# Patient Record
Sex: FEMALE | Race: WHITE | Employment: FULL TIME | URBAN - NONMETROPOLITAN AREA
[De-identification: names, ages, dates, MRNs, and addresses within clinical notes are randomized per-mention and may not be internally consistent; named-entity substitution may affect disease eponyms.]

---

## 2017-02-20 ENCOUNTER — OFFICE VISIT (OUTPATIENT)
Dept: PRIMARY CARE CLINIC | Age: 67
End: 2017-02-20
Payer: MEDICARE

## 2017-02-20 VITALS
BODY MASS INDEX: 25.04 KG/M2 | SYSTOLIC BLOOD PRESSURE: 124 MMHG | TEMPERATURE: 98.4 F | HEIGHT: 69 IN | DIASTOLIC BLOOD PRESSURE: 78 MMHG | RESPIRATION RATE: 16 BRPM | WEIGHT: 169.1 LBS | HEART RATE: 53 BPM | OXYGEN SATURATION: 98 %

## 2017-02-20 DIAGNOSIS — E53.8 B12 DEFICIENCY: ICD-10-CM

## 2017-02-20 DIAGNOSIS — Z23 NEED FOR VACCINATION WITH 13-POLYVALENT PNEUMOCOCCAL CONJUGATE VACCINE: ICD-10-CM

## 2017-02-20 DIAGNOSIS — E03.9 HYPOTHYROIDISM, UNSPECIFIED TYPE: Primary | ICD-10-CM

## 2017-02-20 DIAGNOSIS — E78.5 HYPERLIPIDEMIA, UNSPECIFIED HYPERLIPIDEMIA TYPE: ICD-10-CM

## 2017-02-20 DIAGNOSIS — Z12.31 SCREENING MAMMOGRAM, ENCOUNTER FOR: ICD-10-CM

## 2017-02-20 LAB
ANION GAP SERPL CALCULATED.3IONS-SCNC: 16 MMOL/L (ref 7–19)
BUN BLDV-MCNC: 19 MG/DL (ref 8–23)
CALCIUM SERPL-MCNC: 9.4 MG/DL (ref 8.8–10.2)
CHLORIDE BLD-SCNC: 103 MMOL/L (ref 98–111)
CHOLESTEROL, TOTAL: 246 MG/DL (ref 160–199)
CO2: 24 MMOL/L (ref 22–29)
CREAT SERPL-MCNC: 0.6 MG/DL (ref 0.5–0.9)
GFR NON-AFRICAN AMERICAN: >60
GLUCOSE BLD-MCNC: 93 MG/DL (ref 74–109)
HDLC SERPL-MCNC: 53 MG/DL (ref 65–121)
LDL CHOLESTEROL CALCULATED: 171 MG/DL
POTASSIUM SERPL-SCNC: 4.5 MMOL/L (ref 3.5–5)
SODIUM BLD-SCNC: 143 MMOL/L (ref 136–145)
T4 FREE: 0.9 NG/ML (ref 0.9–1.7)
TRIGL SERPL-MCNC: 112 MG/DL (ref 150–199)
TSH SERPL DL<=0.05 MIU/L-ACNC: 7.8 UIU/ML (ref 0.27–4.2)
VITAMIN B-12: 122 PG/ML (ref 211–946)

## 2017-02-20 PROCEDURE — 90670 PCV13 VACCINE IM: CPT | Performed by: FAMILY MEDICINE

## 2017-02-20 PROCEDURE — 99214 OFFICE O/P EST MOD 30 MIN: CPT | Performed by: FAMILY MEDICINE

## 2017-02-20 PROCEDURE — 36415 COLL VENOUS BLD VENIPUNCTURE: CPT | Performed by: FAMILY MEDICINE

## 2017-02-20 PROCEDURE — G0009 ADMIN PNEUMOCOCCAL VACCINE: HCPCS | Performed by: FAMILY MEDICINE

## 2017-02-21 RX ORDER — LEVOTHYROXINE SODIUM 0.07 MG/1
75 TABLET ORAL DAILY
Qty: 30 TABLET | Refills: 2 | Status: SHIPPED | OUTPATIENT
Start: 2017-02-21 | End: 2017-03-24 | Stop reason: SDUPTHER

## 2017-02-22 ASSESSMENT — ENCOUNTER SYMPTOMS
DIARRHEA: 0
SINUS PRESSURE: 0
COLOR CHANGE: 0
BLOOD IN STOOL: 0
ABDOMINAL PAIN: 0
COUGH: 0
EYE DISCHARGE: 0
CHEST TIGHTNESS: 0
WHEEZING: 0
EYE ITCHING: 0
RHINORRHEA: 0
NAUSEA: 0
EYE REDNESS: 0
VOMITING: 0

## 2017-02-28 DIAGNOSIS — Z12.11 SCREEN FOR COLON CANCER: Primary | ICD-10-CM

## 2017-02-28 LAB
CONTROL: NORMAL
HEMOCCULT STL QL: NORMAL

## 2017-02-28 PROCEDURE — 82274 ASSAY TEST FOR BLOOD FECAL: CPT | Performed by: FAMILY MEDICINE

## 2017-03-07 ENCOUNTER — HOSPITAL ENCOUNTER (OUTPATIENT)
Dept: WOMENS IMAGING | Age: 67
Discharge: HOME | End: 2017-03-07
Payer: MEDICARE

## 2017-03-07 DIAGNOSIS — Z12.31 SCREENING MAMMOGRAM, ENCOUNTER FOR: ICD-10-CM

## 2017-03-07 PROCEDURE — G0202 SCR MAMMO BI INCL CAD: HCPCS

## 2017-03-10 RX ORDER — ATORVASTATIN CALCIUM 40 MG/1
40 TABLET, FILM COATED ORAL NIGHTLY
Qty: 90 TABLET | Refills: 3 | Status: SHIPPED | OUTPATIENT
Start: 2017-03-10 | End: 2017-11-20 | Stop reason: SDUPTHER

## 2017-03-24 RX ORDER — LEVOTHYROXINE SODIUM 0.07 MG/1
75 TABLET ORAL DAILY
Qty: 90 TABLET | Refills: 3 | Status: SHIPPED | OUTPATIENT
Start: 2017-03-24 | End: 2017-05-31 | Stop reason: SDUPTHER

## 2017-04-03 ENCOUNTER — HOSPITAL ENCOUNTER (EMERGENCY)
Age: 67
Discharge: LEFT BEFORE DISCHARGE | End: 2017-04-03
Payer: MEDICARE

## 2017-04-03 VITALS
HEART RATE: 72 BPM | HEIGHT: 70 IN | WEIGHT: 170 LBS | TEMPERATURE: 100.4 F | OXYGEN SATURATION: 94 % | RESPIRATION RATE: 22 BRPM | BODY MASS INDEX: 24.34 KG/M2

## 2017-04-03 PROCEDURE — 4500000002 HC ER NO CHARGE

## 2017-04-03 ASSESSMENT — PAIN SCALES - GENERAL: PAINLEVEL_OUTOF10: 3

## 2017-04-03 ASSESSMENT — PAIN DESCRIPTION - FREQUENCY: FREQUENCY: INTERMITTENT

## 2017-04-04 ENCOUNTER — OFFICE VISIT (OUTPATIENT)
Dept: PRIMARY CARE CLINIC | Age: 67
End: 2017-04-04
Payer: MEDICARE

## 2017-04-04 ENCOUNTER — HOSPITAL ENCOUNTER (OUTPATIENT)
Dept: GENERAL RADIOLOGY | Age: 67
Discharge: HOME | End: 2017-04-04
Payer: MEDICARE

## 2017-04-04 VITALS
DIASTOLIC BLOOD PRESSURE: 64 MMHG | SYSTOLIC BLOOD PRESSURE: 112 MMHG | HEIGHT: 69 IN | BODY MASS INDEX: 25.48 KG/M2 | TEMPERATURE: 96.3 F | OXYGEN SATURATION: 99 % | RESPIRATION RATE: 18 BRPM | WEIGHT: 172 LBS | HEART RATE: 62 BPM

## 2017-04-04 DIAGNOSIS — R06.02 SHORTNESS OF BREATH: ICD-10-CM

## 2017-04-04 DIAGNOSIS — R07.9 CHEST PAIN, UNSPECIFIED TYPE: ICD-10-CM

## 2017-04-04 DIAGNOSIS — J40 BRONCHITIS: Primary | ICD-10-CM

## 2017-04-04 DIAGNOSIS — R22.2 MASS IN CHEST: Primary | ICD-10-CM

## 2017-04-04 PROCEDURE — 96372 THER/PROPH/DIAG INJ SC/IM: CPT | Performed by: FAMILY MEDICINE

## 2017-04-04 PROCEDURE — 99213 OFFICE O/P EST LOW 20 MIN: CPT | Performed by: FAMILY MEDICINE

## 2017-04-04 PROCEDURE — 93000 ELECTROCARDIOGRAM COMPLETE: CPT | Performed by: FAMILY MEDICINE

## 2017-04-04 PROCEDURE — 71020 XR CHEST STANDARD TWO VW: CPT

## 2017-04-04 RX ORDER — BETAMETHASONE SODIUM PHOSPHATE AND BETAMETHASONE ACETATE 3; 3 MG/ML; MG/ML
12 INJECTION, SUSPENSION INTRA-ARTICULAR; INTRALESIONAL; INTRAMUSCULAR; SOFT TISSUE ONCE
Status: COMPLETED | OUTPATIENT
Start: 2017-04-04 | End: 2017-04-04

## 2017-04-04 RX ORDER — ALBUTEROL SULFATE 90 UG/1
2 AEROSOL, METERED RESPIRATORY (INHALATION) EVERY 6 HOURS PRN
Qty: 1 INHALER | Refills: 3 | Status: SHIPPED | OUTPATIENT
Start: 2017-04-04 | End: 2018-05-24 | Stop reason: ALTCHOICE

## 2017-04-04 RX ADMIN — BETAMETHASONE SODIUM PHOSPHATE AND BETAMETHASONE ACETATE 12 MG: 3; 3 INJECTION, SUSPENSION INTRA-ARTICULAR; INTRALESIONAL; INTRAMUSCULAR; SOFT TISSUE at 11:29

## 2017-04-04 ASSESSMENT — ENCOUNTER SYMPTOMS
RHINORRHEA: 1
ABDOMINAL PAIN: 0
DIARRHEA: 0
NAUSEA: 0
COUGH: 1
WHEEZING: 0
BACK PAIN: 0
COLOR CHANGE: 0
EYE DISCHARGE: 0
VOMITING: 0
SINUS PRESSURE: 1

## 2017-04-07 ENCOUNTER — HOSPITAL ENCOUNTER (OUTPATIENT)
Dept: CT IMAGING | Age: 67
Discharge: HOME | End: 2017-04-07
Payer: MEDICARE

## 2017-04-07 DIAGNOSIS — R22.2 MASS IN CHEST: ICD-10-CM

## 2017-04-07 PROCEDURE — 71250 CT THORAX DX C-: CPT

## 2017-04-10 ENCOUNTER — TELEPHONE (OUTPATIENT)
Dept: PRIMARY CARE CLINIC | Age: 67
End: 2017-04-10

## 2017-04-11 ENCOUNTER — TELEPHONE (OUTPATIENT)
Dept: PRIMARY CARE CLINIC | Age: 67
End: 2017-04-11

## 2017-04-11 DIAGNOSIS — R22.2 CHEST MASS: Primary | ICD-10-CM

## 2017-04-24 ENCOUNTER — OFFICE VISIT (OUTPATIENT)
Dept: PRIMARY CARE CLINIC | Age: 67
End: 2017-04-24
Payer: MEDICARE

## 2017-04-24 VITALS
RESPIRATION RATE: 18 BRPM | TEMPERATURE: 98.3 F | SYSTOLIC BLOOD PRESSURE: 110 MMHG | WEIGHT: 169 LBS | BODY MASS INDEX: 25.03 KG/M2 | HEART RATE: 61 BPM | DIASTOLIC BLOOD PRESSURE: 68 MMHG | HEIGHT: 69 IN

## 2017-04-24 DIAGNOSIS — Z13.820 SCREENING FOR OSTEOPOROSIS: ICD-10-CM

## 2017-04-24 DIAGNOSIS — Z78.0 POSTMENOPAUSAL: ICD-10-CM

## 2017-04-24 DIAGNOSIS — E03.9 HYPOTHYROIDISM, UNSPECIFIED TYPE: Primary | ICD-10-CM

## 2017-04-24 PROCEDURE — 99213 OFFICE O/P EST LOW 20 MIN: CPT | Performed by: FAMILY MEDICINE

## 2017-04-25 ENCOUNTER — OFFICE VISIT (OUTPATIENT)
Dept: CARDIOTHORACIC SURGERY | Age: 67
End: 2017-04-25
Payer: MEDICARE

## 2017-04-25 VITALS
HEIGHT: 69 IN | WEIGHT: 169 LBS | SYSTOLIC BLOOD PRESSURE: 124 MMHG | OXYGEN SATURATION: 100 % | DIASTOLIC BLOOD PRESSURE: 60 MMHG | BODY MASS INDEX: 25.03 KG/M2

## 2017-04-25 DIAGNOSIS — R91.1 PULMONARY NODULE, RIGHT: Primary | ICD-10-CM

## 2017-04-25 PROCEDURE — 99204 OFFICE O/P NEW MOD 45 MIN: CPT | Performed by: THORACIC SURGERY (CARDIOTHORACIC VASCULAR SURGERY)

## 2017-04-25 ASSESSMENT — ENCOUNTER SYMPTOMS
DIARRHEA: 0
WHEEZING: 0
COLOR CHANGE: 0
ABDOMINAL PAIN: 0
VOMITING: 0
COUGH: 0
NAUSEA: 0
EYE DISCHARGE: 0
BACK PAIN: 0

## 2017-05-03 ENCOUNTER — HOSPITAL ENCOUNTER (OUTPATIENT)
Dept: WOMENS IMAGING | Age: 67
Discharge: HOME | End: 2017-05-03
Payer: MEDICARE

## 2017-05-03 DIAGNOSIS — Z78.0 POSTMENOPAUSAL: ICD-10-CM

## 2017-05-03 DIAGNOSIS — Z13.820 SCREENING FOR OSTEOPOROSIS: ICD-10-CM

## 2017-05-03 PROCEDURE — 77080 DXA BONE DENSITY AXIAL: CPT

## 2017-05-10 DIAGNOSIS — R91.1 PULMONARY NODULE: Primary | ICD-10-CM

## 2017-05-16 ENCOUNTER — HOSPITAL ENCOUNTER (OUTPATIENT)
Dept: NUCLEAR MEDICINE | Age: 67
Discharge: HOME | End: 2017-05-16
Payer: MEDICARE

## 2017-05-16 DIAGNOSIS — R91.1 PULMONARY NODULE: ICD-10-CM

## 2017-05-16 LAB
GLUCOSE BLD-MCNC: 94 MG/DL (ref 70–99)
PERFORMED ON: NORMAL

## 2017-05-16 PROCEDURE — 78815 PET IMAGE W/CT SKULL-THIGH: CPT

## 2017-05-16 PROCEDURE — 3430000000 HC RX DIAGNOSTIC RADIOPHARMACEUTICAL: Performed by: THORACIC SURGERY (CARDIOTHORACIC VASCULAR SURGERY)

## 2017-05-16 PROCEDURE — A9552 F18 FDG: HCPCS | Performed by: THORACIC SURGERY (CARDIOTHORACIC VASCULAR SURGERY)

## 2017-05-16 PROCEDURE — 82948 REAGENT STRIP/BLOOD GLUCOSE: CPT

## 2017-05-16 RX ORDER — FLUDEOXYGLUCOSE F 18 200 MCI/ML
10 INJECTION, SOLUTION INTRAVENOUS
Status: COMPLETED | OUTPATIENT
Start: 2017-05-16 | End: 2017-05-16

## 2017-05-16 RX ADMIN — FLUDEOXYGLUCOSE F 18 10 MILLICURIE: 200 INJECTION, SOLUTION INTRAVENOUS at 12:59

## 2017-05-17 ENCOUNTER — TELEPHONE (OUTPATIENT)
Dept: PRIMARY CARE CLINIC | Age: 67
End: 2017-05-17

## 2017-05-17 ENCOUNTER — TELEPHONE (OUTPATIENT)
Dept: CARDIOTHORACIC SURGERY | Age: 67
End: 2017-05-17

## 2017-05-17 DIAGNOSIS — R94.8 ABNORMAL PET SCAN OF COLON: Primary | ICD-10-CM

## 2017-05-30 ENCOUNTER — OFFICE VISIT (OUTPATIENT)
Dept: RETAIL CLINIC | Facility: CLINIC | Age: 67
End: 2017-05-30

## 2017-05-30 VITALS
RESPIRATION RATE: 18 BRPM | WEIGHT: 171.6 LBS | HEART RATE: 71 BPM | DIASTOLIC BLOOD PRESSURE: 79 MMHG | BODY MASS INDEX: 25.42 KG/M2 | SYSTOLIC BLOOD PRESSURE: 106 MMHG | HEIGHT: 69 IN | OXYGEN SATURATION: 98 % | TEMPERATURE: 98.2 F
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
## 2017-05-30 PROCEDURE — 99203 OFFICE O/P NEW LOW 30 MIN: CPT | Performed by: NURSE PRACTITIONER

## 2017-05-30 RX ORDER — ATORVASTATIN CALCIUM 80 MG/1
80 TABLET, FILM COATED ORAL DAILY
COMMUNITY
End: 2020-06-26

## 2017-05-30 RX ORDER — LEVOTHYROXINE SODIUM 0.07 MG/1
75 TABLET ORAL DAILY
COMMUNITY
End: 2020-06-26

## 2017-05-30 RX ORDER — LEVOTHYROXINE SODIUM 0.03 MG/1
25 TABLET ORAL DAILY
COMMUNITY
End: 2017-05-30

## 2017-05-31 RX ORDER — LEVOTHYROXINE SODIUM 0.07 MG/1
75 TABLET ORAL DAILY
Qty: 90 TABLET | Refills: 0 | Status: SHIPPED | OUTPATIENT
Start: 2017-05-31 | End: 2017-11-20 | Stop reason: SDUPTHER

## 2017-06-04 ENCOUNTER — OFFICE VISIT (OUTPATIENT)
Dept: RETAIL CLINIC | Facility: CLINIC | Age: 67
End: 2017-06-04

## 2017-06-04 VITALS
OXYGEN SATURATION: 98 % | TEMPERATURE: 97.8 F | RESPIRATION RATE: 20 BRPM | SYSTOLIC BLOOD PRESSURE: 106 MMHG | DIASTOLIC BLOOD PRESSURE: 62 MMHG | HEART RATE: 80 BPM

## 2017-06-04 DIAGNOSIS — J06.9 VIRAL UPPER RESPIRATORY TRACT INFECTION: Primary | ICD-10-CM

## 2017-06-04 DIAGNOSIS — J40 BRONCHITIS: ICD-10-CM

## 2017-06-04 PROCEDURE — 99213 OFFICE O/P EST LOW 20 MIN: CPT | Performed by: NURSE PRACTITIONER

## 2017-06-04 RX ORDER — PREDNISONE 20 MG/1
60 TABLET ORAL DAILY
Qty: 19 TABLET | Refills: 0 | Status: SHIPPED | OUTPATIENT
Start: 2017-06-04 | End: 2020-06-26

## 2017-06-04 RX ORDER — AZITHROMYCIN 250 MG/1
TABLET, FILM COATED ORAL
Qty: 6 TABLET | Refills: 0 | Status: SHIPPED | OUTPATIENT
Start: 2017-06-04 | End: 2020-06-26

## 2017-06-04 NOTE — PROGRESS NOTES
Chief Complaint   Patient presents with   • Cough   • Sore Throat     Subjective   Petty Cortés is a 66 y.o. female who presents to the clinic today with complaints cough and sore throat. She was treated for bronchitis April. She had abnormal chest x-ray and Dr. Mccartney worked her up with resolution of mass likely pulmonary infiltrate possibly pneumonia. This resolved, but she woke up yesterday with cough and sore throat that has gotten worse. Her son reports she is coughed all night. She reports she has chest burning during cough. Associated symptoms are sore throat and chills with no fever. She has mild frontal HA. She also reports nasal congestion and sinus pressure. She does smoke. She taking nothing for the cough or sore throat. She reports it is worse at night.   She reports flea bites almost resolved. She has not had her dog treated. Encouraged her to do this.       Current Outpatient Prescriptions:   •  atorvastatin (LIPITOR) 80 MG tablet, Take 80 mg by mouth Daily., Disp: , Rfl:   •  levothyroxine (SYNTHROID, LEVOTHROID) 75 MCG tablet, Take 75 mcg by mouth Daily., Disp: , Rfl:   •  triamcinolone (KENALOG) 0.1 % ointment, Apply  topically 3 (Three) Times a Day., Disp: 30 g, Rfl: 0    Allergies:  Review of patient's allergies indicates no known allergies.    Past Medical History:   Diagnosis Date   • Collapsed lung      Past Surgical History:   Procedure Laterality Date   • CHEST TUBE INSERTION     • TUBAL ABDOMINAL LIGATION       Family History   Problem Relation Age of Onset   • Stroke Mother    • Diabetes Mother    • Stroke Father    • Diabetes Brother    • Diabetes Child      Social History   Substance Use Topics   • Smoking status: Current Every Day Smoker     Packs/day: 1.00     Years: 47.00     Types: Cigarettes   • Smokeless tobacco: Never Used      Comment: Thinking about it due to pulmonary issues.    • Alcohol use No       Review of Systems  Review of Systems   Constitutional: Positive for  chills and fatigue.   HENT: Positive for sore throat.    Eyes: Negative.    Respiratory: Positive for cough.    Cardiovascular: Negative.    Gastrointestinal: Negative.    Endocrine: Negative.    Genitourinary: Negative.    Musculoskeletal: Negative.    Skin: Negative.    Allergic/Immunologic: Negative.    Neurological: Positive for headaches.   Hematological: Negative.    Psychiatric/Behavioral: Negative.        Objective   /62 (BP Location: Left arm, Patient Position: Sitting, Cuff Size: Adult)  Pulse 80  Temp 97.8 °F (36.6 °C) (Temporal Artery )   Resp 20  SpO2 98%      Physical Exam   Constitutional: She is oriented to person, place, and time. She appears well-developed and well-nourished.   HENT:   Head: Normocephalic and atraumatic.   Right Ear: Hearing, external ear and ear canal normal. Tympanic membrane is bulging.   Left Ear: Hearing, external ear and ear canal normal. Tympanic membrane is bulging.   Nose: Nose normal. Right sinus exhibits no maxillary sinus tenderness and no frontal sinus tenderness. Left sinus exhibits no maxillary sinus tenderness and no frontal sinus tenderness.   Mouth/Throat: Uvula is midline and mucous membranes are normal. Abnormal dentition (missing teeth). Posterior oropharyngeal erythema present. Tonsils are 1+ on the right. Tonsils are 1+ on the left. No tonsillar exudate.   Eyes: Pupils are equal, round, and reactive to light.   Neck: Normal range of motion.   Cardiovascular: Normal rate, regular rhythm and normal heart sounds.    Pulmonary/Chest: Effort normal and breath sounds normal. No stridor. No respiratory distress. She has no wheezes. She has no rales. She exhibits no tenderness.   Nonproductive cough noted during exam several times.    Lymphadenopathy:     She has no cervical adenopathy.   Neurological: She is alert and oriented to person, place, and time.   Skin: Skin is warm and dry.   Psychiatric: She has a normal mood and affect. Her behavior is normal.    Vitals reviewed.      Assessment/Plan     Petty was seen today for cough and sore throat.    Diagnoses and all orders for this visit:    Viral upper respiratory tract infection    Bronchitis    Other orders  -     azithromycin (ZITHROMAX Z-ROMEO) 250 MG tablet; Take 2 tablets the first day, then 1 tablet daily for 4 days.  -     predniSONE (DELTASONE) 20 MG tablet; Take 3 tablets by mouth Daily. 3 tabs po daily x 3 days  then 2 tabs daily x 3 days  then 1 tab daily x 3 days then 1/2 tab daily x 2 days    Reviewed chest x-ray, chest CT and PET looks like she had consolidation that resolved during workup of chest mass in April when she presented with similar symptoms. Her PE shows no evidence of pneumonia and lungs CTA, but will treat for underlying pneumonia and have her follow up with her pcp.     Follow up with pcp if does not improve over next few days. She has follow up on 6/13.   3 tabs po daily x 3 days  then 2 tabs daily x 3 days  then 1 tab daily x 3 days then 1/2 tab daily x 2 days  Go to emergency room if fever of over 101 develops, symptoms worsen or with any respiratory distress.     Recommend smoking cessation she is going to try to cut down.

## 2017-06-04 NOTE — PATIENT INSTRUCTIONS
Acute Bronchitis  Bronchitis is inflammation of the airways that extend from the windpipe into the lungs (bronchi). The inflammation often causes mucus to develop. This leads to a cough, which is the most common symptom of bronchitis.   In acute bronchitis, the condition usually develops suddenly and goes away over time, usually in a couple weeks. Smoking, allergies, and asthma can make bronchitis worse. Repeated episodes of bronchitis may cause further lung problems.   CAUSES  Acute bronchitis is most often caused by the same virus that causes a cold. The virus can spread from person to person (contagious) through coughing, sneezing, and touching contaminated objects.  SIGNS AND SYMPTOMS   · Cough.    · Fever.    · Coughing up mucus.    · Body aches.    · Chest congestion.    · Chills.    · Shortness of breath.    · Sore throat.    DIAGNOSIS   Acute bronchitis is usually diagnosed through a physical exam. Your health care provider will also ask you questions about your medical history. Tests, such as chest X-rays, are sometimes done to rule out other conditions.   TREATMENT   Acute bronchitis usually goes away in a couple weeks. Oftentimes, no medical treatment is necessary. Medicines are sometimes given for relief of fever or cough. Antibiotic medicines are usually not needed but may be prescribed in certain situations. In some cases, an inhaler may be recommended to help reduce shortness of breath and control the cough. A cool mist vaporizer may also be used to help thin bronchial secretions and make it easier to clear the chest.   HOME CARE INSTRUCTIONS  · Get plenty of rest.    · Drink enough fluids to keep your urine clear or pale yellow (unless you have a medical condition that requires fluid restriction). Increasing fluids may help thin your respiratory secretions (sputum) and reduce chest congestion, and it will prevent dehydration.    · Take medicines only as directed by your health care provider.  · If  you were prescribed an antibiotic medicine, finish it all even if you start to feel better.  · Avoid smoking and secondhand smoke. Exposure to cigarette smoke or irritating chemicals will make bronchitis worse. If you are a smoker, consider using nicotine gum or skin patches to help control withdrawal symptoms. Quitting smoking will help your lungs heal faster.    · Reduce the chances of another bout of acute bronchitis by washing your hands frequently, avoiding people with cold symptoms, and trying not to touch your hands to your mouth, nose, or eyes.    · Keep all follow-up visits as directed by your health care provider.    SEEK MEDICAL CARE IF:  Your symptoms do not improve after 1 week of treatment.   SEEK IMMEDIATE MEDICAL CARE IF:  · You develop an increased fever or chills.    · You have chest pain.    · You have severe shortness of breath.  · You have bloody sputum.    · You develop dehydration.  · You faint or repeatedly feel like you are going to pass out.  · You develop repeated vomiting.  · You develop a severe headache.  MAKE SURE YOU:   · Understand these instructions.  · Will watch your condition.  · Will get help right away if you are not doing well or get worse.     This information is not intended to replace advice given to you by your health care provider. Make sure you discuss any questions you have with your health care provider.     Document Released: 01/25/2006 Document Revised: 01/08/2016 Document Reviewed: 06/10/2014  [employer] Interactive Patient Education ©2017 [employer]    Follow up with pcp if does not improve over next few days. She has follow up on 6/13.   3 tabs po daily x 3 days  then 2 tabs daily x 3 days  then 1 tab daily x 3 days then 1/2 tab daily x 2 days  Go to emergency room if fever of over 101 develops, symptoms worsen or with any respiratory distress.   Upper Respiratory Infection, Adult  Most upper respiratory infections (URIs) are a viral infection of the air passages  "leading to the lungs. A URI affects the nose, throat, and upper air passages. The most common type of URI is nasopharyngitis and is typically referred to as \"the common cold.\"  URIs run their course and usually go away on their own. Most of the time, a URI does not require medical attention, but sometimes a bacterial infection in the upper airways can follow a viral infection. This is called a secondary infection. Sinus and middle ear infections are common types of secondary upper respiratory infections.  Bacterial pneumonia can also complicate a URI. A URI can worsen asthma and chronic obstructive pulmonary disease (COPD). Sometimes, these complications can require emergency medical care and may be life threatening.   CAUSES  Almost all URIs are caused by viruses. A virus is a type of germ and can spread from one person to another.   RISKS FACTORS  You may be at risk for a URI if:   · You smoke.    · You have chronic heart or lung disease.  · You have a weakened defense (immune) system.    · You are very young or very old.    · You have nasal allergies or asthma.  · You work in crowded or poorly ventilated areas.  · You work in health care facilities or schools.  SIGNS AND SYMPTOMS   Symptoms typically develop 2-3 days after you come in contact with a cold virus. Most viral URIs last 7-10 days. However, viral URIs from the influenza virus (flu virus) can last 14-18 days and are typically more severe. Symptoms may include:   · Runny or stuffy (congested) nose.    · Sneezing.    · Cough.    · Sore throat.    · Headache.    · Fatigue.    · Fever.    · Loss of appetite.    · Pain in your forehead, behind your eyes, and over your cheekbones (sinus pain).  · Muscle aches.    DIAGNOSIS   Your health care provider may diagnose a URI by:  · Physical exam.  · Tests to check that your symptoms are not due to another condition such as:  ¨ Strep throat.  ¨ Sinusitis.  ¨ Pneumonia.  ¨ Asthma.  TREATMENT   A URI goes away on its " own with time. It cannot be cured with medicines, but medicines may be prescribed or recommended to relieve symptoms. Medicines may help:  · Reduce your fever.  · Reduce your cough.  · Relieve nasal congestion.  HOME CARE INSTRUCTIONS   · Take medicines only as directed by your health care provider.    · Gargle warm saltwater or take cough drops to comfort your throat as directed by your health care provider.  · Use a warm mist humidifier or inhale steam from a shower to increase air moisture. This may make it easier to breathe.  · Drink enough fluid to keep your urine clear or pale yellow.    · Eat soups and other clear broths and maintain good nutrition.    · Rest as needed.    · Return to work when your temperature has returned to normal or as your health care provider advises. You may need to stay home longer to avoid infecting others. You can also use a face mask and careful hand washing to prevent spread of the virus.  · Increase the usage of your inhaler if you have asthma.    · Do not use any tobacco products, including cigarettes, chewing tobacco, or electronic cigarettes. If you need help quitting, ask your health care provider.  PREVENTION   The best way to protect yourself from getting a cold is to practice good hygiene.   · Avoid oral or hand contact with people with cold symptoms.    · Wash your hands often if contact occurs.    There is no clear evidence that vitamin C, vitamin E, echinacea, or exercise reduces the chance of developing a cold. However, it is always recommended to get plenty of rest, exercise, and practice good nutrition.   SEEK MEDICAL CARE IF:   · You are getting worse rather than better.    · Your symptoms are not controlled by medicine.    · You have chills.  · You have worsening shortness of breath.  · You have brown or red mucus.  · You have yellow or brown nasal discharge.  · You have pain in your face, especially when you bend forward.  · You have a fever.  · You have swollen  neck glands.  · You have pain while swallowing.  · You have white areas in the back of your throat.  SEEK IMMEDIATE MEDICAL CARE IF:   · You have severe or persistent:    Headache.    Ear pain.    Sinus pain.    Chest pain.  · You have chronic lung disease and any of the following:    Wheezing.    Prolonged cough.    Coughing up blood.    A change in your usual mucus.  · You have a stiff neck.  · You have changes in your:    Vision.    Hearing.    Thinking.    Mood.  MAKE SURE YOU:   · Understand these instructions.  · Will watch your condition.  · Will get help right away if you are not doing well or get worse.     This information is not intended to replace advice given to you by your health care provider. Make sure you discuss any questions you have with your health care provider.     Document Released: 06/13/2002 Document Revised: 05/03/2016 Document Reviewed: 03/25/2015  [employer] Interactive Patient Education ©2017 [employer]    Follow up with pcp if does not improve over next few days. She has follow up on 6/13.   3 tabs po daily x 3 days  then 2 tabs daily x 3 days  then 1 tab daily x 3 days then 1/2 tab daily x 2 days  Go to emergency room if fever of over 101 develops, symptoms worsen or with any respiratory distress.

## 2017-06-07 ENCOUNTER — OFFICE VISIT (OUTPATIENT)
Dept: GASTROENTEROLOGY | Age: 67
End: 2017-06-07
Payer: MEDICARE

## 2017-06-07 VITALS
BODY MASS INDEX: 25.03 KG/M2 | HEART RATE: 66 BPM | WEIGHT: 169 LBS | DIASTOLIC BLOOD PRESSURE: 60 MMHG | OXYGEN SATURATION: 97 % | SYSTOLIC BLOOD PRESSURE: 110 MMHG | HEIGHT: 69 IN

## 2017-06-07 DIAGNOSIS — Z83.71 FAMILY HISTORY OF POLYPS IN THE COLON: ICD-10-CM

## 2017-06-07 DIAGNOSIS — R94.8 ABNORMAL PET SCAN OF COLON: Primary | ICD-10-CM

## 2017-06-07 PROBLEM — Z83.719 FAMILY HISTORY OF POLYPS IN THE COLON: Status: ACTIVE | Noted: 2017-06-07

## 2017-06-07 PROCEDURE — 99214 OFFICE O/P EST MOD 30 MIN: CPT | Performed by: NURSE PRACTITIONER

## 2017-06-07 ASSESSMENT — ENCOUNTER SYMPTOMS
CONSTIPATION: 0
ANAL BLEEDING: 0
BLOOD IN STOOL: 0
ABDOMINAL DISTENTION: 0
PHOTOPHOBIA: 0
NAUSEA: 0
DIARRHEA: 1
SORE THROAT: 0
BACK PAIN: 0
CHOKING: 0
WHEEZING: 0
VOMITING: 0
COUGH: 1
RECTAL PAIN: 0
ABDOMINAL PAIN: 0

## 2017-06-12 ENCOUNTER — OFFICE VISIT (OUTPATIENT)
Dept: PRIMARY CARE CLINIC | Age: 67
End: 2017-06-12
Payer: MEDICARE

## 2017-06-12 VITALS
BODY MASS INDEX: 25.62 KG/M2 | TEMPERATURE: 97.6 F | WEIGHT: 173 LBS | DIASTOLIC BLOOD PRESSURE: 64 MMHG | HEART RATE: 50 BPM | HEIGHT: 69 IN | SYSTOLIC BLOOD PRESSURE: 125 MMHG | RESPIRATION RATE: 18 BRPM

## 2017-06-12 DIAGNOSIS — J40 BRONCHITIS: ICD-10-CM

## 2017-06-12 DIAGNOSIS — E03.9 HYPOTHYROIDISM, UNSPECIFIED TYPE: Primary | ICD-10-CM

## 2017-06-12 LAB
T4 FREE: 1.6 NG/ML (ref 0.9–1.7)
TSH SERPL DL<=0.05 MIU/L-ACNC: 0.7 UIU/ML (ref 0.27–4.2)

## 2017-06-12 PROCEDURE — 36415 COLL VENOUS BLD VENIPUNCTURE: CPT | Performed by: FAMILY MEDICINE

## 2017-06-12 PROCEDURE — 99213 OFFICE O/P EST LOW 20 MIN: CPT | Performed by: FAMILY MEDICINE

## 2017-06-12 RX ORDER — BENZONATATE 100 MG/1
100 CAPSULE ORAL 3 TIMES DAILY PRN
Qty: 30 CAPSULE | Refills: 0 | Status: SHIPPED | OUTPATIENT
Start: 2017-06-12 | End: 2017-06-19

## 2017-06-12 ASSESSMENT — ENCOUNTER SYMPTOMS
ABDOMINAL PAIN: 0
COUGH: 1
COLOR CHANGE: 0
NAUSEA: 0
VOMITING: 0
WHEEZING: 1
DIARRHEA: 0
BACK PAIN: 0
EYE DISCHARGE: 0

## 2017-06-27 ENCOUNTER — ANESTHESIA (OUTPATIENT)
Dept: OPERATING ROOM | Age: 67
End: 2017-06-27
Payer: MEDICARE

## 2017-06-27 ENCOUNTER — ANESTHESIA EVENT (OUTPATIENT)
Dept: OPERATING ROOM | Age: 67
End: 2017-06-27

## 2017-06-27 ENCOUNTER — HOSPITAL ENCOUNTER (OUTPATIENT)
Age: 67
Setting detail: SPECIMEN
Discharge: HOME | End: 2017-06-27
Payer: MEDICARE

## 2017-06-27 ENCOUNTER — HOSPITAL ENCOUNTER (OUTPATIENT)
Age: 67
Setting detail: OUTPATIENT SURGERY
Discharge: HOME | End: 2017-06-27
Attending: INTERNAL MEDICINE | Admitting: INTERNAL MEDICINE
Payer: MEDICARE

## 2017-06-27 VITALS
DIASTOLIC BLOOD PRESSURE: 66 MMHG | OXYGEN SATURATION: 98 % | RESPIRATION RATE: 18 BRPM | SYSTOLIC BLOOD PRESSURE: 126 MMHG | WEIGHT: 173 LBS | BODY MASS INDEX: 25.62 KG/M2 | HEART RATE: 46 BPM | HEIGHT: 69 IN | TEMPERATURE: 98.7 F

## 2017-06-27 VITALS — OXYGEN SATURATION: 99 % | DIASTOLIC BLOOD PRESSURE: 67 MMHG | SYSTOLIC BLOOD PRESSURE: 115 MMHG

## 2017-06-27 PROCEDURE — G8918 PT W/O PREOP ORDER IV AB PRO: HCPCS

## 2017-06-27 PROCEDURE — 88305 TISSUE EXAM BY PATHOLOGIST: CPT

## 2017-06-27 PROCEDURE — 45380 COLONOSCOPY AND BIOPSY: CPT | Performed by: INTERNAL MEDICINE

## 2017-06-27 PROCEDURE — 45380 COLONOSCOPY AND BIOPSY: CPT

## 2017-06-27 PROCEDURE — 00810 PR ANESTH,INTESTINE,SCOPE,LOW: CPT | Performed by: NURSE ANESTHETIST, CERTIFIED REGISTERED

## 2017-06-27 PROCEDURE — G8907 PT DOC NO EVENTS ON DISCHARG: HCPCS

## 2017-06-27 RX ORDER — PROPOFOL 10 MG/ML
INJECTION, EMULSION INTRAVENOUS PRN
Status: DISCONTINUED | OUTPATIENT
Start: 2017-06-27 | End: 2017-06-27 | Stop reason: SDUPTHER

## 2017-06-27 RX ORDER — SODIUM CHLORIDE, SODIUM LACTATE, POTASSIUM CHLORIDE, CALCIUM CHLORIDE 600; 310; 30; 20 MG/100ML; MG/100ML; MG/100ML; MG/100ML
INJECTION, SOLUTION INTRAVENOUS CONTINUOUS PRN
Status: DISCONTINUED | OUTPATIENT
Start: 2017-06-27 | End: 2017-06-27 | Stop reason: SDUPTHER

## 2017-06-27 RX ADMIN — PROPOFOL 200 MG: 10 INJECTION, EMULSION INTRAVENOUS at 10:46

## 2017-06-27 RX ADMIN — SODIUM CHLORIDE, SODIUM LACTATE, POTASSIUM CHLORIDE, CALCIUM CHLORIDE: 600; 310; 30; 20 INJECTION, SOLUTION INTRAVENOUS at 10:42

## 2017-11-20 ENCOUNTER — OFFICE VISIT (OUTPATIENT)
Dept: PRIMARY CARE CLINIC | Age: 67
End: 2017-11-20
Payer: MEDICARE

## 2017-11-20 VITALS
RESPIRATION RATE: 20 BRPM | TEMPERATURE: 96.6 F | HEART RATE: 77 BPM | WEIGHT: 170 LBS | HEIGHT: 69 IN | BODY MASS INDEX: 25.18 KG/M2 | OXYGEN SATURATION: 97 % | DIASTOLIC BLOOD PRESSURE: 66 MMHG | SYSTOLIC BLOOD PRESSURE: 104 MMHG

## 2017-11-20 DIAGNOSIS — R53.83 FATIGUE, UNSPECIFIED TYPE: ICD-10-CM

## 2017-11-20 DIAGNOSIS — E03.9 HYPOTHYROIDISM, UNSPECIFIED TYPE: Primary | ICD-10-CM

## 2017-11-20 DIAGNOSIS — Z23 NEED FOR INFLUENZA VACCINATION: ICD-10-CM

## 2017-11-20 LAB
T4 FREE: 0.7 NG/DL (ref 0.9–1.7)
TSH SERPL DL<=0.05 MIU/L-ACNC: 19.47 UIU/ML (ref 0.27–4.2)

## 2017-11-20 PROCEDURE — G8427 DOCREV CUR MEDS BY ELIG CLIN: HCPCS | Performed by: FAMILY MEDICINE

## 2017-11-20 PROCEDURE — 3014F SCREEN MAMMO DOC REV: CPT | Performed by: FAMILY MEDICINE

## 2017-11-20 PROCEDURE — G8484 FLU IMMUNIZE NO ADMIN: HCPCS | Performed by: FAMILY MEDICINE

## 2017-11-20 PROCEDURE — 1123F ACP DISCUSS/DSCN MKR DOCD: CPT | Performed by: FAMILY MEDICINE

## 2017-11-20 PROCEDURE — 4040F PNEUMOC VAC/ADMIN/RCVD: CPT | Performed by: FAMILY MEDICINE

## 2017-11-20 PROCEDURE — G8419 CALC BMI OUT NRM PARAM NOF/U: HCPCS | Performed by: FAMILY MEDICINE

## 2017-11-20 PROCEDURE — 90688 IIV4 VACCINE SPLT 0.5 ML IM: CPT | Performed by: FAMILY MEDICINE

## 2017-11-20 PROCEDURE — 99214 OFFICE O/P EST MOD 30 MIN: CPT | Performed by: FAMILY MEDICINE

## 2017-11-20 PROCEDURE — 3017F COLORECTAL CA SCREEN DOC REV: CPT | Performed by: FAMILY MEDICINE

## 2017-11-20 PROCEDURE — G0008 ADMIN INFLUENZA VIRUS VAC: HCPCS | Performed by: FAMILY MEDICINE

## 2017-11-20 PROCEDURE — 1090F PRES/ABSN URINE INCON ASSESS: CPT | Performed by: FAMILY MEDICINE

## 2017-11-20 PROCEDURE — 4004F PT TOBACCO SCREEN RCVD TLK: CPT | Performed by: FAMILY MEDICINE

## 2017-11-20 PROCEDURE — 36415 COLL VENOUS BLD VENIPUNCTURE: CPT | Performed by: FAMILY MEDICINE

## 2017-11-20 PROCEDURE — G8399 PT W/DXA RESULTS DOCUMENT: HCPCS | Performed by: FAMILY MEDICINE

## 2017-11-20 RX ORDER — ATORVASTATIN CALCIUM 40 MG/1
40 TABLET, FILM COATED ORAL NIGHTLY
Qty: 90 TABLET | Refills: 3 | Status: SHIPPED | OUTPATIENT
Start: 2017-11-20 | End: 2019-04-24 | Stop reason: SDUPTHER

## 2017-11-20 RX ORDER — LEVOTHYROXINE SODIUM 0.07 MG/1
75 TABLET ORAL DAILY
Qty: 90 TABLET | Refills: 3 | Status: SHIPPED | OUTPATIENT
Start: 2017-11-20 | End: 2018-02-20 | Stop reason: DRUGHIGH

## 2017-11-20 ASSESSMENT — ENCOUNTER SYMPTOMS
WHEEZING: 0
COLOR CHANGE: 0
EYE DISCHARGE: 0
BACK PAIN: 0
VOMITING: 0
COUGH: 0
DIARRHEA: 0
ABDOMINAL PAIN: 0
NAUSEA: 0

## 2017-11-20 NOTE — PROGRESS NOTES
Subjective:      Patient ID: Jake Galicia is a 73 y.o. female. HPI  Hypothyroidism: Recent symptoms: fatigue. She denies dry skin. Patient is  taking her medication consistently on an empty stomach. Fatigue: Patient complains of fatigue. Symptoms began several days ago. Sentinal symptom the patient feels fatigue began with: none. Symptoms of her fatigue have been fatigue with paradoxical insomnia. Patient describes the following psychologic symptoms: none. Patient denies fever, significant change in weight and GI blood loss. Symptoms have stabilized. Severity has been mild. Previous visits for this problem: none. No results found for: [facility]  Lab Results   Component Value Date    TSH 0.70 06/12/2017    TSH 7.80 (H) 02/20/2017    TSH 11.97 (H) 10/17/2016     Review of Systems   Constitutional: Positive for fatigue. Negative for activity change, appetite change and fever. HENT: Negative for congestion and nosebleeds. Eyes: Negative for discharge. Respiratory: Negative for cough and wheezing. Cardiovascular: Negative for chest pain and leg swelling. Gastrointestinal: Negative for abdominal pain, diarrhea, nausea and vomiting. Genitourinary: Negative for difficulty urinating, frequency and urgency. Musculoskeletal: Negative for back pain and gait problem. Skin: Negative for color change and rash. Neurological: Negative for dizziness and headaches. Hematological: Does not bruise/bleed easily. Psychiatric/Behavioral: Negative for sleep disturbance (tossing and turning at night) and suicidal ideas. Objective:   Physical Exam   Constitutional: She is oriented to person, place, and time. She appears well-developed and well-nourished. HENT:   Head: Normocephalic and atraumatic. Neck: Normal range of motion. Neck supple. Carotid bruit is not present. No thyroid mass and no thyromegaly present. Cardiovascular: Normal rate, regular rhythm and intact distal pulses.   Exam

## 2018-01-05 ENCOUNTER — NURSE ONLY (OUTPATIENT)
Dept: PRIMARY CARE CLINIC | Age: 68
End: 2018-01-05
Payer: MEDICARE

## 2018-01-05 DIAGNOSIS — E03.9 HYPOTHYROIDISM, UNSPECIFIED TYPE: Primary | ICD-10-CM

## 2018-01-05 LAB
T4 FREE: 1.3 NG/DL (ref 0.9–1.7)
TSH SERPL DL<=0.05 MIU/L-ACNC: 4.78 UIU/ML (ref 0.27–4.2)

## 2018-01-05 PROCEDURE — 36415 COLL VENOUS BLD VENIPUNCTURE: CPT | Performed by: FAMILY MEDICINE

## 2018-01-09 RX ORDER — LEVOTHYROXINE SODIUM 88 UG/1
88 TABLET ORAL DAILY
Qty: 30 TABLET | Refills: 3 | Status: SHIPPED | OUTPATIENT
Start: 2018-01-09 | End: 2018-02-21 | Stop reason: SDUPTHER

## 2018-02-20 ENCOUNTER — OFFICE VISIT (OUTPATIENT)
Dept: PRIMARY CARE CLINIC | Age: 68
End: 2018-02-20
Payer: MEDICARE

## 2018-02-20 VITALS
WEIGHT: 167 LBS | OXYGEN SATURATION: 98 % | RESPIRATION RATE: 20 BRPM | DIASTOLIC BLOOD PRESSURE: 76 MMHG | HEART RATE: 60 BPM | HEIGHT: 69 IN | SYSTOLIC BLOOD PRESSURE: 134 MMHG | BODY MASS INDEX: 24.73 KG/M2 | TEMPERATURE: 97.3 F

## 2018-02-20 DIAGNOSIS — E03.9 ACQUIRED HYPOTHYROIDISM: Primary | ICD-10-CM

## 2018-02-20 DIAGNOSIS — Z23 NEED FOR PNEUMOCOCCAL VACCINATION: ICD-10-CM

## 2018-02-20 PROCEDURE — G8484 FLU IMMUNIZE NO ADMIN: HCPCS | Performed by: FAMILY MEDICINE

## 2018-02-20 PROCEDURE — G8399 PT W/DXA RESULTS DOCUMENT: HCPCS | Performed by: FAMILY MEDICINE

## 2018-02-20 PROCEDURE — 99213 OFFICE O/P EST LOW 20 MIN: CPT | Performed by: FAMILY MEDICINE

## 2018-02-20 PROCEDURE — 1123F ACP DISCUSS/DSCN MKR DOCD: CPT | Performed by: FAMILY MEDICINE

## 2018-02-20 PROCEDURE — 3017F COLORECTAL CA SCREEN DOC REV: CPT | Performed by: FAMILY MEDICINE

## 2018-02-20 PROCEDURE — 4004F PT TOBACCO SCREEN RCVD TLK: CPT | Performed by: FAMILY MEDICINE

## 2018-02-20 PROCEDURE — G8427 DOCREV CUR MEDS BY ELIG CLIN: HCPCS | Performed by: FAMILY MEDICINE

## 2018-02-20 PROCEDURE — 3014F SCREEN MAMMO DOC REV: CPT | Performed by: FAMILY MEDICINE

## 2018-02-20 PROCEDURE — 1090F PRES/ABSN URINE INCON ASSESS: CPT | Performed by: FAMILY MEDICINE

## 2018-02-20 PROCEDURE — 4040F PNEUMOC VAC/ADMIN/RCVD: CPT | Performed by: FAMILY MEDICINE

## 2018-02-20 PROCEDURE — G0009 ADMIN PNEUMOCOCCAL VACCINE: HCPCS | Performed by: FAMILY MEDICINE

## 2018-02-20 PROCEDURE — 90732 PPSV23 VACC 2 YRS+ SUBQ/IM: CPT | Performed by: FAMILY MEDICINE

## 2018-02-20 PROCEDURE — G8420 CALC BMI NORM PARAMETERS: HCPCS | Performed by: FAMILY MEDICINE

## 2018-02-20 ASSESSMENT — PATIENT HEALTH QUESTIONNAIRE - PHQ9
SUM OF ALL RESPONSES TO PHQ QUESTIONS 1-9: 0
2. FEELING DOWN, DEPRESSED OR HOPELESS: 0
SUM OF ALL RESPONSES TO PHQ9 QUESTIONS 1 & 2: 0
1. LITTLE INTEREST OR PLEASURE IN DOING THINGS: 0

## 2018-02-21 RX ORDER — LEVOTHYROXINE SODIUM 88 UG/1
88 TABLET ORAL DAILY
Qty: 30 TABLET | Refills: 3 | Status: SHIPPED | OUTPATIENT
Start: 2018-02-21 | End: 2018-02-23 | Stop reason: SDUPTHER

## 2018-02-23 RX ORDER — LEVOTHYROXINE SODIUM 88 UG/1
88 TABLET ORAL DAILY
Qty: 90 TABLET | Refills: 3 | Status: SHIPPED | OUTPATIENT
Start: 2018-02-23 | End: 2019-02-27 | Stop reason: SDUPTHER

## 2018-02-24 ASSESSMENT — ENCOUNTER SYMPTOMS
COLOR CHANGE: 0
NAUSEA: 0
EYE DISCHARGE: 0
WHEEZING: 0
DIARRHEA: 0
BACK PAIN: 0
VOMITING: 0
ABDOMINAL PAIN: 0
COUGH: 0

## 2018-03-19 ENCOUNTER — OFFICE VISIT (OUTPATIENT)
Dept: PRIMARY CARE CLINIC | Age: 68
End: 2018-03-19
Payer: MEDICARE

## 2018-03-19 VITALS
WEIGHT: 166.4 LBS | HEART RATE: 71 BPM | TEMPERATURE: 97.3 F | DIASTOLIC BLOOD PRESSURE: 72 MMHG | OXYGEN SATURATION: 96 % | SYSTOLIC BLOOD PRESSURE: 121 MMHG | HEIGHT: 65 IN | BODY MASS INDEX: 27.72 KG/M2

## 2018-03-19 DIAGNOSIS — J01.00 ACUTE NON-RECURRENT MAXILLARY SINUSITIS: Primary | ICD-10-CM

## 2018-03-19 DIAGNOSIS — J02.9 SORE THROAT: ICD-10-CM

## 2018-03-19 LAB — S PYO AG THROAT QL: NORMAL

## 2018-03-19 PROCEDURE — 87880 STREP A ASSAY W/OPTIC: CPT | Performed by: FAMILY MEDICINE

## 2018-03-19 PROCEDURE — 1123F ACP DISCUSS/DSCN MKR DOCD: CPT | Performed by: FAMILY MEDICINE

## 2018-03-19 PROCEDURE — 1090F PRES/ABSN URINE INCON ASSESS: CPT | Performed by: FAMILY MEDICINE

## 2018-03-19 PROCEDURE — 4040F PNEUMOC VAC/ADMIN/RCVD: CPT | Performed by: FAMILY MEDICINE

## 2018-03-19 PROCEDURE — 99213 OFFICE O/P EST LOW 20 MIN: CPT | Performed by: FAMILY MEDICINE

## 2018-03-19 PROCEDURE — 3014F SCREEN MAMMO DOC REV: CPT | Performed by: FAMILY MEDICINE

## 2018-03-19 PROCEDURE — 4004F PT TOBACCO SCREEN RCVD TLK: CPT | Performed by: FAMILY MEDICINE

## 2018-03-19 PROCEDURE — 3017F COLORECTAL CA SCREEN DOC REV: CPT | Performed by: FAMILY MEDICINE

## 2018-03-19 PROCEDURE — G8482 FLU IMMUNIZE ORDER/ADMIN: HCPCS | Performed by: FAMILY MEDICINE

## 2018-03-19 PROCEDURE — G8419 CALC BMI OUT NRM PARAM NOF/U: HCPCS | Performed by: FAMILY MEDICINE

## 2018-03-19 PROCEDURE — G8427 DOCREV CUR MEDS BY ELIG CLIN: HCPCS | Performed by: FAMILY MEDICINE

## 2018-03-19 PROCEDURE — G8399 PT W/DXA RESULTS DOCUMENT: HCPCS | Performed by: FAMILY MEDICINE

## 2018-03-20 RX ORDER — METHYLPREDNISOLONE 4 MG/1
TABLET ORAL
Qty: 1 KIT | Refills: 0 | Status: SHIPPED | OUTPATIENT
Start: 2018-03-20 | End: 2018-03-26

## 2018-03-20 RX ORDER — BENZONATATE 100 MG/1
100 CAPSULE ORAL 3 TIMES DAILY PRN
Qty: 30 CAPSULE | Refills: 0 | Status: SHIPPED | OUTPATIENT
Start: 2018-03-20 | End: 2018-03-27

## 2018-03-20 RX ORDER — AZITHROMYCIN 250 MG/1
TABLET, FILM COATED ORAL
Qty: 1 PACKET | Refills: 0 | Status: SHIPPED | OUTPATIENT
Start: 2018-03-20 | End: 2018-03-30

## 2018-03-21 ASSESSMENT — ENCOUNTER SYMPTOMS
EYE DISCHARGE: 0
ABDOMINAL PAIN: 0
SINUS PRESSURE: 1
EYE ITCHING: 0
SINUS PAIN: 1
NAUSEA: 0
COLOR CHANGE: 0
WHEEZING: 0
VOMITING: 0
DIARRHEA: 0
CHEST TIGHTNESS: 0
RHINORRHEA: 1
COUGH: 1
SORE THROAT: 1
EYE REDNESS: 0

## 2018-05-24 ENCOUNTER — OFFICE VISIT (OUTPATIENT)
Dept: PRIMARY CARE CLINIC | Age: 68
End: 2018-05-24
Payer: MEDICARE

## 2018-05-24 VITALS
SYSTOLIC BLOOD PRESSURE: 112 MMHG | HEIGHT: 65 IN | DIASTOLIC BLOOD PRESSURE: 66 MMHG | BODY MASS INDEX: 27.99 KG/M2 | OXYGEN SATURATION: 98 % | RESPIRATION RATE: 20 BRPM | WEIGHT: 168 LBS | TEMPERATURE: 98.3 F | HEART RATE: 65 BPM

## 2018-05-24 DIAGNOSIS — R06.02 SHORTNESS OF BREATH: ICD-10-CM

## 2018-05-24 DIAGNOSIS — E03.9 ACQUIRED HYPOTHYROIDISM: ICD-10-CM

## 2018-05-24 DIAGNOSIS — R53.83 FATIGUE, UNSPECIFIED TYPE: Primary | ICD-10-CM

## 2018-05-24 LAB
BASOPHILS ABSOLUTE: 0 K/UL (ref 0–0.2)
BASOPHILS RELATIVE PERCENT: 0.2 % (ref 0–1)
EOSINOPHILS ABSOLUTE: 0.1 K/UL (ref 0–0.6)
EOSINOPHILS RELATIVE PERCENT: 1.9 % (ref 0–5)
HCT VFR BLD CALC: 42 % (ref 37–47)
HEMOGLOBIN: 13.2 G/DL (ref 12–16)
LYMPHOCYTES ABSOLUTE: 1.6 K/UL (ref 1.1–4.5)
LYMPHOCYTES RELATIVE PERCENT: 26 % (ref 20–40)
MCH RBC QN AUTO: 28.9 PG (ref 27–31)
MCHC RBC AUTO-ENTMCNC: 31.4 G/DL (ref 33–37)
MCV RBC AUTO: 91.9 FL (ref 81–99)
MONOCYTES ABSOLUTE: 0.5 K/UL (ref 0–0.9)
MONOCYTES RELATIVE PERCENT: 8 % (ref 0–10)
NEUTROPHILS ABSOLUTE: 4 K/UL (ref 1.5–7.5)
NEUTROPHILS RELATIVE PERCENT: 63.6 % (ref 50–65)
PDW BLD-RTO: 13.8 % (ref 11.5–14.5)
PLATELET # BLD: 144 K/UL (ref 130–400)
PMV BLD AUTO: 11.9 FL (ref 9.4–12.3)
RBC # BLD: 4.57 M/UL (ref 4.2–5.4)
T4 FREE: 1.5 NG/DL (ref 0.9–1.7)
TSH SERPL DL<=0.05 MIU/L-ACNC: 1.25 UIU/ML (ref 0.27–4.2)
VITAMIN B-12: 150 PG/ML (ref 211–946)
WBC # BLD: 6.2 K/UL (ref 4.8–10.8)

## 2018-05-24 PROCEDURE — 1090F PRES/ABSN URINE INCON ASSESS: CPT | Performed by: FAMILY MEDICINE

## 2018-05-24 PROCEDURE — 99214 OFFICE O/P EST MOD 30 MIN: CPT | Performed by: FAMILY MEDICINE

## 2018-05-24 PROCEDURE — G8427 DOCREV CUR MEDS BY ELIG CLIN: HCPCS | Performed by: FAMILY MEDICINE

## 2018-05-24 PROCEDURE — G8399 PT W/DXA RESULTS DOCUMENT: HCPCS | Performed by: FAMILY MEDICINE

## 2018-05-24 PROCEDURE — 1123F ACP DISCUSS/DSCN MKR DOCD: CPT | Performed by: FAMILY MEDICINE

## 2018-05-24 PROCEDURE — 3017F COLORECTAL CA SCREEN DOC REV: CPT | Performed by: FAMILY MEDICINE

## 2018-05-24 PROCEDURE — 36415 COLL VENOUS BLD VENIPUNCTURE: CPT | Performed by: FAMILY MEDICINE

## 2018-05-24 PROCEDURE — 4004F PT TOBACCO SCREEN RCVD TLK: CPT | Performed by: FAMILY MEDICINE

## 2018-05-24 PROCEDURE — G8419 CALC BMI OUT NRM PARAM NOF/U: HCPCS | Performed by: FAMILY MEDICINE

## 2018-05-24 PROCEDURE — 4040F PNEUMOC VAC/ADMIN/RCVD: CPT | Performed by: FAMILY MEDICINE

## 2018-05-31 RX ORDER — SYRINGE W-NEEDLE,DISPOSAB,3 ML 25GX5/8"
SYRINGE, EMPTY DISPOSABLE MISCELLANEOUS
Qty: 10 EACH | Refills: 0 | Status: SHIPPED | OUTPATIENT
Start: 2018-05-31 | End: 2018-08-16 | Stop reason: SDUPTHER

## 2018-05-31 RX ORDER — CYANOCOBALAMIN 1000 UG/ML
INJECTION INTRAMUSCULAR; SUBCUTANEOUS
Qty: 10 ML | Refills: 2 | Status: SHIPPED | OUTPATIENT
Start: 2018-05-31 | End: 2018-06-11 | Stop reason: CLARIF

## 2018-06-01 ASSESSMENT — ENCOUNTER SYMPTOMS
COLOR CHANGE: 0
BACK PAIN: 0
VOMITING: 0
EYE DISCHARGE: 0
SHORTNESS OF BREATH: 1
COUGH: 0
NAUSEA: 0
WHEEZING: 0
ABDOMINAL PAIN: 0
DIARRHEA: 0

## 2018-06-06 ENCOUNTER — TELEPHONE (OUTPATIENT)
Dept: PRIMARY CARE CLINIC | Age: 68
End: 2018-06-06

## 2018-06-06 RX ORDER — CYANOCOBALAMIN 1000 UG/ML
1000 INJECTION INTRAMUSCULAR; SUBCUTANEOUS
Qty: 1 ML | Refills: 5 | Status: SHIPPED | OUTPATIENT
Start: 2018-06-06 | End: 2018-06-11 | Stop reason: SDUPTHER

## 2018-06-11 RX ORDER — CYANOCOBALAMIN 1000 UG/ML
1000 INJECTION INTRAMUSCULAR; SUBCUTANEOUS
Qty: 3 ML | Refills: 3 | Status: SHIPPED | OUTPATIENT
Start: 2018-06-11 | End: 2018-07-16 | Stop reason: SDUPTHER

## 2018-07-16 RX ORDER — CYANOCOBALAMIN 1000 UG/ML
1000 INJECTION INTRAMUSCULAR; SUBCUTANEOUS
Qty: 3 ML | Refills: 3 | Status: SHIPPED | OUTPATIENT
Start: 2018-07-16 | End: 2018-09-25

## 2018-08-16 RX ORDER — SYRINGE WITH NEEDLE, 1 ML 25GX5/8"
SYRINGE, EMPTY DISPOSABLE MISCELLANEOUS
Qty: 6 EACH | Refills: 3 | Status: SHIPPED | OUTPATIENT
Start: 2018-08-16 | End: 2018-09-25

## 2018-09-06 ENCOUNTER — OFFICE VISIT (OUTPATIENT)
Dept: PRIMARY CARE CLINIC | Age: 68
End: 2018-09-06
Payer: MEDICARE

## 2018-09-06 VITALS
RESPIRATION RATE: 18 BRPM | HEART RATE: 53 BPM | TEMPERATURE: 97.7 F | SYSTOLIC BLOOD PRESSURE: 110 MMHG | WEIGHT: 166.8 LBS | HEIGHT: 69 IN | BODY MASS INDEX: 24.71 KG/M2 | OXYGEN SATURATION: 98 % | DIASTOLIC BLOOD PRESSURE: 70 MMHG

## 2018-09-06 DIAGNOSIS — E03.9 ACQUIRED HYPOTHYROIDISM: Primary | ICD-10-CM

## 2018-09-06 DIAGNOSIS — E78.5 HYPERLIPIDEMIA, UNSPECIFIED HYPERLIPIDEMIA TYPE: ICD-10-CM

## 2018-09-06 DIAGNOSIS — E53.8 B12 DEFICIENCY: ICD-10-CM

## 2018-09-06 DIAGNOSIS — R21 RASH: ICD-10-CM

## 2018-09-06 LAB
ALBUMIN SERPL-MCNC: 4.1 G/DL (ref 3.5–5.2)
ALP BLD-CCNC: 78 U/L (ref 35–104)
ALT SERPL-CCNC: 14 U/L (ref 5–33)
ANION GAP SERPL CALCULATED.3IONS-SCNC: 15 MMOL/L (ref 7–19)
AST SERPL-CCNC: 17 U/L (ref 5–32)
BASOPHILS ABSOLUTE: 0 K/UL (ref 0–0.2)
BASOPHILS RELATIVE PERCENT: 0.4 % (ref 0–1)
BILIRUB SERPL-MCNC: 0.6 MG/DL (ref 0.2–1.2)
BUN BLDV-MCNC: 12 MG/DL (ref 8–23)
CALCIUM SERPL-MCNC: 9.6 MG/DL (ref 8.8–10.2)
CHLORIDE BLD-SCNC: 103 MMOL/L (ref 98–111)
CHOLESTEROL, TOTAL: 147 MG/DL (ref 160–199)
CO2: 25 MMOL/L (ref 22–29)
CREAT SERPL-MCNC: 0.5 MG/DL (ref 0.5–0.9)
EOSINOPHILS ABSOLUTE: 0.1 K/UL (ref 0–0.6)
EOSINOPHILS RELATIVE PERCENT: 1.1 % (ref 0–5)
GFR NON-AFRICAN AMERICAN: >60
GLUCOSE BLD-MCNC: 89 MG/DL (ref 74–109)
HCT VFR BLD CALC: 44 % (ref 37–47)
HDLC SERPL-MCNC: 52 MG/DL (ref 65–121)
HEMOGLOBIN: 13.7 G/DL (ref 12–16)
LDL CHOLESTEROL CALCULATED: 75 MG/DL
LYMPHOCYTES ABSOLUTE: 2.1 K/UL (ref 1.1–4.5)
LYMPHOCYTES RELATIVE PERCENT: 27.6 % (ref 20–40)
MCH RBC QN AUTO: 27.7 PG (ref 27–31)
MCHC RBC AUTO-ENTMCNC: 31.1 G/DL (ref 33–37)
MCV RBC AUTO: 88.9 FL (ref 81–99)
MONOCYTES ABSOLUTE: 0.5 K/UL (ref 0–0.9)
MONOCYTES RELATIVE PERCENT: 6.2 % (ref 0–10)
NEUTROPHILS ABSOLUTE: 4.8 K/UL (ref 1.5–7.5)
NEUTROPHILS RELATIVE PERCENT: 64.4 % (ref 50–65)
PDW BLD-RTO: 13 % (ref 11.5–14.5)
PLATELET # BLD: 147 K/UL (ref 130–400)
PMV BLD AUTO: 12.4 FL (ref 9.4–12.3)
POTASSIUM SERPL-SCNC: 3.9 MMOL/L (ref 3.5–5)
RBC # BLD: 4.95 M/UL (ref 4.2–5.4)
SODIUM BLD-SCNC: 143 MMOL/L (ref 136–145)
T4 FREE: 1.2 NG/DL (ref 0.9–1.7)
TOTAL PROTEIN: 6.9 G/DL (ref 6.6–8.7)
TRIGL SERPL-MCNC: 98 MG/DL (ref 0–149)
TSH SERPL DL<=0.05 MIU/L-ACNC: 2.35 UIU/ML (ref 0.27–4.2)
VITAMIN B-12: 504 PG/ML (ref 211–946)
WBC # BLD: 7.5 K/UL (ref 4.8–10.8)

## 2018-09-06 PROCEDURE — 1123F ACP DISCUSS/DSCN MKR DOCD: CPT | Performed by: FAMILY MEDICINE

## 2018-09-06 PROCEDURE — G8427 DOCREV CUR MEDS BY ELIG CLIN: HCPCS | Performed by: FAMILY MEDICINE

## 2018-09-06 PROCEDURE — 4004F PT TOBACCO SCREEN RCVD TLK: CPT | Performed by: FAMILY MEDICINE

## 2018-09-06 PROCEDURE — 99214 OFFICE O/P EST MOD 30 MIN: CPT | Performed by: FAMILY MEDICINE

## 2018-09-06 PROCEDURE — 36415 COLL VENOUS BLD VENIPUNCTURE: CPT | Performed by: FAMILY MEDICINE

## 2018-09-06 PROCEDURE — G8399 PT W/DXA RESULTS DOCUMENT: HCPCS | Performed by: FAMILY MEDICINE

## 2018-09-06 PROCEDURE — 1101F PT FALLS ASSESS-DOCD LE1/YR: CPT | Performed by: FAMILY MEDICINE

## 2018-09-06 PROCEDURE — 96372 THER/PROPH/DIAG INJ SC/IM: CPT | Performed by: FAMILY MEDICINE

## 2018-09-06 PROCEDURE — 3017F COLORECTAL CA SCREEN DOC REV: CPT | Performed by: FAMILY MEDICINE

## 2018-09-06 PROCEDURE — 4040F PNEUMOC VAC/ADMIN/RCVD: CPT | Performed by: FAMILY MEDICINE

## 2018-09-06 PROCEDURE — G8420 CALC BMI NORM PARAMETERS: HCPCS | Performed by: FAMILY MEDICINE

## 2018-09-06 PROCEDURE — 1090F PRES/ABSN URINE INCON ASSESS: CPT | Performed by: FAMILY MEDICINE

## 2018-09-06 RX ORDER — TRIAMCINOLONE ACETONIDE 40 MG/ML
40 INJECTION, SUSPENSION INTRA-ARTICULAR; INTRAMUSCULAR ONCE
Status: COMPLETED | OUTPATIENT
Start: 2018-09-06 | End: 2018-09-06

## 2018-09-06 RX ADMIN — TRIAMCINOLONE ACETONIDE 40 MG: 40 INJECTION, SUSPENSION INTRA-ARTICULAR; INTRAMUSCULAR at 16:33

## 2018-09-06 NOTE — PROGRESS NOTES
After obtaining consent, and per orders of Dr. Aaron Rogers, injection of kenalog 40 mg IM given in Left upper quad. gluteus by Zoila Grove LPN.

## 2018-09-07 ASSESSMENT — ENCOUNTER SYMPTOMS
ABDOMINAL PAIN: 0
COLOR CHANGE: 0
VOMITING: 0
EYE DISCHARGE: 0
WHEEZING: 0
COUGH: 0
NAUSEA: 0
DIARRHEA: 0
BACK PAIN: 0

## 2018-09-25 ENCOUNTER — TELEPHONE (OUTPATIENT)
Dept: PRIMARY CARE CLINIC | Age: 68
End: 2018-09-25

## 2018-09-25 ENCOUNTER — OFFICE VISIT (OUTPATIENT)
Dept: PRIMARY CARE CLINIC | Age: 68
End: 2018-09-25
Payer: MEDICARE

## 2018-09-25 VITALS
HEART RATE: 72 BPM | DIASTOLIC BLOOD PRESSURE: 72 MMHG | OXYGEN SATURATION: 95 % | HEIGHT: 69 IN | WEIGHT: 162 LBS | RESPIRATION RATE: 22 BRPM | BODY MASS INDEX: 23.99 KG/M2 | TEMPERATURE: 98.2 F | SYSTOLIC BLOOD PRESSURE: 108 MMHG

## 2018-09-25 DIAGNOSIS — J40 BRONCHITIS: ICD-10-CM

## 2018-09-25 DIAGNOSIS — R05.9 COUGH: Primary | ICD-10-CM

## 2018-09-25 PROCEDURE — 4040F PNEUMOC VAC/ADMIN/RCVD: CPT | Performed by: FAMILY MEDICINE

## 2018-09-25 PROCEDURE — 96372 THER/PROPH/DIAG INJ SC/IM: CPT | Performed by: FAMILY MEDICINE

## 2018-09-25 PROCEDURE — 4004F PT TOBACCO SCREEN RCVD TLK: CPT | Performed by: FAMILY MEDICINE

## 2018-09-25 PROCEDURE — 1101F PT FALLS ASSESS-DOCD LE1/YR: CPT | Performed by: FAMILY MEDICINE

## 2018-09-25 PROCEDURE — G8427 DOCREV CUR MEDS BY ELIG CLIN: HCPCS | Performed by: FAMILY MEDICINE

## 2018-09-25 PROCEDURE — G8399 PT W/DXA RESULTS DOCUMENT: HCPCS | Performed by: FAMILY MEDICINE

## 2018-09-25 PROCEDURE — 1090F PRES/ABSN URINE INCON ASSESS: CPT | Performed by: FAMILY MEDICINE

## 2018-09-25 PROCEDURE — 3017F COLORECTAL CA SCREEN DOC REV: CPT | Performed by: FAMILY MEDICINE

## 2018-09-25 PROCEDURE — G8420 CALC BMI NORM PARAMETERS: HCPCS | Performed by: FAMILY MEDICINE

## 2018-09-25 PROCEDURE — 99213 OFFICE O/P EST LOW 20 MIN: CPT | Performed by: FAMILY MEDICINE

## 2018-09-25 PROCEDURE — 1123F ACP DISCUSS/DSCN MKR DOCD: CPT | Performed by: FAMILY MEDICINE

## 2018-09-25 RX ORDER — LANOLIN ALCOHOL/MO/W.PET/CERES
1000 CREAM (GRAM) TOPICAL DAILY
COMMUNITY
End: 2019-09-19

## 2018-09-25 RX ORDER — BETAMETHASONE SODIUM PHOSPHATE AND BETAMETHASONE ACETATE 3; 3 MG/ML; MG/ML
12 INJECTION, SUSPENSION INTRA-ARTICULAR; INTRALESIONAL; INTRAMUSCULAR; SOFT TISSUE ONCE
Status: COMPLETED | OUTPATIENT
Start: 2018-09-25 | End: 2018-09-25

## 2018-09-25 RX ADMIN — BETAMETHASONE SODIUM PHOSPHATE AND BETAMETHASONE ACETATE 12 MG: 3; 3 INJECTION, SUSPENSION INTRA-ARTICULAR; INTRALESIONAL; INTRAMUSCULAR; SOFT TISSUE at 09:47

## 2018-09-26 RX ORDER — PREDNISONE 10 MG/1
TABLET ORAL
Qty: 21 EACH | Refills: 0 | Status: SHIPPED | OUTPATIENT
Start: 2018-09-26 | End: 2018-09-26 | Stop reason: SDUPTHER

## 2018-09-26 RX ORDER — ALBUTEROL SULFATE 90 UG/1
2 AEROSOL, METERED RESPIRATORY (INHALATION) EVERY 6 HOURS PRN
Qty: 1 INHALER | Refills: 3 | Status: SHIPPED | OUTPATIENT
Start: 2018-09-26 | End: 2018-09-26 | Stop reason: SDUPTHER

## 2018-09-26 RX ORDER — ALBUTEROL SULFATE 90 UG/1
2 AEROSOL, METERED RESPIRATORY (INHALATION) EVERY 6 HOURS PRN
Qty: 1 INHALER | Refills: 3 | Status: SHIPPED | OUTPATIENT
Start: 2018-09-26 | End: 2019-07-01 | Stop reason: SDUPTHER

## 2018-09-26 RX ORDER — AZITHROMYCIN 250 MG/1
TABLET, FILM COATED ORAL
Qty: 1 PACKET | Refills: 0 | Status: SHIPPED | OUTPATIENT
Start: 2018-09-26 | End: 2018-09-26 | Stop reason: SDUPTHER

## 2018-09-26 RX ORDER — AZITHROMYCIN 250 MG/1
TABLET, FILM COATED ORAL
Qty: 1 PACKET | Refills: 0 | Status: SHIPPED | OUTPATIENT
Start: 2018-09-26 | End: 2018-09-30

## 2018-09-26 RX ORDER — PREDNISONE 10 MG/1
TABLET ORAL
Qty: 21 EACH | Refills: 0 | Status: ON HOLD | OUTPATIENT
Start: 2018-09-26 | End: 2018-10-05

## 2018-09-26 ASSESSMENT — ENCOUNTER SYMPTOMS
COUGH: 1
DIARRHEA: 0
CHEST TIGHTNESS: 0
EYE DISCHARGE: 0
EYE ITCHING: 0
COLOR CHANGE: 0
VOMITING: 0
NAUSEA: 0
EYE REDNESS: 0
ABDOMINAL PAIN: 0
SINUS PRESSURE: 0
SHORTNESS OF BREATH: 1
RHINORRHEA: 0
WHEEZING: 1

## 2018-09-26 NOTE — PROGRESS NOTES
She is oriented to person, place, and time. She appears well-developed and well-nourished. HENT:   Head: Normocephalic and atraumatic. Right Ear: Tympanic membrane normal.   Left Ear: Tympanic membrane normal.   Nose: Mucosal edema and rhinorrhea present. Right sinus exhibits maxillary sinus tenderness and frontal sinus tenderness. Left sinus exhibits maxillary sinus tenderness and frontal sinus tenderness. Mouth/Throat: Uvula is midline, oropharynx is clear and moist and mucous membranes are normal. No oropharyngeal exudate. Eyes: Pupils are equal, round, and reactive to light. Conjunctivae are normal.   Neck: Normal range of motion. Neck supple. Cardiovascular: Normal rate and regular rhythm. Pulmonary/Chest: Effort normal. She has wheezes. Neurological: She is alert and oriented to person, place, and time. Skin: Skin is warm and dry. No rash noted. Psychiatric: She has a normal mood and affect. Her behavior is normal. Judgment and thought content normal.      /72 (Site: Right Upper Arm, Position: Sitting, Cuff Size: Medium Adult)   Pulse 72   Temp 98.2 °F (36.8 °C) (Temporal)   Resp 22   Ht 5' 9\" (1.753 m)   Wt 162 lb (73.5 kg)   SpO2 95%   BMI 23.92 kg/m²      ASSESSMENT:    Verito Mcelroy was seen today for cough. Diagnoses and all orders for this visit:    Cough  -     betamethasone acetate-betamethasone sodium phosphate (CELESTONE) injection 12 mg; Inject 2 mLs into the muscle once    Bronchitis        PLAN:    See prescription orders. Encourage fluids, Tylenol, ibuprofen and rest.  Follow-up if not improving. EMR Dragon/transcription disclaimer:  Much of this encounter note is electronic transcription/translation of spoken language to printed texts. The electronic translation of spoken language may be erroneous, or at times, nonsensical words or phrases may be inadvertently transcribed.   Although I have reviewed the note for such errors, some may still exist.

## 2018-10-05 ENCOUNTER — HOSPITAL ENCOUNTER (INPATIENT)
Age: 68
LOS: 1 days | Discharge: HOME | DRG: 069 | End: 2018-10-06
Attending: EMERGENCY MEDICINE | Admitting: INTERNAL MEDICINE
Payer: MEDICARE

## 2018-10-05 ENCOUNTER — APPOINTMENT (OUTPATIENT)
Dept: MRI IMAGING | Age: 68
DRG: 069 | End: 2018-10-05
Payer: MEDICARE

## 2018-10-05 ENCOUNTER — APPOINTMENT (OUTPATIENT)
Dept: CT IMAGING | Age: 68
DRG: 069 | End: 2018-10-05
Payer: MEDICARE

## 2018-10-05 ENCOUNTER — APPOINTMENT (OUTPATIENT)
Dept: GENERAL RADIOLOGY | Age: 68
DRG: 069 | End: 2018-10-05
Payer: MEDICARE

## 2018-10-05 DIAGNOSIS — R51.9 NONINTRACTABLE HEADACHE, UNSPECIFIED CHRONICITY PATTERN, UNSPECIFIED HEADACHE TYPE: Primary | ICD-10-CM

## 2018-10-05 DIAGNOSIS — R53.1 LEFT-SIDED WEAKNESS: ICD-10-CM

## 2018-10-05 LAB
ALBUMIN SERPL-MCNC: 3.7 G/DL (ref 3.5–5.2)
ALP BLD-CCNC: 82 U/L (ref 35–104)
ALT SERPL-CCNC: 32 U/L (ref 5–33)
ANION GAP SERPL CALCULATED.3IONS-SCNC: 10 MMOL/L (ref 7–19)
AST SERPL-CCNC: 19 U/L (ref 5–32)
BILIRUB SERPL-MCNC: 0.5 MG/DL (ref 0.2–1.2)
BILIRUBIN URINE: NEGATIVE
BLOOD, URINE: NEGATIVE
BUN BLDV-MCNC: 17 MG/DL (ref 8–23)
C-REACTIVE PROTEIN: 0.48 MG/DL (ref 0–0.5)
CALCIUM SERPL-MCNC: 9.4 MG/DL (ref 8.8–10.2)
CHLORIDE BLD-SCNC: 100 MMOL/L (ref 98–111)
CLARITY: CLEAR
CO2: 29 MMOL/L (ref 22–29)
COLOR: YELLOW
CREAT SERPL-MCNC: 0.7 MG/DL (ref 0.5–0.9)
GFR NON-AFRICAN AMERICAN: >60
GLUCOSE BLD-MCNC: 104 MG/DL (ref 74–109)
GLUCOSE URINE: >=1000 MG/DL
HCT VFR BLD CALC: 42.6 % (ref 37–47)
HEMOGLOBIN: 13.2 G/DL (ref 12–16)
INR BLD: 1.01 (ref 0.88–1.18)
KETONES, URINE: NEGATIVE MG/DL
LEUKOCYTE ESTERASE, URINE: NEGATIVE
LV EF: 58 %
LVEF MODALITY: NORMAL
MAGNESIUM: 2.2 MG/DL (ref 1.6–2.4)
MCH RBC QN AUTO: 27.6 PG (ref 27–31)
MCHC RBC AUTO-ENTMCNC: 31 G/DL (ref 33–37)
MCV RBC AUTO: 89.1 FL (ref 81–99)
NITRITE, URINE: NEGATIVE
PDW BLD-RTO: 13.6 % (ref 11.5–14.5)
PH UA: 7
PLATELET # BLD: 245 K/UL (ref 130–400)
PMV BLD AUTO: 10.6 FL (ref 9.4–12.3)
POTASSIUM SERPL-SCNC: 4.2 MMOL/L (ref 3.5–5)
PROTEIN UA: NEGATIVE MG/DL
PROTHROMBIN TIME: 13.2 SEC (ref 12–14.6)
RBC # BLD: 4.78 M/UL (ref 4.2–5.4)
SODIUM BLD-SCNC: 139 MMOL/L (ref 136–145)
SPECIFIC GRAVITY UA: 1.03
T4 FREE: 1.7 NG/DL (ref 0.9–1.7)
TOTAL PROTEIN: 7.1 G/DL (ref 6.6–8.7)
TROPONIN: <0.01 NG/ML (ref 0–0.03)
TSH SERPL DL<=0.05 MIU/L-ACNC: 4.49 UIU/ML (ref 0.27–4.2)
UROBILINOGEN, URINE: 0.2 E.U./DL
WBC # BLD: 11.3 K/UL (ref 4.8–10.8)

## 2018-10-05 PROCEDURE — 2580000003 HC RX 258: Performed by: HOSPITALIST

## 2018-10-05 PROCEDURE — 84439 ASSAY OF FREE THYROXINE: CPT

## 2018-10-05 PROCEDURE — 84443 ASSAY THYROID STIM HORMONE: CPT

## 2018-10-05 PROCEDURE — 80053 COMPREHEN METABOLIC PANEL: CPT

## 2018-10-05 PROCEDURE — 99222 1ST HOSP IP/OBS MODERATE 55: CPT | Performed by: HOSPITALIST

## 2018-10-05 PROCEDURE — 70544 MR ANGIOGRAPHY HEAD W/O DYE: CPT

## 2018-10-05 PROCEDURE — 2500000003 HC RX 250 WO HCPCS: Performed by: HOSPITALIST

## 2018-10-05 PROCEDURE — 84484 ASSAY OF TROPONIN QUANT: CPT

## 2018-10-05 PROCEDURE — 6360000002 HC RX W HCPCS: Performed by: HOSPITALIST

## 2018-10-05 PROCEDURE — 85027 COMPLETE CBC AUTOMATED: CPT

## 2018-10-05 PROCEDURE — 95819 EEG AWAKE AND ASLEEP: CPT | Performed by: PSYCHIATRY & NEUROLOGY

## 2018-10-05 PROCEDURE — 95819 EEG AWAKE AND ASLEEP: CPT

## 2018-10-05 PROCEDURE — 6370000000 HC RX 637 (ALT 250 FOR IP): Performed by: HOSPITALIST

## 2018-10-05 PROCEDURE — 93306 TTE W/DOPPLER COMPLETE: CPT

## 2018-10-05 PROCEDURE — 1210000000 HC MED SURG R&B

## 2018-10-05 PROCEDURE — 93880 EXTRACRANIAL BILAT STUDY: CPT

## 2018-10-05 PROCEDURE — 36415 COLL VENOUS BLD VENIPUNCTURE: CPT

## 2018-10-05 PROCEDURE — A9577 INJ MULTIHANCE: HCPCS | Performed by: PSYCHIATRY & NEUROLOGY

## 2018-10-05 PROCEDURE — 71046 X-RAY EXAM CHEST 2 VIEWS: CPT

## 2018-10-05 PROCEDURE — 86140 C-REACTIVE PROTEIN: CPT

## 2018-10-05 PROCEDURE — 83735 ASSAY OF MAGNESIUM: CPT

## 2018-10-05 PROCEDURE — 85610 PROTHROMBIN TIME: CPT

## 2018-10-05 PROCEDURE — 93005 ELECTROCARDIOGRAM TRACING: CPT

## 2018-10-05 PROCEDURE — 99285 EMERGENCY DEPT VISIT HI MDM: CPT

## 2018-10-05 PROCEDURE — 99285 EMERGENCY DEPT VISIT HI MDM: CPT | Performed by: EMERGENCY MEDICINE

## 2018-10-05 PROCEDURE — 6360000004 HC RX CONTRAST MEDICATION: Performed by: PSYCHIATRY & NEUROLOGY

## 2018-10-05 PROCEDURE — 99223 1ST HOSP IP/OBS HIGH 75: CPT | Performed by: PSYCHIATRY & NEUROLOGY

## 2018-10-05 PROCEDURE — 96372 THER/PROPH/DIAG INJ SC/IM: CPT

## 2018-10-05 PROCEDURE — 81003 URINALYSIS AUTO W/O SCOPE: CPT

## 2018-10-05 PROCEDURE — 70450 CT HEAD/BRAIN W/O DYE: CPT

## 2018-10-05 PROCEDURE — 70553 MRI BRAIN STEM W/O & W/DYE: CPT

## 2018-10-05 RX ORDER — SIMVASTATIN 10 MG
10 TABLET ORAL NIGHTLY
Status: DISCONTINUED | OUTPATIENT
Start: 2018-10-05 | End: 2018-10-06 | Stop reason: HOSPADM

## 2018-10-05 RX ORDER — SODIUM CHLORIDE 9 MG/ML
INJECTION, SOLUTION INTRAVENOUS CONTINUOUS
Status: DISCONTINUED | OUTPATIENT
Start: 2018-10-05 | End: 2018-10-06 | Stop reason: HOSPADM

## 2018-10-05 RX ADMIN — ASPIRIN 325 MG: 325 TABLET, COATED ORAL at 21:15

## 2018-10-05 RX ADMIN — GADOBENATE DIMEGLUMINE 15 ML: 529 INJECTION, SOLUTION INTRAVENOUS at 12:55

## 2018-10-05 RX ADMIN — ENOXAPARIN SODIUM 40 MG: 100 INJECTION SUBCUTANEOUS at 21:16

## 2018-10-05 RX ADMIN — FOLIC ACID: 5 INJECTION, SOLUTION INTRAMUSCULAR; INTRAVENOUS; SUBCUTANEOUS at 21:14

## 2018-10-05 RX ADMIN — SIMVASTATIN 10 MG: 10 TABLET, FILM COATED ORAL at 21:16

## 2018-10-05 ASSESSMENT — ENCOUNTER SYMPTOMS
VOMITING: 0
EYE PAIN: 0
ABDOMINAL PAIN: 0
DIARRHEA: 0
SHORTNESS OF BREATH: 0

## 2018-10-05 ASSESSMENT — PAIN SCALES - GENERAL: PAINLEVEL_OUTOF10: 0

## 2018-10-05 NOTE — PROGRESS NOTES
Speech Language Pathology  Facility/Department: [facility] SURG SERVICES    NAME: Crow Mattson  : 1950  MRN: [id number]     Received order to assess. Attempted X3. Patient out of room for assessments during all attempts. Will try again at later time.     Electronically signed by ROSHAN King on 10/5/2018 at 3:34 PM

## 2018-10-05 NOTE — ED PROVIDER NOTES
nose bilaterally   Skin: Skin is warm and dry. Psychiatric: She has a normal mood and affect. Her behavior is normal.   Vitals reviewed. DIAGNOSTIC RESULTS     EKG: All EKG's are interpreted by the Emergency Department Physician who either signs or Co-signs this chart in the absence of a cardiologist.    Sinus bradycardia. Normal QT interval.  T wave changes in several leads. RADIOLOGY:   Non-plain film images such as CT, Ultrasound and MRI are read by the radiologist. Plain radiographic images are visualized and preliminarily interpreted by the emergency physician with the below findings:    Interpretation per the Radiologist below, if available at the time of this note:    CT Head WO Contrast   Final Result   Impression:   No acute intracranial findings appreciated. Signed by Dr Lurdes Delatorre on 10/5/2018 9:27 AM            LABS:  Labs Reviewed   CBC - Abnormal; Notable for the following:        Result Value    WBC 11.3 (*)     MCHC 31.0 (*)     All other components within normal limits   COMPREHENSIVE METABOLIC PANEL   PROTIME-INR   TROPONIN       All other labs were within normal range or not returned as of this dictation. EMERGENCY DEPARTMENT COURSE and DIFFERENTIAL DIAGNOSIS/MDM:   Vitals:    Vitals:    10/05/18 0825 10/05/18 0901   BP: (!) 120/59 116/60   Pulse: (!) 47 (!) 49   Resp: 18    Temp: 98.4 °F (36.9 °C)    TempSrc: Oral    SpO2: 95%    Weight: 162 lb (73.5 kg)    Height: 5' 9\" (1.753 m)        MDM  0935 case discussed with Dr. Missy Calhoun. He'll be coming to the ER soon to see the patient in consultation. [id number] Dr. Missy Calhoun in ER seeing patient    Dr. Missy Calhoun has evaluated the patient and will continue to follow in consult. He questions whether or not this is a stroke and does not recommend TPA at this time. He will continue to follow. I spoke with hospitalist who is agreeable with plan of care and admission. Patient resting comfortably at this time.     CONSULTS:  IP CONSULT TO

## 2018-10-06 VITALS
SYSTOLIC BLOOD PRESSURE: 97 MMHG | WEIGHT: 162 LBS | BODY MASS INDEX: 23.99 KG/M2 | DIASTOLIC BLOOD PRESSURE: 41 MMHG | TEMPERATURE: 98 F | OXYGEN SATURATION: 94 % | RESPIRATION RATE: 16 BRPM | HEIGHT: 69 IN | HEART RATE: 63 BPM

## 2018-10-06 LAB
FOLATE: >20 NG/ML (ref 4.8–37.3)
SEDIMENTATION RATE, ERYTHROCYTE: 14 MM/HR (ref 0–25)
VITAMIN B-12: 741 PG/ML (ref 211–946)

## 2018-10-06 PROCEDURE — 86308 HETEROPHILE ANTIBODY SCREEN: CPT

## 2018-10-06 PROCEDURE — 36415 COLL VENOUS BLD VENIPUNCTURE: CPT

## 2018-10-06 PROCEDURE — 99232 SBSQ HOSP IP/OBS MODERATE 35: CPT | Performed by: PSYCHIATRY & NEUROLOGY

## 2018-10-06 PROCEDURE — G8998 SWALLOW D/C STATUS: HCPCS

## 2018-10-06 PROCEDURE — G8996 SWALLOW CURRENT STATUS: HCPCS

## 2018-10-06 PROCEDURE — 99238 HOSP IP/OBS DSCHRG MGMT 30/<: CPT | Performed by: INTERNAL MEDICINE

## 2018-10-06 PROCEDURE — 99221 1ST HOSP IP/OBS SF/LOW 40: CPT | Performed by: INTERNAL MEDICINE

## 2018-10-06 PROCEDURE — 92610 EVALUATE SWALLOWING FUNCTION: CPT

## 2018-10-06 PROCEDURE — 82607 VITAMIN B-12: CPT

## 2018-10-06 PROCEDURE — 82746 ASSAY OF FOLIC ACID SERUM: CPT

## 2018-10-06 PROCEDURE — 85652 RBC SED RATE AUTOMATED: CPT

## 2018-10-06 PROCEDURE — 6370000000 HC RX 637 (ALT 250 FOR IP): Performed by: HOSPITALIST

## 2018-10-06 RX ADMIN — ASPIRIN 325 MG: 325 TABLET, COATED ORAL at 08:49

## 2018-10-06 ASSESSMENT — PAIN SCALES - GENERAL: PAINLEVEL_OUTOF10: 0

## 2018-10-06 NOTE — PROGRESS NOTES
Speech Language Pathology  Facility/Department: [facility] SURG SERVICES   BEDSIDE SWALLOW EVALUATION      NAME: Adrien Irvin  : 1950  MRN: [id number]  ADMISSION DATE: 10/5/2018   Date of Eval: 10/6/2018  Evaluating Therapist: Sol Simmons MS CCC-SLP      ADMITTING DIAGNOSIS: has Hypothyroidism; Syncope and collapse; TIA (transient ischemic attack); Bradycardia; Tobacco abuse; Hyperlipidemia; Abnormal PET scan of colon; Family history of polyps in the colon; Left-sided weakness; and Symptomatic bradycardia on her problem list.      HISTORY OF PRESENT ILLNESS:   Per Neurology Note: This is a 78 y.o. female who was evaluated in the emergency department with weakness. The patient states her symptoms started around 745 this morning she was in the dining room and noted a right-sided headache followed by bilateral upper extremity tingling. She states that she noted chills followed by blurred vision. She states that she felt lightheaded and dizzy. She noted generalized weakness. She denies focal weakness or facial drooping. She denies significant speech difficulty. She did state that there was  possibly some confusion at times with the event. Also had a brief confusional episode yesterday. Symptoms are currently improving is not resolving though she does note some weakness over the left side. She has had a questionable TIA in the past. No history of carotid disease no atrial fibrillation history noted. Her headache is now resolved as well and only lasted approximately 15-20 minutes. Reason for Referral  Adrien Irvin was referred for a bedside swallow evaluation to assess the efficiency of her swallow function, identify signs and symptoms of aspiration and make recommendations regarding safe dietary consistencies, effective compensatory strategies, and safe eating environment.       Current Diet level:  Current Diet : Regular  Current Liquid Diet : Thin        Pain:  Pain Assessment  Patient Currently in Verbalizes understanding      G-Code  SLP G-Codes  Functional Limitations: Swallowing  Swallow Current Status (): 0 percent impaired, limited or restricted  Swallow Discharge Status (): 0 percent impaired, limited or restricted             Melissa Nino CCC-SLP  10/6/2018 10:55 AM      Electronically Signed By:  Giovana Cope M.S., CCC-SLP  10/6/2018,10:59 AM.

## 2018-10-06 NOTE — CONSULTS
[facility]  Cardiology Consult    Requesting MD:  Rupa Turner, * Admit Status:  Inpatient       History obtained from:   [x] Patient  [] Other (specify):     Cc:  TIA    HPI: Ms. Dontae Aguilar is a 78 y.o. female with a history of hypertension, hyperlipidemia, and tobacco abuse evaluated for TIA and we were asked to see. She has no prior hx of CAD or AF and denies chest pain or palpitations.  No hx of CHF    ROS    Past Medical History:   Diagnosis Date    Hyperlipidemia     Hypothyroidism     Tobacco abuse       No p  Past Surgical History:   Procedure Laterality Date    LUNG SURGERY      collapsed lung    DC COLONOSCOPY W/BIOPSY SINGLE/MULTIPLE N/A 6/27/2017    Dr Anjel West-extensive and large mouthed diverticular disease throughout the right colon-Tubular AP (villiform) (-) dysplasia x 1, tubular AP (-) dysplasia x 1--5 yr recall    TUBAL LIGATION         Family History   Problem Relation Age of Onset    Other Mother         COPD, dementia    Heart Disease Mother     Stroke Mother     Heart Disease Father     Stroke Father     Diabetes Brother     Diabetes Brother     High Blood Pressure Brother     High Cholesterol Brother     Colon Polyps Brother     Colon Cancer Neg Hx     Liver Cancer Neg Hx     Liver Disease Neg Hx     Esophageal Cancer Neg Hx     Rectal Cancer Neg Hx     Stomach Cancer Neg Hx        Social History     Social History    Marital status:      Spouse name: N/A    Number of children: N/A    Years of education: N/A     Occupational History    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

## 2018-10-06 NOTE — H&P
History and Physical    Patient Name:  Mika Jackson    :  1950    Chief Complaint:   Headache with left sided weakness    History of Present Illness:   Mika Jackson presents to [facility] with severe right sided headache and weakness of left lower extremity. She had associated tingling in her hands and legs. She had blurry vision on the left side. She felt dizzy. Currently complains only of weakness of left LE. She has had similar episode 2 years ago and was diagnosed with TIA. She denies chest pain, dyspnea, or cough. Has history of bradycardia with frequent episodes of dizziness and pre syncope. Past Medical History:   has a past medical history of Hyperlipidemia; Hypothyroidism; and Tobacco abuse. bradycardia and TIA    Surgical History:   has a past surgical history that includes Tubal ligation; Lung surgery; and pr colonoscopy w/biopsy single/multiple (N/A, 2017). Social History:   reports that she has been smoking Cigarettes. She has a 15.00 pack-year smoking history. She has never used smokeless tobacco. She reports that she does not drink alcohol or use drugs. Family History:  family history includes Colon Polyps in her brother; Diabetes in her brother and brother; Heart Disease in her father and mother; High Blood Pressure in her brother; High Cholesterol in her brother; Other in her mother; Stroke in her father and mother. Medications:  Prior to Admission medications    Medication Sig Start Date End Date Taking?  Authorizing Provider   vitamin B-12 (CYANOCOBALAMIN) 1000 MCG tablet Take 1,000 mcg by mouth daily   Yes Historical Provider, MD   levothyroxine (SYNTHROID) 88 MCG tablet Take 1 tablet by mouth Daily 18  Yes JORDYN Aceves - CNP   atorvastatin (LIPITOR) 40 MG tablet Take 1 tablet by mouth nightly 17  Yes Kobe Dewitt MD   albuterol sulfate HFA (PROAIR HFA) 108 (90 Base) MCG/ACT inhaler Inhale 2 puffs into the lungs every 6 hours as needed for Wheezing 9/26/18   Nathan Woods MD       Allergies:  Patient has no known allergies. Review of Systems:   · Constitutional: there has been no unanticipated weight loss. There's been no change in energy level, sleep pattern, or activity level. · Eyes: blurry vision No scleral icterus. · ENT: Headaches, no hearing loss or vertigo. No mouth sores or sore throat. · Cardiovascular: No chest pain, dyspnea on exertion, palpitations or loss of consciousness. No cough, hemoptysis, pleuritic pain, or phlebitis. · Respiratory: No cough or wheezing, no sputum production. No hemoptysis. · Gastrointestinal: No abdominal pain, appetite loss, blood in stools. No change in bowel or bladder habits. · Genitourinary: No dysuria, trouble voiding, or hematuria. · Musculoskeletal:  No gait disturbance or joint complaints. · Integumentary: No rash or pruritis. · Neurological: headache, no diplopia, has new change in muscle strength, and tingling. No change in gait, balance, coordination, mood, affect, memory, mentation, behavior. · Psychiatric: No anxiety, or depression. · Endocrine: No temperature intolerance. No excessive thirst, fluid intake, or urination. No tremor. · Hematologic/Lymphatic: No abnormal bruising or bleeding, blood clots or swollen lymph nodes. · Allergic/Immunologic: No nasal congestion or hives. Physical Examination:    Vital Signs: BP (!) 91/55   Pulse 52   Temp 99.6 °F (37.6 °C) (Temporal)   Resp 16   Ht 5' 9\" (1.753 m)   Wt 162 lb (73.5 kg)   SpO2 92%   BMI 23.92 kg/m²   General appearance: Well preserved, mesomorphic body habitus, alert, no distress. Skin: Skin color, texture, turgor normal. No rashes or lesions. No induration or tightening palpated. Head: Normocephalic. No masses, lesions, tenderness or abnormalities  Eyes: conjunctivae/corneas clear. EOM's intact. Sclera non icteric.   Ears: External ears normal.  Hearing normal to finger

## 2018-10-06 NOTE — PROGRESS NOTES
Patient resting well going down for radiology now for x ray, alert and oriented x 4,  noted bilaterally and pupils equal and reactive Electronically signed by Carolann Short RN on 10/5/2018 at 7:56 PM

## 2018-10-06 NOTE — DISCHARGE SUMMARY
rashes or lesions  Lymphatic: No palpable lymph node enlargment  Neurologic: Alert and oriented X 3, normal strength and tone. Normal symmetric reflexes. Mental status: Alert, oriented, thought content appropriate  Cranial nerves:II-XII Grossly intact   Psychiatric:  Alert and oriented, mood appropriate      Consults:   IP CONSULT TO NEUROLOGY  IP CONSULT TO CARDIOLOGY  IP CONSULT TO CASE MANAGEMENT  IP CONSULT TO SPIRITUAL SERVICES    Significant Diagnostic Studies:     Xr Chest Standard (2 Vw)    Result Date: 10/5/2018  XR CHEST (2 VW) 10/5/2018 6:30 PM HISTORY: Difficulty breathing COMPARISON: April 4, 2017. FINDINGS: The lungs are clear. Apical pleural thickening is noted bilaterally stable and unchanged. The cardiomediastinal silhouette and pulmonary vascularity are within normal limits. The osseous structures and surrounding soft tissues demonstrate no acute abnormality. 1. No radiographic evidence of acute cardiopulmonary process. Signed by Dr Ousmane Powell on 10/5/2018 8:09 PM    Ct Head Wo Contrast    Result Date: 10/5/2018  EXAMINATION: CT HEAD WO CONTRAST 10/5/2018 9:23 AM HISTORY: Acute onset neuro deficits Comparison: CT head without contrast 10/16/2016 Technique: Sequential imaging was performed from the vertex through the base of the skull without the use of IV contrast. Sagittal and coronal reformations were made from the original source data and reviewed. Automated exposure control was utilized for radiation dose reduction. Radiation dose: DLP 5930 mGy-cm (exam repeated due to patient motion Findings: There is no evidence of acute intracranial hemorrhage, mass, or midline shift. There is no evidence of acute territorial infarct. The ventricles appear normal in configuration. The basilar cisterns are patent. Posterior fossa appears grossly normal. The calvarium is intact. The visualized paranasal sinuses and mastoid air cells appear clear. Impression: No acute intracranial findings appreciated. Signed by Dr Khushbu Moore on 10/5/2018 9:27 AM    Mra Head Wo Contrast    Result Date: 10/5/2018  EXAMINATION: MRA HEAD WO CONTRAST 10/5/2018 1:07 PM HISTORY: Syncope, headache, altered mental status TECHNIQUE: 3-D time-of-flight imaging was performed through the [place] [facility] [place]. 3-D and maximum intensity projection (MIP) images were created from the axial source data. COMPARISON: MRI brain with and without contrast 10/5/2018, CT head without contrast 10/5/2018 FINDINGS: There is normal flow related enhancement of the visualized internal carotid arteries bilaterally. There is no evidence of flow-limiting stenosis or vessel cut off. Posteriorly, the visualized vertebral arteries demonstrate normal flow-related enhancement. The left vertebral artery is dominant. The basilar artery demonstrates normal flow-related enhancement. The superior cerebellar arteries demonstrate normal flow-related enhancement. The basilar artery terminates normally into the posterior cerebral arteries bilaterally. Posterior cerebral arteries demonstrate normal flow-related enhancement. Anteriorly, there is normal flow related enhancement involving the middle cerebral arteries bilaterally. There is no evidence of flow-limiting stenosis or vessel cut off. No aneurysmal dilatation is identified. The anterior cerebral arteries demonstrate normal flow-related enhancement. There is no evidence of vessel cut off, aneurysm, or dissection. The anterior communicating artery demonstrates normal flow-related enhancement. There is minimal flow related enhancement within the feeding vessel of the developmental venous anomaly in the right cerebellar hemisphere. .     Normal MRA brain.  Signed by Dr Khushbu Moore on 10/5/2018 1:14 PM    Vl Dup Carotid Bilateral    Result Date: 10/5/2018  Vascular Carotid Procedure  Demographics   Patient Name  Cristobal Rudolph Age                78                G   Patient       [id number]        Gender             Female  Number

## 2018-10-08 ENCOUNTER — TELEPHONE (OUTPATIENT)
Dept: PRIMARY CARE CLINIC | Age: 68
End: 2018-10-08

## 2018-10-08 ENCOUNTER — TELEPHONE (OUTPATIENT)
Dept: CARDIOLOGY | Age: 68
End: 2018-10-08

## 2018-10-08 LAB
ANA IGG, ELISA: NORMAL
EKG P AXIS: 19 DEGREES
EKG P-R INTERVAL: 168 MS
EKG Q-T INTERVAL: 476 MS
EKG QRS DURATION: 94 MS
EKG QTC CALCULATION (BAZETT): 452 MS
EKG T AXIS: 78 DEGREES

## 2018-10-08 RX ORDER — INFLUENZA A VIRUS A/MICHIGAN/45/2015 X-275 (H1N1) ANTIGEN (FORMALDEHYDE INACTIVATED), INFLUENZA A VIRUS A/SINGAPORE/INFIMH-16-0019/2016 IVR-186 (H3N2) ANTIGEN (FORMALDEHYDE INACTIVATED), AND INFLUENZA B VIRUS B/MARYLAND/15/2016 BX-69A (A B/COLORADO/6/2017-LIKE VIRUS) ANTIGEN (FORMALDEHYDE INACTIVATED) 60; 60; 60 UG/.5ML; UG/.5ML; UG/.5ML
INJECTION, SUSPENSION INTRAMUSCULAR
COMMUNITY
Start: 2018-10-05 | End: 2018-10-10 | Stop reason: ALTCHOICE

## 2018-10-10 ENCOUNTER — OFFICE VISIT (OUTPATIENT)
Dept: CARDIOLOGY | Age: 68
End: 2018-10-10
Payer: MEDICARE

## 2018-10-10 VITALS
HEART RATE: 66 BPM | HEIGHT: 69 IN | DIASTOLIC BLOOD PRESSURE: 64 MMHG | SYSTOLIC BLOOD PRESSURE: 118 MMHG | WEIGHT: 167 LBS | BODY MASS INDEX: 24.73 KG/M2

## 2018-10-10 DIAGNOSIS — G45.9 TIA (TRANSIENT ISCHEMIC ATTACK): Primary | ICD-10-CM

## 2018-10-10 DIAGNOSIS — R00.1 BRADYCARDIA: ICD-10-CM

## 2018-10-10 DIAGNOSIS — R55 SYNCOPE AND COLLAPSE: ICD-10-CM

## 2018-10-10 DIAGNOSIS — Z72.0 TOBACCO ABUSE: ICD-10-CM

## 2018-10-10 PROCEDURE — 1111F DSCHRG MED/CURRENT MED MERGE: CPT | Performed by: CLINICAL NURSE SPECIALIST

## 2018-10-10 PROCEDURE — 1101F PT FALLS ASSESS-DOCD LE1/YR: CPT | Performed by: CLINICAL NURSE SPECIALIST

## 2018-10-10 PROCEDURE — 4040F PNEUMOC VAC/ADMIN/RCVD: CPT | Performed by: CLINICAL NURSE SPECIALIST

## 2018-10-10 PROCEDURE — G8484 FLU IMMUNIZE NO ADMIN: HCPCS | Performed by: CLINICAL NURSE SPECIALIST

## 2018-10-10 PROCEDURE — 1123F ACP DISCUSS/DSCN MKR DOCD: CPT | Performed by: CLINICAL NURSE SPECIALIST

## 2018-10-10 PROCEDURE — 3017F COLORECTAL CA SCREEN DOC REV: CPT | Performed by: CLINICAL NURSE SPECIALIST

## 2018-10-10 PROCEDURE — 99203 OFFICE O/P NEW LOW 30 MIN: CPT | Performed by: CLINICAL NURSE SPECIALIST

## 2018-10-10 PROCEDURE — G8420 CALC BMI NORM PARAMETERS: HCPCS | Performed by: CLINICAL NURSE SPECIALIST

## 2018-10-10 PROCEDURE — G8399 PT W/DXA RESULTS DOCUMENT: HCPCS | Performed by: CLINICAL NURSE SPECIALIST

## 2018-10-10 PROCEDURE — 0296T PR EXT ECG > 48HR TO 21 DAY RCRD W/CONECT INTL RCRD: CPT | Performed by: CLINICAL NURSE SPECIALIST

## 2018-10-10 PROCEDURE — 4004F PT TOBACCO SCREEN RCVD TLK: CPT | Performed by: CLINICAL NURSE SPECIALIST

## 2018-10-10 PROCEDURE — G8427 DOCREV CUR MEDS BY ELIG CLIN: HCPCS | Performed by: CLINICAL NURSE SPECIALIST

## 2018-10-10 PROCEDURE — 1090F PRES/ABSN URINE INCON ASSESS: CPT | Performed by: CLINICAL NURSE SPECIALIST

## 2018-10-10 NOTE — PROGRESS NOTES
(1.753 m)   Wt 167 lb (75.8 kg)   BMI 24.66 kg/m²   General - [facility] is alert, cooperative, and pleasant. Well groomed. No acute distress. Body habitus is normal.  HEENT - The head is normocephalic. No circumoral cyanosis. Dentition is poor, missing several teeth  EYES -  No Xanthelasma, no arcus senilis, no conjunctival hemorrhages or discharge. Neck - Supple, without increased jugular venous pressures. No carotid bruits. No mass. Respiratory - Lungs are clear bilaterally. No wheezes or rales. Normal effort without use of accessory muscles. Cardiovascular - Heart has regular rhythm and rate. No murmurs, rubs or gallops. + pedal pulses and no varicosities. Abdominal -  Soft, nontender, nondistended. Bowel sounds are intact. Extremities - No clubbing, cyanosis, or  edema. Musculoskeletal - No musculoskeletal symptoms. No clubbing . No Osler's nodes. Gait normal .  No kyphosis or scoliosis. Skin - no statis ulcers or dermatitis. Neurological - No focal signs are identified. Oriented to person, place and time. Psychiatric -  Appropriate affect and mood. Assessment:          Diagnosis Orders   1. TIA (transient ischemic attack)  AL EXT ECG > 48HR TO 21 DAY RCRD W/CONECT INTL RCRD   2. Syncope and collapse  AL EXT ECG > 48HR TO 21 DAY RCRD W/CONECT INTL RCRD   3. Bradycardia  AL EXT ECG > 48HR TO 21 DAY RCRD W/CONECT INTL RCRD   4. Tobacco abuse       Current medical management includes aspirin and statin. Also on thyroid replacement and albuterol  No arrhythmia noted while in hospital. Discussed where monitor to evaluate for any sustained bradycardia or arrhythmia that could have contributed to her TIA and presyncopal episode.       Plan  Two-week monitor placed in office  Follow up in 4 weeks after waering monitor   Use button for any symptoms  Instructed to go to emergency room should she have recurrent TIA-like symptoms  Smoking cessation recommended  Call with any questions or concerns  Follow up with PCP for non cardiac problems  Report any new problems  Cardiovascular Fitness-Exercise as tolerated. Strive for 15 minutes of exercise most days of the week. Cardiac / Healthy Diet  Continue current medications as directed  Continue plan of treatment        I appreciate the opportunity of participating in the care and treatment of this patient.      Ana María Friedman, APRN      Cc:  Trent Wahl MD

## 2018-10-16 ENCOUNTER — OFFICE VISIT (OUTPATIENT)
Dept: PRIMARY CARE CLINIC | Age: 68
End: 2018-10-16
Payer: MEDICARE

## 2018-10-16 VITALS
HEART RATE: 75 BPM | TEMPERATURE: 98 F | OXYGEN SATURATION: 97 % | BODY MASS INDEX: 24.73 KG/M2 | WEIGHT: 167 LBS | HEIGHT: 69 IN | DIASTOLIC BLOOD PRESSURE: 66 MMHG | SYSTOLIC BLOOD PRESSURE: 108 MMHG | RESPIRATION RATE: 22 BRPM

## 2018-10-16 DIAGNOSIS — R00.1 BRADYCARDIA: ICD-10-CM

## 2018-10-16 DIAGNOSIS — G45.9 TIA (TRANSIENT ISCHEMIC ATTACK): Primary | ICD-10-CM

## 2018-10-16 DIAGNOSIS — Z72.0 TOBACCO ABUSE: ICD-10-CM

## 2018-10-16 PROCEDURE — 99495 TRANSJ CARE MGMT MOD F2F 14D: CPT | Performed by: FAMILY MEDICINE

## 2018-10-16 PROCEDURE — 1111F DSCHRG MED/CURRENT MED MERGE: CPT | Performed by: FAMILY MEDICINE

## 2018-10-28 NOTE — PROGRESS NOTES
from Last 3 Encounters:   10/16/18 108/66   10/10/18 118/64   10/06/18 (!) 97/41        Physical Exam:  General Appearance: alert and oriented to person, place and time, well-developed and well-nourished, in no acute distress  Skin: warm and dry, no rash or erythema  Head: normocephalic and atraumatic  Neck: neck supple and non tender without mass, no thyromegaly or thyroid nodules, no cervical lymphadenopathy   Pulmonary/Chest: clear to auscultation bilaterally- no wheezes, rales or rhonchi, normal air movement, no respiratory distress  Cardiovascular: normal rate, normal S1 and S2, no gallops, intact distal pulses and no carotid bruits  Extremities: no cyanosis and no clubbing  Musculoskeletal: normal range of motion, no joint swelling, deformity or tenderness  Neurologic: gait and coordination normal and speech normal    Assessment/Plan:  1. TIA (transient ischemic attack)  Encouraged her to go back to the ER if symptoms return. Encourage smoking cessation. We will continue to monitor blood pressures and cholesterol. 2. Bradycardia  Encouraged her to follow-up with cardiology for her results of her patch and to determine any further management for her bradycardia. 3. Tobacco abuse  Encourage smoking cessation.         Medical Decision Making: moderate complexity

## 2018-11-12 ENCOUNTER — TELEPHONE (OUTPATIENT)
Dept: PRIMARY CARE CLINIC | Age: 68
End: 2018-11-12

## 2018-11-12 ENCOUNTER — NURSE ONLY (OUTPATIENT)
Dept: PRIMARY CARE CLINIC | Age: 68
End: 2018-11-12
Payer: MEDICARE

## 2018-11-12 DIAGNOSIS — D72.829 LEUKOCYTOSIS, UNSPECIFIED TYPE: ICD-10-CM

## 2018-11-12 DIAGNOSIS — N95.1 MENOPAUSAL SYMPTOMS: Primary | ICD-10-CM

## 2018-11-12 LAB
ESTRADIOL LEVEL: <5 PG/ML
HCT VFR BLD CALC: 46.5 % (ref 37–47)
HEMOGLOBIN: 14.1 G/DL (ref 12–16)
MCH RBC QN AUTO: 27.6 PG (ref 27–31)
MCHC RBC AUTO-ENTMCNC: 30.3 G/DL (ref 33–37)
MCV RBC AUTO: 91.2 FL (ref 81–99)
PDW BLD-RTO: 14.5 % (ref 11.5–14.5)
PLATELET # BLD: 156 K/UL (ref 130–400)
PMV BLD AUTO: 12.5 FL (ref 9.4–12.3)
RBC # BLD: 5.1 M/UL (ref 4.2–5.4)
WBC # BLD: 7.2 K/UL (ref 4.8–10.8)

## 2018-11-12 PROCEDURE — 36415 COLL VENOUS BLD VENIPUNCTURE: CPT | Performed by: FAMILY MEDICINE

## 2018-11-16 LAB
ESTRADIOL LEVEL: 3.9 PG/ML
ESTROGEN TOTAL: 19.6 PG/ML
ESTRONE: 15.7 PG/ML

## 2018-11-20 ENCOUNTER — TELEPHONE (OUTPATIENT)
Dept: PRIMARY CARE CLINIC | Age: 68
End: 2018-11-20

## 2019-01-11 ENCOUNTER — OFFICE VISIT (OUTPATIENT)
Dept: CARDIOLOGY | Age: 69
End: 2019-01-11
Payer: MEDICARE

## 2019-01-11 VITALS
HEIGHT: 69 IN | WEIGHT: 166 LBS | DIASTOLIC BLOOD PRESSURE: 64 MMHG | SYSTOLIC BLOOD PRESSURE: 122 MMHG | BODY MASS INDEX: 24.59 KG/M2 | HEART RATE: 76 BPM

## 2019-01-11 DIAGNOSIS — G45.9 TIA (TRANSIENT ISCHEMIC ATTACK): Primary | ICD-10-CM

## 2019-01-11 DIAGNOSIS — R55 SYNCOPE AND COLLAPSE: ICD-10-CM

## 2019-01-11 PROCEDURE — 99213 OFFICE O/P EST LOW 20 MIN: CPT | Performed by: CLINICAL NURSE SPECIALIST

## 2019-01-11 PROCEDURE — 1101F PT FALLS ASSESS-DOCD LE1/YR: CPT | Performed by: CLINICAL NURSE SPECIALIST

## 2019-01-11 PROCEDURE — 4004F PT TOBACCO SCREEN RCVD TLK: CPT | Performed by: CLINICAL NURSE SPECIALIST

## 2019-01-11 PROCEDURE — G8399 PT W/DXA RESULTS DOCUMENT: HCPCS | Performed by: CLINICAL NURSE SPECIALIST

## 2019-01-11 PROCEDURE — G8484 FLU IMMUNIZE NO ADMIN: HCPCS | Performed by: CLINICAL NURSE SPECIALIST

## 2019-01-11 PROCEDURE — 1123F ACP DISCUSS/DSCN MKR DOCD: CPT | Performed by: CLINICAL NURSE SPECIALIST

## 2019-01-11 PROCEDURE — 3017F COLORECTAL CA SCREEN DOC REV: CPT | Performed by: CLINICAL NURSE SPECIALIST

## 2019-01-11 PROCEDURE — G8427 DOCREV CUR MEDS BY ELIG CLIN: HCPCS | Performed by: CLINICAL NURSE SPECIALIST

## 2019-01-11 PROCEDURE — G8420 CALC BMI NORM PARAMETERS: HCPCS | Performed by: CLINICAL NURSE SPECIALIST

## 2019-01-11 PROCEDURE — 1090F PRES/ABSN URINE INCON ASSESS: CPT | Performed by: CLINICAL NURSE SPECIALIST

## 2019-01-11 PROCEDURE — 4040F PNEUMOC VAC/ADMIN/RCVD: CPT | Performed by: CLINICAL NURSE SPECIALIST

## 2019-02-23 ENCOUNTER — APPOINTMENT (OUTPATIENT)
Dept: GENERAL RADIOLOGY | Age: 69
End: 2019-02-23
Payer: MEDICARE

## 2019-02-23 ENCOUNTER — HOSPITAL ENCOUNTER (EMERGENCY)
Age: 69
Discharge: HOME | End: 2019-02-23
Payer: MEDICARE

## 2019-02-23 ENCOUNTER — APPOINTMENT (OUTPATIENT)
Dept: CT IMAGING | Age: 69
End: 2019-02-23
Payer: MEDICARE

## 2019-02-23 VITALS
DIASTOLIC BLOOD PRESSURE: 71 MMHG | OXYGEN SATURATION: 95 % | BODY MASS INDEX: 23.99 KG/M2 | RESPIRATION RATE: 16 BRPM | HEIGHT: 69 IN | SYSTOLIC BLOOD PRESSURE: 113 MMHG | WEIGHT: 162 LBS | TEMPERATURE: 98.5 F | HEART RATE: 57 BPM

## 2019-02-23 DIAGNOSIS — R55 SYNCOPE AND COLLAPSE: Primary | ICD-10-CM

## 2019-02-23 LAB
ALBUMIN SERPL-MCNC: 3.7 G/DL (ref 3.5–5.2)
ALP BLD-CCNC: 78 U/L (ref 35–104)
ALT SERPL-CCNC: 11 U/L (ref 5–33)
ANION GAP SERPL CALCULATED.3IONS-SCNC: 8 MMOL/L (ref 7–19)
AST SERPL-CCNC: 25 U/L (ref 5–32)
BASOPHILS ABSOLUTE: 0 K/UL (ref 0–0.2)
BASOPHILS RELATIVE PERCENT: 0.6 % (ref 0–1)
BILIRUB SERPL-MCNC: <0.2 MG/DL (ref 0.2–1.2)
BILIRUBIN URINE: NEGATIVE
BLOOD, URINE: NEGATIVE
BUN BLDV-MCNC: 17 MG/DL (ref 8–23)
CALCIUM SERPL-MCNC: 9.2 MG/DL (ref 8.8–10.2)
CHLORIDE BLD-SCNC: 106 MMOL/L (ref 98–111)
CLARITY: CLEAR
CO2: 27 MMOL/L (ref 22–29)
COLOR: YELLOW
CREAT SERPL-MCNC: 0.7 MG/DL (ref 0.5–0.9)
EOSINOPHILS ABSOLUTE: 0.1 K/UL (ref 0–0.6)
EOSINOPHILS RELATIVE PERCENT: 1.3 % (ref 0–5)
GFR NON-AFRICAN AMERICAN: >60
GLUCOSE BLD-MCNC: 96 MG/DL (ref 70–99)
GLUCOSE BLD-MCNC: 98 MG/DL (ref 74–109)
GLUCOSE URINE: NEGATIVE MG/DL
HCT VFR BLD CALC: 42.3 % (ref 37–47)
HEMOGLOBIN: 12.9 G/DL (ref 12–16)
KETONES, URINE: NEGATIVE MG/DL
LEUKOCYTE ESTERASE, URINE: NEGATIVE
LYMPHOCYTES ABSOLUTE: 2.2 K/UL (ref 1.1–4.5)
LYMPHOCYTES RELATIVE PERCENT: 30.8 % (ref 20–40)
MCH RBC QN AUTO: 27 PG (ref 27–31)
MCHC RBC AUTO-ENTMCNC: 30.5 G/DL (ref 33–37)
MCV RBC AUTO: 88.7 FL (ref 81–99)
MONOCYTES ABSOLUTE: 0.5 K/UL (ref 0–0.9)
MONOCYTES RELATIVE PERCENT: 7.2 % (ref 0–10)
NEUTROPHILS ABSOLUTE: 4.3 K/UL (ref 1.5–7.5)
NEUTROPHILS RELATIVE PERCENT: 59.8 % (ref 50–65)
NITRITE, URINE: NEGATIVE
PDW BLD-RTO: 13.4 % (ref 11.5–14.5)
PERFORMED ON: NORMAL
PH UA: 6.5
PLATELET # BLD: 130 K/UL (ref 130–400)
PMV BLD AUTO: 11.2 FL (ref 9.4–12.3)
POTASSIUM SERPL-SCNC: 4.4 MMOL/L (ref 3.5–5)
PROTEIN UA: NEGATIVE MG/DL
RBC # BLD: 4.77 M/UL (ref 4.2–5.4)
SODIUM BLD-SCNC: 141 MMOL/L (ref 136–145)
SPECIFIC GRAVITY UA: 1.01
TOTAL PROTEIN: 6.4 G/DL (ref 6.6–8.7)
TROPONIN: <0.01 NG/ML (ref 0–0.03)
URINE REFLEX TO CULTURE: NORMAL
UROBILINOGEN, URINE: 0.2 E.U./DL
WBC # BLD: 7.1 K/UL (ref 4.8–10.8)

## 2019-02-23 PROCEDURE — 71046 X-RAY EXAM CHEST 2 VIEWS: CPT

## 2019-02-23 PROCEDURE — 81003 URINALYSIS AUTO W/O SCOPE: CPT

## 2019-02-23 PROCEDURE — 84484 ASSAY OF TROPONIN QUANT: CPT

## 2019-02-23 PROCEDURE — 99284 EMERGENCY DEPT VISIT MOD MDM: CPT | Performed by: PHYSICIAN ASSISTANT

## 2019-02-23 PROCEDURE — 85025 COMPLETE CBC W/AUTO DIFF WBC: CPT

## 2019-02-23 PROCEDURE — 82948 REAGENT STRIP/BLOOD GLUCOSE: CPT

## 2019-02-23 PROCEDURE — 93005 ELECTROCARDIOGRAM TRACING: CPT

## 2019-02-23 PROCEDURE — 70450 CT HEAD/BRAIN W/O DYE: CPT

## 2019-02-23 PROCEDURE — 80053 COMPREHEN METABOLIC PANEL: CPT

## 2019-02-23 PROCEDURE — 36415 COLL VENOUS BLD VENIPUNCTURE: CPT

## 2019-02-23 PROCEDURE — 99284 EMERGENCY DEPT VISIT MOD MDM: CPT

## 2019-02-26 ENCOUNTER — OFFICE VISIT (OUTPATIENT)
Dept: PRIMARY CARE CLINIC | Age: 69
End: 2019-02-26
Payer: MEDICARE

## 2019-02-26 VITALS
WEIGHT: 165.58 LBS | RESPIRATION RATE: 16 BRPM | HEART RATE: 62 BPM | SYSTOLIC BLOOD PRESSURE: 113 MMHG | DIASTOLIC BLOOD PRESSURE: 65 MMHG | HEIGHT: 69 IN | TEMPERATURE: 99.4 F | BODY MASS INDEX: 24.53 KG/M2

## 2019-02-26 DIAGNOSIS — R94.31 ABNORMAL EKG: ICD-10-CM

## 2019-02-26 DIAGNOSIS — E78.5 HYPERLIPIDEMIA, UNSPECIFIED HYPERLIPIDEMIA TYPE: ICD-10-CM

## 2019-02-26 DIAGNOSIS — R55 SYNCOPE AND COLLAPSE: Primary | ICD-10-CM

## 2019-02-26 LAB
EKG P AXIS: 125 DEGREES
EKG P-R INTERVAL: 146 MS
EKG Q-T INTERVAL: 438 MS
EKG QRS DURATION: 94 MS
EKG QTC CALCULATION (BAZETT): 438 MS
EKG T AXIS: 56 DEGREES

## 2019-02-26 PROCEDURE — 4040F PNEUMOC VAC/ADMIN/RCVD: CPT | Performed by: FAMILY MEDICINE

## 2019-02-26 PROCEDURE — G8399 PT W/DXA RESULTS DOCUMENT: HCPCS | Performed by: FAMILY MEDICINE

## 2019-02-26 PROCEDURE — 1123F ACP DISCUSS/DSCN MKR DOCD: CPT | Performed by: FAMILY MEDICINE

## 2019-02-26 PROCEDURE — G8427 DOCREV CUR MEDS BY ELIG CLIN: HCPCS | Performed by: FAMILY MEDICINE

## 2019-02-26 PROCEDURE — G8420 CALC BMI NORM PARAMETERS: HCPCS | Performed by: FAMILY MEDICINE

## 2019-02-26 PROCEDURE — 4004F PT TOBACCO SCREEN RCVD TLK: CPT | Performed by: FAMILY MEDICINE

## 2019-02-26 PROCEDURE — 3017F COLORECTAL CA SCREEN DOC REV: CPT | Performed by: FAMILY MEDICINE

## 2019-02-26 PROCEDURE — G8484 FLU IMMUNIZE NO ADMIN: HCPCS | Performed by: FAMILY MEDICINE

## 2019-02-26 PROCEDURE — 99214 OFFICE O/P EST MOD 30 MIN: CPT | Performed by: FAMILY MEDICINE

## 2019-02-26 PROCEDURE — 1090F PRES/ABSN URINE INCON ASSESS: CPT | Performed by: FAMILY MEDICINE

## 2019-02-26 PROCEDURE — 1101F PT FALLS ASSESS-DOCD LE1/YR: CPT | Performed by: FAMILY MEDICINE

## 2019-02-26 ASSESSMENT — ENCOUNTER SYMPTOMS
EYE DISCHARGE: 0
NAUSEA: 0
ABDOMINAL DISTENTION: 0
PHOTOPHOBIA: 0
CHOKING: 0
EYE PAIN: 0
SHORTNESS OF BREATH: 0
WHEEZING: 0
CHEST TIGHTNESS: 0
SORE THROAT: 0
ABDOMINAL PAIN: 0
COUGH: 0
RHINORRHEA: 0
COLOR CHANGE: 0
BACK PAIN: 0
STRIDOR: 0

## 2019-02-26 ASSESSMENT — PATIENT HEALTH QUESTIONNAIRE - PHQ9
2. FEELING DOWN, DEPRESSED OR HOPELESS: 0
SUM OF ALL RESPONSES TO PHQ9 QUESTIONS 1 & 2: 0
SUM OF ALL RESPONSES TO PHQ QUESTIONS 1-9: 0
1. LITTLE INTEREST OR PLEASURE IN DOING THINGS: 0
SUM OF ALL RESPONSES TO PHQ QUESTIONS 1-9: 0

## 2019-02-27 RX ORDER — LEVOTHYROXINE SODIUM 88 UG/1
TABLET ORAL
Qty: 90 TABLET | Refills: 3 | Status: SHIPPED | OUTPATIENT
Start: 2019-02-27 | End: 2019-11-11 | Stop reason: SDUPTHER

## 2019-02-27 ASSESSMENT — ENCOUNTER SYMPTOMS
COLOR CHANGE: 0
DIARRHEA: 0
VOMITING: 0
BACK PAIN: 0
NAUSEA: 0
ABDOMINAL PAIN: 0
COUGH: 0
EYE DISCHARGE: 0
WHEEZING: 0

## 2019-03-07 ENCOUNTER — HOSPITAL ENCOUNTER (OUTPATIENT)
Dept: NON INVASIVE DIAGNOSTICS | Age: 69
Discharge: HOME | End: 2019-03-07
Payer: MEDICARE

## 2019-03-07 VITALS
BODY MASS INDEX: 24.74 KG/M2 | HEART RATE: 80 BPM | SYSTOLIC BLOOD PRESSURE: 106 MMHG | WEIGHT: 167.55 LBS | DIASTOLIC BLOOD PRESSURE: 48 MMHG

## 2019-03-07 DIAGNOSIS — E78.5 HYPERLIPIDEMIA, UNSPECIFIED HYPERLIPIDEMIA TYPE: ICD-10-CM

## 2019-03-07 DIAGNOSIS — R94.31 ABNORMAL EKG: ICD-10-CM

## 2019-03-07 DIAGNOSIS — R55 SYNCOPE AND COLLAPSE: ICD-10-CM

## 2019-03-07 PROCEDURE — 2500000003 HC RX 250 WO HCPCS: Performed by: INTERNAL MEDICINE

## 2019-03-07 PROCEDURE — 2580000003 HC RX 258: Performed by: INTERNAL MEDICINE

## 2019-03-07 PROCEDURE — 6360000002 HC RX W HCPCS: Performed by: INTERNAL MEDICINE

## 2019-03-07 PROCEDURE — C8928 TTE W OR W/O FOL W/CON,STRES: HCPCS

## 2019-03-07 RX ORDER — SODIUM CHLORIDE 9 MG/ML
INJECTION, SOLUTION INTRAVENOUS
Status: COMPLETED | OUTPATIENT
Start: 2019-03-07 | End: 2019-03-07

## 2019-03-07 RX ORDER — DOBUTAMINE HYDROCHLORIDE 200 MG/100ML
10 INJECTION INTRAVENOUS CONTINUOUS PRN
Status: DISCONTINUED | OUTPATIENT
Start: 2019-03-07 | End: 2019-03-08 | Stop reason: HOSPADM

## 2019-03-07 RX ORDER — METOPROLOL TARTRATE 5 MG/5ML
5 INJECTION INTRAVENOUS PRN
Status: DISCONTINUED | OUTPATIENT
Start: 2019-03-07 | End: 2019-03-08 | Stop reason: HOSPADM

## 2019-03-07 RX ORDER — ATROPINE SULFATE 0.1 MG/ML
1 INJECTION INTRAVENOUS PRN
Status: DISCONTINUED | OUTPATIENT
Start: 2019-03-07 | End: 2019-03-08 | Stop reason: HOSPADM

## 2019-03-07 RX ADMIN — METOPROLOL TARTRATE 2 MG: 1 INJECTION, SOLUTION INTRAVENOUS at 15:40

## 2019-03-07 RX ADMIN — ATROPINE SULFATE 0.5 MG: 0.1 INJECTION PARENTERAL at 15:33

## 2019-03-07 RX ADMIN — DOBUTAMINE HYDROCHLORIDE 10 MCG/KG/MIN: 200 INJECTION INTRAVENOUS at 15:20

## 2019-03-07 RX ADMIN — SODIUM CHLORIDE: 9 INJECTION, SOLUTION INTRAVENOUS at 15:20

## 2019-04-25 RX ORDER — ATORVASTATIN CALCIUM 40 MG/1
TABLET, FILM COATED ORAL
Qty: 90 TABLET | Refills: 3 | Status: SHIPPED | OUTPATIENT
Start: 2019-04-25 | End: 2019-11-11 | Stop reason: SDUPTHER

## 2019-06-03 ENCOUNTER — OFFICE VISIT (OUTPATIENT)
Dept: PRIMARY CARE CLINIC | Age: 69
End: 2019-06-03
Payer: MEDICARE

## 2019-06-03 VITALS
SYSTOLIC BLOOD PRESSURE: 110 MMHG | DIASTOLIC BLOOD PRESSURE: 70 MMHG | WEIGHT: 164.8 LBS | BODY MASS INDEX: 24.34 KG/M2 | HEART RATE: 48 BPM | RESPIRATION RATE: 16 BRPM | TEMPERATURE: 97.8 F | OXYGEN SATURATION: 97 %

## 2019-06-03 DIAGNOSIS — F32.1 CURRENT MODERATE EPISODE OF MAJOR DEPRESSIVE DISORDER WITHOUT PRIOR EPISODE (HCC): Primary | ICD-10-CM

## 2019-06-03 DIAGNOSIS — Z12.31 ENCOUNTER FOR SCREENING MAMMOGRAM FOR BREAST CANCER: ICD-10-CM

## 2019-06-03 DIAGNOSIS — E78.5 HYPERLIPIDEMIA, UNSPECIFIED HYPERLIPIDEMIA TYPE: ICD-10-CM

## 2019-06-03 DIAGNOSIS — E03.9 ACQUIRED HYPOTHYROIDISM: ICD-10-CM

## 2019-06-03 LAB
ALBUMIN SERPL-MCNC: 4.3 G/DL (ref 3.5–5.2)
ALP BLD-CCNC: 84 U/L (ref 35–104)
ALT SERPL-CCNC: 11 U/L (ref 5–33)
ANION GAP SERPL CALCULATED.3IONS-SCNC: 13 MMOL/L (ref 7–19)
AST SERPL-CCNC: 15 U/L (ref 5–32)
BASOPHILS ABSOLUTE: 0.1 K/UL (ref 0–0.2)
BASOPHILS RELATIVE PERCENT: 0.7 % (ref 0–1)
BILIRUB SERPL-MCNC: 0.5 MG/DL (ref 0.2–1.2)
BUN BLDV-MCNC: 14 MG/DL (ref 8–23)
CALCIUM SERPL-MCNC: 9.6 MG/DL (ref 8.8–10.2)
CHLORIDE BLD-SCNC: 106 MMOL/L (ref 98–111)
CHOLESTEROL, TOTAL: 147 MG/DL (ref 160–199)
CO2: 27 MMOL/L (ref 22–29)
CREAT SERPL-MCNC: 0.5 MG/DL (ref 0.5–0.9)
EOSINOPHILS ABSOLUTE: 0.1 K/UL (ref 0–0.6)
EOSINOPHILS RELATIVE PERCENT: 1.4 % (ref 0–5)
GFR NON-AFRICAN AMERICAN: >60
GLUCOSE BLD-MCNC: 98 MG/DL (ref 74–109)
HCT VFR BLD CALC: 45.3 % (ref 37–47)
HDLC SERPL-MCNC: 53 MG/DL (ref 65–121)
HEMOGLOBIN: 13.9 G/DL (ref 12–16)
LDL CHOLESTEROL CALCULATED: 81 MG/DL
LYMPHOCYTES ABSOLUTE: 2.1 K/UL (ref 1.1–4.5)
LYMPHOCYTES RELATIVE PERCENT: 29.9 % (ref 20–40)
MCH RBC QN AUTO: 27.1 PG (ref 27–31)
MCHC RBC AUTO-ENTMCNC: 30.7 G/DL (ref 33–37)
MCV RBC AUTO: 88.5 FL (ref 81–99)
MONOCYTES ABSOLUTE: 0.6 K/UL (ref 0–0.9)
MONOCYTES RELATIVE PERCENT: 8 % (ref 0–10)
NEUTROPHILS ABSOLUTE: 4.2 K/UL (ref 1.5–7.5)
NEUTROPHILS RELATIVE PERCENT: 59.6 % (ref 50–65)
PDW BLD-RTO: 13.5 % (ref 11.5–14.5)
PLATELET # BLD: 141 K/UL (ref 130–400)
PMV BLD AUTO: 12.9 FL (ref 9.4–12.3)
POTASSIUM SERPL-SCNC: 4.3 MMOL/L (ref 3.5–5)
RBC # BLD: 5.12 M/UL (ref 4.2–5.4)
SODIUM BLD-SCNC: 146 MMOL/L (ref 136–145)
T4 FREE: 1.4 NG/DL (ref 0.9–1.7)
TOTAL PROTEIN: 6.9 G/DL (ref 6.6–8.7)
TRIGL SERPL-MCNC: 64 MG/DL (ref 0–149)
TSH SERPL DL<=0.05 MIU/L-ACNC: 2.25 UIU/ML (ref 0.27–4.2)
WBC # BLD: 7.1 K/UL (ref 4.8–10.8)

## 2019-06-03 PROCEDURE — G8427 DOCREV CUR MEDS BY ELIG CLIN: HCPCS | Performed by: FAMILY MEDICINE

## 2019-06-03 PROCEDURE — 1123F ACP DISCUSS/DSCN MKR DOCD: CPT | Performed by: FAMILY MEDICINE

## 2019-06-03 PROCEDURE — G8399 PT W/DXA RESULTS DOCUMENT: HCPCS | Performed by: FAMILY MEDICINE

## 2019-06-03 PROCEDURE — 36415 COLL VENOUS BLD VENIPUNCTURE: CPT | Performed by: FAMILY MEDICINE

## 2019-06-03 PROCEDURE — G8420 CALC BMI NORM PARAMETERS: HCPCS | Performed by: FAMILY MEDICINE

## 2019-06-03 PROCEDURE — 4004F PT TOBACCO SCREEN RCVD TLK: CPT | Performed by: FAMILY MEDICINE

## 2019-06-03 PROCEDURE — 3017F COLORECTAL CA SCREEN DOC REV: CPT | Performed by: FAMILY MEDICINE

## 2019-06-03 PROCEDURE — 4040F PNEUMOC VAC/ADMIN/RCVD: CPT | Performed by: FAMILY MEDICINE

## 2019-06-03 PROCEDURE — 1090F PRES/ABSN URINE INCON ASSESS: CPT | Performed by: FAMILY MEDICINE

## 2019-06-03 PROCEDURE — 99214 OFFICE O/P EST MOD 30 MIN: CPT | Performed by: FAMILY MEDICINE

## 2019-06-03 RX ORDER — RISPERIDONE 0.5 MG/1
0.5 TABLET, FILM COATED ORAL EVERY EVENING
Qty: 30 TABLET | Refills: 0 | Status: SHIPPED | OUTPATIENT
Start: 2019-06-03 | End: 2019-07-01 | Stop reason: SDUPTHER

## 2019-06-03 RX ORDER — ESCITALOPRAM OXALATE 10 MG/1
10 TABLET ORAL DAILY
Qty: 30 TABLET | Refills: 5 | Status: SHIPPED
Start: 2019-06-03 | End: 2020-02-24

## 2019-06-04 ASSESSMENT — ENCOUNTER SYMPTOMS
COUGH: 0
EYE DISCHARGE: 0
VOMITING: 0
ABDOMINAL PAIN: 0
NAUSEA: 0
WHEEZING: 0
COLOR CHANGE: 0
DIARRHEA: 0
BACK PAIN: 0

## 2019-06-04 NOTE — PROGRESS NOTES
SUBJECTIVE:    Patient ID: Juan Pablo Gaytan is a 80 y.o. female. HPI:   Patient presents today for a checkup. She states that she is having some problems with depression. She states that she just does not feel well. She states that she is down and stays down most of the time. She states she has difficulty finding motivation to get up and do things. She states that she is staying irritable. She states that she has had some suicidal thoughts but does not have a plan. She states that she is not actively having suicidal thoughts today. She states that she just feels hopeless and does not feel like there is any reason to go on. She states that she has never been on anything for depression but there is a strong family history of depression. Hypothyroidism: Recent symptoms: fatigue. She denies hair loss. Patient is  taking her medication consistently on an empty stomach. No results found for: [facility]  Lab Results   Component Value Date    TSH 2.250 06/03/2019    TSH 4.490 (H) 10/05/2018    TSH 2.350 09/06/2018     Hyperlipidemia:  No new myalgias or GI upset on atorvastatin (Lipitor). Medication compliance: compliant most of the time. Patient is  following a low fat, low cholesterol diet.       Lab Results   Component Value Date    CHOL 147 (L) 06/03/2019    TRIG 64 06/03/2019    HDL 53 (L) 06/03/2019    LDLCALC 81 06/03/2019    LDLDIRECT 129 (H) 02/20/2015     Lab Results   Component Value Date    ALT 11 06/03/2019    AST 15 06/03/2019          Past Medical History:   Diagnosis Date    Cerebral artery occlusion with cerebral infarction ([facility] [address]     Hyperlipidemia     Hypothyroidism     Tobacco abuse     Vitamin B12 deficiency       Current Outpatient Medications   Medication Sig Dispense Refill    escitalopram (LEXAPRO) 10 MG tablet Take 1 tablet by mouth daily 30 tablet 5    risperiDONE (RISPERDAL) 0.5 MG tablet Take 1 tablet by mouth every evening 30 tablet 0    atorvastatin (LIPITOR) 40 MG tablet TAKE 1 TABLET EVERY NIGHT 90 tablet 3    levothyroxine (SYNTHROID) 88 MCG tablet TAKE 1 TABLET EVERY DAY 90 tablet 3    aspirin 325 MG EC tablet Take 1 tablet by mouth daily 30 tablet 3    albuterol sulfate HFA (PROAIR HFA) 108 (90 Base) MCG/ACT inhaler Inhale 2 puffs into the lungs every 6 hours as needed for Wheezing 1 Inhaler 3    vitamin B-12 (CYANOCOBALAMIN) 1000 MCG tablet Take 1,000 mcg by mouth daily       No current facility-administered medications for this visit. No Known Allergies    Review of Systems   Constitutional: Negative for activity change, appetite change and fever. HENT: Negative for congestion and nosebleeds. Eyes: Negative for discharge. Respiratory: Negative for cough and wheezing. Cardiovascular: Negative for chest pain and leg swelling. Gastrointestinal: Negative for abdominal pain, diarrhea, nausea and vomiting. Genitourinary: Negative for difficulty urinating, frequency and urgency. Musculoskeletal: Negative for back pain and gait problem. Skin: Negative for color change and rash. Neurological: Negative for dizziness and headaches. Hematological: Does not bruise/bleed easily. Psychiatric/Behavioral: Positive for dysphoric mood and suicidal ideas. Negative for sleep disturbance. OBJECTIVE:     Physical Exam   Constitutional: She is oriented to person, place, and time. She appears well-developed. No distress. HENT:   Head: Normocephalic and atraumatic. Neck: Normal range of motion. Neck supple. Cardiovascular: Normal rate, regular rhythm and normal heart sounds. No murmur heard. Pulmonary/Chest: Effort normal and breath sounds normal. No respiratory distress. Neurological: She is alert and oriented to person, place, and time. Skin: Skin is warm and dry. She is not diaphoretic. Psychiatric: She has a normal mood and affect.  Her behavior is normal. Judgment and thought content normal.      /70 (Site: Right Upper Arm, Position: Sitting, Cuff Size: Medium Adult)   Pulse (!) 48   Temp 97.8 °F (36.6 °C) (Temporal)   Resp 16   Wt 164 lb 12.8 oz (74.8 kg)   SpO2 97%   Breastfeeding? No   BMI 24.34 kg/m²      ASSESSMENT:    Edgar Broussard was seen today for 3 month follow-up. Diagnoses and all orders for this visit:    Current moderate episode of major depressive disorder without prior episode ([facility] [address]    Encounter for screening mammogram for breast cancer  -     BOBY DIGITAL SCREEN W CAD BILATERAL    Hyperlipidemia, unspecified hyperlipidemia type  -     CBC Auto Differential  -     Comprehensive Metabolic Panel  -     TSH without Reflex  -     Lipid Panel  -     T4, Free    Acquired hypothyroidism  -     CBC Auto Differential  -     Comprehensive Metabolic Panel  -     TSH without Reflex  -     Lipid Panel  -     T4, Free    Other orders  -     escitalopram (LEXAPRO) 10 MG tablet; Take 1 tablet by mouth daily  -     risperiDONE (RISPERDAL) 0.5 MG tablet; Take 1 tablet by mouth every evening        PLAN:    See lab orders. We'll notify of results. Lexapro respiratory been sent to the pharmacy. We did discuss the safety plan with her today for her suicidal thoughts although she is not actively suicidal today. She states that she would never hurt herself. She states that she does have a cousin that she can call if she got to that point. We did also discuss that she could call 911. I would like to see her back here in 4 weeks. She is to follow-up with us sooner if needed. Continue current medications. EMR Dragon/transcription disclaimer:  Much of this encounter note is electronic transcription/translation of spoken language toprinted texts. The electronic translation of spoken language may be erroneous, or at times, nonsensical words or phrases may be inadvertently transcribed.   Although I have reviewed the note for such errors, some may stillexist.

## 2019-06-07 ENCOUNTER — HOSPITAL ENCOUNTER (OUTPATIENT)
Dept: WOMENS IMAGING | Age: 69
Discharge: HOME | End: 2019-06-07
Payer: MEDICARE

## 2019-06-07 DIAGNOSIS — Z12.31 ENCOUNTER FOR SCREENING MAMMOGRAM FOR BREAST CANCER: ICD-10-CM

## 2019-06-07 PROCEDURE — 77063 BREAST TOMOSYNTHESIS BI: CPT

## 2019-07-01 ENCOUNTER — OFFICE VISIT (OUTPATIENT)
Dept: PRIMARY CARE CLINIC | Age: 69
End: 2019-07-01
Payer: MEDICARE

## 2019-07-01 VITALS
TEMPERATURE: 98.3 F | DIASTOLIC BLOOD PRESSURE: 64 MMHG | OXYGEN SATURATION: 96 % | HEIGHT: 69 IN | BODY MASS INDEX: 24.88 KG/M2 | RESPIRATION RATE: 24 BRPM | SYSTOLIC BLOOD PRESSURE: 118 MMHG | WEIGHT: 168 LBS | HEART RATE: 78 BPM

## 2019-07-01 DIAGNOSIS — F32.1 CURRENT MODERATE EPISODE OF MAJOR DEPRESSIVE DISORDER WITHOUT PRIOR EPISODE (HCC): Primary | ICD-10-CM

## 2019-07-01 PROCEDURE — 1123F ACP DISCUSS/DSCN MKR DOCD: CPT | Performed by: FAMILY MEDICINE

## 2019-07-01 PROCEDURE — G8420 CALC BMI NORM PARAMETERS: HCPCS | Performed by: FAMILY MEDICINE

## 2019-07-01 PROCEDURE — 3017F COLORECTAL CA SCREEN DOC REV: CPT | Performed by: FAMILY MEDICINE

## 2019-07-01 PROCEDURE — 1090F PRES/ABSN URINE INCON ASSESS: CPT | Performed by: FAMILY MEDICINE

## 2019-07-01 PROCEDURE — G8399 PT W/DXA RESULTS DOCUMENT: HCPCS | Performed by: FAMILY MEDICINE

## 2019-07-01 PROCEDURE — 4040F PNEUMOC VAC/ADMIN/RCVD: CPT | Performed by: FAMILY MEDICINE

## 2019-07-01 PROCEDURE — G8427 DOCREV CUR MEDS BY ELIG CLIN: HCPCS | Performed by: FAMILY MEDICINE

## 2019-07-01 PROCEDURE — 99213 OFFICE O/P EST LOW 20 MIN: CPT | Performed by: FAMILY MEDICINE

## 2019-07-01 PROCEDURE — 4004F PT TOBACCO SCREEN RCVD TLK: CPT | Performed by: FAMILY MEDICINE

## 2019-07-01 RX ORDER — ALBUTEROL SULFATE 90 UG/1
2 AEROSOL, METERED RESPIRATORY (INHALATION) EVERY 6 HOURS PRN
Qty: 1 INHALER | Refills: 3 | Status: SHIPPED | OUTPATIENT
Start: 2019-07-01 | End: 2020-11-24 | Stop reason: SDUPTHER

## 2019-07-01 RX ORDER — RISPERIDONE 0.5 MG/1
0.5 TABLET, FILM COATED ORAL EVERY EVENING
Qty: 30 TABLET | Refills: 0 | Status: SHIPPED | OUTPATIENT
Start: 2019-07-01 | End: 2019-09-30 | Stop reason: SDUPTHER

## 2019-07-03 PROBLEM — F32.1 CURRENT MODERATE EPISODE OF MAJOR DEPRESSIVE DISORDER WITHOUT PRIOR EPISODE (HCC): Status: ACTIVE | Noted: 2019-07-03

## 2019-07-03 ASSESSMENT — ENCOUNTER SYMPTOMS
BACK PAIN: 0
COUGH: 0
DIARRHEA: 0
COLOR CHANGE: 0
NAUSEA: 0
EYE DISCHARGE: 0
VOMITING: 0
ABDOMINAL PAIN: 0
WHEEZING: 0

## 2019-07-04 NOTE — PROGRESS NOTES
SUBJECTIVE:    Patient ID: Feng Jiménez is a 80 y.o. female. HPI:   Patient presents today for a follow-up. She states that she is doing well. She states that she feels like her depression has significantly improved since she was here 4 weeks ago. She is tolerating her Lexapro well. She states that it has helped. She is also currently seeing her therapist.  She states that she feels like she is in a much better place. She is handling things much better as well. Past Medical History:   Diagnosis Date    Cerebral artery occlusion with cerebral infarction ([facility] [address]     Hyperlipidemia     Hypothyroidism     Tobacco abuse     Vitamin B12 deficiency       Current Outpatient Medications   Medication Sig Dispense Refill    albuterol sulfate HFA (PROAIR HFA) 108 (90 Base) MCG/ACT inhaler Inhale 2 puffs into the lungs every 6 hours as needed for Wheezing 1 Inhaler 3    risperiDONE (RISPERDAL) 0.5 MG tablet Take 1 tablet by mouth every evening 30 tablet 0    escitalopram (LEXAPRO) 10 MG tablet Take 1 tablet by mouth daily 30 tablet 5    atorvastatin (LIPITOR) 40 MG tablet TAKE 1 TABLET EVERY NIGHT 90 tablet 3    levothyroxine (SYNTHROID) 88 MCG tablet TAKE 1 TABLET EVERY DAY 90 tablet 3    aspirin 325 MG EC tablet Take 1 tablet by mouth daily 30 tablet 3    vitamin B-12 (CYANOCOBALAMIN) 1000 MCG tablet Take 1,000 mcg by mouth daily       No current facility-administered medications for this visit. No Known Allergies    Review of Systems   Constitutional: Negative for activity change, appetite change and fever. HENT: Negative for congestion and nosebleeds. Eyes: Negative for discharge. Respiratory: Negative for cough and wheezing. Cardiovascular: Negative for chest pain and leg swelling. Gastrointestinal: Negative for abdominal pain, diarrhea, nausea and vomiting. Genitourinary: Negative for difficulty urinating, frequency and urgency.    Musculoskeletal: Negative for back pain and gait

## 2019-07-26 ENCOUNTER — HOSPITAL ENCOUNTER (EMERGENCY)
Age: 69
Discharge: HOME | End: 2019-07-26
Attending: FAMILY MEDICINE
Payer: MEDICARE

## 2019-07-26 ENCOUNTER — APPOINTMENT (OUTPATIENT)
Dept: CT IMAGING | Age: 69
End: 2019-07-26
Payer: MEDICARE

## 2019-07-26 VITALS
WEIGHT: 164 LBS | HEART RATE: 54 BPM | DIASTOLIC BLOOD PRESSURE: 63 MMHG | BODY MASS INDEX: 24.29 KG/M2 | SYSTOLIC BLOOD PRESSURE: 128 MMHG | TEMPERATURE: 98.4 F | HEIGHT: 69 IN | OXYGEN SATURATION: 99 % | RESPIRATION RATE: 18 BRPM

## 2019-07-26 DIAGNOSIS — R55 SYNCOPE AND COLLAPSE: Primary | ICD-10-CM

## 2019-07-26 LAB
ALBUMIN SERPL-MCNC: 4.2 G/DL (ref 3.5–5.2)
ALP BLD-CCNC: 84 U/L (ref 35–104)
ALT SERPL-CCNC: 10 U/L (ref 5–33)
AMPHETAMINE SCREEN, URINE: NEGATIVE
ANION GAP SERPL CALCULATED.3IONS-SCNC: 11 MMOL/L (ref 7–19)
AST SERPL-CCNC: 14 U/L (ref 5–32)
BACTERIA: NEGATIVE /HPF
BARBITURATE SCREEN URINE: NEGATIVE
BASOPHILS ABSOLUTE: 0 K/UL (ref 0–0.2)
BASOPHILS RELATIVE PERCENT: 0.4 % (ref 0–1)
BENZODIAZEPINE SCREEN, URINE: NEGATIVE
BILIRUB SERPL-MCNC: 0.3 MG/DL (ref 0.2–1.2)
BILIRUBIN URINE: NEGATIVE
BLOOD, URINE: NEGATIVE
BUN BLDV-MCNC: 15 MG/DL (ref 8–23)
C-REACTIVE PROTEIN: 0.11 MG/DL (ref 0–0.5)
CALCIUM SERPL-MCNC: 9.5 MG/DL (ref 8.8–10.2)
CANNABINOID SCREEN URINE: NEGATIVE
CHLORIDE BLD-SCNC: 104 MMOL/L (ref 98–111)
CLARITY: CLEAR
CO2: 26 MMOL/L (ref 22–29)
COCAINE METABOLITE SCREEN URINE: NEGATIVE
COLOR: YELLOW
CREAT SERPL-MCNC: 0.6 MG/DL (ref 0.5–0.9)
EOSINOPHILS ABSOLUTE: 0.1 K/UL (ref 0–0.6)
EOSINOPHILS RELATIVE PERCENT: 1.6 % (ref 0–5)
EPITHELIAL CELLS, UA: 1 /HPF (ref 0–5)
ETHANOL: <10 MG/DL (ref 0–0.08)
GFR NON-AFRICAN AMERICAN: >60
GLUCOSE BLD-MCNC: 91 MG/DL (ref 70–99)
GLUCOSE BLD-MCNC: 96 MG/DL (ref 74–109)
GLUCOSE URINE: NEGATIVE MG/DL
HCT VFR BLD CALC: 40.4 % (ref 37–47)
HEMOGLOBIN: 12.7 G/DL (ref 12–16)
HYALINE CASTS: 0 /HPF (ref 0–8)
KETONES, URINE: NEGATIVE MG/DL
LEUKOCYTE ESTERASE, URINE: ABNORMAL
LYMPHOCYTES ABSOLUTE: 2 K/UL (ref 1.1–4.5)
LYMPHOCYTES RELATIVE PERCENT: 29.1 % (ref 20–40)
Lab: NORMAL
MCH RBC QN AUTO: 27.9 PG (ref 27–31)
MCHC RBC AUTO-ENTMCNC: 31.4 G/DL (ref 33–37)
MCV RBC AUTO: 88.6 FL (ref 81–99)
MONOCYTES ABSOLUTE: 0.5 K/UL (ref 0–0.9)
MONOCYTES RELATIVE PERCENT: 7.4 % (ref 0–10)
NEUTROPHILS ABSOLUTE: 4.2 K/UL (ref 1.5–7.5)
NEUTROPHILS RELATIVE PERCENT: 61.2 % (ref 50–65)
NITRITE, URINE: NEGATIVE
OPIATE SCREEN URINE: NEGATIVE
PDW BLD-RTO: 13.5 % (ref 11.5–14.5)
PERFORMED ON: NORMAL
PH UA: 7 (ref 5–8)
PLATELET # BLD: 130 K/UL (ref 130–400)
PMV BLD AUTO: 12.2 FL (ref 9.4–12.3)
POTASSIUM SERPL-SCNC: 4.6 MMOL/L (ref 3.5–5)
PROTEIN UA: NEGATIVE MG/DL
RBC # BLD: 4.56 M/UL (ref 4.2–5.4)
RBC UA: 1 /HPF (ref 0–4)
SODIUM BLD-SCNC: 141 MMOL/L (ref 136–145)
SPECIFIC GRAVITY UA: 1.01 (ref 1–1.03)
TOTAL PROTEIN: 6.2 G/DL (ref 6.6–8.7)
TROPONIN: <0.01 NG/ML (ref 0–0.03)
URINE REFLEX TO CULTURE: YES
UROBILINOGEN, URINE: 0.2 E.U./DL
WBC # BLD: 6.9 K/UL (ref 4.8–10.8)
WBC UA: 2 /HPF (ref 0–5)

## 2019-07-26 PROCEDURE — 80307 DRUG TEST PRSMV CHEM ANLYZR: CPT

## 2019-07-26 PROCEDURE — 80053 COMPREHEN METABOLIC PANEL: CPT

## 2019-07-26 PROCEDURE — G0480 DRUG TEST DEF 1-7 CLASSES: HCPCS

## 2019-07-26 PROCEDURE — 36415 COLL VENOUS BLD VENIPUNCTURE: CPT

## 2019-07-26 PROCEDURE — 70450 CT HEAD/BRAIN W/O DYE: CPT

## 2019-07-26 PROCEDURE — 84484 ASSAY OF TROPONIN QUANT: CPT

## 2019-07-26 PROCEDURE — 81001 URINALYSIS AUTO W/SCOPE: CPT

## 2019-07-26 PROCEDURE — 82948 REAGENT STRIP/BLOOD GLUCOSE: CPT

## 2019-07-26 PROCEDURE — 93005 ELECTROCARDIOGRAM TRACING: CPT

## 2019-07-26 PROCEDURE — 85025 COMPLETE CBC W/AUTO DIFF WBC: CPT

## 2019-07-26 PROCEDURE — 99284 EMERGENCY DEPT VISIT MOD MDM: CPT

## 2019-07-26 PROCEDURE — 86140 C-REACTIVE PROTEIN: CPT

## 2019-07-26 PROCEDURE — 87086 URINE CULTURE/COLONY COUNT: CPT

## 2019-07-26 PROCEDURE — 99284 EMERGENCY DEPT VISIT MOD MDM: CPT | Performed by: FAMILY MEDICINE

## 2019-07-26 RX ORDER — SODIUM CHLORIDE 9 MG/ML
INJECTION, SOLUTION INTRAVENOUS CONTINUOUS
Status: DISCONTINUED | OUTPATIENT
Start: 2019-07-26 | End: 2019-07-26 | Stop reason: HOSPADM

## 2019-07-26 RX ORDER — 0.9 % SODIUM CHLORIDE 0.9 %
1000 INTRAVENOUS SOLUTION INTRAVENOUS ONCE
Status: DISCONTINUED | OUTPATIENT
Start: 2019-07-26 | End: 2019-07-26 | Stop reason: HOSPADM

## 2019-07-26 ASSESSMENT — PAIN SCALES - GENERAL: PAINLEVEL_OUTOF10: 2

## 2019-07-26 ASSESSMENT — ENCOUNTER SYMPTOMS
CONSTIPATION: 0
BACK PAIN: 0
RHINORRHEA: 0
CHEST TIGHTNESS: 0
ABDOMINAL PAIN: 0
COUGH: 0
DIARRHEA: 0
WHEEZING: 0
VOMITING: 0
SINUS PAIN: 0
NAUSEA: 0
COLOR CHANGE: 0
SHORTNESS OF BREATH: 0

## 2019-07-26 NOTE — ED PROVIDER NOTES
[address] [facility] EMERGENCY DEPT  eMERGENCY dEPARTMENT eNCOUnter      Pt Name: Feng Jiménez  MRN: [id number]  [place] 1950  Date of evaluation: 7/26/2019  Provider: Kenya Schroeder MD    [address] [place] [address]       Chief Complaint   Patient presents with    Loss of Consciousness         HISTORY OF PRESENT ILLNESS   (Location/Symptom, Timing/Onset,Context/Setting, Quality, Duration, Modifying Factors, Severity)  Note limiting factors. Feng Jiménez is a 80 y.o. female who presents to the emergency department      Patient presents today with a complaint of a syncopal episode while at work. Patient and family state that every several months the patient has 1 of these events at work patient began to feel lightheaded went to sit down and evidently she states \"passed out\". Patient has returned to her baseline there is no postictal symptoms recorded patient is wanting to go home but agrees to work-up. NursingNotes were reviewed. REVIEW OF SYSTEMS    (2-9 systems for level 4, 10 or more for level 5)     Review of Systems   Constitutional: Negative for activity change, appetite change, chills, fatigue and fever. HENT: Negative for congestion, rhinorrhea and sinus pain. Respiratory: Negative for cough, chest tightness, shortness of breath and wheezing. Cardiovascular: Negative for chest pain, palpitations and leg swelling. Gastrointestinal: Negative for abdominal pain, constipation, diarrhea, nausea and vomiting. Genitourinary: Negative for difficulty urinating and dysuria. Musculoskeletal: Negative for back pain. Skin: Negative for color change, pallor and rash. Neurological: Positive for syncope. Negative for weakness and numbness. Hematological: Does not bruise/bleed easily.             PAST MEDICALHISTORY     Past Medical History:   Diagnosis Date    Cerebral artery occlusion with cerebral infarction ([facility] [address]     Hyperlipidemia     Hypothyroidism     Tobacco abuse     Vitamin B12 deficiency SURGICAL HISTORY       Past Surgical History:   Procedure Laterality Date    LUNG SURGERY      collapsed lung    [place] COLONOSCOPY W/BIOPSY SINGLE/MULTIPLE N/A 6/27/2017    Dr Robby West-extensive and large mouthed diverticular disease throughout the right colon-Tubular AP (villiform) (-) dysplasia x 1, tubular AP (-) dysplasia x 1--5 yr recall    TUBAL LIGATION           CURRENT MEDICATIONS     Previous Medications    ALBUTEROL SULFATE HFA (PROAIR HFA) 108 (90 BASE) MCG/ACT INHALER    Inhale 2 puffs into the lungs every 6 hours as needed for Wheezing    ATORVASTATIN (LIPITOR) 40 MG TABLET    TAKE 1 TABLET EVERY NIGHT    ESCITALOPRAM (LEXAPRO) 10 MG TABLET    Take 1 tablet by mouth daily    LEVOTHYROXINE (SYNTHROID) 88 MCG TABLET    TAKE 1 TABLET EVERY DAY    RISPERIDONE (RISPERDAL) 0.5 MG TABLET    Take 1 tablet by mouth every evening    VITAMIN B-12 (CYANOCOBALAMIN) 1000 MCG TABLET    Take 1,000 mcg by mouth daily       ALLERGIES     Patient has no known allergies.     FAMILY HISTORY       Family History   Problem Relation Age of Onset    Other Mother         COPD, dementia    Heart Disease Mother     Stroke Mother     Heart Disease Father     Stroke Father     Diabetes Brother     Diabetes Brother     High Blood Pressure Brother     High Cholesterol Brother     Colon Polyps Brother     Colon Cancer Neg Hx     Liver Cancer Neg Hx     Liver Disease Neg Hx     Esophageal Cancer Neg Hx     Rectal Cancer Neg Hx     Stomach Cancer Neg Hx           SOCIAL HISTORY       Social History     Socioeconomic History    Marital status:      Spouse name: None    Number of children: None    Years of education: None    Highest education level: None   Occupational History    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

## 2019-07-28 LAB
EKG P AXIS: NORMAL DEGREES
EKG P AXIS: NORMAL DEGREES
EKG P-R INTERVAL: NORMAL MS
EKG P-R INTERVAL: NORMAL MS
EKG Q-T INTERVAL: 462 MS
EKG Q-T INTERVAL: 466 MS
EKG QRS DURATION: 104 MS
EKG QRS DURATION: 96 MS
EKG QTC CALCULATION (BAZETT): 443 MS
EKG QTC CALCULATION (BAZETT): 452 MS
EKG T AXIS: 58 DEGREES
EKG T AXIS: 68 DEGREES
URINE CULTURE, ROUTINE: NORMAL

## 2019-07-30 ENCOUNTER — OFFICE VISIT (OUTPATIENT)
Dept: PRIMARY CARE CLINIC | Age: 69
End: 2019-07-30
Payer: MEDICARE

## 2019-07-30 VITALS
RESPIRATION RATE: 22 BRPM | DIASTOLIC BLOOD PRESSURE: 66 MMHG | WEIGHT: 166 LBS | HEIGHT: 69 IN | SYSTOLIC BLOOD PRESSURE: 106 MMHG | HEART RATE: 56 BPM | TEMPERATURE: 96.5 F | OXYGEN SATURATION: 97 % | BODY MASS INDEX: 24.59 KG/M2

## 2019-07-30 DIAGNOSIS — R55 SYNCOPE AND COLLAPSE: Primary | ICD-10-CM

## 2019-07-30 PROCEDURE — 4004F PT TOBACCO SCREEN RCVD TLK: CPT | Performed by: FAMILY MEDICINE

## 2019-07-30 PROCEDURE — G8420 CALC BMI NORM PARAMETERS: HCPCS | Performed by: FAMILY MEDICINE

## 2019-07-30 PROCEDURE — 4040F PNEUMOC VAC/ADMIN/RCVD: CPT | Performed by: FAMILY MEDICINE

## 2019-07-30 PROCEDURE — G8427 DOCREV CUR MEDS BY ELIG CLIN: HCPCS | Performed by: FAMILY MEDICINE

## 2019-07-30 PROCEDURE — 1123F ACP DISCUSS/DSCN MKR DOCD: CPT | Performed by: FAMILY MEDICINE

## 2019-07-30 PROCEDURE — G8399 PT W/DXA RESULTS DOCUMENT: HCPCS | Performed by: FAMILY MEDICINE

## 2019-07-30 PROCEDURE — 3017F COLORECTAL CA SCREEN DOC REV: CPT | Performed by: FAMILY MEDICINE

## 2019-07-30 PROCEDURE — 99214 OFFICE O/P EST MOD 30 MIN: CPT | Performed by: FAMILY MEDICINE

## 2019-07-30 PROCEDURE — 1090F PRES/ABSN URINE INCON ASSESS: CPT | Performed by: FAMILY MEDICINE

## 2019-07-31 ASSESSMENT — ENCOUNTER SYMPTOMS
EYE DISCHARGE: 0
BACK PAIN: 0
ABDOMINAL PAIN: 0
WHEEZING: 0
NAUSEA: 0
DIARRHEA: 0
COUGH: 0
COLOR CHANGE: 0
VOMITING: 0

## 2019-07-31 NOTE — PROGRESS NOTES
SUBJECTIVE:    Patient ID: Tess Alicea is a 80 y.o. female. HPI:   Patient was seen in the ER about a week ago for complaints of syncope. She states that she was serving lunch at work and got very lightheaded and felt like she was going to pass out. This is happened on 3 or 4 different occasions. She was taken to the ER via ambulance. She states that when she got to the ER that her symptoms have nearly resolved. She states that they did a thorough work-up including imaging and labs. All of her studies came back normal.  She states that she was told that it was likely a vasovagal syncopal episode. She has had 3 or 4 of these in the last several months. She has not seen cardiology. She has had a Holter monitor done in the past which came back normal.  She denies any history of seizures. She does have a history of a previous TIA. Past Medical History:   Diagnosis Date    Cerebral artery occlusion with cerebral infarction ([address]     Hyperlipidemia     Hypothyroidism     Tobacco abuse     Vitamin B12 deficiency       Current Outpatient Medications   Medication Sig Dispense Refill    albuterol sulfate HFA (PROAIR HFA) 108 (90 Base) MCG/ACT inhaler Inhale 2 puffs into the lungs every 6 hours as needed for Wheezing 1 Inhaler 3    risperiDONE (RISPERDAL) 0.5 MG tablet Take 1 tablet by mouth every evening 30 tablet 0    escitalopram (LEXAPRO) 10 MG tablet Take 1 tablet by mouth daily 30 tablet 5    atorvastatin (LIPITOR) 40 MG tablet TAKE 1 TABLET EVERY NIGHT 90 tablet 3    levothyroxine (SYNTHROID) 88 MCG tablet TAKE 1 TABLET EVERY DAY 90 tablet 3    vitamin B-12 (CYANOCOBALAMIN) 1000 MCG tablet Take 1,000 mcg by mouth daily       No current facility-administered medications for this visit. No Known Allergies    Review of Systems   Constitutional: Negative for activity change, appetite change and fever. HENT: Negative for congestion and nosebleeds. Eyes: Negative for discharge. Dragon/transcription disclaimer:  Much of this encounter note is electronic transcription/translation of spoken language toprinted texts. The electronic translation of spoken language may be erroneous, or at times, nonsensical words or phrases may be inadvertently transcribed.   Although I have reviewed the note for such errors, some may stillexist.

## 2019-09-09 ENCOUNTER — OFFICE VISIT (OUTPATIENT)
Dept: NEUROSURGERY | Age: 69
End: 2019-09-09
Payer: COMMERCIAL

## 2019-09-09 VITALS
SYSTOLIC BLOOD PRESSURE: 106 MMHG | WEIGHT: 166 LBS | HEIGHT: 69 IN | OXYGEN SATURATION: 94 % | DIASTOLIC BLOOD PRESSURE: 62 MMHG | HEART RATE: 64 BPM | BODY MASS INDEX: 24.59 KG/M2

## 2019-09-09 DIAGNOSIS — R56.9 CONVULSIONS, UNSPECIFIED CONVULSION TYPE (HCC): Primary | ICD-10-CM

## 2019-09-09 DIAGNOSIS — R55 SYNCOPE AND COLLAPSE: ICD-10-CM

## 2019-09-09 PROCEDURE — 99214 OFFICE O/P EST MOD 30 MIN: CPT | Performed by: NURSE PRACTITIONER

## 2019-09-09 NOTE — PROGRESS NOTES
 Colon Polyps Brother     Colon Cancer Neg Hx     Liver Cancer Neg Hx     Liver Disease Neg Hx     Esophageal Cancer Neg Hx     Rectal Cancer Neg Hx     Stomach Cancer Neg Hx        Social History     Socioeconomic History    Marital status:      Spouse name: Not on file    Number of children: Not on file    Years of education: Not on file    Highest education level: Not on file   Occupational History    Occupation: Resident Aide at [address] resource strain: Not on file    Food insecurity:     Worry: Not on file     Inability: Not on file    Transportation needs:     Medical: Not on file     Non-medical: Not on file   Tobacco Use    Smoking status: Current Every Day Smoker     Packs/day: 0.50     Years: 30.00     Pack years: 15.00     Types: Cigarettes    Smokeless tobacco: Never Used    Tobacco comment: pt states she would like to try to quit   Substance and Sexual Activity    Alcohol use: No    Drug use: No    Sexual activity: Not on file   Lifestyle    Physical activity:     Days per week: Not on file     Minutes per session: Not on file    Stress: Not on file   Relationships    Social connections:     Talks on phone: Not on file     Gets together: Not on file     Attends Christianity service: Not on file     Active member of club or organization: Not on file     Attends meetings of clubs or organizations: Not on file     Relationship status: Not on file    Intimate partner violence:     Fear of current or ex partner: Not on file     Emotionally abused: Not on file     Physically abused: Not on file     Forced sexual activity: Not on file   Other Topics Concern    Not on file   Social History Narrative    Not on file       Current Outpatient Medications   Medication Sig Dispense Refill    albuterol sulfate HFA (PROAIR HFA) 108 (90 Base) MCG/ACT inhaler Inhale 2 puffs into the lungs every 6 hours as needed for Wheezing 1 Inhaler 3    risperiDONE

## 2019-09-19 ENCOUNTER — OFFICE VISIT (OUTPATIENT)
Dept: CARDIOLOGY | Age: 69
End: 2019-09-19
Payer: COMMERCIAL

## 2019-09-19 ENCOUNTER — TELEPHONE (OUTPATIENT)
Dept: CARDIOLOGY | Age: 69
End: 2019-09-19

## 2019-09-19 VITALS
BODY MASS INDEX: 24.73 KG/M2 | DIASTOLIC BLOOD PRESSURE: 80 MMHG | WEIGHT: 167 LBS | HEIGHT: 69 IN | HEART RATE: 66 BPM | SYSTOLIC BLOOD PRESSURE: 114 MMHG

## 2019-09-19 DIAGNOSIS — G45.9 TIA (TRANSIENT ISCHEMIC ATTACK): ICD-10-CM

## 2019-09-19 DIAGNOSIS — R55 SYNCOPE AND COLLAPSE: Primary | ICD-10-CM

## 2019-09-19 PROCEDURE — 99213 OFFICE O/P EST LOW 20 MIN: CPT | Performed by: CLINICAL NURSE SPECIALIST

## 2019-09-19 ASSESSMENT — ENCOUNTER SYMPTOMS
FACIAL SWELLING: 0
COUGH: 0
ABDOMINAL PAIN: 0
CHEST TIGHTNESS: 0
VOMITING: 0
EYE REDNESS: 0
SHORTNESS OF BREATH: 0
WHEEZING: 0
NAUSEA: 0

## 2019-09-19 NOTE — PROGRESS NOTES
(RISPERDAL) 0.5 MG tablet Take 1 tablet by mouth every evening 30 tablet 0    escitalopram (LEXAPRO) 10 MG tablet Take 1 tablet by mouth daily 30 tablet 5    atorvastatin (LIPITOR) 40 MG tablet TAKE 1 TABLET EVERY NIGHT 90 tablet 3    levothyroxine (SYNTHROID) 88 MCG tablet TAKE 1 TABLET EVERY DAY 90 tablet 3     No current facility-administered medications for this visit. Allergies: Patient has no known allergies. Review of Systems  Review of Systems   Constitutional: Positive for fatigue. Negative for activity change, diaphoresis, fever and unexpected weight change. HENT: Negative for facial swelling and nosebleeds. Eyes: Negative for redness and visual disturbance. Respiratory: Negative for cough, chest tightness, shortness of breath and wheezing. Cardiovascular: Negative for chest pain, palpitations and leg swelling. Gastrointestinal: Negative for abdominal pain, nausea and vomiting. Endocrine: Negative for cold intolerance and heat intolerance. Genitourinary: Negative for dysuria and hematuria. Musculoskeletal: Negative for arthralgias and myalgias. Skin: Negative for pallor and rash. Neurological: Positive for syncope. Negative for dizziness, seizures, weakness and light-headedness. Hematological: Does not bruise/bleed easily. Psychiatric/Behavioral: Negative for agitation. The patient is not nervous/anxious. Objective  Vital Signs - /80   Pulse 66   Ht 5' 9\" (1.753 m)   Wt 167 lb (75.8 kg)   BMI 24.66 kg/m²   Physical Exam    Data:  DSE 3/19  Summary   Dobutamine stress echocardiogram without clinical or echocardiographic   evidence of myocardial ischemia.  EKG with mild nondiagnostic changes   noted.   Test was supervised by Dr. Brea Giang.     Echo 10/18  Summary   Mitral valve leaflets are mildly thickened with preserved leaflet   mobility.   Trace mitral regurgitation.   UQCVYT thickened aortic valve leaflets with preserved leaflet mobility.   Aortic valve

## 2019-09-30 RX ORDER — RISPERIDONE 0.5 MG/1
TABLET, FILM COATED ORAL
Qty: 30 TABLET | Refills: 3 | Status: SHIPPED
Start: 2019-09-30 | End: 2020-02-24

## 2019-10-01 ENCOUNTER — HOSPITAL ENCOUNTER (OUTPATIENT)
Dept: CARDIAC CATH/INVASIVE PROCEDURES | Age: 69
Discharge: HOME | End: 2019-10-01
Attending: INTERNAL MEDICINE | Admitting: INTERNAL MEDICINE
Payer: MEDICARE

## 2019-10-01 VITALS
WEIGHT: 165 LBS | OXYGEN SATURATION: 96 % | BODY MASS INDEX: 24.44 KG/M2 | RESPIRATION RATE: 15 BRPM | SYSTOLIC BLOOD PRESSURE: 117 MMHG | HEIGHT: 69 IN | TEMPERATURE: 98.2 F | DIASTOLIC BLOOD PRESSURE: 70 MMHG | HEART RATE: 46 BPM

## 2019-10-01 PROCEDURE — 6360000002 HC RX W HCPCS

## 2019-10-01 PROCEDURE — 2500000003 HC RX 250 WO HCPCS

## 2019-10-01 PROCEDURE — 33285 INSJ SUBQ CAR RHYTHM MNTR: CPT | Performed by: INTERNAL MEDICINE

## 2019-10-01 PROCEDURE — 33285 INSJ SUBQ CAR RHYTHM MNTR: CPT

## 2019-10-01 PROCEDURE — C1764 EVENT RECORDER, CARDIAC: HCPCS

## 2019-10-01 RX ORDER — CHLORHEXIDINE GLUCONATE 4 G/100ML
SOLUTION TOPICAL ONCE
Status: DISCONTINUED | OUTPATIENT
Start: 2019-10-01 | End: 2019-10-01 | Stop reason: HOSPADM

## 2019-10-01 RX ORDER — SODIUM CHLORIDE 9 MG/ML
INJECTION, SOLUTION INTRAVENOUS CONTINUOUS
Status: DISCONTINUED | OUTPATIENT
Start: 2019-10-01 | End: 2019-10-01 | Stop reason: HOSPADM

## 2019-10-02 ENCOUNTER — OFFICE VISIT (OUTPATIENT)
Dept: PRIMARY CARE CLINIC | Age: 69
End: 2019-10-02
Payer: MEDICARE

## 2019-10-02 VITALS
SYSTOLIC BLOOD PRESSURE: 120 MMHG | TEMPERATURE: 96.2 F | WEIGHT: 165 LBS | DIASTOLIC BLOOD PRESSURE: 80 MMHG | HEIGHT: 69 IN | HEART RATE: 65 BPM | BODY MASS INDEX: 24.44 KG/M2 | OXYGEN SATURATION: 98 %

## 2019-10-02 DIAGNOSIS — R55 NEAR SYNCOPE: ICD-10-CM

## 2019-10-02 DIAGNOSIS — G43.009 MIGRAINE WITHOUT AURA AND WITHOUT STATUS MIGRAINOSUS, NOT INTRACTABLE: ICD-10-CM

## 2019-10-02 DIAGNOSIS — R51.9 SEVERE HEADACHE: ICD-10-CM

## 2019-10-02 DIAGNOSIS — Z00.00 ROUTINE GENERAL MEDICAL EXAMINATION AT A HEALTH CARE FACILITY: Primary | ICD-10-CM

## 2019-10-02 PROCEDURE — G0439 PPPS, SUBSEQ VISIT: HCPCS | Performed by: FAMILY MEDICINE

## 2019-10-02 PROCEDURE — 99213 OFFICE O/P EST LOW 20 MIN: CPT | Performed by: FAMILY MEDICINE

## 2019-10-02 RX ORDER — ATORVASTATIN CALCIUM 40 MG/1
TABLET, FILM COATED ORAL
Qty: 90 TABLET | Refills: 3 | Status: CANCELLED | OUTPATIENT
Start: 2019-10-02

## 2019-10-02 RX ORDER — RISPERIDONE 0.5 MG/1
TABLET, FILM COATED ORAL
Qty: 30 TABLET | Refills: 3 | Status: CANCELLED | OUTPATIENT
Start: 2019-10-02

## 2019-10-02 RX ORDER — LEVOTHYROXINE SODIUM 88 UG/1
TABLET ORAL
Qty: 90 TABLET | Refills: 3 | Status: CANCELLED | OUTPATIENT
Start: 2019-10-02

## 2019-10-02 ASSESSMENT — ENCOUNTER SYMPTOMS
ABDOMINAL PAIN: 0
COUGH: 0
VOMITING: 0
COLOR CHANGE: 0
EYE DISCHARGE: 0
BACK PAIN: 0
WHEEZING: 0
NAUSEA: 0
DIARRHEA: 0

## 2019-10-02 ASSESSMENT — LIFESTYLE VARIABLES: HOW OFTEN DO YOU HAVE A DRINK CONTAINING ALCOHOL: 0

## 2019-10-02 ASSESSMENT — PATIENT HEALTH QUESTIONNAIRE - PHQ9
SUM OF ALL RESPONSES TO PHQ QUESTIONS 1-9: 2
SUM OF ALL RESPONSES TO PHQ QUESTIONS 1-9: 2

## 2019-10-15 ENCOUNTER — OFFICE VISIT (OUTPATIENT)
Dept: NEUROSURGERY | Age: 69
End: 2019-10-15
Payer: MEDICARE

## 2019-10-15 ENCOUNTER — NURSE ONLY (OUTPATIENT)
Dept: CARDIOLOGY | Age: 69
End: 2019-10-15

## 2019-10-15 VITALS
BODY MASS INDEX: 25.18 KG/M2 | SYSTOLIC BLOOD PRESSURE: 109 MMHG | DIASTOLIC BLOOD PRESSURE: 60 MMHG | OXYGEN SATURATION: 97 % | HEART RATE: 53 BPM | HEIGHT: 69 IN | WEIGHT: 170 LBS

## 2019-10-15 DIAGNOSIS — R55 SYNCOPE AND COLLAPSE: ICD-10-CM

## 2019-10-15 DIAGNOSIS — Z95.818 STATUS POST PLACEMENT OF IMPLANTABLE LOOP RECORDER: Primary | ICD-10-CM

## 2019-10-15 DIAGNOSIS — Z51.89 VISIT FOR WOUND CHECK: ICD-10-CM

## 2019-10-15 DIAGNOSIS — R56.9 CONVULSIONS, UNSPECIFIED CONVULSION TYPE (HCC): Primary | ICD-10-CM

## 2019-10-15 PROCEDURE — 99213 OFFICE O/P EST LOW 20 MIN: CPT | Performed by: NURSE PRACTITIONER

## 2019-10-15 PROCEDURE — 99024 POSTOP FOLLOW-UP VISIT: CPT | Performed by: NURSE PRACTITIONER

## 2019-10-18 ENCOUNTER — TELEPHONE (OUTPATIENT)
Dept: PRIMARY CARE CLINIC | Age: 69
End: 2019-10-18

## 2019-10-22 DIAGNOSIS — R00.1 BRADYCARDIA: ICD-10-CM

## 2019-10-22 DIAGNOSIS — Z95.818 STATUS POST PLACEMENT OF IMPLANTABLE LOOP RECORDER: Primary | ICD-10-CM

## 2019-10-22 DIAGNOSIS — R55 SYNCOPE AND COLLAPSE: ICD-10-CM

## 2019-11-04 ENCOUNTER — HOSPITAL ENCOUNTER (INPATIENT)
Dept: NEUROLOGY | Age: 69
LOS: 1 days | Discharge: HOME | DRG: 244 | End: 2019-11-07
Attending: PSYCHIATRY & NEUROLOGY | Admitting: PSYCHIATRY & NEUROLOGY
Payer: MEDICARE

## 2019-11-04 DIAGNOSIS — R55 SYNCOPE AND COLLAPSE: ICD-10-CM

## 2019-11-04 DIAGNOSIS — R00.1 BRADYCARDIA: Primary | ICD-10-CM

## 2019-11-04 DIAGNOSIS — Z95.818 STATUS POST PLACEMENT OF IMPLANTABLE LOOP RECORDER: ICD-10-CM

## 2019-11-04 PROBLEM — R56.9 CONVULSION (HCC): Status: ACTIVE | Noted: 2019-11-04

## 2019-11-04 PROCEDURE — 6360000002 HC RX W HCPCS: Performed by: PSYCHIATRY & NEUROLOGY

## 2019-11-04 PROCEDURE — 96372 THER/PROPH/DIAG INJ SC/IM: CPT

## 2019-11-04 PROCEDURE — 95951 HC EEG MONITORING VIDEO RECORDING: CPT

## 2019-11-04 PROCEDURE — 99222 1ST HOSP IP/OBS MODERATE 55: CPT | Performed by: PSYCHIATRY & NEUROLOGY

## 2019-11-04 PROCEDURE — 95951 PR EEG MONITORING/VIDEORECORD: CPT | Performed by: PSYCHIATRY & NEUROLOGY

## 2019-11-04 PROCEDURE — G0378 HOSPITAL OBSERVATION PER HR: HCPCS

## 2019-11-04 PROCEDURE — 2580000003 HC RX 258: Performed by: PSYCHIATRY & NEUROLOGY

## 2019-11-04 RX ORDER — ESCITALOPRAM OXALATE 10 MG/1
10 TABLET ORAL DAILY
Status: DISCONTINUED | OUTPATIENT
Start: 2019-11-05 | End: 2019-11-07 | Stop reason: HOSPADM

## 2019-11-04 RX ORDER — ACETAMINOPHEN 325 MG/1
650 TABLET ORAL EVERY 6 HOURS PRN
Status: DISCONTINUED | OUTPATIENT
Start: 2019-11-04 | End: 2019-11-06

## 2019-11-04 RX ORDER — SODIUM CHLORIDE 0.9 % (FLUSH) 0.9 %
10 SYRINGE (ML) INJECTION EVERY 12 HOURS SCHEDULED
Status: DISCONTINUED | OUTPATIENT
Start: 2019-11-04 | End: 2019-11-06

## 2019-11-04 RX ORDER — LORAZEPAM 2 MG/ML
1 INJECTION INTRAMUSCULAR PRN
Status: DISCONTINUED | OUTPATIENT
Start: 2019-11-04 | End: 2019-11-07 | Stop reason: HOSPADM

## 2019-11-04 RX ORDER — ATORVASTATIN CALCIUM 40 MG/1
40 TABLET, FILM COATED ORAL DAILY
Status: DISCONTINUED | OUTPATIENT
Start: 2019-11-05 | End: 2019-11-07 | Stop reason: HOSPADM

## 2019-11-04 RX ORDER — SODIUM CHLORIDE 0.9 % (FLUSH) 0.9 %
10 SYRINGE (ML) INJECTION PRN
Status: DISCONTINUED | OUTPATIENT
Start: 2019-11-04 | End: 2019-11-06

## 2019-11-04 RX ORDER — ALBUTEROL SULFATE 90 UG/1
2 AEROSOL, METERED RESPIRATORY (INHALATION) EVERY 6 HOURS PRN
Status: DISCONTINUED | OUTPATIENT
Start: 2019-11-04 | End: 2019-11-07 | Stop reason: HOSPADM

## 2019-11-04 RX ORDER — RISPERIDONE 0.5 MG/1
0.5 TABLET, FILM COATED ORAL EVERY EVENING
Status: DISCONTINUED | OUTPATIENT
Start: 2019-11-04 | End: 2019-11-07 | Stop reason: HOSPADM

## 2019-11-04 RX ORDER — LEVOTHYROXINE SODIUM 88 UG/1
88 TABLET ORAL DAILY
Status: DISCONTINUED | OUTPATIENT
Start: 2019-11-05 | End: 2019-11-07 | Stop reason: HOSPADM

## 2019-11-04 RX ADMIN — ENOXAPARIN SODIUM 40 MG: 40 INJECTION SUBCUTANEOUS at 13:44

## 2019-11-04 RX ADMIN — RISPERIDONE 0.5 MG: 0.5 TABLET, FILM COATED ORAL at 22:04

## 2019-11-04 RX ADMIN — Medication 10 ML: at 22:05

## 2019-11-04 NOTE — H&P
HISTORY AND PHYSICAL      Patient:   Kaylee Dukes  MR#:    [id number]  Account Number:                   [id number]      Room:    0334/334-01   YOB: 1950  Date of Progress Note: 11/4/2019  Time of Note                           12:43 PM  Attending Physician:  Noble Barker DO      CHIEF COMPLAINT:  Convulsions    HISTORY OF PRESENT ILLNESS:   This 80 y.o. female who presents with recurrent episodes of loss of awareness, possible seizure. Patient has been having intermittent episodes where she loses awareness. She states that she will note tingling in her arms and legs followed by sudden loss of awareness. No overt generalized tonic-clonic activity is noted. There is some fatigue afterwards. She denies any tongue biting or urinary incontinence. No prior history of traumatic brain injury, meningitis or encephalitis. She is being admitted to epilepsy monitoring unit for event clarification and to exclude an underlying seizure focus. MRI of her brain from a year ago did not reveal anything acute. Nonspecific white matter abnormalities were noted. 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 Routine EEG was normal.    REVIEW OF SYSTEMS    Constitutional:  Denies fever or chills   Eyes:  Denies change in visual acuity   HENT:  Denies nasal congestion or sore throat   Respiratory:  Denies cough or shortness of breath   Cardiovascular:  Denies chest pain or edema   GI:  Denies abdominal pain, nausea, vomiting, bloody stools or diarrhea   :  Denies dysuria   Musculoskeletal:  Denies back pain or joint pain   Integument:  Denies rash   Neurologic:  Denies headache, focal weakness or sensory changes   Endocrine:  Denies polyuria or polydipsia   Lymphatic:  Denies swollen glands   Psychiatric:  Denies depression or anxiety      All other review of systems are negative.     Past Medical History:  Past Medical History:   Diagnosis Date    Cerebral artery occlusion with cerebral infarction ([facility] [address]     TIA    Diverticular disease of colon     Hyperlipidemia     Hypothyroidism     Pneumonia     Psychiatric problem     Tobacco abuse     Vitamin B12 deficiency        Past Surgical History:      Procedure Laterality Date    COLONOSCOPY      LUNG SURGERY      collapsed lung    [place] COLONOSCOPY W/BIOPSY SINGLE/MULTIPLE N/A 6/27/2017    Dr Krishna West-extensive and large mouthed diverticular disease throughout the right colon-Tubular AP (villiform) (-) dysplasia x 1, tubular AP (-) dysplasia x 1--5 yr recall    TUBAL LIGATION         Social History:   TOBACCO:   reports that she has been smoking cigarettes. She has a 15.00 pack-year smoking history. She has never used smokeless tobacco.  ETOH:   reports that she does not drink alcohol.   DRUG:  None    Family History:       Problem Relation Age of Onset    Other Mother         COPD, dementia    Heart Disease Mother     Stroke Mother     Heart Disease Father     Stroke Father     Diabetes Brother     Diabetes Brother     High Blood Pressure Brother     High Cholesterol Brother     Colon Polyps Brother     Colon Cancer Neg Hx     Liver Cancer Neg Hx     Liver Disease Neg Hx     Esophageal Cancer Neg Hx     Rectal Cancer Neg Hx     Stomach Cancer Neg Hx        Medications:      Current Facility-Administered Medications:     albuterol sulfate  (90 Base) MCG/ACT inhaler 2 puff, 2 puff, Inhalation, Q6H PRN, Emely Stephenson, DO    atorvastatin (LIPITOR) tablet 40 mg, 40 mg, Oral, Daily, Emely Stephenson, DO    escitalopram (LEXAPRO) tablet 10 mg, 10 mg, Oral, Daily, Emely Stephenson, DO    levothyroxine (SYNTHROID) tablet 88 mcg, 1 tablet, Oral, Daily, Emely Stephenson, DO    risperiDONE (RISPERDAL) tablet 0.5 mg, 0.5 mg, Oral, QPM, Emely Stephenson, DO    sodium chloride flush 0.9 % injection 10 mL, 10 mL, Intravenous, 2 times per day, Emely Stephenson, DO    sodium chloride flush 0.9 % injection 10 mL, 10 mL, Intravenous, PRN, [place] Gabrielleast Elmer,     enoxaparin (LOVENOX) injection 40 mg, 40 mg, Subcutaneous, Daily, Mikhail Goins DO    LORazepam (ATIVAN) injection 1 mg, 1 mg, Intravenous, PRN, Mikhail Goins DO    acetaminophen (TYLENOL) tablet 650 mg, 650 mg, Oral, Q6H PRN, Mikhail Goins DO    Allergies:  Patient has no known allergies. PHYSICAL EXAM:    Constitutional -   /65   Pulse 52   Temp 97.6 °F (36.4 °C) (Temporal)   Resp 12   Ht 5' 9\" (1.753 m)   Wt 171 lb (77.6 kg)   SpO2 96%   BMI 25.25 kg/m²   General appearance: No acute distress   EYES -   Conjunctiva normal  Pupillary exam as below, see CN exam in the neurologic exam  ENT-    No scars, masses, or lesions over external nose or ears  Hearing normal bilaterally to finger rub  Cardiovascular -   RRR  No clubbing, cyanosis, or edema   Respiratory-   Good expansion, normal effort without use of accessory muscles  CTA  Musculoskeletal -   No significant wasting of muscles noted  Gait as below, see gait exam in the neurologic exam  Muscle strength, tone, stability as below. No bony deformities  Skin -   Warm, dry, and intact to inspection and palpation. No rash, erythema, or pallor  Psychiatric -   Mood, affect, and behavior appear normal    Memory as below see mental status examination in the neurologic exam      NEUROLOGICAL EXAM    Mental status   [x]Awake, alert, oriented   [x]Affect attention and concentration appear appropriate  [x]Recent and remote memory appears unremarkable  [x]Speech normal without dysarthria or aphasia, comprehension and repetition intact.    COMMENTS:    Cranial Nerves [x]No VF deficit to confrontation  [x]PERRLA, EOMI, no nystagmus, conjugate eye movements, no ptosis  [x]Face symmetric  [x]Facial sensation intact  [x]Tongue midline no atrophy or fasciculations present  [x]Palate midline, hearing to finger rub normal  [x]Shoulder shrug and SCM testing normal  COMMENTS:   Motor   [x]5/5 strength x 4 extremities  [x]Normal bulk and tone  [x]No tremor present  [x]No rigidity or bradykinesia noted  COMMENTS:   Sensory  [x]Sensation intact to light touch, pin prick, vibration, and proprioception BLE  []Sensation intact to light touch, pin prick, vibration, and proprioception BUE  COMMENTS:   Coordination [x]FTN normal bilaterally   []HTS normal bilaterally  []CHEMO normal.   COMMENTS:   Reflexes  [x]Symmetric and non-pathological  [x]Toes down going bilaterally  [x]No clonus present  COMMENTS:   Gait                  [x]Normal steady gait    []Ataxic    []Spastic     []Magnetic     []Shuffling  COMMENTS:     LABS AND IMAGING:  As below and per HPI. ASSESSMENT:  80 y.o. here with recurrent episodes of loss of awareness, possible seizure. She is being admitted to epilepsy monitoring unit for event clarification. PLAN:  1. Video EEG monitoring 48-72 hours. 2.  Seizure precautions, prn ativan. 3.  Tele  4. Final AED recommendations pending above.      Eve Bella DO  Board Certified Neurologist

## 2019-11-04 NOTE — PROGRESS NOTES
Naila Araiza arrived to room # 334. Presented with: EMU  Mental Status: Patient is oriented and alert. Vitals:    11/04/19 0902   BP: 116/65   Pulse: 52   Resp: 12   Temp: 97.6 °F (36.4 °C)   SpO2: 96%     Patient safety contract and falls prevention contract reviewed with patient Yes. Oriented Patient and Family to room. Call light within reach. Yes.   Needs, issues or concerns expressed at this time: no.      Electronically signed by Jevon Alexis RN on 11/4/2019 at 10:10 AM

## 2019-11-04 NOTE — PROGRESS NOTES
4 Eyes Skin Assessment    Margot Pope is being assessed upon: Admission    I agree that Eleno Weston, along with Yeimy Daugherty RN (either 2 RN's or 1 LPN and 1 RN) have performed a thorough Head to Toe Skin Assessment on the patient. ALL assessment sites listed below have been assessed. Areas assessed by both nurses:     [x]   Head, Face, and Ears   [x]   Shoulders, Back, and Chest  [x]   Arms, Elbows, and Hands   [x]   Coccyx, Sacrum, and Ischium  [x]   Legs, Feet, and Heels    Does the Patient have Skin Breakdown?  No    Brent Prevention initiated: No  Wound Care Orders initiated: No    WOC nurse consulted for Pressure Injury (Stage 3,4, Unstageable, DTI, NWPT, and Complex wounds) and New or Established Ostomies: NA        Primary Nurse eSignature: Carolyn Roberts RN on 11/4/2019 at 10:29 AM      Co-Signer eSignature: Electronically signed by Elizabeth Zavaleta RN on 11/4/19 at 4:29 PM

## 2019-11-05 LAB
ALBUMIN SERPL-MCNC: 3.6 G/DL (ref 3.5–5.2)
ALP BLD-CCNC: 82 U/L (ref 35–104)
ALT SERPL-CCNC: 13 U/L (ref 5–33)
ANION GAP SERPL CALCULATED.3IONS-SCNC: 11 MMOL/L (ref 7–19)
AST SERPL-CCNC: 13 U/L (ref 5–32)
BASOPHILS ABSOLUTE: 0.1 K/UL (ref 0–0.2)
BASOPHILS RELATIVE PERCENT: 0.7 % (ref 0–1)
BILIRUB SERPL-MCNC: <0.2 MG/DL (ref 0.2–1.2)
BUN BLDV-MCNC: 21 MG/DL (ref 8–23)
CALCIUM SERPL-MCNC: 9.4 MG/DL (ref 8.8–10.2)
CHLORIDE BLD-SCNC: 107 MMOL/L (ref 98–111)
CO2: 24 MMOL/L (ref 22–29)
CREAT SERPL-MCNC: 0.7 MG/DL (ref 0.5–0.9)
EOSINOPHILS ABSOLUTE: 0.1 K/UL (ref 0–0.6)
EOSINOPHILS RELATIVE PERCENT: 1.6 % (ref 0–5)
GFR NON-AFRICAN AMERICAN: >60
GLUCOSE BLD-MCNC: 139 MG/DL (ref 74–109)
HCT VFR BLD CALC: 42.1 % (ref 37–47)
HEMOGLOBIN: 13.1 G/DL (ref 12–16)
IMMATURE GRANULOCYTES #: 0 K/UL
LYMPHOCYTES ABSOLUTE: 2 K/UL (ref 1.1–4.5)
LYMPHOCYTES RELATIVE PERCENT: 27.7 % (ref 20–40)
MCH RBC QN AUTO: 27.8 PG (ref 27–31)
MCHC RBC AUTO-ENTMCNC: 31.1 G/DL (ref 33–37)
MCV RBC AUTO: 89.2 FL (ref 81–99)
MONOCYTES ABSOLUTE: 0.5 K/UL (ref 0–0.9)
MONOCYTES RELATIVE PERCENT: 7.1 % (ref 0–10)
NEUTROPHILS ABSOLUTE: 4.6 K/UL (ref 1.5–7.5)
NEUTROPHILS RELATIVE PERCENT: 62.6 % (ref 50–65)
PDW BLD-RTO: 13.9 % (ref 11.5–14.5)
PLATELET # BLD: 147 K/UL (ref 130–400)
PMV BLD AUTO: 11.6 FL (ref 9.4–12.3)
POTASSIUM REFLEX MAGNESIUM: 4.3 MMOL/L (ref 3.5–5)
RBC # BLD: 4.72 M/UL (ref 4.2–5.4)
SODIUM BLD-SCNC: 142 MMOL/L (ref 136–145)
TOTAL PROTEIN: 6.2 G/DL (ref 6.6–8.7)
WBC # BLD: 7.3 K/UL (ref 4.8–10.8)

## 2019-11-05 PROCEDURE — 2580000003 HC RX 258: Performed by: PSYCHIATRY & NEUROLOGY

## 2019-11-05 PROCEDURE — 96372 THER/PROPH/DIAG INJ SC/IM: CPT

## 2019-11-05 PROCEDURE — 85025 COMPLETE CBC W/AUTO DIFF WBC: CPT

## 2019-11-05 PROCEDURE — 95951 HC EEG MONITORING VIDEO RECORDING: CPT

## 2019-11-05 PROCEDURE — 6370000000 HC RX 637 (ALT 250 FOR IP): Performed by: INTERNAL MEDICINE

## 2019-11-05 PROCEDURE — 99221 1ST HOSP IP/OBS SF/LOW 40: CPT | Performed by: INTERNAL MEDICINE

## 2019-11-05 PROCEDURE — G0378 HOSPITAL OBSERVATION PER HR: HCPCS

## 2019-11-05 PROCEDURE — 95951 PR EEG MONITORING/VIDEORECORD: CPT | Performed by: PSYCHIATRY & NEUROLOGY

## 2019-11-05 PROCEDURE — 6360000002 HC RX W HCPCS: Performed by: PSYCHIATRY & NEUROLOGY

## 2019-11-05 PROCEDURE — 36415 COLL VENOUS BLD VENIPUNCTURE: CPT

## 2019-11-05 PROCEDURE — 93005 ELECTROCARDIOGRAM TRACING: CPT | Performed by: INTERNAL MEDICINE

## 2019-11-05 PROCEDURE — 99232 SBSQ HOSP IP/OBS MODERATE 35: CPT | Performed by: PSYCHIATRY & NEUROLOGY

## 2019-11-05 PROCEDURE — 80053 COMPREHEN METABOLIC PANEL: CPT

## 2019-11-05 RX ORDER — CHLORHEXIDINE GLUCONATE 4 G/100ML
SOLUTION TOPICAL ONCE
Status: COMPLETED | OUTPATIENT
Start: 2019-11-05 | End: 2019-11-05

## 2019-11-05 RX ADMIN — Medication 10 ML: at 10:27

## 2019-11-05 RX ADMIN — ATORVASTATIN CALCIUM 40 MG: 40 TABLET, FILM COATED ORAL at 10:27

## 2019-11-05 RX ADMIN — LEVOTHYROXINE SODIUM 88 MCG: 88 TABLET ORAL at 06:00

## 2019-11-05 RX ADMIN — ESCITALOPRAM OXALATE 10 MG: 10 TABLET ORAL at 10:27

## 2019-11-05 RX ADMIN — CHLORHEXIDINE GLUCONATE: 213 SOLUTION TOPICAL at 23:29

## 2019-11-05 RX ADMIN — ENOXAPARIN SODIUM 40 MG: 40 INJECTION SUBCUTANEOUS at 12:18

## 2019-11-05 RX ADMIN — RISPERIDONE 0.5 MG: 0.5 TABLET, FILM COATED ORAL at 18:03

## 2019-11-05 ASSESSMENT — PAIN SCALES - GENERAL
PAINLEVEL_OUTOF10: 0
PAINLEVEL_OUTOF10: 0

## 2019-11-05 ASSESSMENT — ENCOUNTER SYMPTOMS
ABDOMINAL DISTENTION: 0
SHORTNESS OF BREATH: 0
EYE DISCHARGE: 0
CONSTIPATION: 0
DIARRHEA: 0
VOMITING: 0
BACK PAIN: 0
COUGH: 0
BLOOD IN STOOL: 0
WHEEZING: 0

## 2019-11-05 NOTE — PROGRESS NOTES
[facility] Neurology Progress Note      Patient:   Maricruz Mack  MR#:    [id number]   Room:    8557/742-68   YOB: 1950  Date of Progress Note: 11/5/2019  Time of Note                           12:30 PM  Attending Physician:  Cinthia Lake DO      INTERVAL HISTORY:  No acute events overnight, no seizures, bradycardia noted on tele down in the 30s. REVIEW OF SYSTEMS:  Constitutional: No fevers No chills  Neck:No stiffness  Respiratory: No shortness of breath  Cardiovascular: No chest pain No palpitations  Gastrointestinal: No abdominal pain    Genitourinary: No Dysuria  Neurological: No headache    PHYSICAL EXAM:    Constitutional -   /68   Pulse 54   Temp 98.9 °F (37.2 °C) (Temporal)   Resp 12   Ht 5' 9\" (1.753 m)   Wt 171 lb (77.6 kg)   SpO2 92%   BMI 25.25 kg/m²   General appearance: No acute distress   EYES -   Conjunctiva normal  Pupillary exam as below, see CN exam in the neurologic exam  ENT-    No scars, masses, or lesions over external nose or ears  Hearing normal bilaterally to finger rub  Cardiovascular -   RRR  No clubbing, cyanosis, or edema   Respiratory-   Good expansion, normal effort without use of accessory muscles  CTA  Musculoskeletal -   No significant wasting of muscles noted  Gait as below, see gait exam in the neurologic exam  Muscle strength, tone, stability as below. No bony deformities  Skin -   Warm, dry, and intact to inspection and palpation. No rash, erythema, or pallor  Psychiatric -   Mood, affect, and behavior appear normal    Memory as below see mental status examination in the neurologic exam       NEUROLOGICAL EXAM     Mental status    [x]Awake, alert, oriented   [x]Affect attention and concentration appear appropriate  [x]Recent and remote memory appears unremarkable  [x]Speech normal without dysarthria or aphasia, comprehension and repetition intact.    COMMENTS:    Cranial Nerves [x]No VF deficit to confrontation  [x]PERRLA, EOMI, no nystagmus, conjugate eye movements, no ptosis  [x]Face symmetric  [x]Facial sensation intact  [x]Tongue midline no atrophy or fasciculations present  [x]Palate midline, hearing to finger rub normal  [x]Shoulder shrug and SCM testing normal  COMMENTS:   Motor   [x]5/5 strength x 4 extremities  [x]Normal bulk and tone  [x]No tremor present  [x]No rigidity or bradykinesia noted  COMMENTS:   Sensory  [x]Sensation intact to light touch, pin prick, vibration, and proprioception BLE  []Sensation intact to light touch, pin prick, vibration, and proprioception BUE  COMMENTS:   Coordination [x]FTN normal bilaterally   []HTS normal bilaterally  []CHEMO normal.   COMMENTS:   Reflexes  [x]Symmetric and non-pathological  [x]Toes down going bilaterally  [x]No clonus present  COMMENTS:   Gait                  [x]Normal steady gait    []Ataxic    []Spastic     []Magnetic     []Shuffling  COMMENTS:        LABS/IMAGING:    Lab Results   Component Value Date    WBC 6.9 07/26/2019    HGB 12.7 07/26/2019    HCT 40.4 07/26/2019    MCV 88.6 07/26/2019     07/26/2019     Lab Results   Component Value Date     07/26/2019    K 4.6 07/26/2019     07/26/2019    CO2 26 07/26/2019    BUN 15 07/26/2019    CREATININE 0.6 07/26/2019    GLUCOSE 96 07/26/2019    CALCIUM 9.5 07/26/2019    PROT 6.2 (L) 07/26/2019    LABALBU 4.2 07/26/2019    BILITOT 0.3 07/26/2019    ALKPHOS 84 07/26/2019    AST 14 07/26/2019    ALT 10 07/26/2019    LABGLOM >60 07/26/2019    GLOB 2.9 10/16/2016     Lab Results   Component Value Date    INR 1.01 10/05/2018    INR 0.96 10/16/2016    PROTIME 13.2 10/05/2018    PROTIME 12.8 10/16/2016       RECORD REVIEW:   Previous medical records, medications were reviewed at today's visit. Nursing/physician notes, imaging, labs and vitals reviewed. PT,OT and/or speech notes reviewed      ASSESSMENT:  80 y.o. here with recurrent episodes of loss of awareness, possible seizure.   She is being admitted to epilepsy monitoring unit for event clarification. No events thus far, bradycardia noted on tele.     PLAN:  1. Continue Video EEG monitoring 72-96 hours. 2.  Seizure precautions, prn ativan. 3.  Follow tele, will consult cardiology given bradycardia down in the 30's at times. 4.  Final AED recommendations pending above. Please feel free to call with any questions. [phone] (cell phone).     Eve Bella DO  Board Certified Neurology

## 2019-11-05 NOTE — PLAN OF CARE
Problem: Falls - Risk of:  Goal: Will remain free from falls  Description  Will remain free from falls  11/5/2019 0139 by Nicholas Calhoun RN  Outcome: Ongoing  11/5/2019 0136 by Nicholas Calhoun RN  Outcome: Ongoing  Goal: Absence of physical injury  Description  Absence of physical injury  11/5/2019 0139 by Nicholas Calhoun RN  Outcome: Ongoing  11/5/2019 0136 by Nicholas Calhoun RN  Outcome: Ongoing     Problem: Coping:  Goal: Ability to cope will improve  Description  Ability to cope will improve  11/5/2019 0139 by Nicholas Calhoun RN  Outcome: Ongoing  11/5/2019 0136 by Nicholas Calhoun RN  Outcome: Ongoing  Goal: Ability to identify appropriate support needs will improve  Description  Ability to identify appropriate support needs will improve  11/5/2019 0139 by Nicholas Calhoun RN  Outcome: Ongoing  11/5/2019 0136 by Nicholas Calhoun RN  Outcome: Ongoing     Problem: Health Behavior:  Goal: Ability to manage health-related needs will improve  Description  Ability to manage health-related needs will improve  11/5/2019 0139 by Nicholas Calhoun RN  Outcome: Ongoing  11/5/2019 0136 by Nicholas Calhoun RN  Outcome: Ongoing     Problem: Physical Regulation:  Goal: Signs of adequate cerebral perfusion will increase  Description  Signs of adequate cerebral perfusion will increase  11/5/2019 0139 by Nicholas Calhoun RN  Outcome: Ongoing  11/5/2019 0136 by Nicholas Calhoun RN  Outcome: Ongoing  Goal: Ability to maintain a stable neurologic state will improve  Description  Ability to maintain a stable neurologic state will improve  11/5/2019 0139 by Nicholas Calhoun RN  Outcome: Ongoing  11/5/2019 0136 by Nicholas Calhoun RN  Outcome: Ongoing     Problem: Safety:  Goal: Ability to remain free from injury will improve  Description  Ability to remain free from injury will improve  11/5/2019 0139 by Nicholas Calhoun RN  Outcome: Ongoing  11/5/2019 0136 by Vipul Leblanc RN  Outcome: Ongoing     Problem: Self-Concept:  Goal: Level of anxiety will decrease  Description  Level of anxiety will decrease  11/5/2019 0139 by Vipul Leblanc RN  Outcome: Ongoing  11/5/2019 0136 by Vipul Leblanc RN  Outcome: Ongoing  Goal: Ability to verbalize feelings about condition will improve  Description  Ability to verbalize feelings about condition will improve  11/5/2019 0139 by Vipul Leblanc RN  Outcome: Ongoing  11/5/2019 0136 by Vipul Leblanc RN  Outcome: Ongoing     Problem: Discharge Planning:  Goal: Discharged to appropriate level of care  Description  Discharged to appropriate level of care  11/5/2019 0139 by Vipul Leblanc RN  Outcome: Ongoing  11/5/2019 0136 by Vipul Leblanc RN  Outcome: Ongoing

## 2019-11-05 NOTE — PLAN OF CARE
Problem: Falls - Risk of:  Goal: Will remain free from falls  Description  Will remain free from falls  11/5/2019 1134 by Caleb Horton RN  Outcome: Ongoing  11/5/2019 0140 by Narcisa Peoples RN  Outcome: Ongoing  11/5/2019 0140 by Narcisa Peoples RN  Outcome: Ongoing  11/5/2019 0139 by Narcisa Peoples RN  Outcome: Ongoing  11/5/2019 0136 by Narcisa Peoples RN  Outcome: Ongoing  Goal: Absence of physical injury  Description  Absence of physical injury  11/5/2019 1134 by Caleb Horton RN  Outcome: Ongoing  11/5/2019 0140 by Narcisa Peoples RN  Outcome: Ongoing  11/5/2019 0140 by Narcisa Peoples RN  Outcome: Ongoing  11/5/2019 0139 by Narcisa Peoples RN  Outcome: Ongoing  11/5/2019 0136 by Narcisa Peoples RN  Outcome: Ongoing     Problem: Coping:  Goal: Ability to cope will improve  Description  Ability to cope will improve  11/5/2019 1134 by Caleb Horton RN  Outcome: Ongoing  11/5/2019 0140 by Narcisa Peoples RN  Outcome: Ongoing  11/5/2019 0140 by Narcisa Peoples RN  Outcome: Ongoing  11/5/2019 0139 by Narcisa Peoples RN  Outcome: Ongoing  11/5/2019 0136 by Narcisa Peoples RN  Outcome: Ongoing  Goal: Ability to identify appropriate support needs will improve  Description  Ability to identify appropriate support needs will improve  11/5/2019 1134 by Caleb Horton RN  Outcome: Ongoing  11/5/2019 0140 by Narcisa Peoples RN  Outcome: Ongoing  11/5/2019 0140 by Narcisa Peoples RN  Outcome: Ongoing  11/5/2019 0139 by Narcisa Peoples RN  Outcome: Ongoing  11/5/2019 0136 by Narcisa Peoples RN  Outcome: Ongoing     Problem: Health Behavior:  Goal: Ability to manage health-related needs will improve  Description  Ability to manage health-related needs will improve  11/5/2019 1134 by Caleb Horton RN  Outcome: Ongoing  11/5/2019 0140 by Narcisa Peoples RN  Outcome: Ongoing  11/5/2019 0140 by Narcisa Peoples RN  Outcome: Ongoing  11/5/2019 0139 by Mahesh Frias RN  Outcome: Ongoing  11/5/2019 0136 by Mahesh Frias RN  Outcome: Ongoing     Problem: Physical Regulation:  Goal: Signs of adequate cerebral perfusion will increase  Description  Signs of adequate cerebral perfusion will increase  11/5/2019 1134 by Rosa Snow RN  Outcome: Ongoing  11/5/2019 0140 by Mahesh Frias, RN  Outcome: Ongoing  11/5/2019 0140 by Mahesh Frias, RN  Outcome: Ongoing  11/5/2019 0139 by Mahesh Frias, RN  Outcome: Ongoing  11/5/2019 0136 by Mahesh Frias RN  Outcome: Ongoing  Goal: Ability to maintain a stable neurologic state will improve  Description  Ability to maintain a stable neurologic state will improve  11/5/2019 1134 by Rosa Snow RN  Outcome: Ongoing  11/5/2019 0140 by Mahesh Frias, RN  Outcome: Ongoing  11/5/2019 0140 by Mahesh Frias, RN  Outcome: Ongoing  11/5/2019 0139 by Mahesh Frias, RN  Outcome: Ongoing  11/5/2019 0136 by Mahesh Frias RN  Outcome: Ongoing     Problem: Safety:  Goal: Ability to remain free from injury will improve  Description  Ability to remain free from injury will improve  11/5/2019 1134 by Rosa Snow RN  Outcome: Ongoing  11/5/2019 0140 by Mahesh Frias, RN  Outcome: Ongoing  11/5/2019 0140 by Mahesh Frias, RN  Outcome: Ongoing  11/5/2019 0139 by Mahesh Frias, RN  Outcome: Ongoing  11/5/2019 0136 by Mahesh Frias RN  Outcome: Ongoing     Problem: Self-Concept:  Goal: Level of anxiety will decrease  Description  Level of anxiety will decrease  11/5/2019 1134 by Rosa Snow RN  Outcome: Ongoing  11/5/2019 0140 by Mahesh Frias, RN  Outcome: Ongoing  11/5/2019 0140 by Mahesh Frias, RN  Outcome: Ongoing  11/5/2019 0139 by Mahesh Frias, RN  Outcome: Ongoing  11/5/2019 0136 by Mahesh Frias RN  Outcome: Ongoing  Goal: Ability to verbalize feelings about condition will improve  Description  Ability to verbalize feelings about condition will improve  11/5/2019 1134 by Moises Garcia RN  Outcome: Ongoing  11/5/2019 0140 by Roberto Carlos Durant RN  Outcome: Ongoing  11/5/2019 0140 by Roberto Carlos uDrant RN  Outcome: Ongoing  11/5/2019 0139 by Roberto Carlos Durant RN  Outcome: Ongoing  11/5/2019 0136 by Roberto Carlos Durant RN  Outcome: Ongoing     Problem: Discharge Planning:  Goal: Discharged to appropriate level of care  Description  Discharged to appropriate level of care  11/5/2019 1134 by Moises Garcia RN  Outcome: Ongoing  11/5/2019 0140 by Roberto Carlos Durant RN  Outcome: Ongoing  11/5/2019 0140 by Roberto Carlos Durant RN  Outcome: Ongoing  11/5/2019 0139 by Roberto Carlos Durant RN  Outcome: Ongoing  11/5/2019 0136 by Roberto Carlos Durant RN  Outcome: Ongoing

## 2019-11-05 NOTE — PLAN OF CARE
Problem: Falls - Risk of:  Goal: Will remain free from falls  Description  Will remain free from falls  Outcome: Ongoing  Goal: Absence of physical injury  Description  Absence of physical injury  Outcome: Ongoing     Problem: Coping:  Goal: Ability to cope will improve  Description  Ability to cope will improve  Outcome: Ongoing  Goal: Ability to identify appropriate support needs will improve  Description  Ability to identify appropriate support needs will improve  Outcome: Ongoing     Problem: Health Behavior:  Goal: Ability to manage health-related needs will improve  Description  Ability to manage health-related needs will improve  Outcome: Ongoing     Problem: Physical Regulation:  Goal: Signs of adequate cerebral perfusion will increase  Description  Signs of adequate cerebral perfusion will increase  Outcome: Ongoing  Goal: Ability to maintain a stable neurologic state will improve  Description  Ability to maintain a stable neurologic state will improve  Outcome: Ongoing     Problem: Safety:  Goal: Ability to remain free from injury will improve  Description  Ability to remain free from injury will improve  Outcome: Ongoing     Problem: Self-Concept:  Goal: Level of anxiety will decrease  Description  Level of anxiety will decrease  Outcome: Ongoing  Goal: Ability to verbalize feelings about condition will improve  Description  Ability to verbalize feelings about condition will improve  Outcome: Ongoing     Problem: Discharge Planning:  Goal: Discharged to appropriate level of care  Description  Discharged to appropriate level of care  Outcome: Ongoing

## 2019-11-05 NOTE — PLAN OF CARE
Problem: Falls - Risk of:  Goal: Will remain free from falls  Description  Will remain free from falls  11/5/2019 0140 by Arlette Pro RN  Outcome: Ongoing  11/5/2019 0140 by Arlette Pro RN  Outcome: Ongoing  11/5/2019 0139 by Arlette Pro RN  Outcome: Ongoing  11/5/2019 0136 by Arlette Pro RN  Outcome: Ongoing  Goal: Absence of physical injury  Description  Absence of physical injury  11/5/2019 0140 by Arlette Pro RN  Outcome: Ongoing  11/5/2019 0140 by Arlette Pro RN  Outcome: Ongoing  11/5/2019 0139 by Arlette Pro RN  Outcome: Ongoing  11/5/2019 0136 by Arlette Pro RN  Outcome: Ongoing     Problem: Coping:  Goal: Ability to cope will improve  Description  Ability to cope will improve  11/5/2019 0140 by Arlette Pro RN  Outcome: Ongoing  11/5/2019 0140 by Arlette Pro RN  Outcome: Ongoing  11/5/2019 0139 by Arlette Pro RN  Outcome: Ongoing  11/5/2019 0136 by Arlette Pro RN  Outcome: Ongoing  Goal: Ability to identify appropriate support needs will improve  Description  Ability to identify appropriate support needs will improve  11/5/2019 0140 by Arlette Pro RN  Outcome: Ongoing  11/5/2019 0140 by Arlette Pro RN  Outcome: Ongoing  11/5/2019 0139 by Arlette Pro RN  Outcome: Ongoing  11/5/2019 0136 by Arlette Pro RN  Outcome: Ongoing     Problem: Health Behavior:  Goal: Ability to manage health-related needs will improve  Description  Ability to manage health-related needs will improve  11/5/2019 0140 by Arlette Pro RN  Outcome: Ongoing  11/5/2019 0140 by Arlette Pro RN  Outcome: Ongoing  11/5/2019 0139 by Arlette Pro RN  Outcome: Ongoing  11/5/2019 0136 by Arlette Pro RN  Outcome: Ongoing     Problem: Physical Regulation:  Goal: Signs of adequate cerebral perfusion will increase  Description  Signs of adequate cerebral perfusion will increase  11/5/2019 0140 by Zhane Escobar RN  Outcome: Ongoing  11/5/2019 0140 by Zhane Escobar RN  Outcome: Ongoing  11/5/2019 0139 by Zhane Escobar RN  Outcome: Ongoing  11/5/2019 0136 by Zhane Escobar RN  Outcome: Ongoing  Goal: Ability to maintain a stable neurologic state will improve  Description  Ability to maintain a stable neurologic state will improve  11/5/2019 0140 by Zhane Escobar RN  Outcome: Ongoing  11/5/2019 0140 by Zhane Escobar RN  Outcome: Ongoing  11/5/2019 0139 by Zhane Escobar RN  Outcome: Ongoing  11/5/2019 0136 by Zhane Escobar RN  Outcome: Ongoing     Problem: Safety:  Goal: Ability to remain free from injury will improve  Description  Ability to remain free from injury will improve  11/5/2019 0140 by Zhane Escobar RN  Outcome: Ongoing  11/5/2019 0140 by Zhane Escobar RN  Outcome: Ongoing  11/5/2019 0139 by Zhane Escobar RN  Outcome: Ongoing  11/5/2019 0136 by Zhane Escobar RN  Outcome: Ongoing     Problem: Self-Concept:  Goal: Level of anxiety will decrease  Description  Level of anxiety will decrease  11/5/2019 0140 by Zhane Escobar RN  Outcome: Ongoing  11/5/2019 0140 by Zhane Escobar RN  Outcome: Ongoing  11/5/2019 0139 by Zhane Escobar RN  Outcome: Ongoing  11/5/2019 0136 by Zhane Escobar RN  Outcome: Ongoing  Goal: Ability to verbalize feelings about condition will improve  Description  Ability to verbalize feelings about condition will improve  11/5/2019 0140 by Zhane Escobar RN  Outcome: Ongoing  11/5/2019 0140 by Zhane Escobar RN  Outcome: Ongoing  11/5/2019 0139 by Zhane Escobar RN  Outcome: Ongoing  11/5/2019 0136 by Zhane Escobar RN  Outcome: Ongoing     Problem: Discharge Planning:  Goal: Discharged to appropriate level of care  Description  Discharged to appropriate level of care  11/5/2019 0140 by Rhys Heath RN  Outcome: Ongoing  11/5/2019 0140 by Rhys Heath RN  Outcome: Ongoing  11/5/2019 0139 by Rhys Heath RN  Outcome: Ongoing  11/5/2019 0136 by Rhys Heath RN  Outcome: Ongoing

## 2019-11-06 ENCOUNTER — APPOINTMENT (OUTPATIENT)
Dept: GENERAL RADIOLOGY | Age: 69
DRG: 244 | End: 2019-11-06
Attending: PSYCHIATRY & NEUROLOGY
Payer: MEDICARE

## 2019-11-06 LAB
EKG P AXIS: 67 DEGREES
EKG P-R INTERVAL: 170 MS
EKG Q-T INTERVAL: 440 MS
EKG QRS DURATION: 94 MS
EKG QTC CALCULATION (BAZETT): 421 MS
EKG T AXIS: 54 DEGREES

## 2019-11-06 PROCEDURE — 71046 X-RAY EXAM CHEST 2 VIEWS: CPT

## 2019-11-06 PROCEDURE — 0JH606Z INSERTION OF PACEMAKER, DUAL CHAMBER INTO CHEST SUBCUTANEOUS TISSUE AND FASCIA, OPEN APPROACH: ICD-10-PCS | Performed by: INTERNAL MEDICINE

## 2019-11-06 PROCEDURE — 6370000000 HC RX 637 (ALT 250 FOR IP): Performed by: INTERNAL MEDICINE

## 2019-11-06 PROCEDURE — 33286 RMVL SUBQ CAR RHYTHM MNTR: CPT | Performed by: INTERNAL MEDICINE

## 2019-11-06 PROCEDURE — 02H63JZ INSERTION OF PACEMAKER LEAD INTO RIGHT ATRIUM, PERCUTANEOUS APPROACH: ICD-10-PCS | Performed by: INTERNAL MEDICINE

## 2019-11-06 PROCEDURE — 99232 SBSQ HOSP IP/OBS MODERATE 35: CPT | Performed by: PSYCHIATRY & NEUROLOGY

## 2019-11-06 PROCEDURE — 2500000003 HC RX 250 WO HCPCS

## 2019-11-06 PROCEDURE — 33208 INSRT HEART PM ATRIAL & VENT: CPT

## 2019-11-06 PROCEDURE — 6360000002 HC RX W HCPCS: Performed by: INTERNAL MEDICINE

## 2019-11-06 PROCEDURE — C1894 INTRO/SHEATH, NON-LASER: HCPCS

## 2019-11-06 PROCEDURE — C1898 LEAD, PMKR, OTHER THAN TRANS: HCPCS

## 2019-11-06 PROCEDURE — 6360000002 HC RX W HCPCS

## 2019-11-06 PROCEDURE — 99152 MOD SED SAME PHYS/QHP 5/>YRS: CPT

## 2019-11-06 PROCEDURE — 2140000000 HC CCU INTERMEDIATE R&B

## 2019-11-06 PROCEDURE — 2720000010 HC SURG SUPPLY STERILE

## 2019-11-06 PROCEDURE — 33208 INSRT HEART PM ATRIAL & VENT: CPT | Performed by: INTERNAL MEDICINE

## 2019-11-06 PROCEDURE — 99153 MOD SED SAME PHYS/QHP EA: CPT

## 2019-11-06 PROCEDURE — 02HK3JZ INSERTION OF PACEMAKER LEAD INTO RIGHT VENTRICLE, PERCUTANEOUS APPROACH: ICD-10-PCS | Performed by: INTERNAL MEDICINE

## 2019-11-06 PROCEDURE — 71045 X-RAY EXAM CHEST 1 VIEW: CPT

## 2019-11-06 PROCEDURE — 33286 RMVL SUBQ CAR RHYTHM MNTR: CPT

## 2019-11-06 PROCEDURE — 0JPT02Z REMOVAL OF MONITORING DEVICE FROM TRUNK SUBCUTANEOUS TISSUE AND FASCIA, OPEN APPROACH: ICD-10-PCS | Performed by: INTERNAL MEDICINE

## 2019-11-06 PROCEDURE — 95951 PR EEG MONITORING/VIDEORECORD: CPT | Performed by: PSYCHIATRY & NEUROLOGY

## 2019-11-06 PROCEDURE — 2580000003 HC RX 258: Performed by: INTERNAL MEDICINE

## 2019-11-06 PROCEDURE — C1785 PMKR, DUAL, RATE-RESP: HCPCS

## 2019-11-06 PROCEDURE — 2580000003 HC RX 258: Performed by: PSYCHIATRY & NEUROLOGY

## 2019-11-06 PROCEDURE — 99152 MOD SED SAME PHYS/QHP 5/>YRS: CPT | Performed by: INTERNAL MEDICINE

## 2019-11-06 PROCEDURE — 2709999900 HC NON-CHARGEABLE SUPPLY

## 2019-11-06 RX ORDER — SODIUM CHLORIDE 0.9 % (FLUSH) 0.9 %
10 SYRINGE (ML) INJECTION EVERY 12 HOURS SCHEDULED
Status: DISCONTINUED | OUTPATIENT
Start: 2019-11-06 | End: 2019-11-07 | Stop reason: HOSPADM

## 2019-11-06 RX ORDER — SODIUM CHLORIDE 0.9 % (FLUSH) 0.9 %
10 SYRINGE (ML) INJECTION PRN
Status: DISCONTINUED | OUTPATIENT
Start: 2019-11-06 | End: 2019-11-07 | Stop reason: HOSPADM

## 2019-11-06 RX ORDER — ACETAMINOPHEN 325 MG/1
650 TABLET ORAL EVERY 4 HOURS PRN
Status: DISCONTINUED | OUTPATIENT
Start: 2019-11-06 | End: 2019-11-07 | Stop reason: HOSPADM

## 2019-11-06 RX ADMIN — RISPERIDONE 0.5 MG: 0.5 TABLET, FILM COATED ORAL at 20:20

## 2019-11-06 RX ADMIN — Medication 10 ML: at 20:21

## 2019-11-06 RX ADMIN — Medication 10 ML: at 09:44

## 2019-11-06 RX ADMIN — ACETAMINOPHEN 650 MG: 325 TABLET ORAL at 22:39

## 2019-11-06 RX ADMIN — Medication 2 G: at 23:38

## 2019-11-06 ASSESSMENT — PAIN SCALES - GENERAL
PAINLEVEL_OUTOF10: 0
PAINLEVEL_OUTOF10: 7
PAINLEVEL_OUTOF10: 0
PAINLEVEL_OUTOF10: 0
PAINLEVEL_OUTOF10: 9
PAINLEVEL_OUTOF10: 0

## 2019-11-06 ASSESSMENT — PAIN DESCRIPTION - LOCATION
LOCATION: SHOULDER
LOCATION: SHOULDER

## 2019-11-06 ASSESSMENT — PAIN DESCRIPTION - PAIN TYPE
TYPE: ACUTE PAIN
TYPE: ACUTE PAIN

## 2019-11-06 ASSESSMENT — PAIN DESCRIPTION - ORIENTATION
ORIENTATION: LEFT
ORIENTATION: LEFT

## 2019-11-06 NOTE — OP NOTE
Cardiac Risk Profile -         Indications: -  Symptomatic bradycardia with syncope    Conscious Sedation Protocol Used During this Procedure -        Anesthesia: Moderate   Sedation: 2 mcg Fentanyl  75 mg Midazolam (Versed)   Start time: 3:55 PM    Stop time: 4:51 PM    ASA Class: 3    Estimated blood loss:  15 mL        After obtaining informed written consent, the patient was brought to the catheterization laboratory where the left chest area was prepared in the usual sterile fashion. The patient was monitored continuously with ECG, pulse oximetry, blood pressure monitoring and direct observation. Additionally, please review the \"[facility] Procedure Report\", which is generated electronically through the [facility]. This report includes additional details regarding this procedure including, but not limited to:     1. Patient Data,   2. Admission,   3. Procedure,   4. Hemodynamics,   5. Vital Signs,   6. Medications, including conscious sedation medications given during the procedure (as note above),   7. Procedure Log,   8. Device Usage,   9. Signature Audit [place], and,   10. Signatures. It should be noted, that I sign the \"Signatures\" line electronically through the \"[facility] Def Portals\" tab on my computer. The 's hands were scrubbed in a betadine solution for 5 minutes. Moderate conscious sedation was administered at my direction. Utilizing local anesthesia and a percutaneous technique, a single puncture was made in the left subclavian vein. Utilizing a combination of sharp and blunt dissection, a pacemaker pocket was created. A second puncture was then made in the left subclavian vein. The right ventricular and right atrial leads were inserted without difficulty and appropriate thresholds were obtained. The lead anchors were secured to the floor of the pacemaker pocket. The pacemaker pocket was copiously irrigated with antibiotic solution.   The leads were attached to the

## 2019-11-06 NOTE — CONSULTS
[facility]  Cardiology Consult      Requesting MD:  Matt Mercado DO   Admit Status:  Observation [104]       History obtained from:   ? Patient  ? Other (specify):     PROBLEM LIST:    Patient Active Problem List    Diagnosis Date Noted    Syncope 10/16/2016     Priority: High    TIA (transient ischemic attack) 10/16/2016     Priority: High    Bradycardia 10/16/2016     Priority: Medium    Tobacco abuse      Priority: Medium    Hyperlipidemia      Priority: Medium    Hypothyroidism 03/30/2015     Priority: Medium    Convulsion ([facility] [address] 11/04/2019     Priority: Low    Current moderate episode of major depressive disorder without prior episode ([facility] [address] 07/03/2019     Priority: Low    Nonintractable headache      Priority: Low    Left-sided weakness      Priority: Low    Symptomatic bradycardia      Priority: Low    Abnormal PET scan of colon 06/07/2017     Priority: Low    Family history of polyps in the colon 06/07/2017     Priority: Low     1. Recurrent syncope, normal LV ejection fraction, negative dobutamine stress echo, implantable loop recorder with multiple episodes of bradycardia. 2. Chronic tobacco use. 3. Hypothyroidism. 4. Hyperlipidemia. PRESENTATION: Ok Mcqueen is a 80y.o. year old female who presents with recent implantable loop recorder showing multiple bouts of sinus bradycardia occurring in daytime hours between 30-40 bpm. She has a history of syncope and recurrent near syncope which began in October 2018. An initial Zio monitor was unremarkable though without any symptoms. She subsequently underwent an implantable loop recorder 9/30/2019. She did not report any significant events since placement of her loop recorder but multiple episodes of bradycardia documented at 30 to 40 bpm occurring in daytime hours. Episodes usually begin with headache, blurred vision and lightheadedness. They usually occurred at work.  She works at an assisted living facility serving the residents. The episodes occur when she is on her feet. No seizure like activity reported. She may have long periods of time without any episodes. REVIEW OF SYSTEMS:  Review of Systems   Constitutional: Negative for activity change, fatigue and fever. HENT: Negative for ear pain, hearing loss and tinnitus. Eyes: Negative for discharge and visual disturbance. Respiratory: Negative for cough, shortness of breath and wheezing. Cardiovascular: Negative for chest pain, palpitations and leg swelling. Gastrointestinal: Negative for abdominal distention, blood in stool, constipation, diarrhea and vomiting. Endocrine: Negative for cold intolerance, heat intolerance, polydipsia and polyuria. Genitourinary: Negative for dysuria and hematuria. Musculoskeletal: Negative for arthralgias, back pain and myalgias. Skin: Negative for pallor and rash. Neurological: Positive for syncope and light-headedness. Negative for seizures, weakness and headaches. Psychiatric/Behavioral: Negative for behavioral problems and dysphoric mood. Past Medical History:      Diagnosis Date    Cerebral artery occlusion with cerebral infarction ([facility] [address]     TIA    Diverticular disease of colon     Hyperlipidemia     Hypothyroidism     Pneumonia     Psychiatric problem     Tobacco abuse     Vitamin B12 deficiency        Past Surgical History:      Procedure Laterality Date    COLONOSCOPY      LUNG SURGERY      collapsed lung    MO COLONOSCOPY W/BIOPSY SINGLE/MULTIPLE N/A 6/27/2017    Dr Kalli West-extensive and large mouthed diverticular disease throughout the right colon-Tubular AP (villiform) (-) dysplasia x 1, tubular AP (-) dysplasia x 1--5 yr recall    TUBAL LIGATION         Allergies:  Patient has no known allergies.     Past Social History:  Social History     Socioeconomic History    Marital status:      Spouse name: Not on file    Number of children: Not on file    Years of education: Not on file    Highest education level: Not on file   Occupational History    Occupation: Resident Aide at [address] resource strain: Not on file    Food insecurity:     Worry: Not on file     Inability: Not on file    Transportation needs:     Medical: Not on file     Non-medical: Not on file   Tobacco Use    Smoking status: Current Every Day Smoker     Packs/day: 0.50     Years: 30.00     Pack years: 15.00     Types: Cigarettes    Smokeless tobacco: Never Used    Tobacco comment: 1 pack of cigaretts last 1 1/2 days while at work   Substance and Sexual Activity    Alcohol use: No    Drug use: No    Sexual activity: Not on file   Lifestyle    Physical activity:     Days per week: Not on file     Minutes per session: Not on file    Stress: Not on file   Relationships    Social connections:     Talks on phone: Not on file     Gets together: Not on file     Attends Synagogue service: Not on file     Active member of club or organization: Not on file     Attends meetings of clubs or organizations: Not on file     Relationship status: Not on file    Intimate partner violence:     Fear of current or ex partner: Not on file     Emotionally abused: Not on file     Physically abused: Not on file     Forced sexual activity: Not on file   Other Topics Concern    Not on file   Social History Narrative    Not on file       Family History:       Problem Relation Age of Onset    Other Mother         COPD, dementia    Heart Disease Mother     Stroke Mother     Heart Disease Father     Stroke Father     Diabetes Brother     Diabetes Brother     High Blood Pressure Brother     High Cholesterol Brother     Colon Polyps Brother     Colon Cancer Neg Hx     Liver Cancer Neg Hx     Liver Disease Neg Hx     Esophageal Cancer Neg Hx     Rectal Cancer Neg Hx     Stomach Cancer Neg Hx        Home Meds:  Prior to Admission medications    Medication Sig Start Date End Date Taking? Authorizing Provider   risperiDONE (RISPERDAL) 0.5 MG tablet TAKE 1 TABLET BY MOUTH ONCE DAILY IN THE EVENING 9/30/19  Yes Lisa Isaac MD   albuterol sulfate HFA (PROAIR HFA) 108 (90 Base) MCG/ACT inhaler Inhale 2 puffs into the lungs every 6 hours as needed for Wheezing 7/1/19  Yes Yeimi Almazan MD   escitalopram (LEXAPRO) 10 MG tablet Take 1 tablet by mouth daily 6/3/19  Yes Yeimi Almazan MD   atorvastatin (LIPITOR) 40 MG tablet TAKE 1 TABLET EVERY NIGHT  Patient taking differently: daily  4/25/19  Yes Lisa Isaac MD   levothyroxine (SYNTHROID) 88 MCG tablet TAKE 1 TABLET EVERY DAY 2/27/19  Yes JORDYN Blevins - CNP       Current Meds:   chlorhexidine   Topical Once    [START ON 11/6/2019] ceFAZolin (ANCEF) IVPB  2 g Intravenous On Call to OR    atorvastatin  40 mg Oral Daily    escitalopram  10 mg Oral Daily    levothyroxine  88 mcg Oral Daily    risperiDONE  0.5 mg Oral QPM    sodium chloride flush  10 mL Intravenous 2 times per day    enoxaparin  40 mg Subcutaneous Q24H       Current Infused Meds:      Physical Exam:  Vitals:    11/05/19 1915   BP: (!) 102/54   Pulse: 51   Resp: 18   Temp:    SpO2: 96%       Intake/Output Summary (Last 24 hours) at 11/5/2019 1925  Last data filed at 11/5/2019 1515  Gross per 24 hour   Intake 780 ml   Output --   Net 780 ml     Estimated body mass index is 25.25 kg/m² as calculated from the following:    Height as of this encounter: 5' 9\" (1.753 m). Weight as of this encounter: 171 lb (77.6 kg). Physical Exam   Constitutional: She is oriented to person, place, and time. No distress. HENT:   Mouth/Throat: No oropharyngeal exudate. Eyes: Right eye exhibits no discharge. Left eye exhibits no discharge. No scleral icterus. Neck: No JVD present. No thyromegaly present. Cardiovascular: Normal rate, regular rhythm, S1 normal, S2 normal, normal heart sounds and intact distal pulses.   No extrasystoles are present. Exam reveals no gallop, no S3, no S4 and no friction rub. No murmur heard. No systolic murmur is present. No diastolic murmur is present. Pulmonary/Chest: Effort normal and breath sounds normal. No respiratory distress. She has no wheezes. She has no rales. She exhibits no tenderness. Abdominal: Soft. Bowel sounds are normal. She exhibits no distension and no mass. There is no hepatosplenomegaly. There is no tenderness. There is no rebound and no guarding. No hernia. Musculoskeletal: Normal range of motion. She exhibits no edema. Neurological: She is alert and oriented to person, place, and time. She displays normal reflexes. No cranial nerve deficit. Skin: Skin is warm. No rash noted. She is not diaphoretic. No pallor. Psychiatric: She has a normal mood and affect. Labs:  Recent Labs     11/05/19  1758   WBC 7.3   HGB 13.1          Recent Labs     11/05/19  1758      K 4.3      CO2 24   BUN 21   CREATININE 0.7   LABGLOM >60   CALCIUM 9.4       CK, CKMB, Troponin: @LABRCNT (CKTOTAL:3, CKMB:3, TROPONINI:3)@    Last 3 BNP:          IMAGING:  No results found. Assessment and Plan: This is a 80y.o. year old female with past medical history of chronic tobacco use, hyperlipidemia, hypothyroidism with syncopal episode with recurrent episodes of near syncope with recent implantable loop recorder placement 10/1/2019 showing multiple bouts of bradycardia with heart rates between 30-40 bpm.    1. Significant episodes of bradycardia likely symptomatic with prior episodes of syncope and near syncopal episodes that are multiple. Strong likelihood that bradycardia contributes to her symptoms given findings. She did not have any symptoms at the time post Loop implantation. Likely more severe bouts provoked her symptoms. At this time I have discussed with her the findings.  It will be appropriate for pacemaker placement for symptomatic bradycardia given the multitude of symptoms preceding Loop implantation. 2. Dual-chamber pacemaker to be placed tomorrow. Loop recorder to be removed at the same time. 3. Smoking cessation stressed. Risks, benefits, alternatives of dual-chamber pacemaker placement and loop recorder removal discussed with the patient and full informed consent obtained.   Acceptable Mallampati score  Consent for moderate conscious sedation  ASA 3      Electronically signed by Margarita Platt MD on 11/5/2019 at 7:25 PM

## 2019-11-06 NOTE — PROGRESS NOTES
[address] [facility] Neurology Progress Note      Patient:   Pipe Graves  MR#:    [id number]   Room:    [address] [place] [address]   YOB: 1950  Date of Progress Note: 11/6/2019  Time of Note                           8:47 AM  Attending Physician:  Sahr Hagen DO      INTERVAL HISTORY:  No acute events overnight, no seizures, cardiology evaluated plan pacer placement. REVIEW OF SYSTEMS:  Constitutional: No fevers No chills  Neck:No stiffness  Respiratory: No shortness of breath  Cardiovascular: No chest pain No palpitations  Gastrointestinal: No abdominal pain    Genitourinary: No Dysuria  Neurological: No headache    PHYSICAL EXAM:    Constitutional -   BP (!) 102/49   Pulse (!) 45   Temp 98 °F (36.7 °C)   Resp 16   Ht 5' 9\" (1.753 m)   Wt 171 lb (77.6 kg)   SpO2 94%   BMI 25.25 kg/m²   General appearance: No acute distress   EYES -   Conjunctiva normal  Pupillary exam as below, see CN exam in the neurologic exam  ENT-    No scars, masses, or lesions over external nose or ears  Hearing normal bilaterally to finger rub  Cardiovascular -   RRR  No clubbing, cyanosis, or edema   Respiratory-   Good expansion, normal effort without use of accessory muscles  CTA  Musculoskeletal -   No significant wasting of muscles noted  Gait as below, see gait exam in the neurologic exam  Muscle strength, tone, stability as below. No bony deformities  Skin -   Warm, dry, and intact to inspection and palpation. No rash, erythema, or pallor  Psychiatric -   Mood, affect, and behavior appear normal    Memory as below see mental status examination in the neurologic exam       NEUROLOGICAL EXAM     Mental status    [x]Awake, alert, oriented   [x]Affect attention and concentration appear appropriate  [x]Recent and remote memory appears unremarkable  [x]Speech normal without dysarthria or aphasia, comprehension and repetition intact.    COMMENTS:    Cranial Nerves [x]No VF deficit to confrontation  [x]PERRLA, EOMI, no nystagmus, conjugate eye movements, no ptosis  [x]Face symmetric  [x]Facial sensation intact  [x]Tongue midline no atrophy or fasciculations present  [x]Palate midline, hearing to finger rub normal  [x]Shoulder shrug and SCM testing normal  COMMENTS:   Motor   [x]5/5 strength x 4 extremities  [x]Normal bulk and tone  [x]No tremor present  [x]No rigidity or bradykinesia noted  COMMENTS:   Sensory  [x]Sensation intact to light touch, pin prick, vibration, and proprioception BLE  []Sensation intact to light touch, pin prick, vibration, and proprioception BUE  COMMENTS:   Coordination [x]FTN normal bilaterally   []HTS normal bilaterally  []CHEMO normal.   COMMENTS:   Reflexes  [x]Symmetric and non-pathological  [x]Toes down going bilaterally  [x]No clonus present  COMMENTS:   Gait                  [x]Normal steady gait    []Ataxic    []Spastic     []Magnetic     []Shuffling  COMMENTS:        LABS/IMAGING:    Lab Results   Component Value Date    WBC 7.3 11/05/2019    HGB 13.1 11/05/2019    HCT 42.1 11/05/2019    MCV 89.2 11/05/2019     11/05/2019     Lab Results   Component Value Date     11/05/2019    K 4.3 11/05/2019     11/05/2019    CO2 24 11/05/2019    BUN 21 11/05/2019    CREATININE 0.7 11/05/2019    GLUCOSE 139 (H) 11/05/2019    CALCIUM 9.4 11/05/2019    PROT 6.2 (L) 11/05/2019    LABALBU 3.6 11/05/2019    BILITOT <0.2 11/05/2019    ALKPHOS 82 11/05/2019    AST 13 11/05/2019    ALT 13 11/05/2019    LABGLOM >60 11/05/2019    GLOB 2.9 10/16/2016     Lab Results   Component Value Date    INR 1.01 10/05/2018    INR 0.96 10/16/2016    PROTIME 13.2 10/05/2018    PROTIME 12.8 10/16/2016       RECORD REVIEW:   Previous medical records, medications were reviewed at today's visit. Nursing/physician notes, imaging, labs and vitals reviewed. PT,OT and/or speech notes reviewed      ASSESSMENT:  80 y.o. here with recurrent episodes of loss of awareness, likely cardiac given telemetry findings and negative EEG results. Cardiology has evaluated and plans pacer placement for symptomatic bradycardia.      PLAN:  1. Will discontinue Video EEG monitoring   2. Cardiology following, plans pacer placement today. 3.  Home once cleared by cardiology    Please feel free to call with any questions. [phone] (cell phone).     Shelia Corbett DO  Board Certified Neurology

## 2019-11-06 NOTE — PROCEDURES
ADULT INPATIENT VIDEO-EEG EVENT MONITORING REPORT    Patient:   Yancy Hernandez  MR#:    [id number]  Room #:    INPATIENT   YOB: 1950  Study Date:    November 4-6, 2019  Primary Physician:     Martinez Lane MD   Referring Physician:   Chasity Weems DO      CLINICAL INFORMATION:     This patient is a 80 y.o. female with a history of possible seizures. The specific reason we are doing this study is for event clarification and to evaluate for an underlying seizure focus. MEDICATIONS:     See MAR    RECORDING CONDITIONS:     Continuous video-EEG monitoring was performed with [employer] equiptment. The recorded period extended from November 4-6, 2019. Electrodes were applied according to the International 10-20 System. Data were obtained, stored, and interpreted according to ACNS guidelines (J Clin Neurophysiol 2006;23(2):) utilizing referential montage recording, with reformatting to longitudinal, transverse bipolar, and referential montages as necessary for interpretation, along with digital/automated EEG analysis. The patient tolerated the entire procedure well. DESCRIPTION OF BASELINE RECORD:    During the waking state, the predominant posterior resting frequency was rhythmic, symmetric, 9-10 Hz, 30-40 uV rhythm with reactivity to eye opening and closure demonstrated. Drowsiness was demonstrated by slow rolling eye movements followed by loss of background waking activities. Onset of Stage I sleep was demonstrated by gradual disappearance of background waking rhythms and the onset of gradual symmetric mixed-frequency 4-7 Hz slowing. Stage II sleep was characterized by vertex transients, sleep-spindles, and K-complexes, at times with shifting asymmetry demonstrated. Stage III and IV of sleep were characterized by progressive proportion of high voltage slowing in the delta frequency. Muscle, motion, and eye movement artifacts were occasionally noted.      DESCRIPTION OF EVENTS:    November 4 , 2019  [facility]. Digital spike and event analysis reviewed and showed no abnormalities. No events were recorded. Bradycardia noted on telemetry. November 5, 2019  The Interical files were as described in [facility] baseline recording. Digital spike and event analysis reviewed and showed no abnormalities. No events were recorded. Bradycardia noted on telemetry. November 6, 2019  The Interical files were as described in [facility] baseline recording. Digital spike and event analysis reviewed and showed no abnormalities. No events were recorded. Bradycardia noted on telemetry. E.E.G. INTERPRETATION:    1. Interictal Abnormalities: None. 2. Ictal Events: None. 3. Non-Epileptic Events: None. CLINICAL CORRELATION:    No events were captured. No interictal abnormalities were noted. Bradycardia noted on telemetry, cardiology consulted and plans pacer placement, episodes felt to be secondary to symptomatic bradycardia.         Harry Vargas DO  Board Certified Neurologist    Date Reported: 11/6/2019  Date Signed: 11/6/2019

## 2019-11-06 NOTE — PLAN OF CARE
Problem: Falls - Risk of:  Goal: Will remain free from falls  Description  Will remain free from falls  11/6/2019 1511 by Kirsty Silva RN  Outcome: Ongoing  11/6/2019 0625 by Bess Hess RN  Outcome: Ongoing  Goal: Absence of physical injury  Description  Absence of physical injury  11/6/2019 1511 by Kirsty Silva RN  Outcome: Ongoing  11/6/2019 0625 by Bess Hess RN  Outcome: Ongoing     Problem: Coping:  Goal: Ability to cope will improve  Description  Ability to cope will improve  11/6/2019 1511 by Kirsty Silva RN  Outcome: Ongoing  11/6/2019 0625 by Bess Hess RN  Outcome: Ongoing  Goal: Ability to identify appropriate support needs will improve  Description  Ability to identify appropriate support needs will improve  11/6/2019 1511 by Kirsty Silva RN  Outcome: Ongoing  11/6/2019 0625 by Bess Hess RN  Outcome: Ongoing     Problem: Health Behavior:  Goal: Ability to manage health-related needs will improve  Description  Ability to manage health-related needs will improve  11/6/2019 1511 by Kirsty Silva RN  Outcome: Ongoing  11/6/2019 0625 by Bess Hess RN  Outcome: Ongoing     Problem: Physical Regulation:  Goal: Signs of adequate cerebral perfusion will increase  Description  Signs of adequate cerebral perfusion will increase  11/6/2019 1511 by Kirsty Silva RN  Outcome: Ongoing  11/6/2019 0625 by Bess Hess RN  Outcome: Ongoing  Goal: Ability to maintain a stable neurologic state will improve  Description  Ability to maintain a stable neurologic state will improve  11/6/2019 1511 by Kirsty Silva RN  Outcome: Ongoing  11/6/2019 0625 by Bess Hess RN  Outcome: Ongoing     Problem: Safety:  Goal: Ability to remain free from injury will improve  Description  Ability to remain free from injury will improve  11/6/2019 1511 by Kirsty Silva RN  Outcome: Ongoing  11/6/2019 0625 by Bess Hess RN  Outcome: Ongoing  Goal: Free from accidental physical injury  Description  Free from accidental physical injury  11/6/2019 1511 by David Castrejon RN  Outcome: Ongoing  11/6/2019 0625 by Gianni Jones RN  Outcome: Ongoing  Goal: Free from intentional harm  Description  Free from intentional harm  11/6/2019 1511 by David Castrejon RN  Outcome: Ongoing  11/6/2019 0625 by Gianni Jones RN  Outcome: Ongoing     Problem: Self-Concept:  Goal: Level of anxiety will decrease  Description  Level of anxiety will decrease  11/6/2019 1511 by David Castrejon RN  Outcome: Ongoing  11/6/2019 0625 by Gianni Jones RN  Outcome: Ongoing  Goal: Ability to verbalize feelings about condition will improve  Description  Ability to verbalize feelings about condition will improve  11/6/2019 1511 by David Castrejon RN  Outcome: Ongoing  11/6/2019 0625 by Gianni Jones RN  Outcome: Ongoing     Problem: Discharge Planning:  Goal: Discharged to appropriate level of care  Description  Discharged to appropriate level of care  11/6/2019 1511 by David Castrejon RN  Outcome: Ongoing  11/6/2019 0625 by Gianni Jones RN  Outcome: Ongoing     Problem: Infection:  Goal: Will remain free from infection  Description  Will remain free from infection  11/6/2019 1511 by David Castrejon RN  Outcome: Ongoing  11/6/2019 0625 by Gianni Jones RN  Outcome: Ongoing     Problem: Daily Care:  Goal: Daily care needs are met  Description  Daily care needs are met  11/6/2019 1511 by David Castrejon RN  Outcome: Ongoing  11/6/2019 0625 by Gianni Jones RN  Outcome: Ongoing     Problem: Pain:  Goal: Patient's pain/discomfort is manageable  Description  Patient's pain/discomfort is manageable  11/6/2019 1511 by David Castrejon RN  Outcome: Ongoing  11/6/2019 0625 by Gianni Jones RN  Outcome: Ongoing     Problem: Skin Integrity:  Goal: Skin integrity will stabilize  Description  Skin integrity will stabilize  11/6/2019 1511 by David Castrejon RN  Outcome: Ongoing  11/6/2019 0625 by Gianni Jones RN  Outcome: Ongoing     Problem: Discharge Planning:  Goal: Patients continuum of care needs are met  Description  Patients continuum of care needs are met  11/6/2019 1511 by Minh Bah RN  Outcome: Ongoing  11/6/2019 0625 by Madai Geiger RN  Outcome: Ongoing     Problem: ABCDS Injury Assessment  Goal: Absence of physical injury  11/6/2019 1511 by Minh Bah RN  Outcome: Ongoing  11/6/2019 0625 by Madai Geiger RN  Outcome: Ongoing

## 2019-11-06 NOTE — PLAN OF CARE
Problem: Falls - Risk of:  Goal: Will remain free from falls  Description  Will remain free from falls  Outcome: Ongoing  Goal: Absence of physical injury  Description  Absence of physical injury  Outcome: Ongoing     Problem: Coping:  Goal: Ability to cope will improve  Description  Ability to cope will improve  Outcome: Ongoing  Goal: Ability to identify appropriate support needs will improve  Description  Ability to identify appropriate support needs will improve  Outcome: Ongoing     Problem: Health Behavior:  Goal: Ability to manage health-related needs will improve  Description  Ability to manage health-related needs will improve  Outcome: Ongoing     Problem: Physical Regulation:  Goal: Signs of adequate cerebral perfusion will increase  Description  Signs of adequate cerebral perfusion will increase  Outcome: Ongoing  Goal: Ability to maintain a stable neurologic state will improve  Description  Ability to maintain a stable neurologic state will improve  Outcome: Ongoing     Problem: Safety:  Goal: Ability to remain free from injury will improve  Description  Ability to remain free from injury will improve  Outcome: Ongoing  Goal: Free from accidental physical injury  Description  Free from accidental physical injury  Outcome: Ongoing  Goal: Free from intentional harm  Description  Free from intentional harm  Outcome: Ongoing     Problem: Self-Concept:  Goal: Level of anxiety will decrease  Description  Level of anxiety will decrease  Outcome: Ongoing  Goal: Ability to verbalize feelings about condition will improve  Description  Ability to verbalize feelings about condition will improve  Outcome: Ongoing     Problem: Discharge Planning:  Goal: Discharged to appropriate level of care  Description  Discharged to appropriate level of care  Outcome: Ongoing     Problem: Infection:  Goal: Will remain free from infection  Description  Will remain free from infection  Outcome: Ongoing     Problem: Daily Care:  Goal: Daily care needs are met  Description  Daily care needs are met  Outcome: Ongoing     Problem: Pain:  Goal: Patient's pain/discomfort is manageable  Description  Patient's pain/discomfort is manageable  Outcome: Ongoing     Problem: Skin Integrity:  Goal: Skin integrity will stabilize  Description  Skin integrity will stabilize  Outcome: Ongoing     Problem: Discharge Planning:  Goal: Patients continuum of care needs are met  Description  Patients continuum of care needs are met  Outcome: Ongoing     Problem: ABCDS Injury Assessment  Goal: Absence of physical injury  Outcome: Ongoing

## 2019-11-07 ENCOUNTER — HOSPITAL ENCOUNTER (EMERGENCY)
Age: 69
Discharge: HOME | End: 2019-11-07
Attending: EMERGENCY MEDICINE
Payer: MEDICARE

## 2019-11-07 ENCOUNTER — APPOINTMENT (OUTPATIENT)
Dept: GENERAL RADIOLOGY | Age: 69
End: 2019-11-07
Payer: MEDICARE

## 2019-11-07 VITALS
HEIGHT: 69 IN | TEMPERATURE: 97.2 F | RESPIRATION RATE: 16 BRPM | SYSTOLIC BLOOD PRESSURE: 98 MMHG | BODY MASS INDEX: 24.47 KG/M2 | OXYGEN SATURATION: 92 % | HEART RATE: 63 BPM | DIASTOLIC BLOOD PRESSURE: 59 MMHG | WEIGHT: 165.2 LBS

## 2019-11-07 VITALS
OXYGEN SATURATION: 95 % | HEART RATE: 63 BPM | TEMPERATURE: 98.1 F | SYSTOLIC BLOOD PRESSURE: 114 MMHG | DIASTOLIC BLOOD PRESSURE: 69 MMHG | RESPIRATION RATE: 18 BRPM

## 2019-11-07 DIAGNOSIS — G89.18 POST PROCEDURE DISCOMFORT: Primary | ICD-10-CM

## 2019-11-07 PROCEDURE — 71046 X-RAY EXAM CHEST 2 VIEWS: CPT

## 2019-11-07 PROCEDURE — 93005 ELECTROCARDIOGRAM TRACING: CPT

## 2019-11-07 PROCEDURE — 6370000000 HC RX 637 (ALT 250 FOR IP): Performed by: EMERGENCY MEDICINE

## 2019-11-07 PROCEDURE — 2580000003 HC RX 258: Performed by: INTERNAL MEDICINE

## 2019-11-07 PROCEDURE — 99239 HOSP IP/OBS DSCHRG MGMT >30: CPT | Performed by: PSYCHIATRY & NEUROLOGY

## 2019-11-07 PROCEDURE — 99024 POSTOP FOLLOW-UP VISIT: CPT | Performed by: INTERNAL MEDICINE

## 2019-11-07 PROCEDURE — 6360000002 HC RX W HCPCS: Performed by: INTERNAL MEDICINE

## 2019-11-07 PROCEDURE — 99283 EMERGENCY DEPT VISIT LOW MDM: CPT

## 2019-11-07 RX ORDER — HYDROCODONE BITARTRATE AND ACETAMINOPHEN 5; 325 MG/1; MG/1
2 TABLET ORAL ONCE
Status: COMPLETED | OUTPATIENT
Start: 2019-11-07 | End: 2019-11-07

## 2019-11-07 RX ORDER — HYDROCODONE BITARTRATE AND ACETAMINOPHEN 7.5; 325 MG/1; MG/1
1 TABLET ORAL EVERY 6 HOURS PRN
Qty: 12 TABLET | Refills: 0 | Status: SHIPPED | OUTPATIENT
Start: 2019-11-07 | End: 2019-11-10

## 2019-11-07 RX ADMIN — Medication 2 G: at 08:03

## 2019-11-07 RX ADMIN — LEVOTHYROXINE SODIUM 88 MCG: 88 TABLET ORAL at 05:04

## 2019-11-07 RX ADMIN — ATORVASTATIN CALCIUM 40 MG: 40 TABLET, FILM COATED ORAL at 08:03

## 2019-11-07 RX ADMIN — ESCITALOPRAM OXALATE 10 MG: 10 TABLET ORAL at 08:03

## 2019-11-07 RX ADMIN — Medication 10 ML: at 08:03

## 2019-11-07 RX ADMIN — HYDROCODONE BITARTRATE AND ACETAMINOPHEN 2 TABLET: 5; 325 TABLET ORAL at 17:59

## 2019-11-07 ASSESSMENT — PAIN SCALES - GENERAL
PAINLEVEL_OUTOF10: 5
PAINLEVEL_OUTOF10: 10
PAINLEVEL_OUTOF10: 2
PAINLEVEL_OUTOF10: 3
PAINLEVEL_OUTOF10: 0

## 2019-11-07 ASSESSMENT — PAIN DESCRIPTION - LOCATION
LOCATION: SHOULDER

## 2019-11-07 ASSESSMENT — ENCOUNTER SYMPTOMS
VOMITING: 0
NAUSEA: 0
SHORTNESS OF BREATH: 0
BACK PAIN: 0

## 2019-11-07 ASSESSMENT — PAIN DESCRIPTION - PAIN TYPE
TYPE: ACUTE PAIN

## 2019-11-07 ASSESSMENT — PAIN DESCRIPTION - ORIENTATION
ORIENTATION: LEFT

## 2019-11-07 NOTE — PROGRESS NOTES
Patient is educated on follow up appointments, medications, and post op instructions for pacemaker. Patient provided instruction sheet for pacemaker. Patient verbalized understanding. Patient is on strict no work restriction until released by Lashawn Joyce.   Patient verbalized understanding

## 2019-11-07 NOTE — PROGRESS NOTES
Cardiology Progress Note Kemal Smith MD      Patient:  Ok Mcqueen  [id number]    Patient Active Problem List    Diagnosis Date Noted    Syncope 10/16/2016     Priority: High    TIA (transient ischemic attack) 10/16/2016     Priority: High    Bradycardia 10/16/2016     Priority: Medium    Tobacco abuse      Priority: Medium    Hyperlipidemia      Priority: Medium    Hypothyroidism 03/30/2015     Priority: Medium    Convulsion ([address] 11/04/2019     Priority: Low    Current moderate episode of major depressive disorder without prior episode ([address] 07/03/2019     Priority: Low    Nonintractable headache      Priority: Low    Left-sided weakness      Priority: Low    Symptomatic bradycardia      Priority: Low    Abnormal PET scan of colon 06/07/2017     Priority: Low    Family history of polyps in the colon 06/07/2017     Priority: Low       Admit Date:  11/4/2019    Admission Problem List: Present on Admission:   Convulsion ([address]   Bradycardia      Cardiac Specific Data:  Specialty Problems        Cardiology Problems    Syncope        TIA (transient ischemic attack)            1. Recurrent syncope, normal LV ejection fraction, negative dobutamine stress echo, implantable loop recorder with multiple episodes of bradycardia, status post dual-lead pacemaker placement 11/6/2019.  2. Chronic tobacco use. 3. Hypothyroidism. 4. Hyperlipidemia. Subjective:  Ms. Humberto Celestin reports no issues. She underwent pacemaker placement yesterday. Chest x-ray without any pneumothorax. Remains in atrial paced throughout the night.     Objective:   /75   Pulse 60   Temp 97.2 °F (36.2 °C) (Temporal)   Resp 16   Ht 5' 9\" (1.753 m)   Wt 165 lb 3.2 oz (74.9 kg)   SpO2 92%   BMI 24.40 kg/m²       Intake/Output Summary (Last 24 hours) at 11/7/2019 0901  Last data filed at 11/7/2019 0843  Gross per 24 hour   Intake 700 ml   Output 850 ml   Net -150 ml       Prior to Admission medications    Medication Sig Start Date End Date Taking? Authorizing Provider   risperiDONE (RISPERDAL) 0.5 MG tablet TAKE 1 TABLET BY MOUTH ONCE DAILY IN THE EVENING 9/30/19  Yes Martinez Lane MD   albuterol sulfate HFA (PROAIR HFA) 108 (90 Base) MCG/ACT inhaler Inhale 2 puffs into the lungs every 6 hours as needed for Wheezing 7/1/19  Yes Martinez Lane MD   escitalopram (LEXAPRO) 10 MG tablet Take 1 tablet by mouth daily 6/3/19  Yes Martinez Lane MD   atorvastatin (LIPITOR) 40 MG tablet TAKE 1 TABLET EVERY NIGHT  Patient taking differently: daily  4/25/19  Yes Martinez Lane MD   levothyroxine (SYNTHROID) 88 MCG tablet TAKE 1 TABLET EVERY DAY 2/27/19  Yes Marj Villasenor, APRN - CNP        sodium chloride flush  10 mL Intravenous 2 times per day    atorvastatin  40 mg Oral Daily    escitalopram  10 mg Oral Daily    levothyroxine  88 mcg Oral Daily    risperiDONE  0.5 mg Oral QPM    enoxaparin  40 mg Subcutaneous Q24H       TELEMETRY: Atrial paced, ventricular sensed     Physical Exam:      Physical Exam   Constitutional: She is oriented to person, place, and time. No distress. HENT:   Mouth/Throat: No oropharyngeal exudate. Eyes: Right eye exhibits no discharge. Left eye exhibits no discharge. No scleral icterus. Neck: No JVD present. No thyromegaly present. Cardiovascular: Normal rate, regular rhythm, S1 normal, S2 normal, normal heart sounds and intact distal pulses. No extrasystoles are present. Exam reveals no gallop, no S3, no S4 and no friction rub. No murmur heard. No systolic murmur is present. No diastolic murmur is present. Pacemaker site with no hematoma. Pulmonary/Chest: Effort normal and breath sounds normal. No respiratory distress. She has no wheezes. She has no rales. She exhibits no tenderness. Abdominal: Soft. Bowel sounds are normal. She exhibits no distension and no mass. There is no hepatosplenomegaly. There is no tenderness. There is no rebound and no guarding.  No following pacemaker placement. Chest is otherwise stable in appearance. No evidence of pneumothorax following pacemaker placement. Signed by Dr Jose Thompson on 11/6/2019 5:56 PM        Assessment and Plan: This is a 80y.o. year old female with past medical history of chronic tobacco use, hyperlipidemia, hypothyroidism with syncopal episode with recurrent episodes of near syncope with recent implantable loop recorder placement 10/1/2019 showing multiple bouts of bradycardia with heart rates between 30-40 bpm., status post dual-lead pacemaker placement. 1. No issues post pacemaker placement. Chest x-ray without any pneumothorax. Pacer site without hematoma. Remains atrial paced 100% overnight. Can be discharged with follow-up and wound checks.     Margarita Platt MD 11/7/2019 9:01 AM

## 2019-11-07 NOTE — PROGRESS NOTES
Pt received medication and is resting. No complaints at this time. Will continue to monitor.  Electronically signed by Efraín Germain RN on 11/6/2019 at 8:33 PM

## 2019-11-07 NOTE — PLAN OF CARE
Problem: Falls - Risk of:  Goal: Will remain free from falls  Description  Will remain free from falls  Outcome: Ongoing  Goal: Absence of physical injury  Description  Absence of physical injury  Outcome: Ongoing     Problem: Coping:  Goal: Ability to cope will improve  Description  Ability to cope will improve  Outcome: Ongoing  Goal: Ability to identify appropriate support needs will improve  Description  Ability to identify appropriate support needs will improve  Outcome: Ongoing     Problem: Health Behavior:  Goal: Ability to manage health-related needs will improve  Description  Ability to manage health-related needs will improve  Outcome: Ongoing     Problem: Physical Regulation:  Goal: Signs of adequate cerebral perfusion will increase  Description  Signs of adequate cerebral perfusion will increase  Outcome: Ongoing  Goal: Ability to maintain a stable neurologic state will improve  Description  Ability to maintain a stable neurologic state will improve  Outcome: Ongoing     Problem: Safety:  Goal: Ability to remain free from injury will improve  Description  Ability to remain free from injury will improve  Outcome: Ongoing  Goal: Free from accidental physical injury  Description  Free from accidental physical injury  Outcome: Ongoing  Goal: Free from intentional harm  Description  Free from intentional harm  Outcome: Ongoing     Problem: Self-Concept:  Goal: Level of anxiety will decrease  Description  Level of anxiety will decrease  Outcome: Ongoing  Goal: Ability to verbalize feelings about condition will improve  Description  Ability to verbalize feelings about condition will improve  Outcome: Ongoing     Problem: Discharge Planning:  Goal: Discharged to appropriate level of care  Description  Discharged to appropriate level of care  Outcome: Ongoing     Problem: Infection:  Goal: Will remain free from infection  Description  Will remain free from infection  Outcome: Ongoing     Problem: Daily Care:  Goal: Daily care needs are met  Description  Daily care needs are met  Outcome: Ongoing     Problem: Pain:  Goal: Patient's pain/discomfort is manageable  Description  Patient's pain/discomfort is manageable  Outcome: Ongoing  Goal: Pain level will decrease  Description  Pain level will decrease  Outcome: Ongoing  Goal: Control of acute pain  Description  Control of acute pain  Outcome: Ongoing  Goal: Control of chronic pain  Description  Control of chronic pain  Outcome: Ongoing     Problem: Skin Integrity:  Goal: Skin integrity will stabilize  Description  Skin integrity will stabilize  Outcome: Ongoing     Problem: Discharge Planning:  Goal: Patients continuum of care needs are met  Description  Patients continuum of care needs are met  Outcome: Ongoing     Problem: ABCDS Injury Assessment  Goal: Absence of physical injury  Outcome: Ongoing

## 2019-11-07 NOTE — DISCHARGE SUMMARY
EPILEPSY MONITORING UNIT DISCHARGE SUMMARY      Patient Name: Naila Araiza    :  1950    MRN:  [id number]  Admit date:  2019    Discharge date:  2019  Admitting Physician:  Annika Brumfield DO    Primary Care Physician:  Ace Galeana MD    Discharge Diagnoses:  Convulsions    Diagnostic Studies: 24 hour video EEG monitoring    Recent Labs     19  1758   WBC 7.3   HGB 13.1        Recent Labs     19  1758      K 4.3      CO2 24   BUN 21   CREATININE 0.7   GLUCOSE 139*     No results for input(s): INR in the last 72 hours. Hospital Course: The patient was admitted to our EMU. No seizures were captured. Interictal EEG was normal.  Bradycardia seen on Tele. Cardiology consulted, recommended pacer. Pacer placed, patient did well. Felt ok for discharge. Patient will be discharged home and will follow up with primary and cardiology. Her events are felt to be related to symptomatic bradycardia and are not seizure related. She will continue to follow event precautions. Discharge Medications:      Flex Lance   Home Medication Instructions RAEANN:[id number]    Printed on:19 1050   Medication Information                      albuterol sulfate HFA (PROAIR HFA) 108 (90 Base) MCG/ACT inhaler  Inhale 2 puffs into the lungs every 6 hours as needed for Wheezing             atorvastatin (LIPITOR) 40 MG tablet  TAKE 1 TABLET EVERY NIGHT             escitalopram (LEXAPRO) 10 MG tablet  Take 1 tablet by mouth daily             levothyroxine (SYNTHROID) 88 MCG tablet  TAKE 1 TABLET EVERY DAY             risperiDONE (RISPERDAL) 0.5 MG tablet  TAKE 1 TABLET BY MOUTH ONCE DAILY IN THE EVENING                 Discharge Instructions: Follow up with Wilmer Davis in 1 month. Follow up with PCP and cardiology in a couple weeks. Take medications as directed. Resume activity as tolerated. Event precautions discussed.   No driving, heights, swimming, tub baths, open flames, or heavy machinery. Disposition: Patient is medically stable and will be discharged home. Time spent on discharge 35 min.       Souleymane Rebollar DO  Board Certified Neurologist

## 2019-11-08 ENCOUNTER — TELEPHONE (OUTPATIENT)
Dept: CARDIOLOGY | Age: 69
End: 2019-11-08

## 2019-11-08 NOTE — CONSULTS
Patient discharged prior to PACEMAKER teaching being conducted. Educational packet and cover letter sent to the patient's mailing address on record. Information included; incision care, affected arm ROM and weight bearing limitations, present and future precautionary measures, sign & symptom awareness with reasons to call the cardiologist, keeping of appointments, carrying identification card and transmitter operation. Patient instructed to call the cardiologist's office with any questions or concerns.

## 2019-11-11 RX ORDER — ATORVASTATIN CALCIUM 40 MG/1
TABLET, FILM COATED ORAL
Qty: 90 TABLET | Refills: 3 | Status: SHIPPED | OUTPATIENT
Start: 2019-11-11 | End: 2020-11-25

## 2019-11-11 RX ORDER — LEVOTHYROXINE SODIUM 88 UG/1
88 TABLET ORAL DAILY
Qty: 90 TABLET | Refills: 3 | Status: SHIPPED | OUTPATIENT
Start: 2019-11-11 | End: 2020-11-25

## 2019-11-12 LAB
EKG P AXIS: 20 DEGREES
EKG P-R INTERVAL: 172 MS
EKG Q-T INTERVAL: 398 MS
EKG QRS DURATION: 96 MS
EKG QTC CALCULATION (BAZETT): 405 MS
EKG T AXIS: 60 DEGREES

## 2019-11-18 ENCOUNTER — TELEPHONE (OUTPATIENT)
Dept: CARDIOLOGY | Age: 69
End: 2019-11-18

## 2019-11-19 ENCOUNTER — NURSE ONLY (OUTPATIENT)
Dept: CARDIOLOGY | Age: 69
End: 2019-11-19

## 2019-11-19 DIAGNOSIS — Z51.89 VISIT FOR WOUND CHECK: Primary | ICD-10-CM

## 2019-11-20 ENCOUNTER — OFFICE VISIT (OUTPATIENT)
Dept: PRIMARY CARE CLINIC | Age: 69
End: 2019-11-20
Payer: MEDICARE

## 2019-11-20 VITALS
OXYGEN SATURATION: 99 % | BODY MASS INDEX: 24.79 KG/M2 | DIASTOLIC BLOOD PRESSURE: 70 MMHG | WEIGHT: 167.4 LBS | SYSTOLIC BLOOD PRESSURE: 100 MMHG | HEIGHT: 69 IN | TEMPERATURE: 98.3 F | RESPIRATION RATE: 18 BRPM | HEART RATE: 72 BPM

## 2019-11-20 DIAGNOSIS — R42 LIGHTHEADED: Primary | ICD-10-CM

## 2019-11-20 DIAGNOSIS — Z95.0 H/O CARDIAC PACEMAKER: ICD-10-CM

## 2019-11-20 LAB
ALBUMIN SERPL-MCNC: 4.1 G/DL (ref 3.5–5.2)
ALP BLD-CCNC: 92 U/L (ref 35–104)
ALT SERPL-CCNC: 14 U/L (ref 5–33)
ANION GAP SERPL CALCULATED.3IONS-SCNC: 12 MMOL/L (ref 7–19)
AST SERPL-CCNC: 18 U/L (ref 5–32)
BILIRUB SERPL-MCNC: 0.4 MG/DL (ref 0.2–1.2)
BUN BLDV-MCNC: 13 MG/DL (ref 8–23)
CALCIUM SERPL-MCNC: 9.9 MG/DL (ref 8.8–10.2)
CHLORIDE BLD-SCNC: 104 MMOL/L (ref 98–111)
CO2: 26 MMOL/L (ref 22–29)
CREAT SERPL-MCNC: 0.6 MG/DL (ref 0.5–0.9)
GFR NON-AFRICAN AMERICAN: >60
GLUCOSE BLD-MCNC: 86 MG/DL (ref 74–109)
HCT VFR BLD CALC: 46.2 % (ref 37–47)
HEMOGLOBIN: 14.2 G/DL (ref 12–16)
MCH RBC QN AUTO: 27.5 PG (ref 27–31)
MCHC RBC AUTO-ENTMCNC: 30.7 G/DL (ref 33–37)
MCV RBC AUTO: 89.4 FL (ref 81–99)
PDW BLD-RTO: 13.2 % (ref 11.5–14.5)
PLATELET # BLD: 134 K/UL (ref 130–400)
PMV BLD AUTO: 11.9 FL (ref 9.4–12.3)
POTASSIUM SERPL-SCNC: 4.5 MMOL/L (ref 3.5–5)
RBC # BLD: 5.17 M/UL (ref 4.2–5.4)
SODIUM BLD-SCNC: 142 MMOL/L (ref 136–145)
TOTAL PROTEIN: 7 G/DL (ref 6.6–8.7)
WBC # BLD: 7.6 K/UL (ref 4.8–10.8)

## 2019-11-20 PROCEDURE — 99213 OFFICE O/P EST LOW 20 MIN: CPT | Performed by: FAMILY MEDICINE

## 2019-11-20 PROCEDURE — 36415 COLL VENOUS BLD VENIPUNCTURE: CPT | Performed by: FAMILY MEDICINE

## 2019-11-20 ASSESSMENT — ENCOUNTER SYMPTOMS
BACK PAIN: 0
DIARRHEA: 0
COLOR CHANGE: 0
VOMITING: 0
NAUSEA: 0
COUGH: 0
ABDOMINAL PAIN: 0
EYE DISCHARGE: 0
WHEEZING: 0

## 2019-11-27 ENCOUNTER — TELEPHONE (OUTPATIENT)
Dept: CARDIOLOGY | Age: 69
End: 2019-11-27

## 2019-12-18 ENCOUNTER — OFFICE VISIT (OUTPATIENT)
Dept: CARDIOLOGY | Age: 69
End: 2019-12-18
Payer: MEDICARE

## 2019-12-18 VITALS
HEIGHT: 69 IN | WEIGHT: 174 LBS | SYSTOLIC BLOOD PRESSURE: 100 MMHG | DIASTOLIC BLOOD PRESSURE: 70 MMHG | BODY MASS INDEX: 25.77 KG/M2 | HEART RATE: 67 BPM

## 2019-12-18 DIAGNOSIS — R00.1 SYMPTOMATIC BRADYCARDIA: Primary | ICD-10-CM

## 2019-12-18 DIAGNOSIS — E78.2 MIXED HYPERLIPIDEMIA: ICD-10-CM

## 2019-12-18 DIAGNOSIS — Z95.0 PACEMAKER: ICD-10-CM

## 2019-12-18 PROCEDURE — 99024 POSTOP FOLLOW-UP VISIT: CPT | Performed by: NURSE PRACTITIONER

## 2019-12-18 PROCEDURE — 93280 PM DEVICE PROGR EVAL DUAL: CPT | Performed by: NURSE PRACTITIONER

## 2020-02-24 ENCOUNTER — OFFICE VISIT (OUTPATIENT)
Dept: PRIMARY CARE CLINIC | Age: 70
End: 2020-02-24
Payer: MEDICARE

## 2020-02-24 VITALS
WEIGHT: 174 LBS | HEART RATE: 68 BPM | OXYGEN SATURATION: 97 % | BODY MASS INDEX: 25.77 KG/M2 | TEMPERATURE: 97.1 F | DIASTOLIC BLOOD PRESSURE: 78 MMHG | RESPIRATION RATE: 22 BRPM | HEIGHT: 69 IN | SYSTOLIC BLOOD PRESSURE: 116 MMHG

## 2020-02-24 PROCEDURE — 99214 OFFICE O/P EST MOD 30 MIN: CPT | Performed by: FAMILY MEDICINE

## 2020-02-26 ASSESSMENT — ENCOUNTER SYMPTOMS
VOMITING: 0
EYE DISCHARGE: 0
DIARRHEA: 0
COLOR CHANGE: 0
BACK PAIN: 0
COUGH: 0
WHEEZING: 0
NAUSEA: 0
ABDOMINAL PAIN: 0

## 2020-02-26 NOTE — PROGRESS NOTES
SUBJECTIVE:    Patient ID: Josias Green is a 77 y.o. female. HPI:   Patient presents today for 3-month follow-up. She states that overall she is feeling very well. She has not had any more syncopal episodes. She states that her lightheadedness is also improved since she had her pacemaker put in for her bradycardia. She states that overall she is feeling very well. She states that she has seen cardiology for a follow-up. She states that she is still taking her Synthroid for hypothyroidism. She is tolerating this well. She does have a history of hyperlipidemia. She is currently on Lipitor. She is tolerating this well and denies any muscle aches or cramps. She states that she is not fasting today for labs but she has just had blood work done recently. Past Medical History:   Diagnosis Date    Cerebral artery occlusion with cerebral infarction ([facility] [address]     TIA    Diverticular disease of colon     Hyperlipidemia     Hypothyroidism     Pneumonia     Psychiatric problem     Tobacco abuse     Vitamin B12 deficiency       Current Outpatient Medications   Medication Sig Dispense Refill    atorvastatin (LIPITOR) 40 MG tablet TAKE 1 TABLET EVERY NIGHT 90 tablet 3    levothyroxine (SYNTHROID) 88 MCG tablet Take 1 tablet by mouth Daily 90 tablet 3    albuterol sulfate HFA (PROAIR HFA) 108 (90 Base) MCG/ACT inhaler Inhale 2 puffs into the lungs every 6 hours as needed for Wheezing 1 Inhaler 3     No current facility-administered medications for this visit. No Known Allergies    Review of Systems   Constitutional: Negative for activity change, appetite change and fever. HENT: Negative for congestion and nosebleeds. Eyes: Negative for discharge. Respiratory: Negative for cough and wheezing. Cardiovascular: Negative for chest pain and leg swelling. Gastrointestinal: Negative for abdominal pain, diarrhea, nausea and vomiting.    Genitourinary: Negative for difficulty urinating, frequency and urgency. Musculoskeletal: Negative for back pain and gait problem. Skin: Negative for color change and rash. Neurological: Negative for dizziness and headaches. Hematological: Does not bruise/bleed easily. Psychiatric/Behavioral: Negative for sleep disturbance and suicidal ideas. OBJECTIVE:     Physical Exam  Constitutional:       General: She is not in acute distress. Appearance: She is well-developed. She is not diaphoretic. HENT:      Head: Normocephalic and atraumatic. Mouth/Throat:      Mouth: Mucous membranes are moist.   Neck:      Musculoskeletal: Normal range of motion and neck supple. Cardiovascular:      Rate and Rhythm: Normal rate and regular rhythm. Pulses: Normal pulses. Heart sounds: Normal heart sounds. No murmur. Pulmonary:      Effort: Pulmonary effort is normal. No respiratory distress. Breath sounds: Normal breath sounds. Skin:     General: Skin is warm and dry. Neurological:      General: No focal deficit present. Mental Status: She is alert and oriented to person, place, and time. Mental status is at baseline. Psychiatric:         Mood and Affect: Mood normal.         Behavior: Behavior normal.         Thought Content: Thought content normal.         Judgment: Judgment normal.        /78 (Site: Left Upper Arm, Position: Sitting, Cuff Size: Medium Adult)   Pulse 68   Temp 97.1 °F (36.2 °C) (Temporal)   Resp 22   Ht 5' 9\" (1.753 m)   Wt 174 lb (78.9 kg)   SpO2 97%   BMI 25.70 kg/m²      ASSESSMENT:    Autumn Lewis was seen today for 3 month follow-up, hyperlipidemia and hypothyroidism. Diagnoses and all orders for this visit:    Lightheaded    H/O cardiac pacemaker    Acquired hypothyroidism    Hyperlipidemia, unspecified hyperlipidemia type        PLAN:    Continue current medications. Continue to follow with cardiology regarding her pacemaker and history of bradycardia.   Follow-up with us in 6 months for checkup unless needed sooner. We will do lab work at that visit. EMR Dragon/transcription disclaimer:  Much of this encounter note is electronic transcription/translation of spoken language toprinted texts. The electronic translation of spoken language may be erroneous, or at times, nonsensical words or phrases may be inadvertently transcribed.   Although I have reviewed the note for such errors, some may stillexist.

## 2020-03-19 PROCEDURE — 93294 REM INTERROG EVL PM/LDLS PM: CPT | Performed by: NURSE PRACTITIONER

## 2020-03-19 PROCEDURE — 93296 REM INTERROG EVL PM/IDS: CPT | Performed by: NURSE PRACTITIONER

## 2020-06-02 ENCOUNTER — OFFICE VISIT (OUTPATIENT)
Dept: PRIMARY CARE CLINIC | Age: 70
End: 2020-06-02
Payer: MEDICARE

## 2020-06-02 VITALS
HEIGHT: 69 IN | WEIGHT: 164.6 LBS | OXYGEN SATURATION: 97 % | SYSTOLIC BLOOD PRESSURE: 118 MMHG | DIASTOLIC BLOOD PRESSURE: 78 MMHG | HEART RATE: 67 BPM | TEMPERATURE: 98 F | BODY MASS INDEX: 24.38 KG/M2 | RESPIRATION RATE: 16 BRPM

## 2020-06-02 PROCEDURE — 99214 OFFICE O/P EST MOD 30 MIN: CPT | Performed by: FAMILY MEDICINE

## 2020-06-02 ASSESSMENT — ENCOUNTER SYMPTOMS
WHEEZING: 0
ABDOMINAL PAIN: 0
DIARRHEA: 0
COUGH: 0
COLOR CHANGE: 0
EYE DISCHARGE: 0
VOMITING: 0
BACK PAIN: 0
NAUSEA: 0

## 2020-06-04 ENCOUNTER — TELEPHONE (OUTPATIENT)
Dept: PRIMARY CARE CLINIC | Age: 70
End: 2020-06-04

## 2020-06-05 ENCOUNTER — TELEPHONE (OUTPATIENT)
Dept: CARDIOLOGY | Age: 70
End: 2020-06-05

## 2020-06-05 NOTE — TELEPHONE ENCOUNTER
LVM; Left vm to remind pt of appt; Pt was notified to wear a mask; Pt was notified to not arrive for her appt no more thatn 5 minutes prior to her appt as there will be no seating in the lobby. Pt was notified that we are not allowing any visitors.

## 2020-06-08 ENCOUNTER — OFFICE VISIT (OUTPATIENT)
Dept: CARDIOLOGY | Age: 70
End: 2020-06-08
Payer: MEDICARE

## 2020-06-08 VITALS
WEIGHT: 168 LBS | BODY MASS INDEX: 24.88 KG/M2 | HEIGHT: 69 IN | DIASTOLIC BLOOD PRESSURE: 78 MMHG | SYSTOLIC BLOOD PRESSURE: 126 MMHG | HEART RATE: 72 BPM

## 2020-06-08 PROCEDURE — 93280 PM DEVICE PROGR EVAL DUAL: CPT | Performed by: NURSE PRACTITIONER

## 2020-06-08 PROCEDURE — 99213 OFFICE O/P EST LOW 20 MIN: CPT | Performed by: NURSE PRACTITIONER

## 2020-06-08 NOTE — PROGRESS NOTES
No lower extremity varicosities. No carotid bruits. Abdominal -  No visible distention, mass or pulsations. Extremities - No clubbing or cyanosis. No statis dermatitis or ulcers. No edema. Musculoskeletal -   No Osler's nodes. No kyphosis or scoliosis. Gait is even and regular without limp or shuffle. Ambulates without assistance. Skin -  Warm and dry; no rash or pallor. No new surgical wound. Neurological - No focal neurological deficits. Thought processes coherent. No apparent tremor. Oriented to person, place and time. Psychiatric -  Appropriate affect and mood. Assessment:     Diagnosis Orders   1. Bradycardia     2. Pacemaker     3. Mixed hyperlipidemia       Data reviewed:  3/7/19 DSE   Summary   Dobutamine stress echocardiogram without clinical or echocardiographic   evidence of myocardial ischemia. EKG with mild nondiagnostic changes   noted. Test was supervised by Dr. Yu Jackson. Signature    ----------------------------------------------------------------   Electronically signed by Pooja Millan MD(Interpreting physician)   on 03/07/2019 03:58 PM    10/5/20 2D ECHO  Summary   Mitral valve leaflets are mildly thickened with preserved leaflet   mobility. Trace mitral regurgitation. Mildly thickened aortic valve leaflets with preserved leaflet mobility. Aortic valve appears to be tricuspid. Mild tricuspid regurgitation. Trace pulmonic regurgitation present. Normal size left atrium. Normal intact intra-atrial septum was noted with no evidence of   significant intra-atrial communications by color flow Doppler or by   agitated saline study. Normal left ventricular size with preserved LV function and an estimated   ejection fraction of approximately 55-60%. No evidence of left ventricular mass or thrombus noted. Mildly enlarged right atrium size. Mildly enlarged right ventricle cavity. No evidence of significant pericardial effusion is noted.    Signature Dr. Alacron Cuff and pacer check 6 months. Educational included in patient instructions. Heart health. [facility] 6/8/20.      JORDYN Grayson

## 2020-06-08 NOTE — PATIENT INSTRUCTIONS
New instructions for today:  Carelink pacemaker transmission:    1)  Your next scheduled transmission from home is 6/8/20. The pacemaker nurse will call you after the report has been reviewed. Follow the steps for sending your first transmission every time you are scheduled to send information to the clinic. 2) If you have questions about your monitor, call the office during hours of operation at [phone]. You can also call [employer] [facility] at [phone], Monday - Friday 7AM-6PM Central Time. 3)  If you have a new pacemaker, you should receive the monitor within a few weeks. If you have not received the box within 30 days, notify the office. Instructions for use will be included in the box. Patient Instructions:  Continue current medications as prescribed. Always keep a current medication list. Bring your medications to every office visit. Continue to follow up with primary care provider for non cardiac medical problems. Call the office with any problems, questions or concerns at [phone]. If you have been asked to keep a blood pressure log, do so for 2 weeks. Call the office to report readings to the triage nurse at [phone]. Follow up with cardiologist as scheduled. The following educational material has been included in this after visit summary for your review: Life simple 7. Heart health. Life simple 7  1) Manage blood pressure - high blood pressure is a major risk factor for heart disease and stroke. Keeping blood pressure in health range reduces strain on your heart, arteries and kidneys. Blood pressure goal is less than 130/80. 2) Control cholesterol - contributes to plaque, which can clog arteries and lead to heart disease and stroke. When you control your cholesterol you are giving your arteries their best chance to remain clear. It is recommended that you get cholesterol lab work done once a year.   3) Reduce blood sugar - most of the food we eat is turning into glucose or blood sugar that our body uses for energy. Over time, high levels of blood sugar can damage your heart, kidneys, eyes and nerves. 4) Get active - living an active life is one of the most rewarding gifts you can give yourself and those you love. Simply put, daily physical activity increases your length and quality of life. Strive to exercise 15 minutes most days of the week. 5)  Eat better - A healthy diet is one of your best weapons for fighting cardiovascular disease. When you eat a heart healthy diet, you improve your chances for feeling good and staying healthy for life. 6)  Lose weight - when you shed extra fat an unnecessary pounds, you reduce the burden on your hear, lungs, blood vessels and skeleton. You give yourself the gift of active living, you lower your blood pressure and help yourself feel better. 7) Stop smoking - cigarette smokers have a higher risk of developing cardiovascular disease. If  You smoke, quitting is the best thing you can do for your health. Check American Heart Association on line for more information on Life's Simple 7 and tips for healthy living. Patient Education        A Healthy Heart: Care Instructions  Your Care Instructions     Coronary artery disease, also called heart disease, occurs when a substance called plaque builds up in the vessels that supply oxygen-rich blood to your heart muscle. This can narrow the blood vessels and reduce blood flow. A heart attack happens when blood flow is completely blocked. A high-fat diet, smoking, and other factors increase the risk of heart disease. Your doctor has found that you have a chance of having heart disease. You can do lots of things to keep your heart healthy. It may not be easy, but you can change your diet, exercise more, and quit smoking. These steps really work to lower your chance of heart disease. Follow-up care is a key part of your treatment and safety.  Be sure to make and go to protect your heart. It is never too late to quit. · Limit alcohol to 2 drinks a day for men and 1 drink a day for women. Too much alcohol can cause health problems. · Manage other health problems such as diabetes, high blood pressure, and high cholesterol. If you think you may have a problem with alcohol or drug use, talk to your doctor. Medicines  · Take your medicines exactly as prescribed. Call your doctor if you think you are having a problem with your medicine. · If your doctor recommends aspirin, take the amount directed each day. Make sure you take aspirin and not another kind of pain reliever, such as acetaminophen (Tylenol). When should you call for help? [id number] if you have symptoms of a heart attack. These may include:  · Chest pain or pressure, or a strange feeling in the chest.  · Sweating. · Shortness of breath. · Pain, pressure, or a strange feeling in the back, neck, jaw, or upper belly or in one or both shoulders or arms. · Lightheadedness or sudden weakness. · A fast or irregular heartbeat. After you call 911, the  may tell you to chew 1 adult-strength or 2 to 4 low-dose aspirin. Wait for an ambulance. Do not try to drive yourself. Watch closely for changes in your health, and be sure to contact your doctor if you have any problems. Where can you learn more? Go to https://[email or web address].[employer].DuXplore. org and sign in to your weeSpring account. Enter U974 in the [facility] box to learn more about \"A Healthy Heart: Care Instructions. \"     If you do not have an account, please click on the \"Sign Up Now\" link. Current as of: December 16, 2019               Content Version: 12.5  © 6466-2853 Healthwise, [employer]. Care instructions adapted under license by [facility] ([facility]).  If you have questions about a medical condition or this instruction, always ask your healthcare professional. [address] any warranty or liability for your use of this information.

## 2020-06-18 ENCOUNTER — NURSE ONLY (OUTPATIENT)
Dept: PRIMARY CARE CLINIC | Age: 70
End: 2020-06-18
Payer: MEDICARE

## 2020-06-18 LAB
ALBUMIN SERPL-MCNC: 4.2 G/DL (ref 3.5–5.2)
ALP BLD-CCNC: 80 U/L (ref 35–104)
ALT SERPL-CCNC: 12 U/L (ref 5–33)
ANION GAP SERPL CALCULATED.3IONS-SCNC: 14 MMOL/L (ref 7–19)
AST SERPL-CCNC: 17 U/L (ref 5–32)
BILIRUB SERPL-MCNC: 0.5 MG/DL (ref 0.2–1.2)
BUN BLDV-MCNC: 12 MG/DL (ref 8–23)
CALCIUM SERPL-MCNC: 9.5 MG/DL (ref 8.8–10.2)
CHLORIDE BLD-SCNC: 103 MMOL/L (ref 98–111)
CHOLESTEROL, TOTAL: 155 MG/DL (ref 160–199)
CO2: 26 MMOL/L (ref 22–29)
CREAT SERPL-MCNC: 0.6 MG/DL (ref 0.5–0.9)
GFR NON-AFRICAN AMERICAN: >60
GLUCOSE BLD-MCNC: 90 MG/DL (ref 74–109)
HDLC SERPL-MCNC: 56 MG/DL (ref 65–121)
LDL CHOLESTEROL CALCULATED: 85 MG/DL
POTASSIUM SERPL-SCNC: 4.1 MMOL/L (ref 3.5–5)
SODIUM BLD-SCNC: 143 MMOL/L (ref 136–145)
T4 FREE: 1.35 NG/DL (ref 0.93–1.7)
TOTAL PROTEIN: 6.9 G/DL (ref 6.6–8.7)
TRIGL SERPL-MCNC: 71 MG/DL (ref 0–149)
TSH SERPL DL<=0.05 MIU/L-ACNC: 3.65 UIU/ML (ref 0.27–4.2)

## 2020-06-18 PROCEDURE — 36415 COLL VENOUS BLD VENIPUNCTURE: CPT | Performed by: FAMILY MEDICINE

## 2020-06-26 PROCEDURE — U0003 INFECTIOUS AGENT DETECTION BY NUCLEIC ACID (DNA OR RNA); SEVERE ACUTE RESPIRATORY SYNDROME CORONAVIRUS 2 (SARS-COV-2) (CORONAVIRUS DISEASE [COVID-19]), AMPLIFIED PROBE TECHNIQUE, MAKING USE OF HIGH THROUGHPUT TECHNOLOGIES AS DESCRIBED BY CMS-2020-01-R: HCPCS | Performed by: NURSE PRACTITIONER

## 2020-06-29 ENCOUNTER — TELEPHONE (OUTPATIENT)
Dept: URGENT CARE | Facility: CLINIC | Age: 70
End: 2020-06-29

## 2020-06-29 NOTE — TELEPHONE ENCOUNTER
Patient notified of results.  Patient is doing better.    COVID-19 Test Result   Telephone Encounter    Patient Name: Petty Cortés   : 1950   MRN: [id number]     SARS-CoV-2, EDILBERTO   Date Value Ref Range Status   2020 Not Detected Not Detected Final     Comment:     This test was developed and its performance characteristics determined  by [employer] This test has not been FDA cleared or  approved. This test has been authorized by FDA under an Emergency Use  Authorization (EUA). This test is only authorized for the duration of  time the declaration that circumstances exist justifying the  authorization of the emergency use of in vitro diagnostic tests for  detection of SARS-CoV-2 virus and/or diagnosis of COVID-19 infection  under section 564(b)(1) of the Act, 21 U.S.C. 360bbb-3(b)(1), unless  the authorization is terminated or revoked sooner.  When diagnostic testing is negative, the possibility of a false  negative result should be considered in the context of a patient's  recent exposures and the presence of clinical signs and symptoms  consistent with COVID-19. An individual without symptoms of COVID-19  and who is not shedding SARS-CoV-2 virus would expect to have a  negative (not detected) result in this assay.        Patient was counseled as follows:  • (-) negative COVID-19 test result with or without symptoms   • The test is not perfect, so there is a chance it could be falsely negative or the virus level is too low for detection due to being very early in the infectious process.   • The optimal duration of home isolation is uncertain. The [place] Centers for Disease Control and Prevention (CDC) has issued recommendations on discontinuation of home isolation.   • For this reason, Petty is strongly encouraged to practice the safest standards in protecting their health and others given the current pandemic concerns. She is advised to:   o Practice social distancing in the  community by staying at least 6 feet away from people   o Encouraged to use face mask while out in public   o Continue to wash their hands frequently with soap and hot water, and cover their mouth while coughing.   • If Petty is asymptomatic, she should self isolate for a total of 14 days from time of potential contact with Covid-19.   • If Petty is symptomatic then she may discontinue home isolation when the following criteria are met:   o At least seven days have passed since symptoms first appeared AND   o At least three days (72 hours) have passed since recovery of symptoms (defined as resolution of fever without the use of fever-reducing medications and improvement in respiratory symptoms [e.g., cough, shortness of breath])   • If Petty has been asymptomatic but then develops non-emergent symptoms such as mild increased shortness of breath, fever, cough, or for other questions, she  was asked to please call their primary care physician’s office or the [place] hotline at [phone].   · Questions were engaged and answered to the best of my ability. She         expressed verbal understanding of their test results and my advice.    Primary Care Physician verified as being: Marilia Reza MD      Electronically signed by RODRICK Gaspar, 06/29/20, 8:52 AM.

## 2020-07-31 ENCOUNTER — NURSE TRIAGE (OUTPATIENT)
Dept: OTHER | Facility: CLINIC | Age: 70
End: 2020-07-31

## 2020-07-31 NOTE — TELEPHONE ENCOUNTER
Reason for Disposition   SEVERE pain (e.g., excruciating, unable to walk)    Protocols used: KNEE PAIN-ADULT-[place]    Patient called pre-service center [facility]) [address] to schedule appointment, with red flag complaint, transferred to RN access for triage. 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 She states now she is on the couch and has leg elevated. She states across knee cap she still has pain. 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 She rates pain 10/10 when she tries to bear weight. Had to have help getting into the house. At rest, it's just a dull pain. Triage indicates for pt to be seen in the office today. Pt instructed to call back for any new or worsening symptoms. Patient verbalized understanding. Patient denies any other questions or concerns. Writer provided warm transfer to [facility] Kerry De La Fuente for appointment scheduling. Please do not respond to the triage nurse through this encounter. Any subsequent communication should be directly with the patient.

## 2020-08-01 ENCOUNTER — HOSPITAL ENCOUNTER (OUTPATIENT)
Dept: GENERAL RADIOLOGY | Facility: HOSPITAL | Age: 70
Discharge: HOME | End: 2020-08-01
Admitting: NURSE PRACTITIONER

## 2020-08-01 ENCOUNTER — HOSPITAL ENCOUNTER (OUTPATIENT)
Dept: GENERAL RADIOLOGY | Facility: HOSPITAL | Age: 70
Discharge: HOME | End: 2020-08-01

## 2020-08-01 PROCEDURE — 73562 X-RAY EXAM OF KNEE 3: CPT

## 2020-08-01 PROCEDURE — 73590 X-RAY EXAM OF LOWER LEG: CPT

## 2020-08-10 ENCOUNTER — OFFICE VISIT (OUTPATIENT)
Dept: PRIMARY CARE CLINIC | Age: 70
End: 2020-08-10
Payer: MEDICARE

## 2020-08-10 VITALS
WEIGHT: 163 LBS | OXYGEN SATURATION: 95 % | TEMPERATURE: 96.4 F | RESPIRATION RATE: 16 BRPM | SYSTOLIC BLOOD PRESSURE: 102 MMHG | HEIGHT: 69 IN | DIASTOLIC BLOOD PRESSURE: 60 MMHG | HEART RATE: 70 BPM | BODY MASS INDEX: 24.14 KG/M2

## 2020-08-10 PROCEDURE — 99213 OFFICE O/P EST LOW 20 MIN: CPT | Performed by: FAMILY MEDICINE

## 2020-08-10 ASSESSMENT — ENCOUNTER SYMPTOMS
DIARRHEA: 0
BACK PAIN: 0
NAUSEA: 0
VOMITING: 0
ABDOMINAL PAIN: 0
WHEEZING: 0
COLOR CHANGE: 0
EYE DISCHARGE: 0
COUGH: 0

## 2020-08-10 NOTE — PROGRESS NOTES
and joint swelling (right knee). Negative for back pain and gait problem. Skin: Negative for color change and rash. Neurological: Negative for dizziness and headaches. Hematological: Does not bruise/bleed easily. Psychiatric/Behavioral: Negative for sleep disturbance and suicidal ideas. OBJECTIVE:     Physical Exam  Vitals signs reviewed. Constitutional:       General: She is not in acute distress. Appearance: Normal appearance. She is well-developed. She is not diaphoretic. HENT:      Head: Normocephalic and atraumatic. Right Ear: External ear normal.      Left Ear: External ear normal.   Neck:      Musculoskeletal: Normal range of motion and neck supple. Cardiovascular:      Rate and Rhythm: Normal rate and regular rhythm. Pulses: Normal pulses. Heart sounds: Normal heart sounds. No murmur. Pulmonary:      Effort: Pulmonary effort is normal. No respiratory distress. Breath sounds: Normal breath sounds. Skin:     General: Skin is warm and dry. Neurological:      General: No focal deficit present. Mental Status: She is alert and oriented to person, place, and time. Mental status is at baseline. Psychiatric:         Mood and Affect: Mood normal.         Behavior: Behavior normal.         Thought Content: Thought content normal.         Judgment: Judgment normal.        /60   Pulse 70   Temp 96.4 °F (35.8 °C)   Resp 16   Ht 5' 9\" (1.753 m)   Wt 163 lb (73.9 kg)   SpO2 95%   BMI 24.07 kg/m²      ASSESSMENT:    Javier Plascencia was seen today for knee pain. Diagnoses and all orders for this visit:    Chronic pain of right knee  -     Cathy Gomez MD, Orthopaedic Surgery, [place]    Osteoarthritis of right knee, unspecified osteoarthritis type  -     Cathy Gomez MD, Orthopaedic Surgery, [place]    Other orders  -     diclofenac (VOLTAREN) 50 MG EC tablet;  Take 1 tablet by mouth 2 times daily (with meals)        PLAN:    We are going to put referral in for

## 2020-09-10 PROCEDURE — 93294 REM INTERROG EVL PM/LDLS PM: CPT | Performed by: CLINICAL NURSE SPECIALIST

## 2020-09-10 PROCEDURE — 93296 REM INTERROG EVL PM/IDS: CPT | Performed by: CLINICAL NURSE SPECIALIST

## 2020-11-24 ENCOUNTER — OFFICE VISIT (OUTPATIENT)
Dept: PRIMARY CARE CLINIC | Age: 70
End: 2020-11-24
Payer: MEDICARE

## 2020-11-24 PROCEDURE — 99213 OFFICE O/P EST LOW 20 MIN: CPT | Performed by: NURSE PRACTITIONER

## 2020-11-24 RX ORDER — BUDESONIDE AND FORMOTEROL FUMARATE DIHYDRATE 160; 4.5 UG/1; UG/1
2 AEROSOL RESPIRATORY (INHALATION) 2 TIMES DAILY
Qty: 1 INHALER | Refills: 5 | Status: SHIPPED | OUTPATIENT
Start: 2020-11-24 | End: 2021-03-03 | Stop reason: SDUPTHER

## 2020-11-24 RX ORDER — ALBUTEROL SULFATE 90 UG/1
2 AEROSOL, METERED RESPIRATORY (INHALATION) EVERY 6 HOURS PRN
Qty: 1 INHALER | Refills: 3 | Status: SHIPPED | OUTPATIENT
Start: 2020-11-24 | End: 2021-03-03 | Stop reason: SDUPTHER

## 2020-11-24 RX ORDER — PREDNISONE 10 MG/1
TABLET ORAL
Qty: 18 TABLET | Refills: 0 | Status: SHIPPED | OUTPATIENT
Start: 2020-11-24 | End: 2021-03-03

## 2020-11-24 ASSESSMENT — ENCOUNTER SYMPTOMS
EYES NEGATIVE: 1
SHORTNESS OF BREATH: 1
COUGH: 1
GASTROINTESTINAL NEGATIVE: 1

## 2020-11-24 NOTE — PATIENT INSTRUCTIONS
Covid Supportive Treatments  Start steroids tomorrow  Zinc 250 mg daily for 5 days and then decrease to 50 mg a day. Vitamin C 1000 mg a day. Vitamin D 20 -25 mcg a day. Aspirin 325 mg a day. Daily antihistamine of choice ( Claritin, Allegra, or Zyrtec)  Tylenol for aches, pain and fever. Your provider may also send in prescriptions for symptom specific treatment. Patient Education        Learning About Coronavirus [phone])  Coronavirus [phone]): Overview  What is coronavirus (MOYJA-26)? The coronavirus disease (COVID-19) is caused by a virus. It is an illness that was first found in December 2019. It has since spread worldwide. The virus can cause fever, cough, and trouble breathing. In severe cases, it can cause pneumonia and make it hard to breathe without help. It can cause death. This virus spreads person-to-person through droplets from coughing and sneezing. It can also spread when you are close to someone who is infected. And it can spread when you touch something that has the virus on it, such as a doorknob or a tabletop. Coronaviruses are a large group of viruses. They cause the common cold. They also cause more serious illnesses like Middle East respiratory syndrome (MERS) and severe acute respiratory syndrome (SARS). COVID-19 is caused by a novel coronavirus. That means it's a new type that has not been seen in people before. How is COVID-19 treated? Mild illness can be treated at home, but more serious illness needs to be treated in the hospital. Treatment may include medicines to reduce symptoms, plus breathing support such as oxygen therapy or a ventilator. Other treatments, such as antiviral medicines, may help people who have COVID-19. What can you do to protect yourself from COVID-19? The best way to protect yourself from getting sick is to:  · Avoid areas where there is an outbreak. · Avoid contact with people who may be infected.   · Avoid crowds and try to stay at least 6 feet away from other people. · Wash your hands often, especially after you cough or sneeze. Use soap and water, and scrub for at least 20 seconds. If soap and water aren't available, use an alcohol-based hand . · Avoid touching your mouth, nose, and eyes. What can you do to avoid spreading the virus to others? To help avoid spreading the virus to others:  · Wash your hands often with soap or alcohol-based hand sanitizers. · Cover your mouth with a tissue when you cough or sneeze. Then throw the tissue in the trash. · Use a disinfectant to clean things that you touch often. These include doorknobs, remote controls, phones, and handles on your refrigerator and microwave. And don't forget countertops, tabletops, bathrooms, and computer keyboards. · Wear a cloth face cover if you have to go to public areas. If you know or suspect that you have COVID-19:  · Stay home. Don't go to school, work, or public areas. And don't use public transportation, ride-shares, or taxis unless you have no choice. · Leave your home only if you need to get medical care or testing. But call the doctor's office first so they know you're coming. And wear a face cover. · Limit contact with people in your home. If possible, stay in a separate bedroom and use a separate bathroom. · Wear a face cover whenever you're around other people. It can help stop the spread of the virus when you cough or sneeze. · Clean and disinfect your home every day. Use household  and disinfectant wipes or sprays. Take special care to clean things that you grab with your hands. · Self-isolate until it's safe to be around others again. ? If you have symptoms, it's safe when you haven't had a fever for 3 days and your symptoms have improved and it's been at least 10 days since your symptoms started. ? If you were exposed to the virus but don't have symptoms, it's safe to be around others 14 days after exposure.   ? Talk to your doctor about

## 2020-11-24 NOTE — LETTER
[facility] REQUISITION FORM     [employer] Client ID # [id number]    CLIENT ADDRESS:  [address] [place], [place] [id number] [place] [address]         Opal Sommers          [phone]                      ORDERING PROVIDER: Fabricio Ibrahim    PATIENT: Brandon Le    GENDER: female    : 1950    PANEL ORDERED: COVID-19 Panel (NP, throat)  and Upper Respiratory Infection Panel (NP, throat)     SOURCE OF SPECIMEN: specimensource: Nasopharyngeal     DATE of COLLECTION: 20    DIAGNOSIS CODE: Exposure to COVID-19 (Z20.828) and Cough (R05)

## 2020-11-24 NOTE — PROGRESS NOTES
[facility] PHYSICIAN SERVICES  [facility]  [address]  [address] [address]  Dept: [phone]  Dept Fax: [phone]  Loc: [phone]    Jaonn Rodriguez is a 77 y.o. female who presents today for her medical conditions/complaints as noted below. Joann Rodriguez is c/o of Cough        HPI:     HPI   Chief Complaint   Patient presents with    Cough   boyfriend tested positive last week. She has cough, fever body aches. she is a smoker. she has an inhaler but it ran out , needs refilled.    Past Medical History:   Diagnosis Date    Cerebral artery occlusion with cerebral infarction ([address]     TIA    Diverticular disease of colon     Hyperlipidemia     Hypothyroidism     Pneumonia     Psychiatric problem     Tobacco abuse     Vitamin B12 deficiency       Past Surgical History:   Procedure Laterality Date    CARDIAC PACEMAKER PLACEMENT      COLONOSCOPY      LUNG SURGERY      collapsed lung    DE COLONOSCOPY W/BIOPSY SINGLE/MULTIPLE N/A 6/27/2017    Dr Christian Cabrales and large mouthed diverticular disease throughout the right colon-Tubular AP (villiform) (-) dysplasia x 1, tubular AP (-) dysplasia x 1--5 yr recall    TUBAL LIGATION         Vitals 8/10/2020 6/8/2020 6/2/2020 2/24/2020 12/18/2019 91/49/8038   SYSTOLIC 652 [phone]   DIASTOLIC 60 78 78 78 70 70   Site - - - Left Upper Arm - Left Upper Arm   Position - - - Sitting - Standing   Cuff Size - - - Medium Adult - -   Pulse 70 72 67 68 67 -   Temp 96.4 - 98 97.1 - -   Resp 16 - 16 22 - -   SpO2 95 - 97 97 - -   Weight 163 lb 168 lb 164 lb 9.6 oz 174 lb 174 lb -   Height 5' 9\" 5' 9\" 5' 9\" 5' 9\" 5' 9\" -   Body mass index 24.07 kg/m2 24.81 kg/m2 24.3 kg/m2 25.69 kg/m2 25.69 kg/m2 -   Pain Level - - - - - -   Some recent data might be hidden       Family History   Problem Relation Age of Onset    Other Mother         COPD, dementia    Heart Disease Mother     Stroke Mother     Heart Disease Father     Stroke Father    Emelina Carbajal Diabetes Brother     Diabetes Brother     High Blood Pressure Brother     High Cholesterol Brother     Colon Polyps Brother     Colon Cancer Neg Hx     Liver Cancer Neg Hx     Liver Disease Neg Hx     Esophageal Cancer Neg Hx     Rectal Cancer Neg Hx     Stomach Cancer Neg Hx        Social History     Tobacco Use    Smoking status: Current Every Day Smoker     Packs/day: 0.50     Years: 30.00     Pack years: 15.00     Types: Cigarettes     Start date: 11/1/1970    Smokeless tobacco: Never Used    Tobacco comment: 1 pack of cigaretts last 1 1/2 days while at work   Substance Use Topics    Alcohol use: No      Current Outpatient Medications   Medication Sig Dispense Refill    albuterol sulfate HFA (PROAIR HFA) 108 (90 Base) MCG/ACT inhaler Inhale 2 puffs into the lungs every 6 hours as needed for Wheezing 1 Inhaler 3    budesonide-formoterol (SYMBICORT) 160-4.5 MCG/ACT AERO Inhale 2 puffs into the lungs 2 times daily 1 Inhaler 5    predniSONE (DELTASONE) 10 MG tablet Days 1-3 take 1 PO TID, days 4-6 take 1 PO BID, days 7-9 take 1 PO daily. 18 tablet 0    diclofenac sodium (VOLTAREN) 1 % GEL Apply 2 g topically 4 times daily      diclofenac (VOLTAREN) 50 MG EC tablet Take 1 tablet by mouth 2 times daily (with meals) 60 tablet 3    atorvastatin (LIPITOR) 40 MG tablet TAKE 1 TABLET EVERY NIGHT 90 tablet 3    levothyroxine (SYNTHROID) 88 MCG tablet Take 1 tablet by mouth Daily 90 tablet 3     No current facility-administered medications for this visit.       No Known Allergies    Health Maintenance   Topic Date Due    Hepatitis C screen  1950    DTaP/Tdap/Td vaccine (1 - Tdap) 12/16/1970    Shingles Vaccine (1 of 2) 12/16/2001    Annual Wellness Visit (AWV)  10/01/2019    Flu vaccine (1) 09/01/2020    Breast cancer screen  06/07/2021    Lipid screen  06/18/2021    TSH testing  06/18/2021    Colon cancer screen colonoscopy  06/27/2022    DEXA (modify frequency per FRAX score)  Completed  Pneumococcal 65+ years Vaccine  Completed    Hepatitis A vaccine  Aged Out    Hepatitis B vaccine  Aged Out    Hib vaccine  Aged Out    Meningococcal (ACWY) vaccine  Aged Out       Subjective:      Review of Systems   Constitutional: Positive for fatigue and fever. HENT: Negative. Eyes: Negative. Respiratory: Positive for cough and shortness of breath. Cardiovascular: Negative. Gastrointestinal: Negative. Genitourinary: Negative. Musculoskeletal: Negative. Skin: Negative. Neurological: Negative. Psychiatric/Behavioral: Negative. Objective:     Physical Exam  Vitals signs and nursing note reviewed. Constitutional:       Appearance: She is well-developed. HENT:      Head: Normocephalic. Right Ear: External ear normal.      Left Ear: External ear normal.   Eyes:      Pupils: Pupils are equal, round, and reactive to light. Neck:      Musculoskeletal: Normal range of motion. Cardiovascular:      Rate and Rhythm: Normal rate and regular rhythm. Heart sounds: Normal heart sounds. Pulmonary:      Effort: Pulmonary effort is normal.      Breath sounds: Normal breath sounds. Skin:     General: Skin is warm and dry. Neurological:      Mental Status: She is alert and oriented to person, place, and time. Psychiatric:         Behavior: Behavior normal.         Thought Content: Thought content normal.         Judgment: Judgment normal.       There were no vitals taken for this visit. Assessment:       Diagnosis Orders   1. Cough  COVID-19   2. Close exposure to COVID-19 virus  COVID-19         Plan:   More than 50% of the time was spent counseling and coordinating care for a total time of 15 min face to face. The patient was taken outside to be swabbed. She is aware to quant ine. Patient given educational materials -see patient instructions. Discussed use, benefit, and side effects of prescribed medications. All patient questions answered.   Pt voiced understanding. Reviewed health maintenance. Instructed to continue currentmedications, diet and exercise. Patient agreed with treatment plan. Follow up as directed. MEDICATIONS:  Orders Placed This Encounter   Medications    albuterol sulfate HFA (PROAIR HFA) 108 (90 Base) MCG/ACT inhaler     Sig: Inhale 2 puffs into the lungs every 6 hours as needed for Wheezing     Dispense:  1 Inhaler     Refill:  3    budesonide-formoterol (SYMBICORT) 160-4.5 MCG/ACT AERO     Sig: Inhale 2 puffs into the lungs 2 times daily     Dispense:  1 Inhaler     Refill:  5    predniSONE (DELTASONE) 10 MG tablet     Sig: Days 1-3 take 1 PO TID, days 4-6 take 1 PO BID, days 7-9 take 1 PO daily. Dispense:  18 tablet     Refill:  0         ORDERS:  Orders Placed This Encounter   Procedures    COVID-19       Follow-up:  No follow-ups on file. PATIENT INSTRUCTIONS:  Patient Instructions   Covid Supportive Treatments  Start steroids tomorrow  Zinc 250 mg daily for 5 days and then decrease to 50 mg a day. Vitamin C 1000 mg a day. Vitamin D 20 -25 mcg a day. Aspirin 325 mg a day. Daily antihistamine of choice ( Claritin, Allegra, or Zyrtec)  Tylenol for aches, pain and fever. Your provider may also send in prescriptions for symptom specific treatment. Patient Education        Learning About Coronavirus [phone])  Coronavirus [phone]): Overview  What is coronavirus ([id number])? The coronavirus disease (COVID-19) is caused by a virus. It is an illness that was first found in December 2019. It has since spread worldwide. The virus can cause fever, cough, and trouble breathing. In severe cases, it can cause pneumonia and make it hard to breathe without help. It can cause death. This virus spreads person-to-person through droplets from coughing and sneezing. It can also spread when you are close to someone who is infected.  And it can spread when you touch something that has the virus on it, such as a doorknob or a tabletop. Coronaviruses are a large group of viruses. They cause the common cold. They also cause more serious illnesses like Middle East respiratory syndrome (MERS) and severe acute respiratory syndrome (SARS). COVID-19 is caused by a novel coronavirus. That means it's a new type that has not been seen in people before. How is COVID-19 treated? Mild illness can be treated at home, but more serious illness needs to be treated in the hospital. Treatment may include medicines to reduce symptoms, plus breathing support such as oxygen therapy or a ventilator. Other treatments, such as antiviral medicines, may help people who have COVID-19. What can you do to protect yourself from COVID-19? The best way to protect yourself from getting sick is to:  · Avoid areas where there is an outbreak. · Avoid contact with people who may be infected. · Avoid crowds and try to stay at least 6 feet away from other people. · Wash your hands often, especially after you cough or sneeze. Use soap and water, and scrub for at least 20 seconds. If soap and water aren't available, use an alcohol-based hand . · Avoid touching your mouth, nose, and eyes. What can you do to avoid spreading the virus to others? To help avoid spreading the virus to others:  · Wash your hands often with soap or alcohol-based hand sanitizers. · Cover your mouth with a tissue when you cough or sneeze. Then throw the tissue in the trash. · Use a disinfectant to clean things that you touch often. These include doorknobs, remote controls, phones, and handles on your refrigerator and microwave. And don't forget countertops, tabletops, bathrooms, and computer keyboards. · Wear a cloth face cover if you have to go to public areas. If you know or suspect that you have COVID-19:  · Stay home. Don't go to school, work, or public areas. And don't use public transportation, ride-shares, or taxis unless you have no choice.   · Leave your home only if you need to get medical care or testing. But call the doctor's office first so they know you're coming. And wear a face cover. · Limit contact with people in your home. If possible, stay in a separate bedroom and use a separate bathroom. · Wear a face cover whenever you're around other people. It can help stop the spread of the virus when you cough or sneeze. · Clean and disinfect your home every day. Use household  and disinfectant wipes or sprays. Take special care to clean things that you grab with your hands. · Self-isolate until it's safe to be around others again. ? If you have symptoms, it's safe when you haven't had a fever for 3 days and your symptoms have improved and it's been at least 10 days since your symptoms started. ? If you were exposed to the virus but don't have symptoms, it's safe to be around others 14 days after exposure. ? Talk to your doctor about whether you also need testing, especially if you have a weakened immune system. When to call for help  Call 911 anytime you think you may need emergency care. For example, call if:  · You have severe trouble breathing. (You can't talk at all.)  · You have constant chest pain or pressure. · You are severely dizzy or lightheaded. · You are confused or can't think clearly. · Your face and lips have a blue color. · You passed out (lost consciousness) or are very hard to wake up. Call your doctor now if you develop symptoms such as:  · Shortness of breath. · Fever. · Cough. If you need to get care, call ahead to the doctor's office for instructions before you go. Make sure you wear a face cover to prevent exposing other people to the virus. Where can you get the latest information? The following health organizations are tracking and studying this virus. Their websites contain the most up-to-date information. Jalil Hernandez also learn what to do if you think you may have been exposed to the virus. · [place]  Centers for Disease Control and Prevention (CDC): The CDC provides updated news about the disease and travel advice. The website also tells you how to prevent the spread of infection. www.cdc.gov  · World Health Organization [facility]): WHO offers information about the virus outbreaks. WHO also has travel advice. www.who.int  Current as of: July 10, 2020               Content Version: 12.6  © 3468-3793 [employer]. Care instructions adapted under license by [facility] ([facility]). If you have questions about a medical condition or this instruction, always ask your healthcare professional. [address] any warranty or liability for your use of this information. Electronically signed by JORDYN Mancilla CNP on 11/24/2020 at 5:25 PM    EMR Dragon/transcription disclaimer:  Much of thisencounter note is electronic transcription/translation of spoken language to printed texts. The electronic translation of spoken language may be erroneous, or at times, nonsensical words or phrases may be inadvertentlytranscribed.   Although I have reviewed the note for such errors, some may still exist.

## 2020-11-25 LAB — SARS-COV-2: NOT DETECTED

## 2020-11-25 RX ORDER — ATORVASTATIN CALCIUM 40 MG/1
TABLET, FILM COATED ORAL
Qty: 90 TABLET | Refills: 0 | Status: SHIPPED | OUTPATIENT
Start: 2020-11-25 | End: 2021-03-01

## 2020-11-25 RX ORDER — LEVOTHYROXINE SODIUM 88 UG/1
TABLET ORAL
Qty: 90 TABLET | Refills: 0 | Status: SHIPPED | OUTPATIENT
Start: 2020-11-25 | End: 2021-03-01

## 2020-12-04 PROCEDURE — 87635 SARS-COV-2 COVID-19 AMP PRB: CPT | Performed by: NURSE PRACTITIONER

## 2020-12-10 PROCEDURE — 93296 REM INTERROG EVL PM/IDS: CPT | Performed by: NURSE PRACTITIONER

## 2020-12-10 PROCEDURE — 93294 REM INTERROG EVL PM/LDLS PM: CPT | Performed by: NURSE PRACTITIONER

## 2021-01-07 ENCOUNTER — TELEPHONE (OUTPATIENT)
Dept: CARDIOLOGY CLINIC | Age: 71
End: 2021-01-07

## 2021-02-11 ENCOUNTER — TELEPHONE (OUTPATIENT)
Dept: CARDIOLOGY CLINIC | Age: 71
End: 2021-02-11

## 2021-02-11 NOTE — TELEPHONE ENCOUNTER
Called the patient 3 times to inform her that [facility] is closed for the day due to inclement weather. This patient's appointment can be changed to a virtual visit or it needs to be cancelled. Left a message for the patient to call back and reschedule her appointment.

## 2021-03-03 ENCOUNTER — OFFICE VISIT (OUTPATIENT)
Dept: PRIMARY CARE CLINIC | Age: 71
End: 2021-03-03
Payer: MEDICARE

## 2021-03-03 VITALS
RESPIRATION RATE: 16 BRPM | DIASTOLIC BLOOD PRESSURE: 88 MMHG | TEMPERATURE: 97.1 F | SYSTOLIC BLOOD PRESSURE: 132 MMHG | HEART RATE: 70 BPM | HEIGHT: 69 IN | BODY MASS INDEX: 24.02 KG/M2 | WEIGHT: 162.2 LBS | OXYGEN SATURATION: 98 %

## 2021-03-03 DIAGNOSIS — R09.89 RHONCHI: ICD-10-CM

## 2021-03-03 DIAGNOSIS — R09.81 CONGESTION OF NASAL SINUS: ICD-10-CM

## 2021-03-03 DIAGNOSIS — R05.9 COUGH: ICD-10-CM

## 2021-03-03 DIAGNOSIS — R19.7 DIARRHEA, UNSPECIFIED TYPE: Primary | ICD-10-CM

## 2021-03-03 DIAGNOSIS — J02.9 SORE THROAT: ICD-10-CM

## 2021-03-03 DIAGNOSIS — R51.9 NONINTRACTABLE HEADACHE, UNSPECIFIED CHRONICITY PATTERN, UNSPECIFIED HEADACHE TYPE: ICD-10-CM

## 2021-03-03 PROCEDURE — 99214 OFFICE O/P EST MOD 30 MIN: CPT | Performed by: FAMILY MEDICINE

## 2021-03-03 RX ORDER — BUDESONIDE AND FORMOTEROL FUMARATE DIHYDRATE 160; 4.5 UG/1; UG/1
2 AEROSOL RESPIRATORY (INHALATION) 2 TIMES DAILY
Qty: 1 INHALER | Refills: 5 | Status: SHIPPED | OUTPATIENT
Start: 2021-03-03 | End: 2022-10-05 | Stop reason: SDUPTHER

## 2021-03-03 RX ORDER — PREDNISONE 20 MG/1
20 TABLET ORAL DAILY
Qty: 7 TABLET | Refills: 0 | Status: SHIPPED | OUTPATIENT
Start: 2021-03-03 | End: 2021-03-10

## 2021-03-03 RX ORDER — AMOXICILLIN AND CLAVULANATE POTASSIUM 875; 125 MG/1; MG/1
1 TABLET, FILM COATED ORAL 2 TIMES DAILY
Qty: 14 TABLET | Refills: 0 | Status: SHIPPED | OUTPATIENT
Start: 2021-03-03 | End: 2021-03-10

## 2021-03-03 RX ORDER — ALBUTEROL SULFATE 90 UG/1
2 AEROSOL, METERED RESPIRATORY (INHALATION) EVERY 6 HOURS PRN
Qty: 1 INHALER | Refills: 3 | Status: SHIPPED | OUTPATIENT
Start: 2021-03-03 | End: 2022-09-15 | Stop reason: SDUPTHER

## 2021-03-03 ASSESSMENT — ENCOUNTER SYMPTOMS
COUGH: 1
DIARRHEA: 1

## 2021-03-03 NOTE — LETTER
[facility] REQUISITION FORM     [employer] Client ID # [id number]    CLIENT ADDRESS:  [address] [place], [place] [address]e [place] [address]         [phone]                      ORDERING PROVIDER: Genoveva West    PATIENT: Pedro Luis Paci    GENDER: female    : 1950    PANEL ORDERED: Upper Respiratory Infection Panel (NP, throat) COVID Panel (NP, throat)    SOURCE OF SPECIMEN: specimensource: Nasopharyngeal     DATE of COLLECTION: 21    DIAGNOSIS CODE: Cough (R05)  and Diarrhea, unspecified (R19.7) Sore Throat (J02.9) Headache (R51.9) Congestion (R09.81)

## 2021-03-03 NOTE — PROGRESS NOTES
SUBJECTIVE:    Roxann Hobbs is 72 y. o.female who comes in complaining of Cough (Pt states symptoms started 03/01. ), Pharyngitis, Diarrhea (started today), and Headache   . HPI: Lawson Begum comes in today with multiple complaints. She is being seen in the sick clinic. She is complaining of a cough which started on March 1. This morning she developed nonbloody diarrhea and a headache. She has had a sore throat on and off. She feels very congested. The headache is just a dull headache across the front of her face. She is a smoker.       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[place] [address] Road resource strain: None    Food insecurity     Worry: None     Inability: None    Transportation needs     Medical: None     Non-medical: None   Tobacco Use    Smoking status: Current Every Day Smoker     Packs/day: 0.50     Years: 30.00     Pack years: 15.00     Types: Cigarettes     Start date: 11/1/1970    Smokeless tobacco: Never Used    Tobacco comment: 1 pack of cigaretts last 1 1/2 days while at work   Substance and Sexual Activity    Alcohol use: No    Drug use: No    Sexual activity: None   Lifestyle    Physical activity     Days per week: None     Minutes per session: None    Stress: None   Relationships    Social connections     Talks on phone: None     Gets together: None     Attends Buddhism service: None     Active member of club or organization: None     Attends meetings of clubs or organizations: None     Relationship status: None    Intimate partner violence     Fear of current or ex partner: None     Emotionally abused: None     Physically abused: None     Forced sexual activity: None   Other Topics Concern    None   Social History Narrative    None       Review of Systems   Constitutional: Positive for activity change and fatigue. HENT: Positive for congestion. Respiratory: Positive for cough. Gastrointestinal: Positive for diarrhea. Neurological: Positive for headaches. Hematological: Negative. Psychiatric/Behavioral: Negative. OBJECTIVE:    Wt Readings from Last 3 Encounters:   03/03/21 162 lb 3.2 oz (73.6 kg)   08/10/20 163 lb (73.9 kg)   06/08/20 168 lb (76.2 kg)       /88   Pulse 70   Temp 97.1 °F (36.2 °C)   Resp 16   Ht 5' 9\" (1.753 m)   Wt 162 lb 3.2 oz (73.6 kg)   SpO2 98%   BMI 23.95 kg/m²     Physical Exam  Vitals signs reviewed. Constitutional:       General: She is not in acute distress. Appearance: Normal appearance. She is well-developed. HENT:      Head: Normocephalic. Right Ear: Tympanic membrane, ear canal and external ear normal.      Left Ear: Tympanic membrane, ear canal and external ear normal.      Nose: Nose normal. No rhinorrhea. Mouth/Throat:      Mouth: Mucous membranes are moist.      Pharynx: No posterior oropharyngeal erythema. Eyes:      Extraocular Movements: Extraocular movements intact. Conjunctiva/sclera: Conjunctivae normal.      Pupils: Pupils are equal, round, and reactive to light. Neck:      Musculoskeletal: Normal range of motion and neck supple. Vascular: No carotid bruit. Cardiovascular:      Rate and Rhythm: Normal rate and regular rhythm. Heart sounds: Normal heart sounds. No murmur. Pulmonary:      Effort: Pulmonary effort is normal. No respiratory distress. Breath sounds: Rhonchi (in lower lobes) present. Lymphadenopathy:      Cervical: No cervical adenopathy. Skin:     General: Skin is warm and dry. Neurological:      Mental Status: She is alert and oriented to person, place, and time.    Psychiatric:         Mood and Affect: Mood normal.         Speech: Speech normal.         Behavior: Behavior normal.         Thought Content: Thought content normal.         Judgment: Judgment normal.         ASSESSMENT:    1. Diarrhea, unspecified type    2. Sore throat    3. Cough    4. Nonintractable headache, unspecified chronicity pattern, unspecified headache type    5. Congestion of nasal sinus    6. Rhonchi          PLAN:    MEDICATIONS:  Orders Placed This Encounter   Medications    predniSONE (DELTASONE) 20 MG tablet     Sig: Take 1 tablet by mouth daily for 7 days     Dispense:  7 tablet     Refill:  0    albuterol sulfate HFA (PROAIR HFA) 108 (90 Base) MCG/ACT inhaler     Sig: Inhale 2 puffs into the lungs every 6 hours as needed for Wheezing     Dispense:  1 Inhaler     Refill:  3    budesonide-formoterol (SYMBICORT) 160-4.5 MCG/ACT AERO     Sig: Inhale 2 puffs into the lungs 2 times daily     Dispense:  1 Inhaler     Refill:  5    amoxicillin-clavulanate (AUGMENTIN) 875-125 MG per tablet     Sig: Take 1 tablet by mouth 2 times daily for 7 days For infection     Dispense:  14 tablet     Refill:  0       ORDERS:  Orders Placed This Encounter   Procedures    XR CHEST (2 VW)    COVID-19     I am going to get a chest x-ray and we will contact her as soon as we get results. I strongly encouraged her to quit smoking. I am going to screen her for Covid. She is to go home and self isolate. She stated she understood. I am going to go ahead and start her on antibiotics and steroids. I refilled her Symbicort. I also refilled her rescue inhaler. Follow-up:  Return in about 5 days (around 3/8/2021) for in regular clinic if covid negative . PATIENT INSTRUCTIONS:  Patient Instructions       Use your inhalers as ordered    Take the antibiotic      Off work for 48 hours, or until we get Covid results                    [employer]uit [employer] [place] is a FREE online service available to [place] residents 13years of age and over.  When you become a member,it offers a free telephone service, so you can speak to a  in person, if you would prefer. Call the [facility] or [phone].  special tools, a support team of coaches, research-based information, and a community of others trying to become tobacco free. Our expert coaches can talk to you about overcoming common barriers, such as dealing with stress, fighting cravings, coping with irritability, and controlling weight gain. https://www.[employer]/    Https://www.[employer].org/    Nicotine is an addictive drug found in tobacco that causes changes in the brain - making people crave it more and more. When prolonged tobacco use is stopped, it can cause unpleasant withdrawal symptoms, including irritability and anxiety      BENEFITS OF STOPPING SMOKIN minutes after quitting  Your heart rate and blood pressure drop  12 hours after quitting  The carbon monoxide level in your blood drops to normal  2 weeks to 3 months after quitting  Your circulation improves and your lung function increases  1 to 9 months after quitting  Coughing and shortness of breath decrease; cilia (tiny hair-like structures that move mucus out of the lungs) start to regain normal function in the lungs, increasing the ability to handle mucus, clean the lungs, and reduce the risk of infection. 1 year after quitting  The excess risk of coronary heart disease is half that of a continuing smokers  5 years after quitting  Risk of cancer of the mouth, throat, esophagus, and bladder are cut in half. Cervical cancer risk falls to that of a non-smoker. Stroke risk can fall to that of a non-smoker after 2-5 years  10 years after quitting  The risk of dying from lung cancer is about half that of a person who is still smoking. The risk of cancer of the larynx (voice box) and pancreas decreases          EMR Dragon/transcription disclaimer:  Much of this encounter note is electronic transcription/translation of spoken language to printed texts.   The electronic translation of spoken language may be erroneous, or at times, nonsensical words or phrases may beinadvertently transcribed.   Although I have reviewed the note for such errors, some may still exist.

## 2021-03-03 NOTE — PATIENT INSTRUCTIONS
risk of dying from lung cancer is about half that of a person who is still smoking.  The risk of cancer of the larynx (voice box) and pancreas decreases

## 2021-03-03 NOTE — LETTER
[place] [address] [address] [address]  Phone: [phone]  Fax: [phone]    Robin Mina MD        March 3, 2021     Patient: Carmel Steinberg   YOB: 1950   Date of Visit: 3/3/2021       To Whom It May Concern: It is my medical opinion that Carmel Steinberg should remain out of work until March 6, 2021, assumong Covid is negative. .    If you have any questions or concerns, please don't hesitate to call.     Sincerely,        Robin Mina MD

## 2021-03-04 LAB — SARS-COV-2: NOT DETECTED

## 2021-03-11 DIAGNOSIS — Z95.0 PACEMAKER: Primary | ICD-10-CM

## 2021-03-11 DIAGNOSIS — R00.1 BRADYCARDIA: ICD-10-CM

## 2021-03-11 PROCEDURE — 93294 REM INTERROG EVL PM/LDLS PM: CPT | Performed by: NURSE PRACTITIONER

## 2021-03-11 PROCEDURE — 93296 REM INTERROG EVL PM/IDS: CPT | Performed by: NURSE PRACTITIONER

## 2021-05-11 ENCOUNTER — OFFICE VISIT (OUTPATIENT)
Dept: PRIMARY CARE CLINIC | Age: 71
End: 2021-05-11
Payer: MEDICARE

## 2021-05-11 VITALS
DIASTOLIC BLOOD PRESSURE: 82 MMHG | TEMPERATURE: 97.1 F | HEART RATE: 68 BPM | WEIGHT: 155 LBS | SYSTOLIC BLOOD PRESSURE: 110 MMHG | BODY MASS INDEX: 22.96 KG/M2 | RESPIRATION RATE: 16 BRPM | HEIGHT: 69 IN | OXYGEN SATURATION: 98 %

## 2021-05-11 DIAGNOSIS — Z12.31 VISIT FOR SCREENING MAMMOGRAM: ICD-10-CM

## 2021-05-11 DIAGNOSIS — J40 BRONCHITIS: Primary | ICD-10-CM

## 2021-05-11 DIAGNOSIS — Z72.0 TOBACCO ABUSE: ICD-10-CM

## 2021-05-11 PROCEDURE — 99213 OFFICE O/P EST LOW 20 MIN: CPT | Performed by: FAMILY MEDICINE

## 2021-05-11 PROCEDURE — 96372 THER/PROPH/DIAG INJ SC/IM: CPT | Performed by: FAMILY MEDICINE

## 2021-05-11 RX ORDER — AZITHROMYCIN 250 MG/1
TABLET, FILM COATED ORAL
Qty: 6 TABLET | Refills: 0 | Status: ON HOLD | OUTPATIENT
Start: 2021-05-11 | End: 2021-06-02 | Stop reason: HOSPADM

## 2021-05-11 RX ORDER — PREDNISONE 20 MG/1
20 TABLET ORAL 2 TIMES DAILY
Qty: 10 TABLET | Refills: 0 | Status: SHIPPED | OUTPATIENT
Start: 2021-05-11 | End: 2021-05-16

## 2021-05-11 RX ORDER — BETAMETHASONE SODIUM PHOSPHATE AND BETAMETHASONE ACETATE 3; 3 MG/ML; MG/ML
12 INJECTION, SUSPENSION INTRA-ARTICULAR; INTRALESIONAL; INTRAMUSCULAR; SOFT TISSUE ONCE
Status: COMPLETED | OUTPATIENT
Start: 2021-05-11 | End: 2021-05-11

## 2021-05-11 RX ORDER — ZOSTER VACCINE RECOMBINANT, ADJUVANTED 50 MCG/0.5
0.5 KIT INTRAMUSCULAR SEE ADMIN INSTRUCTIONS
Qty: 0.5 ML | Refills: 0 | Status: SHIPPED | OUTPATIENT
Start: 2021-05-11 | End: 2021-06-08

## 2021-05-11 RX ADMIN — BETAMETHASONE SODIUM PHOSPHATE AND BETAMETHASONE ACETATE 12 MG: 3; 3 INJECTION, SUSPENSION INTRA-ARTICULAR; INTRALESIONAL; INTRAMUSCULAR; SOFT TISSUE at 14:13

## 2021-05-11 ASSESSMENT — ENCOUNTER SYMPTOMS
COUGH: 1
COLOR CHANGE: 0
EYE DISCHARGE: 0
DIARRHEA: 0
CHEST TIGHTNESS: 1
WHEEZING: 1
NAUSEA: 0
SHORTNESS OF BREATH: 1
VOMITING: 0
ABDOMINAL PAIN: 0
BACK PAIN: 0

## 2021-05-11 NOTE — PROGRESS NOTES
SUBJECTIVE:    Patient ID: Ruby Santana is a 72 y.o. female. HPI:   Patient is seen today for complaints of cough and congestion. She states that she has chest congestion and chest tightness as well as wheezing. She states that this started about a week ago. She states that she is coughing up some whitish phlegm. She is a longtime smoker. She states that she has been out of her inhalers and has not been using those. She states that she has not had any sore throat. She denies any diarrhea, fever, or chills. She states that she has had both Covid vaccines. She does get bronchitis about this time most every year. Past Medical History:   Diagnosis Date    Cerebral artery occlusion with cerebral infarction ([facility] [address]     TIA    Diverticular disease of colon     Hyperlipidemia     Hypothyroidism     Pneumonia     Psychiatric problem     Tobacco abuse     Vitamin B12 deficiency       Current Outpatient Medications on File Prior to Visit   Medication Sig Dispense Refill    albuterol sulfate HFA (PROAIR HFA) 108 (90 Base) MCG/ACT inhaler Inhale 2 puffs into the lungs every 6 hours as needed for Wheezing 1 Inhaler 3    budesonide-formoterol (SYMBICORT) 160-4.5 MCG/ACT AERO Inhale 2 puffs into the lungs 2 times daily 1 Inhaler 5    levothyroxine (SYNTHROID) 88 MCG tablet Take 1 tablet by mouth once daily 90 tablet 0    atorvastatin (LIPITOR) 40 MG tablet TAKE 1 TABLET BY MOUTH ONCE DAILY AT NIGHT 90 tablet 0     No current facility-administered medications on file prior to visit. No Known Allergies    Review of Systems   Constitutional: Negative for activity change, appetite change and fever. HENT: Positive for congestion. Negative for nosebleeds. Eyes: Negative for discharge. Respiratory: Positive for cough, chest tightness, shortness of breath and wheezing. Cardiovascular: Negative for chest pain and leg swelling.    Gastrointestinal: Negative for abdominal pain, diarrhea, nausea and vomiting. Genitourinary: Negative for difficulty urinating, frequency and urgency. Musculoskeletal: Negative for back pain and gait problem. Skin: Negative for color change and rash. Neurological: Negative for dizziness and headaches. Hematological: Does not bruise/bleed easily. Psychiatric/Behavioral: Negative for sleep disturbance and suicidal ideas. OBJECTIVE:    Physical Exam  Vitals signs reviewed. Constitutional:       General: She is not in acute distress. Appearance: Normal appearance. She is well-developed. She is not diaphoretic. HENT:      Head: Normocephalic and atraumatic. Right Ear: External ear normal.      Left Ear: External ear normal.   Neck:      Musculoskeletal: Normal range of motion and neck supple. Cardiovascular:      Rate and Rhythm: Normal rate and regular rhythm. Pulses: Normal pulses. Heart sounds: Normal heart sounds. No murmur. Pulmonary:      Effort: Pulmonary effort is normal. No respiratory distress. Breath sounds: Decreased air movement present. Wheezing present. Skin:     General: Skin is warm and dry. Neurological:      General: No focal deficit present. Mental Status: She is alert and oriented to person, place, and time. Mental status is at baseline. Psychiatric:         Mood and Affect: Mood normal.         Behavior: Behavior normal.         Thought Content: Thought content normal.         Judgment: Judgment normal.        /82 (Site: Left Upper Arm, Position: Sitting, Cuff Size: Medium Adult)   Pulse 68   Temp 97.1 °F (36.2 °C) (Temporal)   Resp 16   Ht 5' 9\" (1.753 m)   Wt 155 lb (70.3 kg)   SpO2 98%   BMI 22.89 kg/m²      ASSESSMENT:    Laureano Sharma was seen today for chest congestion and cough. Diagnoses and all orders for this visit:    Bronchitis  -     azithromycin (ZITHROMAX Z-KEELY) 250 MG tablet;  Take 2 tabs on day 1 and 1 tab on days 2-5    Visit for screening mammogram  -     BOBY DIGITAL SCREEN W OR WO CAD BILATERAL; Future    Tobacco abuse    Other orders  -     zoster recombinant adjuvanted vaccine [facility]) 50 MCG/0.5ML SUSR injection; Inject 0.5 mLs into the muscle See Admin Instructions 1 dose now and repeat in 2-6 months  -     betamethasone acetate-betamethasone sodium phosphate (CELESTONE) injection 12 mg  -     predniSONE (DELTASONE) 20 MG tablet; Take 1 tablet by mouth 2 times daily for 5 days        PLAN:    Celestone injection was given in office today. Prednisone and Z-Kit are sent to the pharmacy. I encouraged her to get her Symbicort and albuterol inhalers filled. Encourage smoking cessation. Follow-up with us if symptoms or not improving. EMR Dragon/transcription disclaimer:  Much of this encounter note is electronic transcription/translation of spoken language toprinted texts. The electronic translation of spoken language may be erroneous, or at times, nonsensical words or phrases may be inadvertently transcribed.   Although I have reviewed the note for such errors, some may stillexist.

## 2021-05-11 NOTE — PROGRESS NOTES
After obtaining consent, and per orders of Dr. Mohinder Bautista, injection of Celestone 2ML given in Left upper quad. gluteus by Dk Fung. Pt tolerated well.

## 2021-05-15 ENCOUNTER — HOSPITAL ENCOUNTER (INPATIENT)
Age: 71
LOS: 4 days | Discharge: NURSING, REHAB OR LONG-TERM CARE | DRG: 522 | End: 2021-05-19
Attending: EMERGENCY MEDICINE | Admitting: STUDENT IN AN ORGANIZED HEALTH CARE EDUCATION/TRAINING PROGRAM
Payer: MEDICARE

## 2021-05-15 ENCOUNTER — ANESTHESIA (OUTPATIENT)
Dept: OPERATING ROOM | Age: 71
DRG: 522 | End: 2021-05-15
Payer: MEDICARE

## 2021-05-15 ENCOUNTER — APPOINTMENT (OUTPATIENT)
Dept: GENERAL RADIOLOGY | Age: 71
DRG: 522 | End: 2021-05-15
Payer: MEDICARE

## 2021-05-15 ENCOUNTER — ANESTHESIA EVENT (OUTPATIENT)
Dept: OPERATING ROOM | Age: 71
DRG: 522 | End: 2021-05-15
Payer: MEDICARE

## 2021-05-15 VITALS — OXYGEN SATURATION: 99 % | TEMPERATURE: 96.4 F | DIASTOLIC BLOOD PRESSURE: 54 MMHG | SYSTOLIC BLOOD PRESSURE: 99 MMHG
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
## 2021-05-15 DIAGNOSIS — Z72.0 TOBACCO ABUSE: ICD-10-CM

## 2021-05-15 DIAGNOSIS — S72.001A CLOSED FRACTURE OF RIGHT HIP, INITIAL ENCOUNTER (HCC): Primary | ICD-10-CM

## 2021-05-15 DIAGNOSIS — Z95.0 ARTIFICIAL PACEMAKER: ICD-10-CM

## 2021-05-15 PROBLEM — J42 CHRONIC BRONCHITIS (HCC): Status: ACTIVE | Noted: 2021-05-15

## 2021-05-15 LAB
ABO/RH: NORMAL
ALBUMIN SERPL-MCNC: 4.2 G/DL (ref 3.5–5.2)
ALP BLD-CCNC: 78 U/L (ref 35–104)
ALT SERPL-CCNC: 16 U/L (ref 5–33)
ANION GAP SERPL CALCULATED.3IONS-SCNC: 7 MMOL/L (ref 7–19)
ANTIBODY SCREEN: NORMAL
AST SERPL-CCNC: 18 U/L (ref 5–32)
BASOPHILS ABSOLUTE: 0 K/UL (ref 0–0.2)
BASOPHILS RELATIVE PERCENT: 0.1 % (ref 0–1)
BILIRUB SERPL-MCNC: 0.6 MG/DL (ref 0.2–1.2)
BUN BLDV-MCNC: 16 MG/DL (ref 8–23)
CALCIUM SERPL-MCNC: 9.7 MG/DL (ref 8.8–10.2)
CHLORIDE BLD-SCNC: 103 MMOL/L (ref 98–111)
CO2: 29 MMOL/L (ref 22–29)
CREAT SERPL-MCNC: 0.5 MG/DL (ref 0.5–0.9)
EOSINOPHILS ABSOLUTE: 0 K/UL (ref 0–0.6)
EOSINOPHILS RELATIVE PERCENT: 0 % (ref 0–5)
GFR AFRICAN AMERICAN: >59
GFR NON-AFRICAN AMERICAN: >60
GLUCOSE BLD-MCNC: 102 MG/DL (ref 74–109)
HCT VFR BLD CALC: 42.3 % (ref 37–47)
HEMOGLOBIN: 13.7 G/DL (ref 12–16)
IMMATURE GRANULOCYTES #: 0.1 K/UL
INR BLD: 1.01 (ref 0.88–1.18)
LYMPHOCYTES ABSOLUTE: 1.8 K/UL (ref 1.1–4.5)
LYMPHOCYTES RELATIVE PERCENT: 17.1 % (ref 20–40)
MCH RBC QN AUTO: 27.9 PG (ref 27–31)
MCHC RBC AUTO-ENTMCNC: 32.4 G/DL (ref 33–37)
MCV RBC AUTO: 86.2 FL (ref 81–99)
MONOCYTES ABSOLUTE: 0.8 K/UL (ref 0–0.9)
MONOCYTES RELATIVE PERCENT: 7.2 % (ref 0–10)
NEUTROPHILS ABSOLUTE: 7.8 K/UL (ref 1.5–7.5)
NEUTROPHILS RELATIVE PERCENT: 74.3 % (ref 50–65)
PDW BLD-RTO: 13.5 % (ref 11.5–14.5)
PLATELET # BLD: 119 K/UL (ref 130–400)
PMV BLD AUTO: 11 FL (ref 9.4–12.3)
POTASSIUM SERPL-SCNC: 4.2 MMOL/L (ref 3.5–5)
PROTHROMBIN TIME: 13.3 SEC (ref 12–14.6)
RBC # BLD: 4.91 M/UL (ref 4.2–5.4)
SARS-COV-2, NAAT: NOT DETECTED
SODIUM BLD-SCNC: 139 MMOL/L (ref 136–145)
TOTAL PROTEIN: 6.7 G/DL (ref 6.6–8.7)
WBC # BLD: 10.5 K/UL (ref 4.8–10.8)

## 2021-05-15 PROCEDURE — 85610 PROTHROMBIN TIME: CPT

## 2021-05-15 PROCEDURE — 2580000003 HC RX 258

## 2021-05-15 PROCEDURE — 7100000001 HC PACU RECOVERY - ADDTL 15 MIN: Performed by: ORTHOPAEDIC SURGERY

## 2021-05-15 PROCEDURE — 73502 X-RAY EXAM HIP UNI 2-3 VIEWS: CPT

## 2021-05-15 PROCEDURE — 36415 COLL VENOUS BLD VENIPUNCTURE: CPT

## 2021-05-15 PROCEDURE — C1713 ANCHOR/SCREW BN/BN,TIS/BN: HCPCS | Performed by: ORTHOPAEDIC SURGERY

## 2021-05-15 PROCEDURE — 6360000002 HC RX W HCPCS

## 2021-05-15 PROCEDURE — C1776 JOINT DEVICE (IMPLANTABLE): HCPCS | Performed by: ORTHOPAEDIC SURGERY

## 2021-05-15 PROCEDURE — 87635 SARS-COV-2 COVID-19 AMP PRB: CPT

## 2021-05-15 PROCEDURE — 93005 ELECTROCARDIOGRAM TRACING: CPT | Performed by: EMERGENCY MEDICINE

## 2021-05-15 PROCEDURE — 3600000005 HC SURGERY LEVEL 5 BASE: Performed by: ORTHOPAEDIC SURGERY

## 2021-05-15 PROCEDURE — 73590 X-RAY EXAM OF LOWER LEG: CPT

## 2021-05-15 PROCEDURE — 0SRR0J9 REPLACEMENT OF RIGHT HIP JOINT, FEMORAL SURFACE WITH SYNTHETIC SUBSTITUTE, CEMENTED, OPEN APPROACH: ICD-10-PCS | Performed by: ORTHOPAEDIC SURGERY

## 2021-05-15 PROCEDURE — 3700000001 HC ADD 15 MINUTES (ANESTHESIA): Performed by: ORTHOPAEDIC SURGERY

## 2021-05-15 PROCEDURE — 2500000003 HC RX 250 WO HCPCS

## 2021-05-15 PROCEDURE — 73552 X-RAY EXAM OF FEMUR 2/>: CPT

## 2021-05-15 PROCEDURE — 6370000000 HC RX 637 (ALT 250 FOR IP): Performed by: ORTHOPAEDIC SURGERY

## 2021-05-15 PROCEDURE — 94640 AIRWAY INHALATION TREATMENT: CPT

## 2021-05-15 PROCEDURE — 99284 EMERGENCY DEPT VISIT MOD MDM: CPT

## 2021-05-15 PROCEDURE — 2700000000 HC OXYGEN THERAPY PER DAY

## 2021-05-15 PROCEDURE — 1210000000 HC MED SURG R&B

## 2021-05-15 PROCEDURE — 6360000002 HC RX W HCPCS: Performed by: EMERGENCY MEDICINE

## 2021-05-15 PROCEDURE — 86900 BLOOD TYPING SEROLOGIC ABO: CPT

## 2021-05-15 PROCEDURE — 3700000000 HC ANESTHESIA ATTENDED CARE: Performed by: ORTHOPAEDIC SURGERY

## 2021-05-15 PROCEDURE — 85025 COMPLETE CBC W/AUTO DIFF WBC: CPT

## 2021-05-15 PROCEDURE — 2709999900 HC NON-CHARGEABLE SUPPLY: Performed by: ORTHOPAEDIC SURGERY

## 2021-05-15 PROCEDURE — 71045 X-RAY EXAM CHEST 1 VIEW: CPT

## 2021-05-15 PROCEDURE — 80053 COMPREHEN METABOLIC PANEL: CPT

## 2021-05-15 PROCEDURE — 7100000000 HC PACU RECOVERY - FIRST 15 MIN: Performed by: ORTHOPAEDIC SURGERY

## 2021-05-15 PROCEDURE — 3600000015 HC SURGERY LEVEL 5 ADDTL 15MIN: Performed by: ORTHOPAEDIC SURGERY

## 2021-05-15 PROCEDURE — 86850 RBC ANTIBODY SCREEN: CPT

## 2021-05-15 PROCEDURE — 96375 TX/PRO/DX INJ NEW DRUG ADDON: CPT

## 2021-05-15 PROCEDURE — 86901 BLOOD TYPING SEROLOGIC RH(D): CPT

## 2021-05-15 PROCEDURE — 6360000002 HC RX W HCPCS: Performed by: ORTHOPAEDIC SURGERY

## 2021-05-15 PROCEDURE — 96374 THER/PROPH/DIAG INJ IV PUSH: CPT

## 2021-05-15 DEVICE — CEMENT BNE 40GM HI VISC RADPQ FOR REV SURG: Type: IMPLANTABLE DEVICE | Site: HIP | Status: FUNCTIONAL

## 2021-05-15 DEVICE — INSERT FEM STD NK HIP CO CHROM TAPR ENDO II: Type: IMPLANTABLE DEVICE | Site: HIP | Status: FUNCTIONAL

## 2021-05-15 DEVICE — STEM FEM DIA11MM STD OFFSET HIP CO CHROM ECHO FX: Type: IMPLANTABLE DEVICE | Site: HIP | Status: FUNCTIONAL

## 2021-05-15 DEVICE — CENTRALIZER STEM DIA9MM DST FEM HIP POS PMMA ECHO: Type: IMPLANTABLE DEVICE | Site: HIP | Status: FUNCTIONAL

## 2021-05-15 DEVICE — SHELL UPLR DIA47MM FEM HIP CO CHROM MOLYBDENUM ALLY MOD: Type: IMPLANTABLE DEVICE | Site: HIP | Status: FUNCTIONAL

## 2021-05-15 RX ORDER — MEPERIDINE HYDROCHLORIDE 50 MG/ML
12.5 INJECTION INTRAMUSCULAR; INTRAVENOUS; SUBCUTANEOUS EVERY 5 MIN PRN
Status: DISCONTINUED | OUTPATIENT
Start: 2021-05-15 | End: 2021-05-15 | Stop reason: HOSPADM

## 2021-05-15 RX ORDER — SODIUM CHLORIDE 9 MG/ML
25 INJECTION, SOLUTION INTRAVENOUS PRN
Status: DISCONTINUED | OUTPATIENT
Start: 2021-05-15 | End: 2021-05-15

## 2021-05-15 RX ORDER — HYDRALAZINE HYDROCHLORIDE 20 MG/ML
5 INJECTION INTRAMUSCULAR; INTRAVENOUS EVERY 10 MIN PRN
Status: DISCONTINUED | OUTPATIENT
Start: 2021-05-15 | End: 2021-05-15 | Stop reason: HOSPADM

## 2021-05-15 RX ORDER — ONDANSETRON 2 MG/ML
4 INJECTION INTRAMUSCULAR; INTRAVENOUS ONCE
Status: COMPLETED | OUTPATIENT
Start: 2021-05-15 | End: 2021-05-15

## 2021-05-15 RX ORDER — DEXAMETHASONE SODIUM PHOSPHATE 10 MG/ML
INJECTION, SOLUTION INTRAMUSCULAR; INTRAVENOUS PRN
Status: DISCONTINUED | OUTPATIENT
Start: 2021-05-15 | End: 2021-05-15 | Stop reason: SDUPTHER

## 2021-05-15 RX ORDER — BUDESONIDE AND FORMOTEROL FUMARATE DIHYDRATE 160; 4.5 UG/1; UG/1
2 AEROSOL RESPIRATORY (INHALATION) 2 TIMES DAILY
Status: DISCONTINUED | OUTPATIENT
Start: 2021-05-15 | End: 2021-05-15

## 2021-05-15 RX ORDER — ACETAMINOPHEN 650 MG/1
650 SUPPOSITORY RECTAL EVERY 6 HOURS PRN
Status: DISCONTINUED | OUTPATIENT
Start: 2021-05-15 | End: 2021-05-19 | Stop reason: HOSPADM

## 2021-05-15 RX ORDER — ARFORMOTEROL TARTRATE 15 UG/2ML
15 SOLUTION RESPIRATORY (INHALATION) 2 TIMES DAILY
Status: DISCONTINUED | OUTPATIENT
Start: 2021-05-15 | End: 2021-05-19 | Stop reason: HOSPADM

## 2021-05-15 RX ORDER — DIPHENHYDRAMINE HYDROCHLORIDE 50 MG/ML
12.5 INJECTION INTRAMUSCULAR; INTRAVENOUS
Status: DISCONTINUED | OUTPATIENT
Start: 2021-05-15 | End: 2021-05-15 | Stop reason: HOSPADM

## 2021-05-15 RX ORDER — HYDROMORPHONE HYDROCHLORIDE 1 MG/ML
0.25 INJECTION, SOLUTION INTRAMUSCULAR; INTRAVENOUS; SUBCUTANEOUS EVERY 5 MIN PRN
Status: DISCONTINUED | OUTPATIENT
Start: 2021-05-15 | End: 2021-05-15 | Stop reason: HOSPADM

## 2021-05-15 RX ORDER — ENALAPRILAT 2.5 MG/2ML
1.25 INJECTION INTRAVENOUS
Status: DISCONTINUED | OUTPATIENT
Start: 2021-05-15 | End: 2021-05-15 | Stop reason: HOSPADM

## 2021-05-15 RX ORDER — IPRATROPIUM BROMIDE AND ALBUTEROL SULFATE 2.5; .5 MG/3ML; MG/3ML
1 SOLUTION RESPIRATORY (INHALATION) 4 TIMES DAILY
Status: DISCONTINUED | OUTPATIENT
Start: 2021-05-15 | End: 2021-05-19 | Stop reason: HOSPADM

## 2021-05-15 RX ORDER — ATORVASTATIN CALCIUM 40 MG/1
40 TABLET, FILM COATED ORAL NIGHTLY
Status: DISCONTINUED | OUTPATIENT
Start: 2021-05-15 | End: 2021-05-19 | Stop reason: HOSPADM

## 2021-05-15 RX ORDER — OXYCODONE HYDROCHLORIDE AND ACETAMINOPHEN 5; 325 MG/1; MG/1
1 TABLET ORAL EVERY 4 HOURS PRN
Status: DISCONTINUED | OUTPATIENT
Start: 2021-05-15 | End: 2021-05-19 | Stop reason: HOSPADM

## 2021-05-15 RX ORDER — SODIUM CHLORIDE, SODIUM LACTATE, POTASSIUM CHLORIDE, CALCIUM CHLORIDE 600; 310; 30; 20 MG/100ML; MG/100ML; MG/100ML; MG/100ML
INJECTION, SOLUTION INTRAVENOUS CONTINUOUS PRN
Status: DISCONTINUED | OUTPATIENT
Start: 2021-05-15 | End: 2021-05-15 | Stop reason: SDUPTHER

## 2021-05-15 RX ORDER — CEFAZOLIN SODIUM 1 G/3ML
INJECTION, POWDER, FOR SOLUTION INTRAMUSCULAR; INTRAVENOUS PRN
Status: DISCONTINUED | OUTPATIENT
Start: 2021-05-15 | End: 2021-05-15 | Stop reason: SDUPTHER

## 2021-05-15 RX ORDER — LEVOTHYROXINE SODIUM 88 UG/1
88 TABLET ORAL DAILY
Status: DISCONTINUED | OUTPATIENT
Start: 2021-05-15 | End: 2021-05-19 | Stop reason: HOSPADM

## 2021-05-15 RX ORDER — MORPHINE SULFATE 4 MG/ML
2 INJECTION, SOLUTION INTRAMUSCULAR; INTRAVENOUS EVERY 5 MIN PRN
Status: DISCONTINUED | OUTPATIENT
Start: 2021-05-15 | End: 2021-05-15 | Stop reason: HOSPADM

## 2021-05-15 RX ORDER — ROCURONIUM BROMIDE 10 MG/ML
INJECTION, SOLUTION INTRAVENOUS PRN
Status: DISCONTINUED | OUTPATIENT
Start: 2021-05-15 | End: 2021-05-15 | Stop reason: SDUPTHER

## 2021-05-15 RX ORDER — HYDROMORPHONE HYDROCHLORIDE 1 MG/ML
0.5 INJECTION, SOLUTION INTRAMUSCULAR; INTRAVENOUS; SUBCUTANEOUS EVERY 5 MIN PRN
Status: DISCONTINUED | OUTPATIENT
Start: 2021-05-15 | End: 2021-05-15 | Stop reason: HOSPADM

## 2021-05-15 RX ORDER — MAGNESIUM SULFATE IN WATER 40 MG/ML
2000 INJECTION, SOLUTION INTRAVENOUS PRN
Status: DISCONTINUED | OUTPATIENT
Start: 2021-05-15 | End: 2021-05-19 | Stop reason: HOSPADM

## 2021-05-15 RX ORDER — ONDANSETRON 2 MG/ML
4 INJECTION INTRAMUSCULAR; INTRAVENOUS EVERY 6 HOURS PRN
Status: DISCONTINUED | OUTPATIENT
Start: 2021-05-15 | End: 2021-05-19 | Stop reason: HOSPADM

## 2021-05-15 RX ORDER — OXYCODONE AND ACETAMINOPHEN 10; 325 MG/1; MG/1
1 TABLET ORAL EVERY 4 HOURS PRN
Status: DISCONTINUED | OUTPATIENT
Start: 2021-05-15 | End: 2021-05-19 | Stop reason: HOSPADM

## 2021-05-15 RX ORDER — PROPOFOL 10 MG/ML
INJECTION, EMULSION INTRAVENOUS PRN
Status: DISCONTINUED | OUTPATIENT
Start: 2021-05-15 | End: 2021-05-15 | Stop reason: SDUPTHER

## 2021-05-15 RX ORDER — MORPHINE SULFATE 4 MG/ML
4 INJECTION, SOLUTION INTRAMUSCULAR; INTRAVENOUS ONCE
Status: COMPLETED | OUTPATIENT
Start: 2021-05-15 | End: 2021-05-15

## 2021-05-15 RX ORDER — PREDNISONE 20 MG/1
40 TABLET ORAL DAILY
Status: COMPLETED | OUTPATIENT
Start: 2021-05-16 | End: 2021-05-16

## 2021-05-15 RX ORDER — POTASSIUM CHLORIDE 7.45 MG/ML
10 INJECTION INTRAVENOUS PRN
Status: DISCONTINUED | OUTPATIENT
Start: 2021-05-15 | End: 2021-05-19 | Stop reason: HOSPADM

## 2021-05-15 RX ORDER — SODIUM CHLORIDE 0.9 % (FLUSH) 0.9 %
5-40 SYRINGE (ML) INJECTION EVERY 12 HOURS SCHEDULED
Status: DISCONTINUED | OUTPATIENT
Start: 2021-05-15 | End: 2021-05-19 | Stop reason: HOSPADM

## 2021-05-15 RX ORDER — ACETAMINOPHEN 325 MG/1
650 TABLET ORAL EVERY 6 HOURS PRN
Status: DISCONTINUED | OUTPATIENT
Start: 2021-05-15 | End: 2021-05-19 | Stop reason: HOSPADM

## 2021-05-15 RX ORDER — PROMETHAZINE HYDROCHLORIDE 25 MG/1
12.5 TABLET ORAL EVERY 6 HOURS PRN
Status: DISCONTINUED | OUTPATIENT
Start: 2021-05-15 | End: 2021-05-19 | Stop reason: HOSPADM

## 2021-05-15 RX ORDER — FENTANYL CITRATE 50 UG/ML
INJECTION, SOLUTION INTRAMUSCULAR; INTRAVENOUS PRN
Status: DISCONTINUED | OUTPATIENT
Start: 2021-05-15 | End: 2021-05-15 | Stop reason: SDUPTHER

## 2021-05-15 RX ORDER — ONDANSETRON 2 MG/ML
INJECTION INTRAMUSCULAR; INTRAVENOUS PRN
Status: DISCONTINUED | OUTPATIENT
Start: 2021-05-15 | End: 2021-05-15 | Stop reason: SDUPTHER

## 2021-05-15 RX ORDER — LABETALOL HYDROCHLORIDE 5 MG/ML
5 INJECTION, SOLUTION INTRAVENOUS EVERY 10 MIN PRN
Status: DISCONTINUED | OUTPATIENT
Start: 2021-05-15 | End: 2021-05-15 | Stop reason: HOSPADM

## 2021-05-15 RX ORDER — LIDOCAINE HYDROCHLORIDE 10 MG/ML
INJECTION, SOLUTION EPIDURAL; INFILTRATION; INTRACAUDAL; PERINEURAL PRN
Status: DISCONTINUED | OUTPATIENT
Start: 2021-05-15 | End: 2021-05-15 | Stop reason: SDUPTHER

## 2021-05-15 RX ORDER — SODIUM CHLORIDE 0.9 % (FLUSH) 0.9 %
5-40 SYRINGE (ML) INJECTION PRN
Status: DISCONTINUED | OUTPATIENT
Start: 2021-05-15 | End: 2021-05-19 | Stop reason: HOSPADM

## 2021-05-15 RX ORDER — POLYETHYLENE GLYCOL 3350 17 G/17G
17 POWDER, FOR SOLUTION ORAL DAILY PRN
Status: DISCONTINUED | OUTPATIENT
Start: 2021-05-15 | End: 2021-05-19 | Stop reason: HOSPADM

## 2021-05-15 RX ORDER — PROMETHAZINE HYDROCHLORIDE 25 MG/ML
6.25 INJECTION, SOLUTION INTRAMUSCULAR; INTRAVENOUS
Status: DISCONTINUED | OUTPATIENT
Start: 2021-05-15 | End: 2021-05-15 | Stop reason: HOSPADM

## 2021-05-15 RX ORDER — METOCLOPRAMIDE HYDROCHLORIDE 5 MG/ML
10 INJECTION INTRAMUSCULAR; INTRAVENOUS
Status: DISCONTINUED | OUTPATIENT
Start: 2021-05-15 | End: 2021-05-15 | Stop reason: HOSPADM

## 2021-05-15 RX ORDER — POTASSIUM CHLORIDE 20 MEQ/1
40 TABLET, EXTENDED RELEASE ORAL PRN
Status: DISCONTINUED | OUTPATIENT
Start: 2021-05-15 | End: 2021-05-19 | Stop reason: HOSPADM

## 2021-05-15 RX ORDER — BUDESONIDE 0.5 MG/2ML
0.5 INHALANT ORAL 2 TIMES DAILY
Status: DISCONTINUED | OUTPATIENT
Start: 2021-05-15 | End: 2021-05-19 | Stop reason: HOSPADM

## 2021-05-15 RX ORDER — MORPHINE SULFATE 4 MG/ML
4 INJECTION, SOLUTION INTRAMUSCULAR; INTRAVENOUS EVERY 5 MIN PRN
Status: DISCONTINUED | OUTPATIENT
Start: 2021-05-15 | End: 2021-05-15 | Stop reason: HOSPADM

## 2021-05-15 RX ORDER — SODIUM CHLORIDE 9 MG/ML
INJECTION, SOLUTION INTRAVENOUS CONTINUOUS
Status: ACTIVE | OUTPATIENT
Start: 2021-05-15 | End: 2021-05-17

## 2021-05-15 RX ADMIN — SUGAMMADEX 150 MG: 100 INJECTION, SOLUTION INTRAVENOUS at 18:00

## 2021-05-15 RX ADMIN — FENTANYL CITRATE 50 MCG: 50 INJECTION, SOLUTION INTRAMUSCULAR; INTRAVENOUS at 17:48

## 2021-05-15 RX ADMIN — ONDANSETRON 4 MG: 2 INJECTION INTRAMUSCULAR; INTRAVENOUS at 13:27

## 2021-05-15 RX ADMIN — CEFAZOLIN SODIUM 2000 MG: 1 INJECTION, POWDER, FOR SOLUTION INTRAMUSCULAR; INTRAVENOUS at 16:03

## 2021-05-15 RX ADMIN — DEXAMETHASONE SODIUM PHOSPHATE 10 MG: 10 INJECTION, SOLUTION INTRAMUSCULAR; INTRAVENOUS at 16:40

## 2021-05-15 RX ADMIN — PROPOFOL 120 MG: 10 INJECTION, EMULSION INTRAVENOUS at 15:57

## 2021-05-15 RX ADMIN — BUDESONIDE 500 MCG: 0.5 SUSPENSION RESPIRATORY (INHALATION) at 19:23

## 2021-05-15 RX ADMIN — FENTANYL CITRATE 50 MCG: 50 INJECTION, SOLUTION INTRAMUSCULAR; INTRAVENOUS at 15:57

## 2021-05-15 RX ADMIN — ARFORMOTEROL TARTRATE 15 MCG: 15 SOLUTION RESPIRATORY (INHALATION) at 19:23

## 2021-05-15 RX ADMIN — FENTANYL CITRATE 50 MCG: 50 INJECTION, SOLUTION INTRAMUSCULAR; INTRAVENOUS at 16:26

## 2021-05-15 RX ADMIN — ROCURONIUM BROMIDE 50 MG: 10 INJECTION, SOLUTION INTRAVENOUS at 15:57

## 2021-05-15 RX ADMIN — ONDANSETRON HYDROCHLORIDE 4 MG: 2 INJECTION, SOLUTION INTRAMUSCULAR; INTRAVENOUS at 17:19

## 2021-05-15 RX ADMIN — PROPOFOL 50 MG: 10 INJECTION, EMULSION INTRAVENOUS at 17:48

## 2021-05-15 RX ADMIN — SODIUM CHLORIDE, SODIUM LACTATE, POTASSIUM CHLORIDE, AND CALCIUM CHLORIDE: 600; 310; 30; 20 INJECTION, SOLUTION INTRAVENOUS at 15:45

## 2021-05-15 RX ADMIN — FENTANYL CITRATE 50 MCG: 50 INJECTION, SOLUTION INTRAMUSCULAR; INTRAVENOUS at 17:24

## 2021-05-15 RX ADMIN — SODIUM CHLORIDE, SODIUM LACTATE, POTASSIUM CHLORIDE, AND CALCIUM CHLORIDE: 600; 310; 30; 20 INJECTION, SOLUTION INTRAVENOUS at 17:04

## 2021-05-15 RX ADMIN — IPRATROPIUM BROMIDE AND ALBUTEROL SULFATE 1 AMPULE: 2.5; .5 SOLUTION RESPIRATORY (INHALATION) at 19:18

## 2021-05-15 RX ADMIN — FENTANYL CITRATE 50 MCG: 50 INJECTION, SOLUTION INTRAMUSCULAR; INTRAVENOUS at 16:39

## 2021-05-15 RX ADMIN — HYDROMORPHONE HYDROCHLORIDE 0.5 MG: 1 INJECTION, SOLUTION INTRAMUSCULAR; INTRAVENOUS; SUBCUTANEOUS at 18:31

## 2021-05-15 RX ADMIN — MORPHINE SULFATE 4 MG: 4 INJECTION, SOLUTION INTRAMUSCULAR; INTRAVENOUS at 13:27

## 2021-05-15 RX ADMIN — HYDROMORPHONE HYDROCHLORIDE 0.5 MG: 1 INJECTION, SOLUTION INTRAMUSCULAR; INTRAVENOUS; SUBCUTANEOUS at 18:37

## 2021-05-15 RX ADMIN — LIDOCAINE HYDROCHLORIDE 50 MG: 10 INJECTION, SOLUTION EPIDURAL; INFILTRATION; INTRACAUDAL; PERINEURAL at 15:57

## 2021-05-15 RX ADMIN — ATORVASTATIN CALCIUM 40 MG: 40 TABLET, FILM COATED ORAL at 20:47

## 2021-05-15 ASSESSMENT — LIFESTYLE VARIABLES: SMOKING_STATUS: 1

## 2021-05-15 ASSESSMENT — PAIN SCALES - GENERAL
PAINLEVEL_OUTOF10: 10
PAINLEVEL_OUTOF10: 4
PAINLEVEL_OUTOF10: 5
PAINLEVEL_OUTOF10: 3

## 2021-05-15 ASSESSMENT — PAIN DESCRIPTION - ORIENTATION: ORIENTATION: RIGHT

## 2021-05-15 ASSESSMENT — ENCOUNTER SYMPTOMS
SHORTNESS OF BREATH: 0
ABDOMINAL PAIN: 0
BACK PAIN: 0

## 2021-05-15 ASSESSMENT — PAIN DESCRIPTION - PAIN TYPE: TYPE: SURGICAL PAIN

## 2021-05-15 NOTE — H&P
Hospitalist: History and Physical    Date: 5/15/2021 Time: 2:41 PM    Name: Kallie Mckenzie : 1950 MRN: [id number]    Code Status: Full Code No additional code details    PCP: Gayle Parks MD    Patient's Chief Complaint Is:    Fall with hip pain    HPI: Patient is a 72 y.o. female with chronic bronchitis, tobacco abuse, h/o symptomatic bradycardia s/p pacemaker placement, h/o TIA, HLD, presented to ED following fall from standing after she tripped and fell on her right side. No preceding symptoms. She noted severe R hip/leg pain and was unable to bear weight. In the ED, imaging revealed a right hip fracture and Orthopedics consulted, with plan to take to OR for surgical repair later today. Of note, patient did develop worsening dyspnea from baseline and cough last week and treated outpatient for acute exacerbation of her chronic bronchitis, including zpak and steroids. She completed nearly 5 days of treatment with significant improvement. Not requiring supplemental O2.         Past Medical History:   Diagnosis Date    Cerebral artery occlusion with cerebral infarction ([facility] [address]     TIA    Diverticular disease of colon     Hyperlipidemia     Hypothyroidism     Pneumonia     Psychiatric problem     Tobacco abuse     Vitamin B12 deficiency      Past Surgical History:   Procedure Laterality Date    CARDIAC PACEMAKER PLACEMENT      COLONOSCOPY      LUNG SURGERY      collapsed lung    [place] COLONOSCOPY W/BIOPSY SINGLE/MULTIPLE N/A 2017    Dr uJancho West-extensive and large mouthed diverticular disease throughout the right colon-Tubular AP (villiform) (-) dysplasia x 1, tubular AP (-) dysplasia x 1--5 yr recall    TUBAL LIGATION       Family History   Problem Relation Age of Onset    Other Mother         COPD, dementia    Heart Disease Mother    Sherrel Willard Stroke Mother     Heart Disease Father     Stroke Father     Diabetes Brother     Diabetes Brother     High Blood Pressure Brother     High Cholesterol Brother     Colon Polyps Brother     Colon Cancer Neg Hx     Liver Cancer Neg Hx     Liver Disease Neg Hx     Esophageal Cancer Neg Hx     Rectal Cancer Neg Hx     Stomach Cancer Neg Hx      Social History     Socioeconomic History    Marital status:      Spouse name: Not on file    Number of children: Not on file    Years of education: Not on file    Highest education level: Not on file   Occupational History    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    Smoking status: Current Every Day Smoker     Packs/day: 0.50     Years: 30.00     Pack years: 15.00     Types: Cigarettes     Start date: 11/1/1970    Smokeless tobacco: Never Used    Tobacco comment: 1 pack of cigaretts last 1 1/2 days while at work   Vaping Use    Vaping Use: Never used   Substance and Sexual Activity    Alcohol use: No    Drug use: No    Sexual activity: Not on file   Other Topics Concern    Not on file   Social History Narrative    Not on file     Social Determinants of Health     Financial Resource Strain:     Difficulty of Paying Living Expenses:    Food Insecurity:     Worried About [address] in the Last Year:     [address] [employer] in the Last Year:    Transportation Needs:     Lack of Transportation (Medical):      Lack of Transportation (Non-Medical):    Physical Activity:     Days of Exercise per Week:     Minutes of Exercise per Session:    Stress:     Feeling of Stress :    Social Connections:     Frequency of Communication with Friends and Family:     Frequency of Social Gatherings with Friends and Family:     Attends Taoism Services:     Active Member of Clubs or Organizations:     Attends Club or Organization Meetings:     Marital Status:    Intimate Partner Violence:     Fear of Current or Ex-Partner:     Emotionally Abused:     Physically Abused:     Sexually Abused:      No Known Allergies  Prior to Admission medications    Medication Sig Start Date End Date Taking? Authorizing Provider   azithromycin (ZITHROMAX Z-KEELY) 250 MG tablet Take 2 tabs on day 1 and 1 tab on days 2-5 5/11/21  Yes Marianne Ruelas MD   predniSONE (DELTASONE) 20 MG tablet Take 1 tablet by mouth 2 times daily for 5 days 5/11/21 5/16/21 Yes Lisa Isaac MD   albuterol sulfate HFA (PROAIR HFA) 108 (90 Base) MCG/ACT inhaler Inhale 2 puffs into the lungs every 6 hours as needed for Wheezing 3/3/21  Yes Sneha Maurice MD   budesonide-formoterol (SYMBICORT) 160-4.5 MCG/ACT AERO Inhale 2 puffs into the lungs 2 times daily 3/3/21  Yes Sneha Maurice MD   levothyroxine (SYNTHROID) 88 MCG tablet Take 1 tablet by mouth once daily 3/1/21  Yes Marianne Ruelas MD   atorvastatin (LIPITOR) 40 MG tablet TAKE 1 TABLET BY MOUTH ONCE DAILY AT NIGHT 3/1/21  Yes Marianne Ruelas MD   zoster recombinant adjuvanted vaccine [facility]) 50 MCG/0.5ML SUSR injection Inject 0.5 mLs into the muscle See Admin Instructions 1 dose now and repeat in 2-6 months 5/11/21 11/7/21  Marianne Ruelas MD       I have reviewed all pertinent history. Prior medical records and laboratory evaluation reviewed. Imaging independently reviewed. Review of Systems:   As per HPI, otherwise all other ROS performed and found to be negative at this time. Physical Exam:  Vitals:    05/15/21 1328   BP: (!) 147/79   Pulse: 80   Resp: 18   Temp: 98 °F (36.7 °C)   SpO2: 96%     CONSTITUTIONAL:  Unkempt, no acute distress  PSYCH: Judgement and insight are normal. Mental status alert and oriented to person, place and time. Mood and affect are normal  EYES: ARINA, symmetrical. Conjunctiva are moist, non-icteric. Lids are normal  ENT: Ears- No scars, lesions or masses. Hearing is grossly normal. Throat: Lips are normal. Oral mucosa is pink and moist.   NECK: Trachea is midline. Thyroid is non-tender.   HEAD:  NC, AT   LUNGS: coarse breath sounds bilaterally, no accessory muscle use  CARDIOVASCULAR: Regular rate and rhythm, no murmurs, rubs or gallops. Pulses are equal bilaterally. GI/ABDOMEN: Soft, non-tender, non-distended, no guarding or rebound. Bowel sounds are normal.   SKIN: Warm and dry.  No rashes, lesions or bruises  NEUROLOGICAL: no dysarthria or aphasia  EXTREMITIES:  RLE externally rotated    Labs:   Recent Results (from the past 72 hour(s))   TYPE AND SCREEN CAPTURE THREE SCREEN CELL    Collection Time: 05/15/21  1:20 PM   Result Value Ref Range    ABO/Rh B POS     Antibody Screen NEG    Comprehensive Metabolic Panel    Collection Time: 05/15/21  1:23 PM   Result Value Ref Range    Sodium 139 136 - 145 mmol/L    Potassium 4.2 3.5 - 5.0 mmol/L    Chloride 103 98 - 111 mmol/L    CO2 29 22 - 29 mmol/L    Anion Gap 7 7 - 19 mmol/L    Glucose 102 74 - 109 mg/dL    BUN 16 8 - 23 mg/dL    CREATININE 0.5 0.5 - 0.9 mg/dL    GFR Non-African American >60 >60    GFR African American >59 >59    Calcium 9.7 8.8 - 10.2 mg/dL    Total Protein 6.7 6.6 - 8.7 g/dL    Albumin 4.2 3.5 - 5.2 g/dL    Total Bilirubin 0.6 0.2 - 1.2 mg/dL    Alkaline Phosphatase 78 35 - 104 U/L    ALT 16 5 - 33 U/L    AST 18 5 - 32 U/L   CBC Auto Differential    Collection Time: 05/15/21  1:23 PM   Result Value Ref Range    WBC 10.5 4.8 - 10.8 K/uL    RBC 4.91 4.20 - 5.40 M/uL    Hemoglobin 13.7 12.0 - 16.0 g/dL    Hematocrit 42.3 37.0 - 47.0 %    MCV 86.2 81.0 - 99.0 fL    MCH 27.9 27.0 - 31.0 pg    MCHC 32.4 (L) 33.0 - 37.0 g/dL    RDW 13.5 11.5 - 14.5 %    Platelets 476 (L) 584 - 400 K/uL    MPV 11.0 9.4 - 12.3 fL    Neutrophils % 74.3 (H) 50.0 - 65.0 %    Lymphocytes % 17.1 (L) 20.0 - 40.0 %    Monocytes % 7.2 0.0 - 10.0 %    Eosinophils % 0.0 0.0 - 5.0 %    Basophils % 0.1 0.0 - 1.0 %    Neutrophils Absolute 7.8 (H) 1.5 - 7.5 K/uL    Immature Granulocytes # 0.1 K/uL    Lymphocytes Absolute 1.8 1.1 - 4.5 K/uL    Monocytes Absolute 0.80 0.00 - 0.90 K/uL    Eosinophils Absolute 0.00 0.00 - 0.60 K/uL    Basophils Janae Galloway on 5/15/2021 1:48 PM      Assessment/Plan:     Principal Problem:    Closed right hip fracture, initial encounter ([facility] [address]  Active Problems:    Tobacco abuse    Hyperlipidemia    Chronic bronchitis ([facility] [address]    Pacemaker  Resolved Problems:    * No resolved hospital problems.  *       Right hip fracture  N.p.o. for surgical intervention per orthopedics  Supportive care  Pain control  PT/OT following surgery    Chronic bronchitis, with recent acute exacerbation, improving  S/p corticosteroid injection outpatient, completed 5 days Prednisone  No need for further steroids  Scheduled duo nebs  Nebulized budesonide and formoterol    H/o symptomatic bradycardia, s/p Pacemaker  No issues    Tobacco abuse  Counseled   Nicotine patch    Reconcile and continue appropriate home regimen    DVT prophylaxis-  Per Orthopedics after Surgical Intervention    Suzanne Cazares MD  5/15/2021 2:41 PM

## 2021-05-15 NOTE — ED PROVIDER NOTES
Positive for gait problem and joint swelling. Negative for back pain. Skin: Negative for wound. Neurological: Negative for seizures and syncope. Psychiatric/Behavioral: Negative for confusion. A complete review of systems was performed and is negative except as noted above in the HPI. PAST MEDICAL HISTORY     Past Medical History:   Diagnosis Date    Cerebral artery occlusion with cerebral infarction ([facility] [address]     TIA    Diverticular disease of colon     Hyperlipidemia     Hypothyroidism     Pneumonia     Psychiatric problem     Tobacco abuse     Vitamin B12 deficiency          SURGICAL HISTORY       Past Surgical History:   Procedure Laterality Date    CARDIAC PACEMAKER PLACEMENT      COLONOSCOPY      LUNG SURGERY      collapsed lung    [place] COLONOSCOPY W/BIOPSY SINGLE/MULTIPLE N/A 6/27/2017    Dr Jeni West-extensive and large mouthed diverticular disease throughout the right colon-Tubular AP (villiform) (-) dysplasia x 1, tubular AP (-) dysplasia x 1--5 yr recall    TUBAL LIGATION           CURRENT MEDICATIONS       Previous Medications    ALBUTEROL SULFATE HFA (PROAIR HFA) 108 (90 BASE) MCG/ACT INHALER    Inhale 2 puffs into the lungs every 6 hours as needed for Wheezing    ATORVASTATIN (LIPITOR) 40 MG TABLET    TAKE 1 TABLET BY MOUTH ONCE DAILY AT NIGHT    AZITHROMYCIN (ZITHROMAX Z-KEELY) 250 MG TABLET    Take 2 tabs on day 1 and 1 tab on days 2-5    BUDESONIDE-FORMOTEROL (SYMBICORT) 160-4.5 MCG/ACT AERO    Inhale 2 puffs into the lungs 2 times daily    LEVOTHYROXINE (SYNTHROID) 88 MCG TABLET    Take 1 tablet by mouth once daily    PREDNISONE (DELTASONE) 20 MG TABLET    Take 1 tablet by mouth 2 times daily for 5 days    ZOSTER RECOMBINANT ADJUVANTED VACCINE (SHINGRIX) 50 MCG/0.5ML SUSR INJECTION    Inject 0.5 mLs into the muscle See Admin Instructions 1 dose now and repeat in 2-6 months       ALLERGIES     Patient has no known allergies.     FAMILY HISTORY       Family History   Problem Relation Age of Onset    Other Mother         COPD, dementia    Heart Disease Mother     Stroke Mother     Heart Disease Father     Stroke Father     Diabetes Brother     Diabetes Brother     High Blood Pressure Brother     High Cholesterol Brother     Colon Polyps Brother     Colon Cancer Neg Hx     Liver Cancer Neg Hx     Liver Disease Neg Hx     Esophageal Cancer Neg Hx     Rectal Cancer Neg Hx     Stomach Cancer Neg Hx           SOCIAL HISTORY       Social History     Socioeconomic History    Marital status:      Spouse name: None    Number of children: None    Years of education: None    Highest education level: None   Occupational History    Occupation: Resident Aide at [employer]    Smoking status: Current Every Day Smoker     Packs/day: 0.50     Years: 30.00     Pack years: 15.00     Types: Cigarettes     Start date: 11/1/1970    Smokeless tobacco: Never Used    Tobacco comment: 1 pack of cigaretts last 1 1/2 days while at work   Vaping Use    Vaping Use: Never used   Substance and Sexual Activity    Alcohol use: No    Drug use: No    Sexual activity: None   Other Topics Concern    None   Social History Narrative    None     Social Determinants of Health     Financial Resource Strain:     Difficulty of Paying Living Expenses:    Food Insecurity:     Worried About Running Out of Food in the Last Year:     [address] in the Last Year:    Transportation Needs:     Lack of Transportation (Medical):      Lack of Transportation (Non-Medical):    Physical Activity:     Days of Exercise per Week:     Minutes of Exercise per Session:    Stress:     Feeling of Stress :    Social Connections:     Frequency of Communication with Friends and Family:     Frequency of Social Gatherings with Friends and Family:     Attends Adventist Services:     Active Member of Clubs or Organizations:     Attends Club or Organization Meetings:     Marital Status: Intimate Partner Violence:     Fear of Current or Ex-Partner:     Emotionally Abused:     Physically Abused:     Sexually Abused:        SCREENINGS             PHYSICAL EXAM    (up to 7 for level 4, 8 or more for level 5)     ED Triage Vitals [05/15/21 1328]   BP Temp Temp src Pulse Resp SpO2 Height Weight   (!) 147/79 98 °F (36.7 °C) -- 80 18 96 % 5' 9\" (1.753 m) 155 lb (70.3 kg)       Physical Exam  Vitals and nursing note reviewed. Constitutional:       Comments: Laying supine with a box splint to her right lower extremity. Appears to be in pain   HENT:      Head: Normocephalic and atraumatic. Eyes:      Extraocular Movements: Extraocular movements intact. Pupils: Pupils are equal, round, and reactive to light. Cardiovascular:      Rate and Rhythm: Normal rate and regular rhythm. Pulses: Normal pulses. Pulmonary:      Effort: Pulmonary effort is normal. No respiratory distress. Abdominal:      General: Abdomen is flat. There is no distension. Palpations: Abdomen is soft. Musculoskeletal:         General: Tenderness and deformity present. Cervical back: Normal range of motion and neck supple. Comments: Full range of motion of the left lower extremity. No deformity or pain. No back pain. Right lower extremity with pain on tenderness of the right hip. The patient's right knee had a little bit of swelling. She is not tender and she is able to move it some. The patient has a mild abrasion. She has strong dorsalis pedis pulse in the right lower extremity and she is able to move her toes and feel normally. Right lower extremity is slightly rotated externally. And shortened. Skin:     General: Skin is warm and dry. Capillary Refill: Capillary refill takes less than 2 seconds. Comments: Mild abrasion over the right knee. Neurological:      General: No focal deficit present. Mental Status: She is alert and oriented to person, place, and time. (70.3 kg)   Height: 5' 9\" (1.753 m)       MDM  Number of Diagnoses or Management Options  Artificial pacemaker  Closed fracture of right hip, initial encounter [facility]): new and requires workup  Fall, initial encounter  Tobacco abuse  Diagnosis management comments: Patient with trip and fall isolated right grade. On my reexam she does not really have any knee rotation or pain there. Initially I was concerned. There is a slight abrasion. Neurovascular intact distally. X-rays show only a right hip fracture. Patient's pain controlled with morphine. She has underlying likely COPD and a pacemaker no known cardiac blockages. Patient is n.p.o. today. Admit to hospitalist service discussed with Dr. Serjio Hope. Discussed with Dr. Krystal Jaffe orthopedics may go to the OR today keep n.p.o. Amount and/or Complexity of Data Reviewed  Clinical lab tests: reviewed and ordered  Tests in the radiology section of CPT®: ordered and reviewed  Obtain history from someone other than the patient: yes  Discuss the patient with other providers: yes  Independent visualization of images, tracings, or specimens: yes    Risk of Complications, Morbidity, and/or Mortality  Presenting problems: high    Patient Progress  Patient progress: stable        CONSULTS:  IP CONSULT TO ORTHOPEDIC SURGERY    PROCEDURES:  Unless otherwise notedbelow, none     Procedures    FINAL IMPRESSION     1. Closed fracture of right hip, initial encounter ([facility] [address]    2. Tobacco abuse    3. Fall, initial encounter    4. Artificial pacemaker          DISPOSITION/PLAN   DISPOSITION Decision To Admit 05/15/2021 02:11:11 PM      PATIENT REFERRED TO:  No follow-up provider specified.     DISCHARGE MEDICATIONS:  New Prescriptions    No medications on file          (Please note that portions of this note were completed with a voice recognition program.  Efforts were made to edit the dictations butoccasionally words are mis-transcribed.)    Meghana Duval MD (electronically signed)  Bg Goode MD  05/15/21 0685

## 2021-05-15 NOTE — OP NOTE
Operative Note    Pt Name: Darin Churchill  MRN: [id number]  YOB: 1950  Date: 5/15/2021     PREOPERATIVE DIAGNOSIS:   1. Right subcapital displaced femoral neck fracture. 2. Hyperlipidemia  3. Hypothyroidism  4. History of TIA  5. COPD  6. Chronic tobacco use     POSTOPERATIVE DIAGNOSIS:   1. Right subcapital displaced femoral neck fracture. 2.  Hyperlipidemia  3. Hypothyroidism  4. History of TIA  5. COPD  6. Chronic tobacco use     PROCEDURE:   1. Right cemented hip hemiarthroplasty. SURGEON: Margarita Mena MD     ASSISTANT: None     ANESTHESIA: General endotracheal anesthesia. ESTIMATED BLOOD LOSS: 325 mL. COMPLICATIONS: None. CONDITION: Stable. COMPONENTS: Odilon Echo size 11 mm stem, 47 mm femoral head, standard neck. HISTORY: This is a 71-year-old female who fell at home landing on her right hip . The patient presented to the emergency department with complaints of hip pain. X-rays demonstrated a subcapital femoral neck fracture. Based on this, the decision was made to take the patient to the operating room for the above-mentioned procedure. After the risks and benefits had been discussed with the patient and the patient was medically cleared, the patient was taken to the operative suite. DESCRIPTION OF PROCEDURE: The patient was met in the preanesthesia area, where the correct patient, procedure and side were confirmed. The operative site was marked by myself in agreement with the patient. The patient was then taken to the operative suite. A formal timeout was held with myself and the operating team, again, identifying the correct patient, procedure and side. General anesthesia was then induced. Patient was then transferred to the operating table and positioned in the left lateral decubitus position over a pegboard. An axillary roll was placed. Patient was secured to the table, pegs were well padded. A PAS boot was applied to the non-operative lower extremity. I felt that this would be acceptable with my plan to cement the stem slightly proud. The trial components were dislocated and removed uneventfully. The femoral canal was cleaned and dried. Cement was then mixed and placed within the canal. The final femoral stem component was then placed, again matching native version, and held in position while the cement dried-I left the stem approximately 5 mm proud. After the cement stabilized, I palpated the acetabulum, no free soft tissue, bone debris or cement was present. The acetabulum was then irrigated and dried. The final femoral head component was placed and impacted into position. The hip was reduced and, again, found to be stable with approximately equal leg lengths-right lower extremity still appeared slightly shorter than the left. The wound was irrigated with normal saline. The capsule was closed with figure of eight #1 ethibond suture. The gluteus medius was re-approximated with a combination of #1 ethibond sutures were passed through the trochanter and tied over a bone bridge and 2-0 vicryl sutures. The IT band was re-approximated with #1 Vicryl suture. The hip was irrigated again. Subcutaneous tissue was re-approximate with buried 2-0 Vicryl sutures. Skin was approximated with staples. The patient awoke from anesthesia without difficulty and was transferred to the PACU in stable condition. All sponge, needle and instrument counts were correct at the end of the procedure. Post-Op Plan:  1. WBAT to operative extremity, anterior hip precautions. 2. Pain control  3. PT/OT  4. DVT ppx: SCD's, chemical ppx for one month  5.  D/c planning

## 2021-05-15 NOTE — ANESTHESIA PRE PROCEDURE
MD Emir        sodium chloride flush 0.9 % injection 5-40 mL  5-40 mL Intravenous 2 times per day Prem Cordero MD        sodium chloride flush 0.9 % injection 5-40 mL  5-40 mL Intravenous PRN Prem Cordero MD        0.9 % sodium chloride infusion  25 mL Intravenous PRN Prem Cordero MD        promethazine (PHENERGAN) tablet 12.5 mg  12.5 mg Oral Q6H PRN Prem Cordero MD        Or    ondansetron [facility]) injection 4 mg  4 mg Intravenous Q6H PRN Prem Cordero MD        polyethylene glycol (GLYCOLAX) packet 17 g  17 g Oral Daily PRN Prem Cordero MD        acetaminophen (TYLENOL) tablet 650 mg  650 mg Oral Q6H PRN Prem Cordero MD        Or   Madai Ori acetaminophen (TYLENOL) suppository 650 mg  650 mg Rectal Q6H PRN Prem Cordero MD        0.9 % sodium chloride infusion   Intravenous Continuous Prem Cordero MD        potassium chloride (KLOR-CON M) extended release tablet 40 mEq  40 mEq Oral PRN rPem Cordero MD        Or    potassium bicarb-citric acid (EFFER-K) effervescent tablet 40 mEq  40 mEq Oral PRN Prem Cordero MD        Or   [place] Ori potassium chloride 10 mEq/100 mL IVPB (Peripheral Line)  10 mEq Intravenous PRN Prem Cordero MD        magnesium sulfate 2000 mg in 50 mL IVPB premix  2,000 mg Intravenous PRN Prem Cordero MD        budesonide (PULMICORT) nebulizer suspension 500 mcg  0.5 mg Nebulization BID Prem Cordero MD        Arformoterol Tartrate (BROVANA) nebulizer solution 15 mcg  15 mcg Nebulization BID Prem Cordero MD         Current Outpatient Medications   Medication Sig Dispense Refill    azithromycin (ZITHROMAX Z-KEELY) 250 MG tablet Take 2 tabs on day 1 and 1 tab on days 2-5 6 tablet 0    predniSONE (DELTASONE) 20 MG tablet Take 1 tablet by mouth 2 times daily for 5 days 10 tablet 0    albuterol sulfate HFA (PROAIR HFA) 108 (90 Base) MCG/ACT inhaler Inhale 2 puffs into the lungs every 6 hours as needed for Wheezing 1 Inhaler 3    budesonide-formoterol (SYMBICORT) 160-4.5 MCG/ACT AERO Inhale 2 puffs into the lungs 2 times daily 1 Inhaler 5    levothyroxine (SYNTHROID) 88 MCG tablet Take 1 tablet by mouth once daily 90 tablet 0    atorvastatin (LIPITOR) 40 MG tablet TAKE 1 TABLET BY MOUTH ONCE DAILY AT NIGHT 90 tablet 0    zoster recombinant adjuvanted vaccine (SHINGRIX) 50 MCG/0.5ML SUSR injection Inject 0.5 mLs into the muscle See Admin Instructions 1 dose now and repeat in 2-6 months 0.5 mL 0       Allergies:  No Known Allergies    Problem List:    Patient Active Problem List   Diagnosis Code    Hypothyroidism E03.9    Syncope R55    TIA (transient ischemic attack) G45.9    Bradycardia R00.1    Tobacco abuse Z72.0    Hyperlipidemia E78.5    Abnormal PET scan of colon R94.8    Family history of polyps in the colon Z83.71    Left-sided weakness R53.1    Symptomatic bradycardia R00.1    Nonintractable headache R51.9    Current moderate episode of major depressive disorder without prior episode (HCC) F32.1    Convulsion (HCC) R56.9    Pacemaker Z95.0    Closed right hip fracture, initial encounter ([facility] [address] S72.001A    Chronic bronchitis (HCC) J42       Past Medical History:        Diagnosis Date    Cerebral artery occlusion with cerebral infarction ([facility] [address]     TIA    Diverticular disease of colon     Hyperlipidemia     Hypothyroidism     Pneumonia     Psychiatric problem     Tobacco abuse     Vitamin B12 deficiency        Past Surgical History:        Procedure Laterality Date    CARDIAC PACEMAKER PLACEMENT      COLONOSCOPY      LUNG SURGERY      collapsed lung    [place] COLONOSCOPY W/BIOPSY SINGLE/MULTIPLE N/A 6/27/2017    Dr Yaneth West-extensive and large mouthed diverticular disease throughout the right colon-Tubular AP (villiform) (-) dysplasia x 1, tubular AP (-) dysplasia x 1--5 yr recall    TUBAL LIGATION         Social History:    Social History     Tobacco Use    Smoking status: Current Every Day Smoker Packs/day: 0.50     Years: 30.00     Pack years: 15.00     Types: Cigarettes     Start date: 11/1/1970    Smokeless tobacco: Never Used    Tobacco comment: 1 pack of cigaretts last 1 1/2 days while at work   Substance Use Topics    Alcohol use: No                                Ready to quit: Not Answered  Counseling given: Not Answered  Comment: 1 pack of cigaretts last 1 1/2 days while at work      Vital Signs (Current):   Vitals:    05/15/21 1328   BP: (!) 147/79   Pulse: 80   Resp: 18   Temp: 98 °F (36.7 °C)   SpO2: 96%   Weight: 155 lb (70.3 kg)   Height: 5' 9\" (1.753 m)                                              BP Readings from Last 3 Encounters:   05/15/21 (!) 147/79   05/11/21 110/82   03/03/21 132/88       NPO Status:                                                                                 BMI:   Wt Readings from Last 3 Encounters:   05/15/21 155 lb (70.3 kg)   05/11/21 155 lb (70.3 kg)   03/03/21 162 lb 3.2 oz (73.6 kg)     Body mass index is 22.89 kg/m². CBC:   Lab Results   Component Value Date    WBC 10.5 05/15/2021    RBC 4.91 05/15/2021    HGB 13.7 05/15/2021    HCT 42.3 05/15/2021    HCT 36.6 07/04/2011    MCV 86.2 05/15/2021    RDW 13.5 05/15/2021     05/15/2021     07/04/2011       CMP:   Lab Results   Component Value Date     05/15/2021     07/04/2011    K 4.2 05/15/2021    K 4.3 11/05/2019    K 4.5 07/04/2011     05/15/2021     07/04/2011    CO2 29 05/15/2021    BUN 16 05/15/2021    CREATININE 0.5 05/15/2021    CREATININE 0.6 07/04/2011    GFRAA >59 05/15/2021    LABGLOM >60 05/15/2021    GLUCOSE 102 05/15/2021    PROT 6.7 05/15/2021    CALCIUM 9.7 05/15/2021    BILITOT 0.6 05/15/2021    ALKPHOS 78 05/15/2021    AST 18 05/15/2021    ALT 16 05/15/2021       POC Tests: No results for input(s): POCGLU, POCNA, POCK, POCCL, POCBUN, POCHEMO, POCHCT in the last 72 hours.     Coags:   Lab Results   Component Value Date    PROTIME 13.3 05/15/2021    INR 1.01 05/15/2021    APTT 27.2 10/16/2016       HCG (If Applicable): No results found for: PREGTESTUR, PREGSERUM, HCG, HCGQUANT     ABGs: No results found for: PHART, PO2ART, DPR8XSW, ZEH0ZDU, BEART, X4ICNZJT     Type & Screen (If Applicable):  No results found for: LABABO, LABRH    Drug/Infectious Status (If Applicable):  No results found for: HIV, HEPCAB    COVID-19 Screening (If Applicable):   Lab Results   Component Value Date    COVID19 Not Detected 05/15/2021    COVID19 not detected 03/03/2021           Anesthesia Evaluation  Patient summary reviewed and Nursing notes reviewed no history of anesthetic complications:   Airway: Mallampati: II  TM distance: >3 FB   Neck ROM: full  Mouth opening: > = 3 FB Dental:      Comment: Poor dentition upper/lower    Pulmonary:   (+) pneumonia: resolved,  rhonchi,  current smoker          Patient smoked on day of surgery. ROS comment: Chronic bronchitis, uses inhalers daily  PE comment: Chronic cough Cardiovascular:    (+) pacemaker: pacemaker, hyperlipidemia    (-) hypertension, past MI, CAD and CABG/stent    ECG reviewed  Rhythm: regular  Rate: normal           Beta Blocker:  Not on Beta Blocker      ROS comment: EKG 5-15-21       Neuro/Psych:   (+) CVA:, TIA (pt denies having a stroke), headaches:, psychiatric history:            GI/Hepatic/Renal: Neg GI/Hepatic/Renal ROS       (-) GERD, hepatitis, liver disease and no renal disease       Endo/Other:    (+) hypothyroidism::., no malignancy/cancer. (-) blood dyscrasia, no malignancy/cancer               Abdominal:           Vascular: negative vascular ROS. Anesthesia Plan      general     ASA 3       Induction: intravenous. Anesthetic plan and risks discussed with patient. Plan discussed with CRNA.                   JORDYN Fernando - RUPAL   5/15/2021

## 2021-05-15 NOTE — ANESTHESIA POSTPROCEDURE EVALUATION
Department of Anesthesiology  Postprocedure Note    Patient: Salena Keneny  MRN: [id number]  [place]: 1950  Date of evaluation: 5/15/2021  Time:  6:10 PM     Procedure Summary     Date: 05/15/21 Room / Location: [address]    Anesthesia Start: 1553 Anesthesia Stop: 6290    Procedure: HIP HEMIARTHROPLASTY (Right Hip) Diagnosis: (Right femoral neck fracture)    Surgeons: Cara Salomon MD Responsible Provider: JORDYN Vera Asp, CRNA    Anesthesia Type: general ASA Status: 3          Anesthesia Type: general    Jennifer Phase I: Jennifer Score: 9    Jennifer Phase II:      Last vitals: Reviewed and per EMR flowsheets.        Anesthesia Post Evaluation    Patient location during evaluation: PACU  Patient participation: complete - patient participated  Level of consciousness: awake and alert  Pain score: 0  Airway patency: patent  Nausea & Vomiting: no nausea and no vomiting  Complications: no  Cardiovascular status: hemodynamically stable and blood pressure returned to baseline  Respiratory status: acceptable and room air  Hydration status: stable

## 2021-05-15 NOTE — CONSULTS
Orthopaedic Inpatient Consultation    NAME:  Rosaline Vaughn   : 1950  MRN: [id number]    5/15/2021  1:10 PM    Requesting Physician: Tana Szymanski MD    CHIEF COMPLAINT:  Right hip pain      HISTORY OF PRESENT ILLNESS:   Ms. Liseth Hogue is a 28-year-old female who presented to the ER after sustaining a ground level mechanical fall, landing on her right side- states that she tripped, losing her balance. She had immediate onset pain with inability to bear weight. Radiographs in the ER revealed a displaced femoral neck fracture, orthopedics was consulted. On interview, she reports ongoing, relatively stable right hip pain with radiation into her thigh. She denies hitting her head or LOC. Denies numbness/tingling to RLE. Denies pain to remaining extremities. Denies active chest pain, back/neck pain. Of note, had recent episode of worsening dyspnea, treated as acute exacerbation of underlying chronic bronchitis. Past Medical History:        Diagnosis Date    Cerebral artery occlusion with cerebral infarction ([facility] [address]     TIA    Diverticular disease of colon     Hyperlipidemia     Hypothyroidism     Pneumonia     Psychiatric problem     Tobacco abuse     Vitamin B12 deficiency        Past Surgical History:        Procedure Laterality Date    CARDIAC PACEMAKER PLACEMENT      COLONOSCOPY      LUNG SURGERY      collapsed lung    [place] COLONOSCOPY W/BIOPSY SINGLE/MULTIPLE N/A 2017    Dr Carrie West-extensive and large mouthed diverticular disease throughout the right colon-Tubular AP (villiform) (-) dysplasia x 1, tubular AP (-) dysplasia x 1--5 yr recall    TUBAL LIGATION         Current Medications:   Prior to Admission medications    Medication Sig Start Date End Date Taking?  Authorizing Provider   azithromycin (ZITHROMAX Z-KEELY) 250 MG tablet Take 2 tabs on day 1 and 1 tab on days 2-5 21  Yes Shira Herrera MD   predniSONE (DELTASONE) 20 MG tablet Take 1 tablet by mouth 2 times daily for 5 of Transportation (Non-Medical):    Physical Activity:     Days of Exercise per Week:     Minutes of Exercise per Session:    Stress:     Feeling of Stress :    Social Connections:     Frequency of Communication with Friends and Family:     Frequency of Social Gatherings with Friends and Family:     Attends Sikh Services:     Active Member of Clubs or Organizations:     Attends Club or Organization Meetings:     Marital Status:    Intimate Partner Violence:     Fear of Current or Ex-Partner:     Emotionally Abused:     Physically Abused:     Sexually Abused:        Family History:   Family History   Problem Relation Age of Onset    Other Mother         COPD, dementia    Heart Disease Mother     Stroke Mother     Heart Disease Father     Stroke Father     Diabetes Brother     Diabetes Brother     High Blood Pressure Brother     High Cholesterol Brother     Colon Polyps Brother     Colon Cancer Neg Hx     Liver Cancer Neg Hx     Liver Disease Neg Hx     Esophageal Cancer Neg Hx     Rectal Cancer Neg Hx     Stomach Cancer Neg Hx        REVIEW OF SYSTEMS:  14 point review of systems has been reviewed from the patient's emergency room visit, reviewed with the patient on today's date with no new changes. PHYSICAL EXAM:      Physical Examination:  Vitals:   Vitals:    05/15/21 1328   BP: (!) 147/79   Pulse: 80   Resp: 18   Temp: 98 °F (36.7 °C)   SpO2: 96%   Weight: 155 lb (70.3 kg)   Height: 5' 9\" (1.753 m)     General:  Appears stated age, no distress. Orientation:  Alert and oriented to time, place, and person. Mood and Affect:  Cooperative and pleasant. HEENT: Head is normocephalic, atraumatic. No gross visual or auditory acuity deficits. Gait:  Resting comfortably in bed. Cardiovascular:  Symmetric 1-2 plus pulses in upper and lower extremities. Sensation:  Grossly intact to light touch.   Coordination/balance:  Normal    Musculoskeletal:  Right upper extremity exam:  There is no tenderness to palpation about the shoulder, elbow, wrist or hand. Unrestricted full function motion is present. Stability is normal with provocative tests, 5/5 strength, and skin is normal.      Left upper extremity exam:  There is no tenderness to palpation about the shoulder, elbow, wrist or hand. Unrestricted full function motion is present. Stability is normal with provocative tests, 5/5 strength, and skin is normal.     Right lower extremity exam:  Shortened, externally rotated. No open skin wounds or lesions. No prior incisions about right hip. Palpation deferred about right hip. No significant tenderness to distal femur, knee, tibia/fibula, ankle or foot. EHL, FHL, TA, GS motor intact. Gross sensation intact to sural, saphenous, deep/superficial peroneal and tibial nerve distributions. Right foot warm, perfused. Left lower extremity exam:  There is no tenderness to palpation about the hip, knee, ankle or foot. Unrestricted full function motion is present.   Stability is normal with provocative tests, 5/5 strength, and skin is normal.      DATA:    CBC with Differential:    Lab Results   Component Value Date    WBC 10.5 05/15/2021    RBC 4.91 05/15/2021    HGB 13.7 05/15/2021    HCT 42.3 05/15/2021    HCT 36.6 07/04/2011     05/15/2021     07/04/2011    MCV 86.2 05/15/2021    MCH 27.9 05/15/2021    MCHC 32.4 05/15/2021    RDW 13.5 05/15/2021    LYMPHOPCT 17.1 05/15/2021    MONOPCT 7.2 05/15/2021    EOSPCT 1.0 07/04/2011    BASOPCT 0.1 05/15/2021    MONOSABS 0.80 05/15/2021    LYMPHSABS 1.8 05/15/2021    EOSABS 0.00 05/15/2021    BASOSABS 0.00 05/15/2021     CMP:    Lab Results   Component Value Date     05/15/2021     07/04/2011    K 4.2 05/15/2021    K 4.3 11/05/2019    K 4.5 07/04/2011     05/15/2021     07/04/2011    CO2 29 05/15/2021    BUN 16 05/15/2021    CREATININE 0.5 05/15/2021    CREATININE 0.6 07/04/2011    GFRAA >59 05/15/2021    LABGLOM >60 05/15/2021    GLUCOSE 102 05/15/2021    PROT 6.7 05/15/2021    CALCIUM 9.7 05/15/2021    BILITOT 0.6 05/15/2021    ALKPHOS 78 05/15/2021    AST 18 05/15/2021    ALT 16 05/15/2021     BMP:    Lab Results   Component Value Date     05/15/2021     07/04/2011    K 4.2 05/15/2021    K 4.3 11/05/2019    K 4.5 07/04/2011     05/15/2021     07/04/2011    CO2 29 05/15/2021    BUN 16 05/15/2021    CREATININE 0.5 05/15/2021    CREATININE 0.6 07/04/2011    CALCIUM 9.7 05/15/2021    GFRAA >59 05/15/2021    LABGLOM >60 05/15/2021    GLUCOSE 102 05/15/2021         Radiology: I have reviewed the radiology images listed below and agree with the findings of the interpreting radiologist(s). XR FEMUR RIGHT (MIN 2 VIEWS)    Result Date: 5/15/2021  EXAMINATION: XR FEMUR RIGHT (MIN 2 VIEWS) 5/15/2021 1:48 PM HISTORY: Pain after fall COMPARISON: None FINDINGS: There is an acute subcapital right hip fracture with proximal migration of the femur. The femoral head appears appropriately located in the acetabulum. There is normal bony alignment at the knee. No lytic or sclerotic lesions are identified. Acute subcapital right hip fracture. Signed by Dr Rikki Marley on 5/15/2021 1:49 PM    XR TIBIA FIBULA RIGHT (2 VIEWS)    Result Date: 5/15/2021  EXAMINATION: XR TIBIA FIBULA RIGHT (2 VIEWS) 5/15/2021 1:49 PM HISTORY: Proximal pain COMPARISON: None FINDINGS: There is normal bony alignment at the knee and ankle without evidence of acute fracture or dislocation. No lytic or sclerotic lesions are identified. No soft tissue abnormality is seen. Degenerative plantar calcaneal spurring is present. No acute bony abnormality appreciated.  Signed by Dr Rikki Marley on 5/15/2021 1:50 PM    XR CHEST PORTABLE    Result Date: 5/15/2021  EXAMINATION: XR CHEST PORTABLE 5/15/2021 1:47 PM HISTORY: Fall, history of tobacco abuse COMPARISON: 3/3/2021 FINDINGS: The heart and mediastinal contours appear normal. A left subclavian approach dual lead cardiac pacing device is in stable position. Diffuse interstitial coarsening is evident. There is associated hyperinflation. There is no focal consolidation or effusion. No pneumothorax is appreciated. The pulmonary vasculature appears grossly normal.    Chronic interstitial changes. No evidence of acute cardiopulmonary process. Signed by Dr Tuyet Burden on 5/15/2021 1:48 PM    --------------------------------------------------------------------------------------------------------------------    Assessment:   73 y/o F s/p fall with right displaced subcapital femoral neck fracture, significant medical history for prior TIA, hyperlipidemia, hypothyroidism, COPD, chronic tobacco use    Plan:  1) Right hip fracture: Discussed clinical and radiographic findings with the patient this afternoon. Attempted to reach out to her family -son Suzanne Dubon who was listed as her patient contact in the EMR, but was unable to reach him. Nonetheless, I discussed treatment options consisting of both conservative and operative intervention with the patient. Ultimately, she elected to proceed with operative intervention with which I agree. Surgical risks including, but not limited to, bleeding, infection, ongoing pain, need for additional procedures, leg length discrepancy, hip instability, leg weakness, stiffness, DVT/PE and risks of anesthesia- including death, were discussed. All questions were answered. Written and informed consent will be obtained.   2) Admit postop for pain control, PT/OT  3) Dispo: Pending post-op course     Electronically signed by Froylan Patel MD on 5/15/2021 at 3:06 PM

## 2021-05-15 NOTE — ED NOTES
Bed: 01-A  Expected date:   Expected time:   Means of arrival:   Comments:  Gracie Rebolledo RN  05/15/21 1393

## 2021-05-16 LAB
ANION GAP SERPL CALCULATED.3IONS-SCNC: 9 MMOL/L (ref 7–19)
BASOPHILS ABSOLUTE: 0 K/UL (ref 0–0.2)
BASOPHILS RELATIVE PERCENT: 0.1 % (ref 0–1)
BUN BLDV-MCNC: 13 MG/DL (ref 8–23)
CALCIUM SERPL-MCNC: 9.2 MG/DL (ref 8.8–10.2)
CHLORIDE BLD-SCNC: 104 MMOL/L (ref 98–111)
CO2: 27 MMOL/L (ref 22–29)
CREAT SERPL-MCNC: 0.7 MG/DL (ref 0.5–0.9)
EOSINOPHILS ABSOLUTE: 0 K/UL (ref 0–0.6)
EOSINOPHILS RELATIVE PERCENT: 0 % (ref 0–5)
GFR AFRICAN AMERICAN: >59
GFR NON-AFRICAN AMERICAN: >60
GLUCOSE BLD-MCNC: 173 MG/DL (ref 74–109)
HCT VFR BLD CALC: 37.1 % (ref 37–47)
HEMOGLOBIN: 11.8 G/DL (ref 12–16)
IMMATURE GRANULOCYTES #: 0.1 K/UL
LYMPHOCYTES ABSOLUTE: 1.1 K/UL (ref 1.1–4.5)
LYMPHOCYTES RELATIVE PERCENT: 9 % (ref 20–40)
MCH RBC QN AUTO: 28 PG (ref 27–31)
MCHC RBC AUTO-ENTMCNC: 31.8 G/DL (ref 33–37)
MCV RBC AUTO: 88.1 FL (ref 81–99)
MONOCYTES ABSOLUTE: 0.6 K/UL (ref 0–0.9)
MONOCYTES RELATIVE PERCENT: 4.5 % (ref 0–10)
NEUTROPHILS ABSOLUTE: 10.5 K/UL (ref 1.5–7.5)
NEUTROPHILS RELATIVE PERCENT: 85.7 % (ref 50–65)
PDW BLD-RTO: 13.7 % (ref 11.5–14.5)
PLATELET # BLD: 112 K/UL (ref 130–400)
PMV BLD AUTO: 11.8 FL (ref 9.4–12.3)
POTASSIUM REFLEX MAGNESIUM: 3.8 MMOL/L (ref 3.5–5)
RBC # BLD: 4.21 M/UL (ref 4.2–5.4)
SODIUM BLD-SCNC: 140 MMOL/L (ref 136–145)
WBC # BLD: 12.3 K/UL (ref 4.8–10.8)

## 2021-05-16 PROCEDURE — 6370000000 HC RX 637 (ALT 250 FOR IP): Performed by: ORTHOPAEDIC SURGERY

## 2021-05-16 PROCEDURE — 2700000000 HC OXYGEN THERAPY PER DAY

## 2021-05-16 PROCEDURE — 97162 PT EVAL MOD COMPLEX 30 MIN: CPT

## 2021-05-16 PROCEDURE — 97110 THERAPEUTIC EXERCISES: CPT

## 2021-05-16 PROCEDURE — 36415 COLL VENOUS BLD VENIPUNCTURE: CPT

## 2021-05-16 PROCEDURE — 97116 GAIT TRAINING THERAPY: CPT

## 2021-05-16 PROCEDURE — 6360000002 HC RX W HCPCS: Performed by: ORTHOPAEDIC SURGERY

## 2021-05-16 PROCEDURE — 85025 COMPLETE CBC W/AUTO DIFF WBC: CPT

## 2021-05-16 PROCEDURE — 80048 BASIC METABOLIC PNL TOTAL CA: CPT

## 2021-05-16 PROCEDURE — 2580000003 HC RX 258: Performed by: STUDENT IN AN ORGANIZED HEALTH CARE EDUCATION/TRAINING PROGRAM

## 2021-05-16 PROCEDURE — 1210000000 HC MED SURG R&B

## 2021-05-16 PROCEDURE — 94640 AIRWAY INHALATION TREATMENT: CPT

## 2021-05-16 RX ORDER — 0.9 % SODIUM CHLORIDE 0.9 %
1000 INTRAVENOUS SOLUTION INTRAVENOUS ONCE
Status: COMPLETED | OUTPATIENT
Start: 2021-05-16 | End: 2021-05-16

## 2021-05-16 RX ADMIN — ATORVASTATIN CALCIUM 40 MG: 40 TABLET, FILM COATED ORAL at 19:59

## 2021-05-16 RX ADMIN — OXYCODONE HYDROCHLORIDE AND ACETAMINOPHEN 1 TABLET: 10; 325 TABLET ORAL at 09:43

## 2021-05-16 RX ADMIN — Medication 2000 MG: at 01:58

## 2021-05-16 RX ADMIN — ARFORMOTEROL TARTRATE 15 MCG: 15 SOLUTION RESPIRATORY (INHALATION) at 06:20

## 2021-05-16 RX ADMIN — Medication 2000 MG: at 07:56

## 2021-05-16 RX ADMIN — BUDESONIDE 500 MCG: 0.5 SUSPENSION RESPIRATORY (INHALATION) at 06:21

## 2021-05-16 RX ADMIN — PREDNISONE 40 MG: 20 TABLET ORAL at 07:57

## 2021-05-16 RX ADMIN — IPRATROPIUM BROMIDE AND ALBUTEROL SULFATE 1 AMPULE: 2.5; .5 SOLUTION RESPIRATORY (INHALATION) at 06:10

## 2021-05-16 RX ADMIN — OXYCODONE HYDROCHLORIDE AND ACETAMINOPHEN 1 TABLET: 5; 325 TABLET ORAL at 19:59

## 2021-05-16 RX ADMIN — SODIUM CHLORIDE 1000 ML: 9 INJECTION, SOLUTION INTRAVENOUS at 10:36

## 2021-05-16 RX ADMIN — ARFORMOTEROL TARTRATE 15 MCG: 15 SOLUTION RESPIRATORY (INHALATION) at 20:19

## 2021-05-16 RX ADMIN — LEVOTHYROXINE SODIUM 88 MCG: 0.09 TABLET ORAL at 05:11

## 2021-05-16 RX ADMIN — ENOXAPARIN SODIUM 40 MG: 40 INJECTION SUBCUTANEOUS at 07:57

## 2021-05-16 RX ADMIN — BUDESONIDE 500 MCG: 0.5 SUSPENSION RESPIRATORY (INHALATION) at 20:19

## 2021-05-16 RX ADMIN — IPRATROPIUM BROMIDE AND ALBUTEROL SULFATE 1 AMPULE: 2.5; .5 SOLUTION RESPIRATORY (INHALATION) at 10:16

## 2021-05-16 RX ADMIN — OXYCODONE HYDROCHLORIDE AND ACETAMINOPHEN 1 TABLET: 10; 325 TABLET ORAL at 05:11

## 2021-05-16 RX ADMIN — IPRATROPIUM BROMIDE AND ALBUTEROL SULFATE 1 AMPULE: 2.5; .5 SOLUTION RESPIRATORY (INHALATION) at 14:13

## 2021-05-16 RX ADMIN — IPRATROPIUM BROMIDE AND ALBUTEROL SULFATE 1 AMPULE: 2.5; .5 SOLUTION RESPIRATORY (INHALATION) at 20:11

## 2021-05-16 ASSESSMENT — PAIN SCALES - GENERAL
PAINLEVEL_OUTOF10: 4
PAINLEVEL_OUTOF10: 4
PAINLEVEL_OUTOF10: 8
PAINLEVEL_OUTOF10: 8

## 2021-05-16 ASSESSMENT — PAIN DESCRIPTION - ORIENTATION: ORIENTATION: RIGHT

## 2021-05-16 ASSESSMENT — PAIN - FUNCTIONAL ASSESSMENT: PAIN_FUNCTIONAL_ASSESSMENT: PREVENTS OR INTERFERES SOME ACTIVE ACTIVITIES AND ADLS

## 2021-05-16 ASSESSMENT — PAIN DESCRIPTION - DESCRIPTORS: DESCRIPTORS: SORE;DISCOMFORT

## 2021-05-16 ASSESSMENT — PAIN DESCRIPTION - LOCATION: LOCATION: HIP

## 2021-05-16 NOTE — PROGRESS NOTES
Physical Therapy    Facility/Department: [facility] SURG SERVICES  Initial Assessment    NAME: Gold Genao  : 1950  MRN: [id number]    Date of Service: 2021    Discharge Recommendations:  Continue to assess pending progress, Patient would benefit from continued therapy after discharge, 24 hour supervision or assist        Assessment   Body structures, Functions, Activity limitations: Decreased functional mobility ; Decreased ROM; Decreased strength;Decreased sensation;Decreased posture; Increased pain;Decreased balance  Assessment: Pt. will benefit from cont. PT to decrease impairments. Pt. a fall risk due to pain and anxiety. She would most likely benefit from cont. therapy after d/c from [facility]. Pt.'s BP initially low at 92/54, but once she sat EOB her BP came up to 117/69, as did pain. If pt's pain is better controlled, she would most likely be able to d/c home if able to progress with mobility. Pt. unable to WB much through RLE today due to pain, but she did well with transfering to chair with RW, gait belt and A from PT. Pt. may need BSC for initial use until better able to mobilize. Treatment Diagnosis: impaired gait and mobility  Prognosis: Good  Decision Making: Medium Complexity  PT Education: PT Role;General Safety;Weight-bearing Education; Adaptive Device Training;Functional Mobility Training;Transfer Training;Gait Training  Patient Education: use of call light for mobility and needs  Barriers to Learning: anxiety? and pain  REQUIRES PT FOLLOW UP: Yes  Activity Tolerance  Activity Tolerance: Patient limited by pain;Treatment limited secondary to medical complications (free text)  Activity Tolerance: pt's BP initially low, but most likely increased due to pain       Patient Diagnosis(es): The primary encounter diagnosis was Closed fracture of right hip, initial encounter ([facility] [address] Diagnoses of Tobacco abuse, Fall, initial encounter, and Artificial pacemaker were also pertinent to this visit.      has a past medical history of Cerebral artery occlusion with cerebral infarction [facility]), Diverticular disease of colon, Hyperlipidemia, Hypothyroidism, Pneumonia, Psychiatric problem, Tobacco abuse, and Vitamin B12 deficiency. has a past surgical history that includes Tubal ligation; Lung surgery; pr colonoscopy w/biopsy single/multiple (N/A, 6/27/2017); Colonoscopy; and Cardiac pacemaker placement. Restrictions  Restrictions/Precautions  Restrictions/Precautions: Weight Bearing, Fall Risk  Required Braces or Orthoses?: No  Lower Extremity Weight Bearing Restrictions  Right Lower Extremity Weight Bearing: Weight Bearing As Tolerated  Position Activity Restriction  Other position/activity restrictions: ant hip prec  Vision/Hearing  Vision: Impaired  Vision Exceptions: Wears glasses at all times  Hearing: Within functional limits     Subjective  General  Chart Reviewed: Yes  Patient assessed for rehabilitation services?: Yes  Response To Previous Treatment: Not applicable  Family / Caregiver Present: No  Referring Practitioner: Loc Pace MD  Referral Date : 05/16/21  Diagnosis: Right subcapital displaced femoral neck fracture  Follows Commands: Impaired  Other (Comment): pt a little anxious  General Comment  Comments: Emanuel PAULINO PT and present to give pain meds once pt t/f to chair. Noted in chart that pt's BP had been low, but pt asymptomatic. R hip amberly 5/15/21  Subjective  Subjective: Pt. willing to get up to the chair. \"Please don't let my leg drop. \"  Pain Screening  Patient Currently in Pain: Yes  Pain Assessment  Pain Assessment: 0-10  Pain Level: 8  Pain Type: Surgical pain  Pain Location: Hip  Pain Orientation: Right  Pain Descriptors: Sore;Discomfort  Functional Pain Assessment: Prevents or interferes some active activities and ADLs  Non-Pharmaceutical Pain Intervention(s): Repositioned; Ambulation/Increased Activity;Cold applied;Elevation  Response to Pain Intervention: Patient Satisfied  Vital Signs  Orthostatic B/P and Pulse?: Yes  Blood Pressure Lyin/54  Pulse Lyin PER MINUTE  Blood Pressure Sittin/69  Pulse Sittin PER MINUTE  Patient Currently in Pain: Yes  Orthostatic B/P and Pulse?: Yes  Oxygen Therapy  SpO2: (!) 87 % (on RA trial, pt placed back on 2LO2 prior to transfer to chair and was 95%)  Pre Treatment Pain Screening  Intervention List: Patient able to continue with treatment;Nurse called to administer meds    Orientation  Orientation  Overall Orientation Status: Within Functional Limits  Social/Functional History  Social/Functional History  Lives With: Son  Home Layout: One level  Home Equipment:  (none)  ADL Assistance: Independent  Homemaking Assistance: Independent  Ambulation Assistance: Independent  Transfer Assistance: Independent  Active : Yes  Occupation: Full time employment  Type of occupation: CNA at [employer]  Overall Cognitive Status: Exceptions  Arousal/Alertness: Appropriate responses to stimuli  Following Commands: Follows one step commands with repetition; Follows one step commands with increased time  Attention Span: Appears intact  Memory: Appears intact  Safety Judgement: Good awareness of safety precautions  Problem Solving: Assistance required to generate solutions;Assistance required to implement solutions  Insights: Fully aware of deficits  Initiation: Requires cues for some  Sequencing: Requires cues for some  Cognition Comment: a little anxious due to pain and anticipation of pain    Objective     Observation/Palpation  Posture: Good  Observation: IV, O2, deras    AROM RLE (degrees)  RLE AROM: Exceptions  RLE General AROM: AAROM knee WFLs, ankle to neutral DF only, hip to 90 deg due to pain  AROM LLE (degrees)  LLE AROM : WFL  Strength RLE  Strength RLE: Exception  Comment: 3-/5 grossly  Strength LLE  Strength LLE: WFL  Comment: 5/5     Sensation  Overall Sensation Status: Impaired (reports that her sensation is decreased in R foot especially toes)  Bed mobility  Supine to Sit: Minimal assistance  Sit to Supine: Unable to assess  Comment: pt needing extended time to come to sitting due to pain in R hip. Pt. sat on EOB x 5 mins CGA. Transfers  Sit to Stand: Moderate Assistance (extended time to come to full standing due to pain)  Stand to sit: Minimal Assistance  Bed to Chair: Minimal assistance (pt. pivoted on LLE)  Comment: pt putting minimal weight on RLE due to pain, but is very strong in [address] for RW use to take the weight off RLE. Ambulation  Ambulation?: Yes  WB Status: FWBAT RLE  Ambulation 1  Surface: level tile  Device: Rolling Walker  Other Apparatus: O2  Assistance: Minimal assistance  Quality of Gait: a little unsteady, antalgic  Gait Deviations: Slow Jane;Decreased step length;Decreased step height  Distance: 2' to chair  Comments: decreased ability to WB on RLE due to pain     Balance  Posture: Good  Sitting - Static: +;Fair  Sitting - Dynamic: -;Fair  Standing - Static: Poor;+  Standing - Dynamic: Poor;+  Exercises  Knee Long Arc Quad: x 10 reps RLE within available range  Ankle Pumps: x10 BLEs     Plan   Plan  Times per week: 3-7  Times per day: Twice a day  Plan weeks: 2  Current Treatment Recommendations: Strengthening, ROM, Balance Training, Functional Mobility Training, Transfer Training, Gait Training, Safety Education & Training, Positioning, Equipment Evaluation, Education, & procurement, Patient/Caregiver Education & Training, Stair training  Plan Comment: cont. PT per POC.   Safety Devices  Type of devices:  (ice pack, pt's brother present)    G-Code       OutComes Score                                                  AM-PAC Score             Goals  Short term goals  Time Frame for Short term goals: 2 wks  Short term goal 1: supine to sit indep  Short term goal 2: sit to stand indep  Short term goal 3: amb. 100' with RW SBA  Short term goal 4: up/down 2-3 stairs SBA with rail  Patient Goals Patient goals : go home       Therapy Time   Individual Concurrent Group Co-treatment   Time In           Time Out           Minutes                   Lisa Colin PT     Electronically signed by Lisa Colin PT on 5/16/2021 at 10:39 AM <--- Click to Launch ICDx for PreOp, PostOp and Procedure

## 2021-05-16 NOTE — PROGRESS NOTES
Daily Progress Note    Date:2021  Patient: Ruby Santana  : 1950  [id number]  CODE:Full Code No additional code details  Brunilda Merino MD    Admit Date: 5/15/2021  1:10 PM   LOS: 1 day       Subjective:    52-year-old female with chronic bronchitis, tobacco abuse, history of symptomatic bradycardia status post pacemaker placement, history of TIA, hyperlipidemia, presented to the ED on 5/15 following fall from standing after she tripped and fell on her right side, with severe pain and unable to bear weight-admitted after she was found to have right subcapital displaced femoral neck fracture on imaging. Orthopedics consulted and patient underwent right cemented hip hemiarthroplasty on 5/15 with estimated blood loss of 325 cc. Patient recently completed treatment for mild exacerbation of COPD patient 5 days of steroids, continued on bronchodilators. underwent rehab postop. Mild drop in H&H related to postop acute blood loss. This a.m. doing well, no worsening pain. No significant shortness of breath.         Review of Systems    Comprehensive ROS completed and is negative except as otherwise noted    Objective:      Vital signs in last 24 hours:  Patient Vitals for the past 24 hrs:   BP Temp Temp src Pulse Resp SpO2 Height Weight   21 1012 -- -- -- -- -- (!) 87 % -- --   21 0718 (!) 91/50 96.5 °F (35.8 °C) Temporal 60 16 93 % -- --   21 0330 (!) 100/54 96.4 °F (35.8 °C) Temporal 72 18 93 % -- --   05/15/21 2330 (!) 100/58 97 °F (36.1 °C) Temporal 64 18 92 % -- --   05/15/21 2119 (!) 90/56 96.5 °F (35.8 °C) Temporal 60 14 91 % -- --   05/15/21 2028 (!) 96/48 96.6 °F (35.9 °C) Temporal 61 14 90 % -- --   05/15/21 194 128/71 96.4 °F (35.8 °C) Temporal 64 18 93 % -- --   05/15/21 1923 119/71 96 °F (35.6 °C) Temporal 60 16 96 % -- --   05/15/21 1907 -- -- -- 61 -- -- -- --   05/15/21 1905 131/73 96.1 °F (35.6 °C) Temporal 61 16 91 % -- --   05/15/21 1855 112/63 98 °F (36.7 °C) Temporal 62 14 98 % -- --   05/15/21 1850 117/60 -- -- 61 14 97 % -- --   05/15/21 1840 (!) 124/59 -- -- 64 11 97 % -- --   05/15/21 1835 115/62 -- -- 66 11 90 % -- --   05/15/21 1830 (!) 118/55 -- -- 60 13 91 % -- --   05/15/21 1825 114/62 -- -- 60 16 90 % -- --   05/15/21 1820 120/62 -- -- 61 15 91 % -- --   05/15/21 1815 117/62 -- -- 60 15 90 % -- --   05/15/21 1810 133/70 97.6 °F (36.4 °C) Temporal 63 17 96 % -- --   05/15/21 1328 (!) 147/79 98 °F (36.7 °C) -- 80 18 96 % 5' 9\" (1.753 m) 155 lb (70.3 kg)     Physical exam    General: No acute distress  Head: Normocephalic/atraumatic  Neck: Supple, nontender  Eyes: Symmetrical  Cardiac: Regular rhythm, pulses equal  Respirations: Coarse breath sounds but no wheezes  Abdomen: Soft, nontender, ostomy present  Neurologic: No focal deficits  Extremities: Right lower extremity with dressing in place, no erythema or ecchymosis, neurovascularly intact        Lab Review   Recent Results (from the past 24 hour(s))   TYPE AND SCREEN CAPTURE THREE SCREEN CELL    Collection Time: 05/15/21  1:20 PM   Result Value Ref Range    ABO/Rh B POS     Antibody Screen NEG    Comprehensive Metabolic Panel    Collection Time: 05/15/21  1:23 PM   Result Value Ref Range    Sodium 139 136 - 145 mmol/L    Potassium 4.2 3.5 - 5.0 mmol/L    Chloride 103 98 - 111 mmol/L    CO2 29 22 - 29 mmol/L    Anion Gap 7 7 - 19 mmol/L    Glucose 102 74 - 109 mg/dL    BUN 16 8 - 23 mg/dL    CREATININE 0.5 0.5 - 0.9 mg/dL    GFR Non-African American >60 >60    GFR African American >59 >59    Calcium 9.7 8.8 - 10.2 mg/dL    Total Protein 6.7 6.6 - 8.7 g/dL    Albumin 4.2 3.5 - 5.2 g/dL    Total Bilirubin 0.6 0.2 - 1.2 mg/dL    Alkaline Phosphatase 78 35 - 104 U/L    ALT 16 5 - 33 U/L    AST 18 5 - 32 U/L   CBC Auto Differential    Collection Time: 05/15/21  1:23 PM   Result Value Ref Range    WBC 10.5 4.8 - 10.8 K/uL    RBC 4.91 4.20 - 5.40 M/uL    Hemoglobin 13.7 12.0 - 16.0 g/dL    Hematocrit 42.3 37.0 - 47.0 %    MCV 86.2 81.0 - 99.0 fL    MCH 27.9 27.0 - 31.0 pg    MCHC 32.4 (L) 33.0 - 37.0 g/dL    RDW 13.5 11.5 - 14.5 %    Platelets 125 (L) 117 - 400 K/uL    MPV 11.0 9.4 - 12.3 fL    Neutrophils % 74.3 (H) 50.0 - 65.0 %    Lymphocytes % 17.1 (L) 20.0 - 40.0 %    Monocytes % 7.2 0.0 - 10.0 %    Eosinophils % 0.0 0.0 - 5.0 %    Basophils % 0.1 0.0 - 1.0 %    Neutrophils Absolute 7.8 (H) 1.5 - 7.5 K/uL    Immature Granulocytes # 0.1 K/uL    Lymphocytes Absolute 1.8 1.1 - 4.5 K/uL    Monocytes Absolute 0.80 0.00 - 0.90 K/uL    Eosinophils Absolute 0.00 0.00 - 0.60 K/uL    Basophils Absolute 0.00 0.00 - 0.20 K/uL   Protime-INR    Collection Time: 05/15/21  1:23 PM   Result Value Ref Range    Protime 13.3 12.0 - 14.6 sec    INR 1.01 0.88 - 1.18   COVID-19, Rapid    Collection Time: 05/15/21  1:55 PM    Specimen: Nasopharyngeal Swab   Result Value Ref Range    SARS-CoV-2, NAAT Not Detected Not Detected   CBC auto differential    Collection Time: 05/16/21  9:14 AM   Result Value Ref Range    WBC 12.3 (H) 4.8 - 10.8 K/uL    RBC 4.21 4.20 - 5.40 M/uL    Hemoglobin 11.8 (L) 12.0 - 16.0 g/dL    Hematocrit 37.1 37.0 - 47.0 %    MCV 88.1 81.0 - 99.0 fL    MCH 28.0 27.0 - 31.0 pg    MCHC 31.8 (L) 33.0 - 37.0 g/dL    RDW 13.7 11.5 - 14.5 %    Platelets 548 (L) 261 - 400 K/uL    MPV 11.8 9.4 - 12.3 fL    Neutrophils % 85.7 (H) 50.0 - 65.0 %    Lymphocytes % 9.0 (L) 20.0 - 40.0 %    Monocytes % 4.5 0.0 - 10.0 %    Eosinophils % 0.0 0.0 - 5.0 %    Basophils % 0.1 0.0 - 1.0 %    Neutrophils Absolute 10.5 (H) 1.5 - 7.5 K/uL    Immature Granulocytes # 0.1 K/uL    Lymphocytes Absolute 1.1 1.1 - 4.5 K/uL    Monocytes Absolute 0.60 0.00 - 0.90 K/uL    Eosinophils Absolute 0.00 0.00 - 0.60 K/uL    Basophils Absolute 0.00 0.00 - 0.20 K/uL       I/O last 3 completed shifts: In: 3000 [P.O.:350; I.V.:2650]  Out: 2025 [Urine:1700; Blood:325]  I/O this shift:  In: 0851 [P.O.:200;  I.V.:1115]  Out: -       Current Facility-Administered Medications:     ipratropium-albuterol (DUONEB) nebulizer solution 1 ampule, 1 ampule, Inhalation, 4x daily, Laisha Powell MD, 1 ampule at 05/16/21 0610    atorvastatin (LIPITOR) tablet 40 mg, 40 mg, Oral, Nightly, Laisha Powell MD, 40 mg at 05/15/21 2047    levothyroxine (SYNTHROID) tablet 88 mcg, 88 mcg, Oral, Daily, Laisha Powell MD, 88 mcg at 05/16/21 0511    predniSONE (DELTASONE) tablet 40 mg, 40 mg, Oral, Daily, Laisha Powell MD, 40 mg at 05/16/21 0757    sodium chloride flush 0.9 % injection 5-40 mL, 5-40 mL, Intravenous, 2 times per day, Laisha Powell MD    sodium chloride flush 0.9 % injection 5-40 mL, 5-40 mL, Intravenous, PRN, Laisha Powell MD    promethazine (PHENERGAN) tablet 12.5 mg, 12.5 mg, Oral, Q6H PRN **OR** ondansetron (ZOFRAN) injection 4 mg, 4 mg, Intravenous, Q6H PRN, Laisha Powell MD    polyethylene glycol [facility]) packet 17 g, 17 g, Oral, Daily PRN, Laisha Powell MD    acetaminophen (TYLENOL) tablet 650 mg, 650 mg, Oral, Q6H PRN **OR** acetaminophen (TYLENOL) suppository 650 mg, 650 mg, Rectal, Q6H PRN, Laisha Powell MD    0.9 % sodium chloride infusion, , Intravenous, Continuous, Laisha Powell MD    potassium chloride (KLOR-CON M) extended release tablet 40 mEq, 40 mEq, Oral, PRN **OR** potassium bicarb-citric acid (EFFER-K) effervescent tablet 40 mEq, 40 mEq, Oral, PRN **OR** potassium chloride 10 mEq/100 mL IVPB (Peripheral Line), 10 mEq, Intravenous, PRN, Laisha Powell MD    magnesium sulfate 2000 mg in 50 mL IVPB premix, 2,000 mg, Intravenous, PRN, Laisha Powell MD    budesonide (PULMICORT) nebulizer suspension 500 mcg, 0.5 mg, Nebulization, BID, Laisha Powell MD, 500 mcg at 05/16/21 1641    Arformoterol Tartrate (BROVANA) nebulizer solution 15 mcg, 15 mcg, Nebulization, BID, Laisha Powell MD, 15 mcg at 05/16/21 0620    oxyCODONE-acetaminophen (PERCOCET) 5-325 MG per tablet 1 tablet, 1 tablet, Oral, Q4H PRN, Laisha Powell MD  [facility]

## 2021-05-16 NOTE — PROGRESS NOTES
Orthopedic Surgery Progress Note    [facility]  5/16/2021      Subjective:     Awake, lying in bed. States that pain is improved from preoperative status. Currently rates pain 2/10. States that pain is controlled with pain medication. Denies numbness or tingling to the right lower extremity. Denies active chest pain or shortness of breath. Eating breakfast this morning. Objective:     Patient Vitals for the past 24 hrs:   BP Temp Temp src Pulse Resp SpO2 Height Weight   05/16/21 0718 (!) 91/50 96.5 °F (35.8 °C) Temporal 60 16 93 % -- --   05/16/21 0330 (!) 100/54 96.4 °F (35.8 °C) Temporal 72 18 93 % -- --   05/15/21 2330 (!) 100/58 97 °F (36.1 °C) Temporal 64 18 92 % -- --   05/15/21 2119 (!) 90/56 96.5 °F (35.8 °C) Temporal 60 14 91 % -- --   05/15/21 2028 (!) 96/48 96.6 °F (35.9 °C) Temporal 61 14 90 % -- --   05/15/21 1940 128/71 96.4 °F (35.8 °C) Temporal 64 18 93 % -- --   05/15/21 1923 119/71 96 °F (35.6 °C) Temporal 60 16 96 % -- --   05/15/21 1907 -- -- -- 61 -- -- -- --   05/15/21 1905 131/73 96.1 °F (35.6 °C) Temporal 61 16 91 % -- --   05/15/21 1855 112/63 98 °F (36.7 °C) Temporal 62 14 98 % -- --   05/15/21 1850 117/60 -- -- 61 14 97 % -- --   05/15/21 1840 (!) 124/59 -- -- 64 11 97 % -- --   05/15/21 1835 115/62 -- -- 66 11 90 % -- --   05/15/21 1830 (!) 118/55 -- -- 60 13 91 % -- --   05/15/21 1825 114/62 -- -- 60 16 90 % -- --   05/15/21 1820 120/62 -- -- 61 15 91 % -- --   05/15/21 1815 117/62 -- -- 60 15 90 % -- --   05/15/21 1810 133/70 97.6 °F (36.4 °C) Temporal 63 17 96 % -- --   05/15/21 1328 (!) 147/79 98 °F (36.7 °C) -- 80 18 96 % 5' 9\" (1.753 m) 155 lb (70.3 kg)       General: Awake, alert, no acute distress  Respiratory: Nonlabored respirations  Cardiovascular: Regular rate  Right lower extremity: Dressing is in place with 2 small locations of strikethrough, otherwise dressing is clean, dry and intact. No significant surrounding edema, erythema or ecchymosis.   EHL, FHL, tibialis anterior and gastrocsoleus complex motor intact. Gross sensation is intact to the right foot. Right foot is warm and perfused. Data Review:  Recent Labs     05/15/21  1323   HGB 13.7     Recent Labs     05/15/21  1323      K 4.2   CREATININE 0.5       Assessment:     71 y/o F POD#1 s/p right hip cemented hemiarthroplasty, significant medical history for chronic tobacco use, COPD    Plan:     Pain: Controlled, continue current regimen  Acute post-op blood loss anemia: No labs this a.m., blood pressure trending low but otherwise asymptomatic, will follow clinically no plans for transfusion at this time  DVT prophylaxis: PAS boots, increase ambulation as able, begin Lovenox today with plans to transition to daily 325 mg aspirin at discharge for 1 month after surgery  Physical therapy/Occupational therapy  Activity: Weight bearing as tolerated, ice/elevate  Dressing: Plan to leave dressing in place for 2 weeks. May apply new Mepilex dressing if current dressing becomes saturated or loses adhesion.   Disposition: Await PT/OT recommendations, discharge planning, follow-up 2 weeks from surgery in clinic       Electronically signed by Deirdre Loyola MD on 5/16/2021 at 8:38 AM

## 2021-05-17 LAB
ANION GAP SERPL CALCULATED.3IONS-SCNC: 7 MMOL/L (ref 7–19)
BACTERIA: NEGATIVE /HPF
BASOPHILS ABSOLUTE: 0 K/UL (ref 0–0.2)
BASOPHILS RELATIVE PERCENT: 0.2 % (ref 0–1)
BILIRUBIN URINE: NEGATIVE
BLOOD, URINE: NEGATIVE
BUN BLDV-MCNC: 10 MG/DL (ref 8–23)
CALCIUM SERPL-MCNC: 8.4 MG/DL (ref 8.8–10.2)
CHLORIDE BLD-SCNC: 109 MMOL/L (ref 98–111)
CLARITY: CLEAR
CO2: 26 MMOL/L (ref 22–29)
COLOR: YELLOW
CREAT SERPL-MCNC: 0.5 MG/DL (ref 0.5–0.9)
CRYSTALS, UA: ABNORMAL /HPF
EKG P AXIS: 46 DEGREES
EKG P-R INTERVAL: 150 MS
EKG Q-T INTERVAL: 418 MS
EKG QRS DURATION: 102 MS
EKG QTC CALCULATION (BAZETT): 419 MS
EKG T AXIS: 60 DEGREES
EOSINOPHILS ABSOLUTE: 0 K/UL (ref 0–0.6)
EOSINOPHILS RELATIVE PERCENT: 0 % (ref 0–5)
EPITHELIAL CELLS, UA: 1 /HPF (ref 0–5)
GFR AFRICAN AMERICAN: >59
GFR NON-AFRICAN AMERICAN: >60
GLUCOSE BLD-MCNC: 115 MG/DL (ref 74–109)
GLUCOSE URINE: NEGATIVE MG/DL
HCT VFR BLD CALC: 32.7 % (ref 37–47)
HEMOGLOBIN: 10.3 G/DL (ref 12–16)
HYALINE CASTS: 0 /HPF (ref 0–8)
IMMATURE GRANULOCYTES #: 0.1 K/UL
KETONES, URINE: NEGATIVE MG/DL
LEUKOCYTE ESTERASE, URINE: ABNORMAL
LYMPHOCYTES ABSOLUTE: 1.8 K/UL (ref 1.1–4.5)
LYMPHOCYTES RELATIVE PERCENT: 16.3 % (ref 20–40)
MCH RBC QN AUTO: 27.6 PG (ref 27–31)
MCHC RBC AUTO-ENTMCNC: 31.5 G/DL (ref 33–37)
MCV RBC AUTO: 87.7 FL (ref 81–99)
MONOCYTES ABSOLUTE: 0.8 K/UL (ref 0–0.9)
MONOCYTES RELATIVE PERCENT: 7 % (ref 0–10)
NEUTROPHILS ABSOLUTE: 8.3 K/UL (ref 1.5–7.5)
NEUTROPHILS RELATIVE PERCENT: 75.6 % (ref 50–65)
NITRITE, URINE: NEGATIVE
PDW BLD-RTO: 14 % (ref 11.5–14.5)
PH UA: 6.5 (ref 5–8)
PLATELET # BLD: 94 K/UL (ref 130–400)
PMV BLD AUTO: 11.9 FL (ref 9.4–12.3)
POTASSIUM REFLEX MAGNESIUM: 3.9 MMOL/L (ref 3.5–5)
PROTEIN UA: NEGATIVE MG/DL
RBC # BLD: 3.73 M/UL (ref 4.2–5.4)
RBC UA: 1 /HPF (ref 0–4)
SODIUM BLD-SCNC: 142 MMOL/L (ref 136–145)
SPECIFIC GRAVITY UA: 1.01 (ref 1–1.03)
UROBILINOGEN, URINE: 1 E.U./DL
WBC # BLD: 10.9 K/UL (ref 4.8–10.8)
WBC UA: 2 /HPF (ref 0–5)

## 2021-05-17 PROCEDURE — 36415 COLL VENOUS BLD VENIPUNCTURE: CPT

## 2021-05-17 PROCEDURE — 80048 BASIC METABOLIC PNL TOTAL CA: CPT

## 2021-05-17 PROCEDURE — 94640 AIRWAY INHALATION TREATMENT: CPT

## 2021-05-17 PROCEDURE — 6370000000 HC RX 637 (ALT 250 FOR IP): Performed by: ORTHOPAEDIC SURGERY

## 2021-05-17 PROCEDURE — 97535 SELF CARE MNGMENT TRAINING: CPT

## 2021-05-17 PROCEDURE — 97116 GAIT TRAINING THERAPY: CPT

## 2021-05-17 PROCEDURE — 81001 URINALYSIS AUTO W/SCOPE: CPT

## 2021-05-17 PROCEDURE — 97165 OT EVAL LOW COMPLEX 30 MIN: CPT

## 2021-05-17 PROCEDURE — 6360000002 HC RX W HCPCS: Performed by: ORTHOPAEDIC SURGERY

## 2021-05-17 PROCEDURE — 2700000000 HC OXYGEN THERAPY PER DAY

## 2021-05-17 PROCEDURE — 1210000000 HC MED SURG R&B

## 2021-05-17 PROCEDURE — 93010 ELECTROCARDIOGRAM REPORT: CPT | Performed by: INTERNAL MEDICINE

## 2021-05-17 PROCEDURE — 97530 THERAPEUTIC ACTIVITIES: CPT

## 2021-05-17 PROCEDURE — 85025 COMPLETE CBC W/AUTO DIFF WBC: CPT

## 2021-05-17 RX ADMIN — OXYCODONE HYDROCHLORIDE AND ACETAMINOPHEN 1 TABLET: 10; 325 TABLET ORAL at 17:21

## 2021-05-17 RX ADMIN — IPRATROPIUM BROMIDE AND ALBUTEROL SULFATE 1 AMPULE: 2.5; .5 SOLUTION RESPIRATORY (INHALATION) at 14:10

## 2021-05-17 RX ADMIN — LEVOTHYROXINE SODIUM 88 MCG: 0.09 TABLET ORAL at 05:59

## 2021-05-17 RX ADMIN — IPRATROPIUM BROMIDE AND ALBUTEROL SULFATE 1 AMPULE: 2.5; .5 SOLUTION RESPIRATORY (INHALATION) at 10:25

## 2021-05-17 RX ADMIN — BUDESONIDE 500 MCG: 0.5 SUSPENSION RESPIRATORY (INHALATION) at 06:30

## 2021-05-17 RX ADMIN — ENOXAPARIN SODIUM 40 MG: 40 INJECTION SUBCUTANEOUS at 08:20

## 2021-05-17 RX ADMIN — IPRATROPIUM BROMIDE AND ALBUTEROL SULFATE 1 AMPULE: 2.5; .5 SOLUTION RESPIRATORY (INHALATION) at 06:05

## 2021-05-17 RX ADMIN — ARFORMOTEROL TARTRATE 15 MCG: 15 SOLUTION RESPIRATORY (INHALATION) at 19:44

## 2021-05-17 RX ADMIN — IPRATROPIUM BROMIDE AND ALBUTEROL SULFATE 1 AMPULE: 2.5; .5 SOLUTION RESPIRATORY (INHALATION) at 19:37

## 2021-05-17 RX ADMIN — ATORVASTATIN CALCIUM 40 MG: 40 TABLET, FILM COATED ORAL at 20:14

## 2021-05-17 RX ADMIN — ARFORMOTEROL TARTRATE 15 MCG: 15 SOLUTION RESPIRATORY (INHALATION) at 06:30

## 2021-05-17 RX ADMIN — BUDESONIDE 500 MCG: 0.5 SUSPENSION RESPIRATORY (INHALATION) at 19:44

## 2021-05-17 ASSESSMENT — PAIN - FUNCTIONAL ASSESSMENT
PAIN_FUNCTIONAL_ASSESSMENT: PREVENTS OR INTERFERES WITH MANY ACTIVE NOT PASSIVE ACTIVITIES
PAIN_FUNCTIONAL_ASSESSMENT: PREVENTS OR INTERFERES WITH MANY ACTIVE NOT PASSIVE ACTIVITIES

## 2021-05-17 ASSESSMENT — PAIN SCALES - GENERAL
PAINLEVEL_OUTOF10: 7
PAINLEVEL_OUTOF10: 3
PAINLEVEL_OUTOF10: 5

## 2021-05-17 ASSESSMENT — PAIN DESCRIPTION - PAIN TYPE: TYPE: ACUTE PAIN;SURGICAL PAIN

## 2021-05-17 ASSESSMENT — PAIN DESCRIPTION - DESCRIPTORS: DESCRIPTORS: SORE

## 2021-05-17 ASSESSMENT — PAIN DESCRIPTION - ORIENTATION: ORIENTATION: RIGHT

## 2021-05-17 NOTE — PROGRESS NOTES
Physical Therapy  Name: Gold Genao  MRN:  [id number]  Date of service:  5/17/2021 05/17/21 1534   Restrictions/Precautions   Restrictions/Precautions Weight Bearing; Fall Risk   Required Braces or Orthoses? No   Lower Extremity Weight Bearing Restrictions   Right Lower Extremity Weight Bearing Weight Bearing As Tolerated   Position Activity Restriction   Other position/activity restrictions ant hip prec   General   Chart Reviewed Yes   Subjective   Subjective Pt in bed, agreeable to try walking with therapy. Pain Screening   Patient Currently in Pain Yes   Pain Assessment   Pain Assessment 0-10   Pain Level 3   Pain Type Acute pain;Surgical pain   Pain Location Hip   Pain Orientation Right   Pain Descriptors Sore   Functional Pain Assessment Prevents or interferes with many active not passive activities   Non-Pharmaceutical Pain Intervention(s) Ambulation/Increased Activity;Repositioned; Rest   Response to Pain Intervention Patient Satisfied  (once positioned for comfort)   Oxygen Therapy   O2 Device Nasal cannula   O2 Flow Rate (L/min) 2 L/min   Bed Mobility   Supine to Sit Minimal assistance   Sit to Supine Minimal assistance   Comment Able to sit EOB unsupported by therapist once fully upright, requires much additional time to perform bed mobility d/t increased pain   Transfers   Sit to Stand Moderate Assistance   Stand to sit Minimal Assistance   Ambulation   Ambulation?  Yes   WB Status FWBAT RLE   Ambulation 1   Surface level tile   Device Rolling Walker   Other Apparatus O2   Assistance Minimal assistance   Quality of Gait unsteady overall, antalgic, unable to bear much weight thru RLE   Gait Deviations Slow Jane;Decreased step length;Decreased step height   Distance 8'   Comments 4' fwd, 4' backward to bed   Short term goals   Time Frame for Short term goals 2 wks   Short term goal 1 supine to sit indep   Short term goal 2 sit to stand indep   Short term goal 3 amb. 100' with RW SBA   Short term goal 4 up/down 2-3 stairs SBA with rail   Conditions Requiring Skilled Therapeutic Intervention   Body structures, Functions, Activity limitations Decreased functional mobility ; Decreased ROM; Decreased strength;Decreased sensation;Decreased posture; Increased pain;Decreased balance   Assessment Pt cont to require assist for all aspects of mobility, able to stand and take steps away from/back to bed but only able to bear little if any weight thru RLE d/t pain, but does compensate well by using BUE on rwx to advance LLE. Pt put forth good effort throughout tx. Pt returned to supine and positioned for comfort with all needs in reach. Activity Tolerance   Activity Tolerance Patient limited by pain; Patient limited by endurance   Safety Devices   Type of devices Bed alarm in place;Call light within reach;Gait belt;Left in bed  (son present)         Electronically signed by Prince Higgins PTA on 5/17/2021 at 3:40 PM

## 2021-05-17 NOTE — PROGRESS NOTES
Occupational Therapy   Occupational Therapy Initial Assessment  Date: 2021   Patient Name: Olivia Marcial  MRN: [id number]     : 1950    Date of Service: 2021    Discharge Recommendations:  Patient would benefit from continued therapy after discharge       Assessment   Assessment: Evaluation completed and tx initiated. The patient would benefit from further therapy to upgrade functional status and safety. Good potential to return to prior living situation with futher skilled intervention. Treatment Diagnosis: Right hip fx s/p hip hemiarthroplasty  REQUIRES OT FOLLOW UP: Yes  Activity Tolerance  Activity Tolerance: Patient limited by pain  Activity Tolerance: but is fully willing to work through her pain issues to increase ADL and mobility  Safety Devices  Safety Devices in place: Yes  Type of devices: Call light within reach; Chair alarm in place;Nurse notified           Patient Diagnosis(es): The primary encounter diagnosis was Closed fracture of right hip, initial encounter ([facility] [address] Diagnoses of Tobacco abuse, Fall, initial encounter, and Artificial pacemaker were also pertinent to this visit. has a past medical history of Cerebral artery occlusion with cerebral infarction [facility]), Diverticular disease of colon, Hyperlipidemia, Hypothyroidism, Pneumonia, Psychiatric problem, Tobacco abuse, and Vitamin B12 deficiency. has a past surgical history that includes Tubal ligation; Lung surgery; pr colonoscopy w/biopsy single/multiple (N/A, 2017); Colonoscopy; and Cardiac pacemaker placement.     Treatment Diagnosis: Right hip fx s/p hip hemiarthroplasty      Restrictions  Restrictions/Precautions  Restrictions/Precautions: Weight Bearing, Fall Risk  Required Braces or Orthoses?: No  Lower Extremity Weight Bearing Restrictions  Right Lower Extremity Weight Bearing: Weight Bearing As Tolerated  Position Activity Restriction  Other position/activity restrictions: ant hip prec    Subjective General  Chart Reviewed: Yes  Patient assessed for rehabilitation services?: Yes  Family / Caregiver Present: No  Patient Currently in Pain: Yes  Pain Assessment  Pain Assessment: 0-10  Pain Level: 5  Pain Type: Surgical pain  Pain Location: Hip  Pain Orientation: Right  Pain Descriptors: Sore  Functional Pain Assessment: Prevents or interferes with many active not passive activities  Non-Pharmaceutical Pain Intervention(s): Ambulation/Increased Activity; Elevation;Repositioned (declines ice this visit)  Response to Pain Intervention: Patient Satisfied (once settled into recliner)  Vital Signs  Level of Consciousness: Alert (0)  Patient Currently in Pain: Yes  Social/Functional History  Social/Functional History  Lives With: Son  Home Layout: One level  Home Equipment:  (none)  ADL Assistance: Independent  Homemaking Assistance: Independent  Ambulation Assistance: Independent  Transfer Assistance: Independent  Active : Yes  Occupation: Full time employment  Type of occupation: CNA at [employer]  Overall Orientation Status: Within Normal Limits     Balance  Standing Balance: Contact guard assistance (to [employer])  Toilet Transfers  Toilet Transfer: Contact guard assistance;Minimal assistance  ADL  Feeding: Independent  Grooming: Independent;Setup  UE Bathing: Supervision;Setup  LE Bathing: Minimal assistance; Moderate assistance  UE Dressing: Setup;Supervision  LE Dressing: Moderate assistance  Toileting: Minimal assistance; Moderate assistance           Transfers  Stand Step Transfers: Contact guard assistance;Minimal assistance  Transfer Comments: Minimally incorporates RLE due to pain     Cognition  Cognition Comment: Awake, alert, no cognitive deficits noted, follows commands.                  LUE PROM (degrees)  LUE PROM: WNL  LUE AROM (degrees)  LUE AROM : WNL  RUE PROM (degrees)  RUE PROM: WNL  RUE AROM (degrees)  RUE AROM : WNL  LUE Strength  Gross LUE Strength: WFL  RUE Strength  Gross RUE Strength: WFL                Tx initiated:  AE and anterior hip precautions training, balance and mobility task (25 mins)     Plan   Plan  Times per week: 3-5    G-Code     OutComes Score                                                  AM-PAC Score             Goals  Short term goals  Time Frame for Short term goals: 1-2 weeks  Short term goal 1: Modified independent for dressing  Short term goal 2: Modified independent for toileting  Short term goal 3: Modified independent for bed mobility and transfers  Short term goal 4: Modified independent for light ambulatory ADL  Short term goal 5: Patient will be independent to apply anterior hip precautions       Therapy Time   Individual Concurrent Group Co-treatment   Time In           Time Out           Minutes                   Migdalia Sherman OT Electronically signed by Migdalia Sherman OT on 5/17/2021 at 9:25 AM

## 2021-05-17 NOTE — PROGRESS NOTES
Daily Progress Note    Date:2021  Patient: Beverley Thayer  : 1950  [id number]  CODE:Full Code No additional code details  Ramakrishna Louise MD    Admit Date: 5/15/2021  1:10 PM   LOS: 2 days       Subjective:    43-year-old female with chronic bronchitis, tobacco abuse, history of symptomatic bradycardia status post pacemaker placement, history of TIA, hyperlipidemia, presented to the ED on 5/15 following fall from standing after she tripped and fell on her right side, with severe pain and unable to bear weight-admitted after she was found to have right subcapital displaced femoral neck fracture on imaging. Orthopedics consulted and patient underwent right cemented hip hemiarthroplasty on 5/15 with estimated blood loss of 325 cc. Patient recently completed treatment for mild exacerbation of COPD patient 5 days of steroids, continued on bronchodilators. underwent rehab postop. Mild drop in H&H related to postop acute blood loss and hemodilution from fluids. This a.m. doing well, no worsening pain. No significant shortness of breath.         Review of Systems    Comprehensive ROS completed and is negative except as otherwise noted    Objective:      Vital signs in last 24 hours:  Patient Vitals for the past 24 hrs:   BP Temp Temp src Pulse Resp SpO2   21 0638 118/68 97.1 °F (36.2 °C) Temporal 66 16 96 %   21 0605 -- -- -- -- 18 95 %   21 0317 130/77 96.4 °F (35.8 °C) Temporal 59 16 94 %   21 2332 122/78 97.3 °F (36.3 °C) Temporal 72 18 96 %   21 1915 116/72 97.6 °F (36.4 °C) Temporal 72 18 97 %   21 1441 (!) 96/53 97.4 °F (36.3 °C) Temporal 68 16 98 %   21 1109 105/62 97.6 °F (36.4 °C) Temporal 62 16 100 %   21 1012 -- -- -- -- -- (!) 87 %     Physical exam    General: No acute distress  Head: Normocephalic/atraumatic  Neck: Supple, nontender  Eyes: Symmetrical  Cardiac: Regular rhythm, pulses equal  Respirations: Coarse breath sounds but no wheezes  Abdomen: Soft, nontender, ostomy present  Neurologic: No focal deficits  Extremities: Right lower extremity with dressing in place, no erythema or ecchymosis, neurovascularly intact        Lab Review   Recent Results (from the past 24 hour(s))   Urinalysis Reflex to Culture    Collection Time: 05/17/21  2:05 AM    Specimen: Urine, clean catch   Result Value Ref Range    Color, UA YELLOW Straw/Yellow    Clarity, UA Clear Clear    Glucose, Ur Negative Negative mg/dL    Bilirubin Urine Negative Negative    Ketones, Urine Negative Negative mg/dL    Specific Gravity, UA 1.013 1.005 - 1.030    Blood, Urine Negative Negative    pH, UA 6.5 5.0 - 8.0    Protein, UA Negative Negative mg/dL    Urobilinogen, Urine 1.0 <2.0 E.U./dL    Nitrite, Urine Negative Negative    Leukocyte Esterase, Urine TRACE (A) Negative   Microscopic Urinalysis    Collection Time: 05/17/21  2:05 AM   Result Value Ref Range    Bacteria, UA NEGATIVE (A) None Seen /HPF    Crystals, UA NEG (A) None Seen /HPF    Hyaline Casts, UA 0 0 - 8 /HPF    WBC, UA 2 0 - 5 /HPF    RBC, UA 1 0 - 4 /HPF    Epithelial Cells, UA 1 0 - 5 /HPF   Basic Metabolic Panel w/ Reflex to MG    Collection Time: 05/17/21  3:08 AM   Result Value Ref Range    Sodium 142 136 - 145 mmol/L    Potassium reflex Magnesium 3.9 3.5 - 5.0 mmol/L    Chloride 109 98 - 111 mmol/L    CO2 26 22 - 29 mmol/L    Anion Gap 7 7 - 19 mmol/L    Glucose 115 (H) 74 - 109 mg/dL    BUN 10 8 - 23 mg/dL    CREATININE 0.5 0.5 - 0.9 mg/dL    GFR Non-African American >60 >60    GFR African American >59 >59    Calcium 8.4 (L) 8.8 - 10.2 mg/dL   CBC auto differential    Collection Time: 05/17/21  3:08 AM   Result Value Ref Range    WBC 10.9 (H) 4.8 - 10.8 K/uL    RBC 3.73 (L) 4.20 - 5.40 M/uL    Hemoglobin 10.3 (L) 12.0 - 16.0 g/dL    Hematocrit 32.7 (L) 37.0 - 47.0 %    MCV 87.7 81.0 - 99.0 fL    MCH 27.6 27.0 - 31.0 pg    MCHC 31.5 (L) 33.0 - 37.0 g/dL    RDW 14.0 11.5 - 14.5 %    Platelets 94 (L) IVPB premix, 2,000 mg, Intravenous, PRN, Nilson Landry MD    budesonide (PULMICORT) nebulizer suspension 500 mcg, 0.5 mg, Nebulization, BID, Nilson Landry MD, 500 mcg at 05/17/21 0630    Arformoterol Tartrate (BROVANA) nebulizer solution 15 mcg, 15 mcg, Nebulization, BID, Nilson Landry MD, 15 mcg at 05/17/21 0630    oxyCODONE-acetaminophen (PERCOCET) 5-325 MG per tablet 1 tablet, 1 tablet, Oral, Q4H PRN, Nilson Landry MD, 1 tablet at 05/16/21 1959    oxyCODONE-acetaminophen (PERCOCET)  MG per tablet 1 tablet, 1 tablet, Oral, Q4H PRN, Nilson Landry MD, 1 tablet at 05/16/21 0943    enoxaparin (LOVENOX) injection 40 mg, 40 mg, Subcutaneous, Daily, Nilson Landry MD, 40 mg at 05/17/21 0820          Assessment/plan  Principal Problem:    Closed right hip fracture, initial encounter [facility])  Active Problems:    Tobacco abuse    Hyperlipidemia    Chronic bronchitis ([address]    Pacemaker  Resolved Problems:    * No resolved hospital problems.  *      Right subcapital displaced femoral neck fracture  S/p right cemented hip hemiarthroplasty, estimated blood loss 325 cc  Appreciate orthopedic surgery recommendations  WBAT  Pain control  PT/OT    Postoperative acute blood anemia  Follow CBC    Chronic bronchitis, with recent acute exacerbation, improving  S/p corticosteroid injection outpatient, completed prednisone outpatient  Scheduled duo nebs  Nebulized budesonide and formoterol  Monitor SpO2, wean off O2     H/o symptomatic bradycardia, s/p Pacemaker  No issues     Tobacco abuse  Counseled   Nicotine patch     Reconcile and continue appropriate home regimen    DVT prophylaxis: Per Ortho-Lovenox for now then transition to aspirin 325 mg daily x1 month      Alissa Guevara MD 5/17/2021 9:47 AM

## 2021-05-17 NOTE — PROGRESS NOTES
Physical Therapy  Name: Kallie Jefferson  MRN:  [id number]  Date of service:  5/17/2021 05/17/21 1025   General   Missed reason Patient declined   Subjective   Subjective Attempt: Pt just back to bed with nursing assist, states she would be willing to work with PT this afternoon but not right now.          Electronically signed by Jamilah Patel PTA on 5/17/2021 at 10:25 AM

## 2021-05-17 NOTE — CARE COORDINATION
The [address] at [facility]  Notification of Admission Decision      [] Patient has been accepted for admit to [facility] on :       Please write discharge orders and summary prior to discharge. [] Patient acceptance to Rehab pending the following :    [x] Eval in progress Pt will need precert w/[employer] [employer] [facility]. [] Patient determined to be ineligible for services at [facility] because : We recommend you consider        Thank you for your referral, we appreciate you. If you have any questions, please feel   free to contact me at [phone].

## 2021-05-18 LAB
ANION GAP SERPL CALCULATED.3IONS-SCNC: 7 MMOL/L (ref 7–19)
BASOPHILS ABSOLUTE: 0 K/UL (ref 0–0.2)
BASOPHILS RELATIVE PERCENT: 0.1 % (ref 0–1)
BUN BLDV-MCNC: 11 MG/DL (ref 8–23)
CALCIUM SERPL-MCNC: 8.5 MG/DL (ref 8.8–10.2)
CHLORIDE BLD-SCNC: 104 MMOL/L (ref 98–111)
CO2: 28 MMOL/L (ref 22–29)
CREAT SERPL-MCNC: 0.4 MG/DL (ref 0.5–0.9)
EOSINOPHILS ABSOLUTE: 0.1 K/UL (ref 0–0.6)
EOSINOPHILS RELATIVE PERCENT: 1.2 % (ref 0–5)
GFR AFRICAN AMERICAN: >59
GFR NON-AFRICAN AMERICAN: >60
GLUCOSE BLD-MCNC: 116 MG/DL (ref 74–109)
HCT VFR BLD CALC: 35.2 % (ref 37–47)
HEMOGLOBIN: 11.1 G/DL (ref 12–16)
IMMATURE GRANULOCYTES #: 0.1 K/UL
LYMPHOCYTES ABSOLUTE: 2 K/UL (ref 1.1–4.5)
LYMPHOCYTES RELATIVE PERCENT: 18 % (ref 20–40)
MCH RBC QN AUTO: 27.5 PG (ref 27–31)
MCHC RBC AUTO-ENTMCNC: 31.5 G/DL (ref 33–37)
MCV RBC AUTO: 87.3 FL (ref 81–99)
MONOCYTES ABSOLUTE: 0.8 K/UL (ref 0–0.9)
MONOCYTES RELATIVE PERCENT: 7.4 % (ref 0–10)
NEUTROPHILS ABSOLUTE: 7.8 K/UL (ref 1.5–7.5)
NEUTROPHILS RELATIVE PERCENT: 72.3 % (ref 50–65)
PDW BLD-RTO: 13.9 % (ref 11.5–14.5)
PLATELET # BLD: 97 K/UL (ref 130–400)
PMV BLD AUTO: 11.8 FL (ref 9.4–12.3)
POTASSIUM REFLEX MAGNESIUM: 4 MMOL/L (ref 3.5–5)
RBC # BLD: 4.03 M/UL (ref 4.2–5.4)
SODIUM BLD-SCNC: 139 MMOL/L (ref 136–145)
WBC # BLD: 10.8 K/UL (ref 4.8–10.8)

## 2021-05-18 PROCEDURE — 6360000002 HC RX W HCPCS: Performed by: ORTHOPAEDIC SURGERY

## 2021-05-18 PROCEDURE — 85025 COMPLETE CBC W/AUTO DIFF WBC: CPT

## 2021-05-18 PROCEDURE — 97530 THERAPEUTIC ACTIVITIES: CPT

## 2021-05-18 PROCEDURE — 80048 BASIC METABOLIC PNL TOTAL CA: CPT

## 2021-05-18 PROCEDURE — 2700000000 HC OXYGEN THERAPY PER DAY

## 2021-05-18 PROCEDURE — 94761 N-INVAS EAR/PLS OXIMETRY MLT: CPT

## 2021-05-18 PROCEDURE — 6370000000 HC RX 637 (ALT 250 FOR IP): Performed by: ORTHOPAEDIC SURGERY

## 2021-05-18 PROCEDURE — 1210000000 HC MED SURG R&B

## 2021-05-18 PROCEDURE — 36415 COLL VENOUS BLD VENIPUNCTURE: CPT

## 2021-05-18 PROCEDURE — 2580000003 HC RX 258: Performed by: STUDENT IN AN ORGANIZED HEALTH CARE EDUCATION/TRAINING PROGRAM

## 2021-05-18 PROCEDURE — 94640 AIRWAY INHALATION TREATMENT: CPT

## 2021-05-18 PROCEDURE — 97116 GAIT TRAINING THERAPY: CPT

## 2021-05-18 RX ORDER — 0.9 % SODIUM CHLORIDE 0.9 %
1000 INTRAVENOUS SOLUTION INTRAVENOUS ONCE
Status: COMPLETED | OUTPATIENT
Start: 2021-05-18 | End: 2021-05-18

## 2021-05-18 RX ADMIN — IPRATROPIUM BROMIDE AND ALBUTEROL SULFATE 1 AMPULE: 2.5; .5 SOLUTION RESPIRATORY (INHALATION) at 19:15

## 2021-05-18 RX ADMIN — BUDESONIDE 500 MCG: 0.5 SUSPENSION RESPIRATORY (INHALATION) at 19:21

## 2021-05-18 RX ADMIN — ARFORMOTEROL TARTRATE 15 MCG: 15 SOLUTION RESPIRATORY (INHALATION) at 07:08

## 2021-05-18 RX ADMIN — ENOXAPARIN SODIUM 40 MG: 40 INJECTION SUBCUTANEOUS at 07:35

## 2021-05-18 RX ADMIN — BUDESONIDE 500 MCG: 0.5 SUSPENSION RESPIRATORY (INHALATION) at 07:08

## 2021-05-18 RX ADMIN — IPRATROPIUM BROMIDE AND ALBUTEROL SULFATE 1 AMPULE: 2.5; .5 SOLUTION RESPIRATORY (INHALATION) at 11:11

## 2021-05-18 RX ADMIN — IPRATROPIUM BROMIDE AND ALBUTEROL SULFATE 1 AMPULE: 2.5; .5 SOLUTION RESPIRATORY (INHALATION) at 07:08

## 2021-05-18 RX ADMIN — ARFORMOTEROL TARTRATE 15 MCG: 15 SOLUTION RESPIRATORY (INHALATION) at 19:21

## 2021-05-18 RX ADMIN — SODIUM CHLORIDE 1000 ML: 9 INJECTION, SOLUTION INTRAVENOUS at 10:30

## 2021-05-18 RX ADMIN — LEVOTHYROXINE SODIUM 88 MCG: 0.09 TABLET ORAL at 05:04

## 2021-05-18 RX ADMIN — IPRATROPIUM BROMIDE AND ALBUTEROL SULFATE 1 AMPULE: 2.5; .5 SOLUTION RESPIRATORY (INHALATION) at 15:30

## 2021-05-18 RX ADMIN — ATORVASTATIN CALCIUM 40 MG: 40 TABLET, FILM COATED ORAL at 19:48

## 2021-05-18 RX ADMIN — OXYCODONE HYDROCHLORIDE AND ACETAMINOPHEN 1 TABLET: 10; 325 TABLET ORAL at 07:36

## 2021-05-18 RX ADMIN — SODIUM CHLORIDE 1000 ML: 9 INJECTION, SOLUTION INTRAVENOUS at 11:56

## 2021-05-18 ASSESSMENT — PAIN SCALES - GENERAL
PAINLEVEL_OUTOF10: 3
PAINLEVEL_OUTOF10: 7
PAINLEVEL_OUTOF10: 10

## 2021-05-18 ASSESSMENT — PAIN DESCRIPTION - PAIN TYPE: TYPE: ACUTE PAIN;SURGICAL PAIN

## 2021-05-18 ASSESSMENT — PAIN DESCRIPTION - DESCRIPTORS: DESCRIPTORS: ACHING;SORE

## 2021-05-18 ASSESSMENT — PAIN DESCRIPTION - LOCATION
LOCATION: HIP

## 2021-05-18 ASSESSMENT — PAIN - FUNCTIONAL ASSESSMENT: PAIN_FUNCTIONAL_ASSESSMENT: PREVENTS OR INTERFERES SOME ACTIVE ACTIVITIES AND ADLS

## 2021-05-18 NOTE — PROGRESS NOTES
Physical Therapy  Name: Kalile Jefferson  MRN:  [id number]  Date of service:  5/18/2021 05/18/21 0829   Restrictions/Precautions   Restrictions/Precautions Weight Bearing; Fall Risk   Required Braces or Orthoses? No   Lower Extremity Weight Bearing Restrictions   Right Lower Extremity Weight Bearing Weight Bearing As Tolerated   Position Activity Restriction   Other position/activity restrictions ant hip prec   General   Chart Reviewed Yes   Subjective   Subjective Pt in bed, agreeable to work with therapy. States that she is wet and will need cleanup. Pain Screening   Patient Currently in Pain Yes   Pain Assessment   Pain Assessment 0-10   Pain Level 7   Pain Type Acute pain;Surgical pain   Pain Location Hip   Pain Orientation Right   Pain Descriptors Aching; Sore   Functional Pain Assessment Prevents or interferes some active activities and ADLs   Non-Pharmaceutical Pain Intervention(s) Ambulation/Increased Activity;Repositioned; Rest   Response to Pain Intervention Patient Satisfied  (once positioned for comfort)   Oxygen Therapy   O2 Device Nasal cannula   O2 Flow Rate (L/min) 2 L/min   Bed Mobility   Supine to Sit Minimal assistance   Comment Able to sit EOB unsupported by therapist for gown change   Transfers   Sit to Stand Moderate Assistance   Stand to sit Minimal Assistance   Bed to Chair Minimal assistance   Comment Pt able to stand at bedside with SBA for cleanup from incontinence of urine, then able to step to chair. Requires modA to stand from bedside, recliner, and from toilet. Ambulation   Ambulation?  Yes   WB Status FWBAT RLE   Ambulation 1   Surface level tile   Device Rolling Walker   Other Apparatus O2   Assistance Minimal assistance   Quality of Gait unsteady overall, antalgic, unable to bear much weight thru RLE   Gait Deviations Slow Jane;Decreased step length;Decreased step height   Distance 3', 10' x2   Comments steps to recliner, then amb to BR and back   Short term goals   Time Frame for Short term goals 2 wks   Short term goal 1 supine to sit indep   Short term goal 2 sit to stand indep   Short term goal 3 amb. 100' with RW SBA   Short term goal 4 up/down 2-3 stairs SBA with rail   Conditions Requiring Skilled Therapeutic Intervention   Body structures, Functions, Activity limitations Decreased functional mobility ; Decreased ROM; Decreased strength;Decreased sensation;Decreased posture; Increased pain;Decreased balance   Assessment Pt cont to require assist for all aspects of mobility, able to stand with modA from surfaces of different heights, showing some improvement in gait pattern although still antalgic and able to bear minimal weight thru RLE. Pt up in recliner and positioned for comfort with all needs in reach and breakfast setup following tx. Activity Tolerance   Activity Tolerance Patient Tolerated treatment well;Patient limited by pain; Patient limited by endurance   Safety Devices   Type of devices Call light within reach; Chair alarm in place;Gait belt;Left in chair         Electronically signed by Jamilah Patel PTA on 5/18/2021 at 8:35 AM

## 2021-05-18 NOTE — PROGRESS NOTES
Home O2 Evaluation    Nasal cannula @ 2lpm 99%    Room Air at rest saturation 94%    Pt is unable to ambulate at this time

## 2021-05-18 NOTE — PROGRESS NOTES
Daily Progress Note    Date:2021  Patient: Luis Peres  : 1950  [id number]  CODE:Full Code No additional code details  Yumiko Funk MD    Admit Date: 5/15/2021  1:10 PM   LOS: 3 days       Subjective:    62-year-old female with chronic bronchitis, tobacco abuse, history of symptomatic bradycardia status post pacemaker placement, history of TIA, hyperlipidemia, presented to the ED on 5/15 following fall from standing after she tripped and fell on her right side, with severe pain and unable to bear weight-admitted after she was found to have right subcapital displaced femoral neck fracture on imaging. Orthopedics consulted and patient underwent right cemented hip hemiarthroplasty on 5/15 with estimated blood loss of 325 cc. Patient recently completed treatment for mild exacerbation of COPD patient 5 days of steroids, continued on bronchodilators. underwent rehab postop. Mild drop in H&H related to postop acute blood loss and hemodilution from fluids. Limited with rehab and attempting placement at [facility] inpatient rehab. This a.m. doing well, no worsening pain. No significant shortness of breath. Working with rehab.         Review of Systems    Comprehensive ROS completed and is negative except as otherwise noted    Objective:      Vital signs in last 24 hours:  Patient Vitals for the past 24 hrs:   BP Temp Temp src Pulse Resp SpO2   21 1119 (!) 91/56 -- -- 64 -- --   21 1111 -- -- -- -- -- 99 %   21 0950 (!) 81/49 97.4 °F (36.3 °C) Temporal 60 20 98 %   21 0623 112/68 98 °F (36.7 °C) Temporal 69 20 94 %   21 0259 116/76 97.1 °F (36.2 °C) Temporal 82 16 92 %   21 2254 119/68 97.7 °F (36.5 °C) Temporal 71 16 92 %   21 1900 126/68 96.7 °F (35.9 °C) Temporal 71 18 94 %   21 1410 -- -- -- -- 18 97 %     Physical exam    General: No acute distress  Head: Normocephalic/atraumatic  Neck: Supple, nontender  Eyes: Symmetrical  Cardiac: Regular rhythm, pulses equal  Respirations: Coarse breath sounds but no wheezes  Abdomen: Soft, nontender, ostomy present  Neurologic: No focal deficits  Extremities: Right lower extremity with dressing in place, no erythema or ecchymosis, neurovascularly intact        Lab Review   Recent Results (from the past 24 hour(s))   Basic Metabolic Panel w/ Reflex to MG    Collection Time: 05/18/21  3:03 AM   Result Value Ref Range    Sodium 139 136 - 145 mmol/L    Potassium reflex Magnesium 4.0 3.5 - 5.0 mmol/L    Chloride 104 98 - 111 mmol/L    CO2 28 22 - 29 mmol/L    Anion Gap 7 7 - 19 mmol/L    Glucose 116 (H) 74 - 109 mg/dL    BUN 11 8 - 23 mg/dL    CREATININE 0.4 (L) 0.5 - 0.9 mg/dL    GFR Non-African American >60 >60    GFR African American >59 >59    Calcium 8.5 (L) 8.8 - 10.2 mg/dL   CBC auto differential    Collection Time: 05/18/21  3:03 AM   Result Value Ref Range    WBC 10.8 4.8 - 10.8 K/uL    RBC 4.03 (L) 4.20 - 5.40 M/uL    Hemoglobin 11.1 (L) 12.0 - 16.0 g/dL    Hematocrit 35.2 (L) 37.0 - 47.0 %    MCV 87.3 81.0 - 99.0 fL    MCH 27.5 27.0 - 31.0 pg    MCHC 31.5 (L) 33.0 - 37.0 g/dL    RDW 13.9 11.5 - 14.5 %    Platelets 97 (L) 151 - 400 K/uL    MPV 11.8 9.4 - 12.3 fL    Neutrophils % 72.3 (H) 50.0 - 65.0 %    Lymphocytes % 18.0 (L) 20.0 - 40.0 %    Monocytes % 7.4 0.0 - 10.0 %    Eosinophils % 1.2 0.0 - 5.0 %    Basophils % 0.1 0.0 - 1.0 %    Neutrophils Absolute 7.8 (H) 1.5 - 7.5 K/uL    Immature Granulocytes # 0.1 K/uL    Lymphocytes Absolute 2.0 1.1 - 4.5 K/uL    Monocytes Absolute 0.80 0.00 - 0.90 K/uL    Eosinophils Absolute 0.10 0.00 - 0.60 K/uL    Basophils Absolute 0.00 0.00 - 0.20 K/uL       I/O last 3 completed shifts:   In: 26 [P.O.:460]  Out: 2550 [Urine:2550]  I/O this shift:  In: 210 [P.O.:210]  Out: 550 [Urine:550]      Current Facility-Administered Medications:     0.9 % sodium chloride bolus, 1,000 mL, Intravenous, Once, Marina Galarza MD, Last Rate: 1,000 mL/hr at 05/18/21 1030, 1,000 mL at 05/18/21 1030    ipratropium-albuterol (DUONEB) nebulizer solution 1 ampule, 1 ampule, Inhalation, 4x daily, Branden Hooker MD, 1 ampule at 05/18/21 1111    atorvastatin (LIPITOR) tablet 40 mg, 40 mg, Oral, Nightly, Branden Hookre MD, 40 mg at 05/17/21 2014    levothyroxine (SYNTHROID) tablet 88 mcg, 88 mcg, Oral, Daily, Branden Hooker MD, 88 mcg at 05/18/21 0504    sodium chloride flush 0.9 % injection 5-40 mL, 5-40 mL, Intravenous, 2 times per day, Branden Hooker MD    sodium chloride flush 0.9 % injection 5-40 mL, 5-40 mL, Intravenous, PRN, Branden Hooker MD    promethazine (PHENERGAN) tablet 12.5 mg, 12.5 mg, Oral, Q6H PRN **OR** ondansetron (ZOFRAN) injection 4 mg, 4 mg, Intravenous, Q6H PRN, Branden Hooker MD    polyethylene glycol [facility]) packet 17 g, 17 g, Oral, Daily PRN, Branden Hooker MD    acetaminophen (TYLENOL) tablet 650 mg, 650 mg, Oral, Q6H PRN **OR** acetaminophen (TYLENOL) suppository 650 mg, 650 mg, Rectal, Q6H PRN, Branden Hooker MD    potassium chloride (KLOR-CON M) extended release tablet 40 mEq, 40 mEq, Oral, PRN **OR** potassium bicarb-citric acid (EFFER-K) effervescent tablet 40 mEq, 40 mEq, Oral, PRN **OR** potassium chloride 10 mEq/100 mL IVPB (Peripheral Line), 10 mEq, Intravenous, PRN, Branden Hooker MD    magnesium sulfate 2000 mg in 50 mL IVPB premix, 2,000 mg, Intravenous, PRN, Branden Hooker MD    budesonide (PULMICORT) nebulizer suspension 500 mcg, 0.5 mg, Nebulization, BID, Branden Hooker MD, 500 mcg at 05/18/21 0708    Arformoterol Tartrate (BROVANA) nebulizer solution 15 mcg, 15 mcg, Nebulization, BID, Branden Hooker MD, 15 mcg at 05/18/21 0708    oxyCODONE-acetaminophen (PERCOCET) 5-325 MG per tablet 1 tablet, 1 tablet, Oral, Q4H PRN, Branden Hooker MD, 1 tablet at 05/16/21 1959    oxyCODONE-acetaminophen (PERCOCET)  MG per tablet 1 tablet, 1 tablet, Oral, Q4H PRN, Branden Hooker MD, 1 tablet at 05/18/21 0736    enoxaparin (LOVENOX)

## 2021-05-18 NOTE — PROGRESS NOTES
Physical Therapy  Name: Silver Foley  MRN:  [id number]  Date of service:  5/18/2021 05/18/21 1102   Restrictions/Precautions   Restrictions/Precautions Weight Bearing; Fall Risk   Required Braces or Orthoses? No   Lower Extremity Weight Bearing Restrictions   Right Lower Extremity Weight Bearing Weight Bearing As Tolerated   Position Activity Restriction   Other position/activity restrictions Ant. hip precautions   General   Chart Reviewed Yes   Subjective   Subjective Pt ready for BTB, states that she needs to use the bathroom first.   Pain Screening   Patient Currently in Pain Yes   Pain Assessment   Pain Assessment 0-10   Pain Level 3   Pain Type Acute pain;Surgical pain   Pain Location Hip   Pain Orientation Right   Pain Descriptors Aching; Sore   Functional Pain Assessment Prevents or interferes some active activities and ADLs   Non-Pharmaceutical Pain Intervention(s) Ambulation/Increased Activity;Repositioned; Rest   Response to Pain Intervention Patient Satisfied  (once positioned for comfort)   Bed Mobility   Sit to Supine Minimal assistance  (BLE management)   Transfers   Sit to Stand Moderate Assistance   Stand to sit Minimal Assistance   Comment Pt able to stand from recliner and from toilet with modA   Ambulation   Ambulation?  Yes   WB Status FWBAT RLE   Ambulation 1   Surface level tile   Device Rolling Walker   Assistance Minimal assistance   Quality of Gait unsteady overall, antalgic, unable to bear much weight thru RLE   Gait Deviations Slow Jane;Decreased step length;Decreased step height   Distance 10' x2   Comments to BR and back to bed   Short term goals   Time Frame for Short term goals 2 wks   Short term goal 1 supine to sit indep   Short term goal 2 sit to stand indep   Short term goal 3 amb. 100' with RW SBA   Short term goal 4 up/down 2-3 stairs SBA with rail   Conditions Requiring Skilled Therapeutic Intervention   Body structures, Functions, Activity limitations Decreased functional mobility ; Decreased ROM; Decreased strength;Decreased sensation;Decreased posture; Increased pain;Decreased balance   Assessment Pt able to stand from recliner and amb to BR, then stand and amb BTB. Pt cont to have difficulty with WB thru RLE and exhibits decreased heel strike/foot flat. Pt returned to supine and positioned for comfort with all needs in reach. Activity Tolerance   Activity Tolerance Patient Tolerated treatment well;Patient limited by pain; Patient limited by endurance   Safety Devices   Type of devices Bed alarm in place;Call light within reach;Gait belt;Left in bed         Electronically signed by Viral Verma PTA on 5/18/2021 at 3:04 PM

## 2021-05-19 ENCOUNTER — HOSPITAL ENCOUNTER (INPATIENT)
Age: 71
LOS: 14 days | Discharge: HOME HEALTH | DRG: 561 | End: 2021-06-02
Attending: PSYCHIATRY & NEUROLOGY | Admitting: PSYCHIATRY & NEUROLOGY
Payer: MEDICARE

## 2021-05-19 VITALS
DIASTOLIC BLOOD PRESSURE: 72 MMHG | SYSTOLIC BLOOD PRESSURE: 115 MMHG | WEIGHT: 155 LBS | BODY MASS INDEX: 22.96 KG/M2 | HEART RATE: 66 BPM | TEMPERATURE: 97.5 F | OXYGEN SATURATION: 95 % | HEIGHT: 69 IN | RESPIRATION RATE: 20 BRPM

## 2021-05-19 DIAGNOSIS — S72.001A CLOSED RIGHT HIP FRACTURE, INITIAL ENCOUNTER (HCC): Primary | ICD-10-CM

## 2021-05-19 PROBLEM — M25.559 HIP PAIN: Status: ACTIVE | Noted: 2021-05-19

## 2021-05-19 PROBLEM — R26.9 GAIT ABNORMALITY: Status: ACTIVE | Noted: 2021-05-19

## 2021-05-19 LAB
ANION GAP SERPL CALCULATED.3IONS-SCNC: 10 MMOL/L (ref 7–19)
BASOPHILS ABSOLUTE: 0 K/UL (ref 0–0.2)
BASOPHILS RELATIVE PERCENT: 0.1 % (ref 0–1)
BILIRUBIN URINE: NEGATIVE
BLOOD, URINE: NEGATIVE
BUN BLDV-MCNC: 12 MG/DL (ref 8–23)
CALCIUM SERPL-MCNC: 8.6 MG/DL (ref 8.8–10.2)
CHLORIDE BLD-SCNC: 108 MMOL/L (ref 98–111)
CLARITY: CLEAR
CO2: 25 MMOL/L (ref 22–29)
COLOR: YELLOW
CREAT SERPL-MCNC: 0.4 MG/DL (ref 0.5–0.9)
EOSINOPHILS ABSOLUTE: 0.2 K/UL (ref 0–0.6)
EOSINOPHILS RELATIVE PERCENT: 1.7 % (ref 0–5)
GFR AFRICAN AMERICAN: >59
GFR NON-AFRICAN AMERICAN: >60
GLUCOSE BLD-MCNC: 118 MG/DL (ref 74–109)
GLUCOSE URINE: NEGATIVE MG/DL
HCT VFR BLD CALC: 31.4 % (ref 37–47)
HEMOGLOBIN: 10 G/DL (ref 12–16)
IMMATURE GRANULOCYTES #: 0.1 K/UL
INR BLD: 1.07 (ref 0.88–1.18)
KETONES, URINE: NEGATIVE MG/DL
LEUKOCYTE ESTERASE, URINE: NEGATIVE
LYMPHOCYTES ABSOLUTE: 1.5 K/UL (ref 1.1–4.5)
LYMPHOCYTES RELATIVE PERCENT: 15.7 % (ref 20–40)
MCH RBC QN AUTO: 28 PG (ref 27–31)
MCHC RBC AUTO-ENTMCNC: 31.8 G/DL (ref 33–37)
MCV RBC AUTO: 88 FL (ref 81–99)
MONOCYTES ABSOLUTE: 0.8 K/UL (ref 0–0.9)
MONOCYTES RELATIVE PERCENT: 8.3 % (ref 0–10)
NEUTROPHILS ABSOLUTE: 6.8 K/UL (ref 1.5–7.5)
NEUTROPHILS RELATIVE PERCENT: 73.1 % (ref 50–65)
NITRITE, URINE: NEGATIVE
PDW BLD-RTO: 14 % (ref 11.5–14.5)
PH UA: 6.5 (ref 5–8)
PLATELET # BLD: 92 K/UL (ref 130–400)
PMV BLD AUTO: 11.8 FL (ref 9.4–12.3)
POTASSIUM REFLEX MAGNESIUM: 4.1 MMOL/L (ref 3.5–5)
PREALBUMIN: 10 MG/DL (ref 20–40)
PROTEIN UA: NEGATIVE MG/DL
PROTHROMBIN TIME: 13.8 SEC (ref 12–14.6)
RBC # BLD: 3.57 M/UL (ref 4.2–5.4)
SARS-COV-2, NAAT: NOT DETECTED
SODIUM BLD-SCNC: 143 MMOL/L (ref 136–145)
SPECIFIC GRAVITY UA: 1.01 (ref 1–1.03)
TSH REFLEX FT4: 2.03 UIU/ML (ref 0.35–5.5)
UROBILINOGEN, URINE: 1 E.U./DL
VITAMIN B-12: 217 PG/ML (ref 211–946)
WBC # BLD: 9.3 K/UL (ref 4.8–10.8)

## 2021-05-19 PROCEDURE — 2580000003 HC RX 258: Performed by: HOSPITALIST

## 2021-05-19 PROCEDURE — 6370000000 HC RX 637 (ALT 250 FOR IP): Performed by: ORTHOPAEDIC SURGERY

## 2021-05-19 PROCEDURE — 94640 AIRWAY INHALATION TREATMENT: CPT

## 2021-05-19 PROCEDURE — 81003 URINALYSIS AUTO W/O SCOPE: CPT

## 2021-05-19 PROCEDURE — 36415 COLL VENOUS BLD VENIPUNCTURE: CPT

## 2021-05-19 PROCEDURE — 80048 BASIC METABOLIC PNL TOTAL CA: CPT

## 2021-05-19 PROCEDURE — 87635 SARS-COV-2 COVID-19 AMP PRB: CPT

## 2021-05-19 PROCEDURE — 6370000000 HC RX 637 (ALT 250 FOR IP): Performed by: HOSPITALIST

## 2021-05-19 PROCEDURE — 84134 ASSAY OF PREALBUMIN: CPT

## 2021-05-19 PROCEDURE — 84443 ASSAY THYROID STIM HORMONE: CPT

## 2021-05-19 PROCEDURE — 6360000002 HC RX W HCPCS: Performed by: HOSPITALIST

## 2021-05-19 PROCEDURE — 82607 VITAMIN B-12: CPT

## 2021-05-19 PROCEDURE — 85610 PROTHROMBIN TIME: CPT

## 2021-05-19 PROCEDURE — 6360000002 HC RX W HCPCS: Performed by: ORTHOPAEDIC SURGERY

## 2021-05-19 PROCEDURE — 1180000000 HC REHAB R&B

## 2021-05-19 PROCEDURE — 85025 COMPLETE CBC W/AUTO DIFF WBC: CPT

## 2021-05-19 RX ORDER — POLYETHYLENE GLYCOL 3350 17 G/17G
17 POWDER, FOR SOLUTION ORAL DAILY PRN
Status: CANCELLED | OUTPATIENT
Start: 2021-05-19

## 2021-05-19 RX ORDER — ATORVASTATIN CALCIUM 40 MG/1
40 TABLET, FILM COATED ORAL NIGHTLY
Status: DISCONTINUED | OUTPATIENT
Start: 2021-05-19 | End: 2021-06-02 | Stop reason: HOSPADM

## 2021-05-19 RX ORDER — BUDESONIDE 0.5 MG/2ML
0.5 INHALANT ORAL 2 TIMES DAILY
Status: CANCELLED | OUTPATIENT
Start: 2021-05-19

## 2021-05-19 RX ORDER — IPRATROPIUM BROMIDE AND ALBUTEROL SULFATE 2.5; .5 MG/3ML; MG/3ML
1 SOLUTION RESPIRATORY (INHALATION) 4 TIMES DAILY
Status: CANCELLED | OUTPATIENT
Start: 2021-05-19

## 2021-05-19 RX ORDER — POTASSIUM CHLORIDE 7.45 MG/ML
10 INJECTION INTRAVENOUS PRN
Status: CANCELLED | OUTPATIENT
Start: 2021-05-19

## 2021-05-19 RX ORDER — ACETAMINOPHEN 650 MG/1
650 SUPPOSITORY RECTAL EVERY 6 HOURS PRN
Status: DISCONTINUED | OUTPATIENT
Start: 2021-05-19 | End: 2021-06-02 | Stop reason: HOSPADM

## 2021-05-19 RX ORDER — MAGNESIUM SULFATE IN WATER 40 MG/ML
2000 INJECTION, SOLUTION INTRAVENOUS PRN
Status: DISCONTINUED | OUTPATIENT
Start: 2021-05-19 | End: 2021-06-02 | Stop reason: HOSPADM

## 2021-05-19 RX ORDER — LEVOTHYROXINE SODIUM 88 UG/1
88 TABLET ORAL DAILY
Status: CANCELLED | OUTPATIENT
Start: 2021-05-20

## 2021-05-19 RX ORDER — POTASSIUM CHLORIDE 20 MEQ/1
40 TABLET, EXTENDED RELEASE ORAL PRN
Status: DISCONTINUED | OUTPATIENT
Start: 2021-05-19 | End: 2021-06-02 | Stop reason: HOSPADM

## 2021-05-19 RX ORDER — IPRATROPIUM BROMIDE AND ALBUTEROL SULFATE 2.5; .5 MG/3ML; MG/3ML
1 SOLUTION RESPIRATORY (INHALATION) 4 TIMES DAILY
Status: DISCONTINUED | OUTPATIENT
Start: 2021-05-19 | End: 2021-05-20

## 2021-05-19 RX ORDER — ACETAMINOPHEN 325 MG/1
650 TABLET ORAL EVERY 6 HOURS PRN
Status: DISCONTINUED | OUTPATIENT
Start: 2021-05-19 | End: 2021-05-19 | Stop reason: SDUPTHER

## 2021-05-19 RX ORDER — ONDANSETRON 2 MG/ML
4 INJECTION INTRAMUSCULAR; INTRAVENOUS EVERY 6 HOURS PRN
Status: DISCONTINUED | OUTPATIENT
Start: 2021-05-19 | End: 2021-06-02 | Stop reason: HOSPADM

## 2021-05-19 RX ORDER — ARFORMOTEROL TARTRATE 15 UG/2ML
15 SOLUTION RESPIRATORY (INHALATION) 2 TIMES DAILY
Status: CANCELLED | OUTPATIENT
Start: 2021-05-19

## 2021-05-19 RX ORDER — PROMETHAZINE HYDROCHLORIDE 12.5 MG/1
12.5 TABLET ORAL EVERY 6 HOURS PRN
Status: DISCONTINUED | OUTPATIENT
Start: 2021-05-19 | End: 2021-06-02 | Stop reason: HOSPADM

## 2021-05-19 RX ORDER — SODIUM CHLORIDE 0.9 % (FLUSH) 0.9 %
5-40 SYRINGE (ML) INJECTION PRN
Status: DISCONTINUED | OUTPATIENT
Start: 2021-05-19 | End: 2021-06-02 | Stop reason: HOSPADM

## 2021-05-19 RX ORDER — MAGNESIUM SULFATE IN WATER 40 MG/ML
2000 INJECTION, SOLUTION INTRAVENOUS PRN
Status: CANCELLED | OUTPATIENT
Start: 2021-05-19

## 2021-05-19 RX ORDER — POTASSIUM CHLORIDE 7.45 MG/ML
10 INJECTION INTRAVENOUS PRN
Status: DISCONTINUED | OUTPATIENT
Start: 2021-05-19 | End: 2021-06-02 | Stop reason: HOSPADM

## 2021-05-19 RX ORDER — ONDANSETRON 2 MG/ML
4 INJECTION INTRAMUSCULAR; INTRAVENOUS EVERY 6 HOURS PRN
Status: CANCELLED | OUTPATIENT
Start: 2021-05-19

## 2021-05-19 RX ORDER — ATORVASTATIN CALCIUM 40 MG/1
40 TABLET, FILM COATED ORAL NIGHTLY
Status: CANCELLED | OUTPATIENT
Start: 2021-05-19

## 2021-05-19 RX ORDER — ACETAMINOPHEN 325 MG/1
650 TABLET ORAL EVERY 4 HOURS PRN
Status: DISCONTINUED | OUTPATIENT
Start: 2021-05-19 | End: 2021-06-02 | Stop reason: HOSPADM

## 2021-05-19 RX ORDER — SODIUM CHLORIDE 0.9 % (FLUSH) 0.9 %
5-40 SYRINGE (ML) INJECTION PRN
Status: CANCELLED | OUTPATIENT
Start: 2021-05-19

## 2021-05-19 RX ORDER — POLYETHYLENE GLYCOL 3350 17 G/17G
17 POWDER, FOR SOLUTION ORAL DAILY PRN
Status: DISCONTINUED | OUTPATIENT
Start: 2021-05-19 | End: 2021-05-19 | Stop reason: SDUPTHER

## 2021-05-19 RX ORDER — POLYETHYLENE GLYCOL 3350 17 G/17G
17 POWDER, FOR SOLUTION ORAL DAILY PRN
Status: DISCONTINUED | OUTPATIENT
Start: 2021-05-19 | End: 2021-06-02 | Stop reason: HOSPADM

## 2021-05-19 RX ORDER — ACETAMINOPHEN 650 MG/1
650 SUPPOSITORY RECTAL EVERY 6 HOURS PRN
Status: CANCELLED | OUTPATIENT
Start: 2021-05-19

## 2021-05-19 RX ORDER — ARFORMOTEROL TARTRATE 15 UG/2ML
15 SOLUTION RESPIRATORY (INHALATION) 2 TIMES DAILY
Status: DISCONTINUED | OUTPATIENT
Start: 2021-05-19 | End: 2021-05-20

## 2021-05-19 RX ORDER — SODIUM CHLORIDE 0.9 % (FLUSH) 0.9 %
5-40 SYRINGE (ML) INJECTION EVERY 12 HOURS SCHEDULED
Status: CANCELLED | OUTPATIENT
Start: 2021-05-19

## 2021-05-19 RX ORDER — OXYCODONE HYDROCHLORIDE AND ACETAMINOPHEN 5; 325 MG/1; MG/1
1 TABLET ORAL EVERY 4 HOURS PRN
Status: CANCELLED | OUTPATIENT
Start: 2021-05-19

## 2021-05-19 RX ORDER — POTASSIUM CHLORIDE 20 MEQ/1
40 TABLET, EXTENDED RELEASE ORAL PRN
Status: CANCELLED | OUTPATIENT
Start: 2021-05-19

## 2021-05-19 RX ORDER — SODIUM CHLORIDE 0.9 % (FLUSH) 0.9 %
5-40 SYRINGE (ML) INJECTION EVERY 12 HOURS SCHEDULED
Status: DISCONTINUED | OUTPATIENT
Start: 2021-05-19 | End: 2021-05-27

## 2021-05-19 RX ORDER — OXYCODONE HYDROCHLORIDE AND ACETAMINOPHEN 5; 325 MG/1; MG/1
1 TABLET ORAL EVERY 4 HOURS PRN
Status: DISCONTINUED | OUTPATIENT
Start: 2021-05-19 | End: 2021-05-26

## 2021-05-19 RX ORDER — ACETAMINOPHEN 325 MG/1
650 TABLET ORAL EVERY 6 HOURS PRN
Status: CANCELLED | OUTPATIENT
Start: 2021-05-19

## 2021-05-19 RX ORDER — PROMETHAZINE HYDROCHLORIDE 12.5 MG/1
12.5 TABLET ORAL EVERY 6 HOURS PRN
Status: CANCELLED | OUTPATIENT
Start: 2021-05-19

## 2021-05-19 RX ORDER — LEVOTHYROXINE SODIUM 88 UG/1
88 TABLET ORAL DAILY
Status: DISCONTINUED | OUTPATIENT
Start: 2021-05-20 | End: 2021-06-02 | Stop reason: HOSPADM

## 2021-05-19 RX ORDER — BUDESONIDE 0.5 MG/2ML
0.5 INHALANT ORAL 2 TIMES DAILY
Status: DISCONTINUED | OUTPATIENT
Start: 2021-05-19 | End: 2021-06-02 | Stop reason: HOSPADM

## 2021-05-19 RX ADMIN — IPRATROPIUM BROMIDE AND ALBUTEROL SULFATE 1 AMPULE: 2.5; .5 SOLUTION RESPIRATORY (INHALATION) at 06:35

## 2021-05-19 RX ADMIN — OXYCODONE HYDROCHLORIDE AND ACETAMINOPHEN 1 TABLET: 5; 325 TABLET ORAL at 23:55

## 2021-05-19 RX ADMIN — IPRATROPIUM BROMIDE AND ALBUTEROL SULFATE 1 AMPULE: .5; 3 SOLUTION RESPIRATORY (INHALATION) at 19:30

## 2021-05-19 RX ADMIN — ARFORMOTEROL TARTRATE 15 MCG: 15 SOLUTION RESPIRATORY (INHALATION) at 19:41

## 2021-05-19 RX ADMIN — ARFORMOTEROL TARTRATE 15 MCG: 15 SOLUTION RESPIRATORY (INHALATION) at 06:44

## 2021-05-19 RX ADMIN — LEVOTHYROXINE SODIUM 88 MCG: 0.09 TABLET ORAL at 05:27

## 2021-05-19 RX ADMIN — SODIUM CHLORIDE, PRESERVATIVE FREE 10 ML: 5 INJECTION INTRAVENOUS at 22:25

## 2021-05-19 RX ADMIN — BUDESONIDE 500 MCG: 0.5 SUSPENSION RESPIRATORY (INHALATION) at 19:41

## 2021-05-19 RX ADMIN — OXYCODONE HYDROCHLORIDE AND ACETAMINOPHEN 1 TABLET: 5; 325 TABLET ORAL at 09:17

## 2021-05-19 RX ADMIN — BUDESONIDE 500 MCG: 0.5 SUSPENSION RESPIRATORY (INHALATION) at 06:44

## 2021-05-19 RX ADMIN — ATORVASTATIN CALCIUM 40 MG: 40 TABLET, FILM COATED ORAL at 22:25

## 2021-05-19 ASSESSMENT — PAIN SCALES - GENERAL
PAINLEVEL_OUTOF10: 6
PAINLEVEL_OUTOF10: 8
PAINLEVEL_OUTOF10: 1

## 2021-05-19 NOTE — CARE COORDINATION
The [address] at [facility]  Notification of Admission Decision      [x] Patient has been accepted for admit to [facility] on : 5/19/21 Room 821      Please write discharge orders and summary prior to discharge. [] Patient acceptance to Rehab pending the following :    [] Eval in progress       [] Patient determined to be ineligible for services at [facility] because : We recommend you consider        Thank you for your referral, we appreciate you. If you have any questions, please feel   free to contact me at [phone].     Electronically Signed by Davey Lemon Admissions Coordinator 5/19/2021 10:38 AM

## 2021-05-19 NOTE — PROGRESS NOTES
Anny Steele arrived to room # [phone]. Presented with: right hip fracture  Mental Status: Patient is oriented, alert, coherent, logical, thought processes intact and able to concentrate and follow conversation. Vitals:    05/19/21 1402   BP: 100/60   Pulse: 63   Resp: 18   Temp: 98.8 °F (37.1 °C)   SpO2: 95%     Patient safety contract and falls prevention contract reviewed with patient Yes. Oriented Patient to room. Call light within reach. Yes.   Needs, issues or concerns expressed at this time: no.      Electronically signed by Anderson Chávez RN on 5/19/2021 at 3:34 PM

## 2021-05-19 NOTE — PROGRESS NOTES
4 Eyes Skin Assessment    Lillis Severs is being assessed upon: Admission    I agree that Roberto Carlos Lugo RN, along with Estrella Bello LPN (either 2 RN's or 1 LPN and 1 RN) have performed a thorough Head to Toe Skin Assessment on the patient. ALL assessment sites listed below have been assessed. Areas assessed by both nurses:     [x]   Head, Face, and Ears   [x]   Shoulders, Back, and Chest  [x]   Arms, Elbows, and Hands   [x]   Coccyx, Sacrum, and Ischium  [x]   Legs, Feet, and Heels    Does the Patient have Skin Breakdown?  No    Brent Prevention initiated: No  Wound Care Orders initiated: No    WOC nurse consulted for Pressure Injury (Stage 3,4, Unstageable, DTI, NWPT, and Complex wounds) and New or Established Ostomies: No        Primary Nurse eSignature: Dain Wynn RN on 5/19/2021 at 3:35 PM      Co-Signer eSignature: Electronically signed by Frederic Kayser, LPN on 6/83/9421 at 5:88 PM

## 2021-05-19 NOTE — PLAN OF CARE
70 [address] TREATMENT PLAN      Docia Quant    : [id number]  Acct #: [de-identified]  MRN: [id number]   PHYSICIAN:  José Miguel Blackburn MD  Primary Problem    Patient Active Problem List   Diagnosis    Hypothyroidism    Syncope    TIA (transient ischemic attack)    Bradycardia    Tobacco abuse    Hyperlipidemia    Abnormal PET scan of colon    Family history of polyps in the colon    Left-sided weakness    Symptomatic bradycardia    Nonintractable headache    Current moderate episode of major depressive disorder without prior episode ([facility]    Convulsion ([facility]    Pacemaker    Closed right hip fracture, initial encounter ([facility]    Chronic bronchitis (HCC)    Hip pain    Gait abnormality       Rehabilitation Diagnosis:     Closed right [facility] fracture, [facility] encounter [facility] [S72.001A]       ADMIT DATE:2021   CARE PLAN     NURSING:  Shelly Esparza while on this unit will:     [] Be continent of bowel and bladder      [x] Have an adequate number of bowel movements   [] Urinate with no urinary retention >300ml in bladder   [] Complete bladder protocol with deras removal   [] Maintain O2 SATs at ___%   [x] Have pain managed while on ARU        [] Be pain free by discharge    [x] Have no skin breakdown while on ARU   [] Have improved skin integrity via wound measurements   [x] Have no signs/symptoms of infection at the wound site  [x] Be free from injury during hospitalization   [] Complete education with patient/family with understanding demonstrated for:  [x] Adjustment   [x] Other:   Nursing interventions may include bowel/bladder training, education for medical assistive devices, medication education, O2 saturation management, energy conservation, stress management techniques, fall prevention, alarms protocol, seating and positioning, skin/wound care, pressure relief instruction,dressing changes,  infection protection, DVT prophylaxis, and/or assistance with in room safety with transfers to bed, toilet, wheelchair, shower as well as bathroom activities and hygiene. Patient/caregiver education for:   [x] Disease/sustained injury/management      [x] Medication Use   [] Surgical intervention   [x] Safety   [] Body mechanics and or joint protection   [x] Health maintenance       IRF-MAGNOLIA  Bladder and Bowel Continence  Bladder Continence: Always continent  Bowel Continence: Always continent       PHYSICAL THERAPY:  Goals:                  Short term goals  Time Frame for Short term goals: 1-2 WEEKS  Short term goal 1: SUPERVISION WITH BED MOBILITY  Short term goal 2: SBA WITH TRANSFERS  Short term goal 3: SBA WITH CAR TRANSFER  Short term goal 4: NEGOTIATE 1 STEP CGA  Short term goal 5: PROPEL W/C 150 FT SUPERVISION            Long term goals  Time Frame for Long term goals : 2-3 WEEKS  Long term goal 1: INDEP WITH BED MOBILITY  Long term goal 2: INDEP WITH TRANSFERS  Long term goal 3: INDEP WITH CAR TRANSFER  Long term goal 4: NEGOTIATE 4 STEPS INDEP  Long term goal 5: PROPEL W/C 150 FT INDEP  These goals were reviewed with this patient at the time of assessment and Philip Nelsonole is in agreement.      Plan of Care: Frequency:  [x] 5 days per week, 90 minutes per day                             []  5 days per week, 60 minutes per day               Current Treatment Recommendations: Strengthening, ROM, Balance Training, Functional Mobility Training, Transfer Training, Endurance Training, Wheelchair Mobility Training, Gait Training, Stair training, Home Exercise Program, Safety Education & Training, Patient/Caregiver Education & Training, Modalities    IRF-MAGNOLIA  Roll Left and Right  Assistance Needed: Supervision or touching assistance  CARE Score: 4  Discharge Goal: Independent  Sit to Lying  Reason if not Attempted: Not attempted due to medical condition or safety concerns  CARE Score: 88  Discharge Goal: Independent  Lying to Sitting on Side of Bed  Assistance Needed: Supervision or touching assistance  CARE Score: 4 CGA with toilet transfers. Short term goal 3: Min A with LB dressing using AE. Short term goal 4: Min A with donning/doffing socks and shoes using AE. Short term goal 5: Min A with ambulatory homemaking tasks. Short term goal 6: Pt will complete 1-2 static standing task for 3 minutes, requiring CGA. :  Long term goals  Time Frame for Long term goals : 2 weeks  Long term goal 1: Modified independent with toileting and toilet transfers. Long term goal 2: Modified independent with overall dressing. Long term goal 3: Modified independent with ambulatory homemaking tasks. Long term goal 4: Patient verbalize DME needs. :    These goals were reviewed with this patient at the time of assessment and Jeannie Gonsalez is in agreement    Plan of Care: Frequency:   [x] 5 days per week, 90 minutes per day     [] 5 days per week, 60 minutes per day                Plan  Current Treatment Recommendations: Strengthening, Balance Training, Functional Mobility Training, Endurance Training, Safety Education & Training, Patient/Caregiver Education & Training, Equipment Evaluation, Education, & procurement, Self-Care / ADL, Home Management Training            IRF-MAGNOLIA  Eating  Assistance Needed: Setup or clean-up assistance  CARE Score: 5  Discharge Goal: Independent  Oral Hygiene  Assistance Needed: Setup or clean-up assistance  CARE Score: 5  Discharge Goal: Independent  Toileting Hygiene  Assistance Needed: Supervision or touching assistance  Comment: CGA  CARE Score: 4  Discharge Goal: Independent  Shower/Bathe Self  Assistance Needed: Partial/moderate assistance  Comment:  Mod A  CARE Score: 3  Discharge Goal: Independent  Upper Body Dressing  Assistance Needed: Setup or clean-up assistance  CARE Score: 5  Discharge Goal: Independent  Lower Body Dressing  Assistance Needed: Substantial/maximal assistance  CARE Score: 2  Discharge Goal: Independent  Putting On/Taking Off Footwear  Assistance Needed: Dependent  CARE Score: 1 Discharge Goal: Independent  Toilet Transfer  Assistance Needed: Partial/moderate assistance  Comment: Min A  CARE Score: 3  Discharge Goal: Independent  Picking Up Object  Assistance Needed: Dependent  CARE Score: 1  Discharge Goal: Supervision or touching assistance  Treatments may include IADL retraining, strengthening, safety education and training, patient/caregiver education and training, equipment evaluation/ training/procurement, neuromuscular reeducation, wheelchair mobility training. SPEECH THERAPY:   Plan of Care and Goals:   LTG    FIM Goals: Comprehension:    Expression:    Social:    Problem Solving:    Memory:                                                  IRF-MAGNOLIA  Hearing, Speech, and Vision  Expression of Ideas and Wants: Without difficulty  Understanding Verbal and Non-Verbal Content: Understands  Cognitive Patterns  Cognitive Pattern Assessment Used: BIMS  Repetition of Three Words (First Attempt): 3  Temporal Orientation: Year: Correct  Temporal Orientation: Month: Accurate within 5 days  Temporal Orientation: Day: Correct  Able to recall \"sock: Yes, after cueing (\"something to wear\")  Able to recall \"blue\": Yes, no cue required  Able to recall \"bed\": Yes, no cue required  BIMS Summary Score: 14          Plan of Care:  Frequency:   [] 5 days per week, 60 minutes per day                            [x] Not appropriate for Speech Therapy  Treatments may include speech/language/communication therapy, cognitive training, group therapy, education, and/or dysphagia therapy based on the above goals. These goals were reviewed with this patient at the time of assessment and Silver Foley is in agreement. CASE MANAGEMENT:  Goals:   Assist patient/family with discharge planning, patient/family counseling,  and coordination with insurance during ARU stay.   Patient Goals: recover, get endurance, be able to move and take care of self some following instructions               Activities Prior to Admit:            Occupation: Caregiver            Christiano Grace will be seen a minimum of 3 hours of therapy per day/a minimum of 5 out of 7 days per week. [] In this rare instance due to the nature of this patient's medical involvement, this patient will be seen 15 hours per week (900 minutes within a 7 day period). Treatments may include therapeutic exercises, gait training, neuromuscular re-ed, transfer training, community reintegration, bed mobility, w/c mobility and training, self care, home mgmt, cognitive training, energy conservation,dysphagia tx, speech/language/communication therapy, group therapy, and patient/family education. In addition, dietician/nutritionist may monitor calorie count as well as intake and collaboratively work with SLP on dietary upgrades. Neuropsychology/Psychology may evaluate and provide necessary support. Medical issues being managed closely and that require 24 hour availability of a physician:   [] Swallowing Precautions  [x] Bowel/Bladder Fx  [x] Weight bearing precautions   [x] Wound Care    [x] Pain Mgmt   [x] Infection Protection   [x] DVT Prophylaxis   [x] Fall Precautions  [] Fluid/Electrolyte/Nutrition Balance   [] Voice Protection   [] Respiratory  [] Other:    Medical Prognosis: [] Good  [x] Fair    [] Guarded   Total expected IRF days:  13  Anticipated discharge destination:    [] Home Independently   [x] Home with supervision    []SNF     [] Other                                           Physician anticipated functional outcomes:  Ambulate household distances independently with assistive device. IPOC brief synthesis: Acute inpatient rehabilitation with occupational   physical therapy 180 minutes, 5 every 7   days will address basic and advancing mobility with self-care   instruction and adaptive equipment training. Caregiver education will   be offered.  Expected length of stay prior to the supervised level of   functional discharge to home with home care is 13 days. Assessment and Plan:  1. S/P right femur fracture and repair-pain control/mobilization  2. Hyperlipidemia-on statin  3. DVT prophylaxis-Lovenox  4. Hypothyroidism-on synthroid  5. COPD-nebs PRN  6. Pain control-Percocet PRN  7. GI-bowel regimen  8. Thrombocytopenia-monitor   9.  PT/OT            Case Mgmt: Electronically signed by CONNOR Colón on 5/20/2021 at 2:45 PM    OT: Electronically signed by Patric Mercado OT on 5/20/21 at 11:54 AM CDT    PT:Electronically signed by Casey Devi PT on 5/20/2021 at 3:37 PM      RN: Electronically signed by Shital Killian RN on 5/19/21 at 3:43 PM CDT        ST: Electronically signed by ROSHAN Fletcher on 5/20/2021 at 7:58 AM

## 2021-05-19 NOTE — DISCHARGE SUMMARY
Discharge Summary      Date:5/19/2021        Patient Deangelo Miles     Date of Birth:[birth year]     Age:70 y.o. Admit Date:5/15/2021   Admission Condition:fair   Discharged Condition:stable  Discharge Date: 05/19/21       Discharge Diagnoses   Principal Problem:    Closed right hip fracture, initial encounter [facility])  Active Problems:    Tobacco abuse    Hyperlipidemia    Pacemaker    Chronic bronchitis ([address]  Resolved Problems:    * No resolved hospital problems. [facility] Stay   Narrative of Hospital Course:     78-year-old female with chronic bronchitis, tobacco abuse, history of symptomatic bradycardia status post pacemaker placement, history of TIA, hyperlipidemia, presented to the ED on 5/15 following fall from standing after she tripped and fell on her right side, with severe pain and unable to bear weight-admitted after she was found to have right subcapital displaced femoral neck fracture on imaging. Orthopedics consulted and patient underwent right cemented hip hemiarthroplasty on 5/15 with estimated blood loss of 325 cc. Patient recently completed treatment for mild exacerbation of COPD with 5 days of steroids, continued on bronchodilators. Underwent rehab postop. Mild drop in H&H related to postop acute blood loss and hemodilution from fluids. Accepted to [facility] inpatient rehab. Physical Examination:  General: Well-developed, no acute distress lying comfortably in bed. HEENT: Atraumatic normocephalic, range of motion normal   Cardiac: Normal S1-S2 no murmurs rub or gallop. Respiratory: clear To auscultation bilaterally, no rhonchi or rales, no wheezing  Abdomen: Soft, positive bowel sounds in all quadrants, no distention, nontender to palpation.   Extremities: Incision site clean and intact, no tenderness, no edema, moves all extremities  Psych: Affect normal and good eye contact, behavioral normal.          Consultants:   IP CONSULT TO ORTHOPEDIC SURGERY  IP CONSULT TO CASE MANAGEMENT  IP CONSULT TO REHAB/TCU ADMISSION COORDINATOR    Time Spent on Discharge:  40 minutes were spent in patient examination, evaluation, counseling as well as medication reconciliation, prescriptions for required medications, discharge plan and follow up. Surgeries/Procedures Performed:  Procedure(s):  HIP HEMIARTHROPLASTY       Significant Diagnostic Studies:   Recent Labs:  CBC:   Lab Results   Component Value Date    WBC 9.3 05/19/2021    RBC 3.57 05/19/2021    HGB 10.0 05/19/2021    HCT 31.4 05/19/2021    HCT 36.6 07/04/2011    MCV 88.0 05/19/2021    MCH 28.0 05/19/2021    MCHC 31.8 05/19/2021    RDW 14.0 05/19/2021    PLT 92 05/19/2021     07/04/2011     BMP:    Lab Results   Component Value Date    GLUCOSE 118 05/19/2021     05/19/2021     07/04/2011    K 4.1 05/19/2021    K 4.5 07/04/2011     05/19/2021     07/04/2011    CO2 25 05/19/2021    ANIONGAP 10 05/19/2021    BUN 12 05/19/2021    CREATININE 0.4 05/19/2021    CREATININE 0.6 07/04/2011    CALCIUM 8.6 05/19/2021    LABGLOM >60 05/19/2021    GFRAA >59 05/19/2021       Radiology Last 7 Days:  XR FEMUR RIGHT (MIN 2 VIEWS)    Result Date: 5/15/2021  Acute subcapital right hip fracture. Signed by Dr Jassi Mooney on 5/15/2021 1:49 PM    XR TIBIA FIBULA RIGHT (2 VIEWS)    Result Date: 5/15/2021  No acute bony abnormality appreciated. Signed by Dr Jassi Mooney on 5/15/2021 1:50 PM    XR CHEST PORTABLE    Result Date: 5/15/2021  Chronic interstitial changes. No evidence of acute cardiopulmonary process. Signed by Dr Jassi Mooney on 5/15/2021 1:48 PM    XR HIP 2-3 VW W PELVIS RIGHT    Result Date: 5/15/2021  1. Right hip arthroplasty with no postoperative complication identified.  Signed by Dr Pau Christianson on 5/15/2021 7:03 PM      Discharge Plan   Disposition: Discharge/Readmit    Provider Follow-Up:   [id number]  [phone]    Go on 6/2/2021  @ 8:10 am for follow up on right hip    Constanza Short MD  [address] [place] [address]  [phone]             Patient Instructions   Diet: cardiac diet    Activity: activity as tolerated      Discharge Medications         Medication List      ASK your doctor about these medications    albuterol sulfate  (90 Base) MCG/ACT inhaler  Commonly known as: ProAir HFA  Inhale 2 puffs into the lungs every 6 hours as needed for Wheezing     atorvastatin 40 MG tablet  Commonly known as: LIPITOR  TAKE 1 TABLET BY MOUTH ONCE DAILY AT NIGHT     azithromycin 250 MG tablet  Commonly known as: Zithromax Z-Kit  Take 2 tabs on day 1 and 1 tab on days 2-5     budesonide-formoterol 160-4.5 MCG/ACT Aero  Commonly known as: Symbicort  Inhale 2 puffs into the lungs 2 times daily     levothyroxine 88 MCG tablet  Commonly known as: SYNTHROID  Take 1 tablet by mouth once daily     predniSONE 20 MG tablet  Commonly known as: DELTASONE  Take 1 tablet by mouth 2 times daily for 5 days  Ask about: Should I take this medication?      Shingrix 50 MCG/0.5ML Susr injection  Generic drug: zoster recombinant adjuvanted vaccine  Inject 0.5 mLs into the muscle See Admin Instructions 1 dose now and repeat in 2-6 months            Electronically signed by Albania Paniagua MD on 5/19/21 at 11:24 AM CDT

## 2021-05-19 NOTE — PROGRESS NOTES
Physical Therapy  Name: Lillis Severs  MRN:  [id number]  Date of service:  5/19/2021 05/19/21 4565   General   Missed reason Patient declined   Subjective   Subjective Attempt: Pt declines OOB at this time, states that she is waiting to get a shower.          Electronically signed by Lynn Hines PTA on 5/19/2021 at 9:40 AM

## 2021-05-20 LAB
ALBUMIN SERPL-MCNC: 2.9 G/DL (ref 3.5–5.2)
ALP BLD-CCNC: 58 U/L (ref 35–104)
ALT SERPL-CCNC: 13 U/L (ref 5–33)
ANION GAP SERPL CALCULATED.3IONS-SCNC: 7 MMOL/L (ref 7–19)
AST SERPL-CCNC: 15 U/L (ref 5–32)
BASOPHILS ABSOLUTE: 0 K/UL (ref 0–0.2)
BASOPHILS RELATIVE PERCENT: 0.1 % (ref 0–1)
BILIRUB SERPL-MCNC: 0.7 MG/DL (ref 0.2–1.2)
BUN BLDV-MCNC: 13 MG/DL (ref 8–23)
CALCIUM SERPL-MCNC: 8.9 MG/DL (ref 8.8–10.2)
CHLORIDE BLD-SCNC: 105 MMOL/L (ref 98–111)
CO2: 28 MMOL/L (ref 22–29)
CREAT SERPL-MCNC: 0.4 MG/DL (ref 0.5–0.9)
EOSINOPHILS ABSOLUTE: 0.2 K/UL (ref 0–0.6)
EOSINOPHILS RELATIVE PERCENT: 2.4 % (ref 0–5)
GFR AFRICAN AMERICAN: >59
GFR NON-AFRICAN AMERICAN: >60
GLUCOSE BLD-MCNC: 110 MG/DL (ref 74–109)
HCT VFR BLD CALC: 33.4 % (ref 37–47)
HEMOGLOBIN: 10.7 G/DL (ref 12–16)
IMMATURE GRANULOCYTES #: 0.1 K/UL
LYMPHOCYTES ABSOLUTE: 1.7 K/UL (ref 1.1–4.5)
LYMPHOCYTES RELATIVE PERCENT: 17.5 % (ref 20–40)
MCH RBC QN AUTO: 27.9 PG (ref 27–31)
MCHC RBC AUTO-ENTMCNC: 32 G/DL (ref 33–37)
MCV RBC AUTO: 87.2 FL (ref 81–99)
MONOCYTES ABSOLUTE: 0.8 K/UL (ref 0–0.9)
MONOCYTES RELATIVE PERCENT: 8.3 % (ref 0–10)
NEUTROPHILS ABSOLUTE: 7 K/UL (ref 1.5–7.5)
NEUTROPHILS RELATIVE PERCENT: 70.8 % (ref 50–65)
PDW BLD-RTO: 13.9 % (ref 11.5–14.5)
PLATELET # BLD: 106 K/UL (ref 130–400)
PMV BLD AUTO: 11.3 FL (ref 9.4–12.3)
POTASSIUM REFLEX MAGNESIUM: 4.3 MMOL/L (ref 3.5–5)
POTASSIUM SERPL-SCNC: 4.3 MMOL/L (ref 3.5–5)
PREALBUMIN: 10 MG/DL (ref 20–40)
RBC # BLD: 3.83 M/UL (ref 4.2–5.4)
SODIUM BLD-SCNC: 140 MMOL/L (ref 136–145)
TOTAL PROTEIN: 4.9 G/DL (ref 6.6–8.7)
WBC # BLD: 9.8 K/UL (ref 4.8–10.8)

## 2021-05-20 PROCEDURE — 93970 EXTREMITY STUDY: CPT

## 2021-05-20 PROCEDURE — 85025 COMPLETE CBC W/AUTO DIFF WBC: CPT

## 2021-05-20 PROCEDURE — 97161 PT EVAL LOW COMPLEX 20 MIN: CPT

## 2021-05-20 PROCEDURE — 6370000000 HC RX 637 (ALT 250 FOR IP): Performed by: HOSPITALIST

## 2021-05-20 PROCEDURE — 97110 THERAPEUTIC EXERCISES: CPT

## 2021-05-20 PROCEDURE — 36415 COLL VENOUS BLD VENIPUNCTURE: CPT

## 2021-05-20 PROCEDURE — 97116 GAIT TRAINING THERAPY: CPT

## 2021-05-20 PROCEDURE — 6360000002 HC RX W HCPCS: Performed by: HOSPITALIST

## 2021-05-20 PROCEDURE — 84134 ASSAY OF PREALBUMIN: CPT

## 2021-05-20 PROCEDURE — 94640 AIRWAY INHALATION TREATMENT: CPT

## 2021-05-20 PROCEDURE — 97165 OT EVAL LOW COMPLEX 30 MIN: CPT

## 2021-05-20 PROCEDURE — 1180000000 HC REHAB R&B

## 2021-05-20 PROCEDURE — 97530 THERAPEUTIC ACTIVITIES: CPT

## 2021-05-20 PROCEDURE — 99223 1ST HOSP IP/OBS HIGH 75: CPT | Performed by: PSYCHIATRY & NEUROLOGY

## 2021-05-20 PROCEDURE — 80053 COMPREHEN METABOLIC PANEL: CPT

## 2021-05-20 PROCEDURE — 2580000003 HC RX 258: Performed by: HOSPITALIST

## 2021-05-20 PROCEDURE — 6360000002 HC RX W HCPCS: Performed by: PSYCHIATRY & NEUROLOGY

## 2021-05-20 PROCEDURE — 97535 SELF CARE MNGMENT TRAINING: CPT

## 2021-05-20 RX ORDER — ARFORMOTEROL TARTRATE 15 UG/2ML
15 SOLUTION RESPIRATORY (INHALATION) EVERY 4 HOURS PRN
Status: DISCONTINUED | OUTPATIENT
Start: 2021-05-20 | End: 2021-05-20 | Stop reason: DRUGHIGH

## 2021-05-20 RX ORDER — ARFORMOTEROL TARTRATE 15 UG/2ML
15 SOLUTION RESPIRATORY (INHALATION) 2 TIMES DAILY
Status: DISCONTINUED | OUTPATIENT
Start: 2021-05-20 | End: 2021-06-02 | Stop reason: HOSPADM

## 2021-05-20 RX ORDER — IPRATROPIUM BROMIDE AND ALBUTEROL SULFATE 2.5; .5 MG/3ML; MG/3ML
1 SOLUTION RESPIRATORY (INHALATION) EVERY 4 HOURS PRN
Status: DISCONTINUED | OUTPATIENT
Start: 2021-05-20 | End: 2021-06-02 | Stop reason: HOSPADM

## 2021-05-20 RX ADMIN — SODIUM CHLORIDE, PRESERVATIVE FREE 10 ML: 5 INJECTION INTRAVENOUS at 20:02

## 2021-05-20 RX ADMIN — OXYCODONE HYDROCHLORIDE AND ACETAMINOPHEN 1 TABLET: 5; 325 TABLET ORAL at 15:33

## 2021-05-20 RX ADMIN — OXYCODONE HYDROCHLORIDE AND ACETAMINOPHEN 1 TABLET: 5; 325 TABLET ORAL at 20:03

## 2021-05-20 RX ADMIN — ATORVASTATIN CALCIUM 40 MG: 40 TABLET, FILM COATED ORAL at 20:00

## 2021-05-20 RX ADMIN — ENOXAPARIN SODIUM 40 MG: 40 INJECTION SUBCUTANEOUS at 08:12

## 2021-05-20 RX ADMIN — LEVOTHYROXINE SODIUM 88 MCG: 0.09 TABLET ORAL at 05:52

## 2021-05-20 RX ADMIN — IPRATROPIUM BROMIDE AND ALBUTEROL SULFATE 1 AMPULE: .5; 3 SOLUTION RESPIRATORY (INHALATION) at 06:49

## 2021-05-20 RX ADMIN — BUDESONIDE 500 MCG: 0.5 SUSPENSION RESPIRATORY (INHALATION) at 06:57

## 2021-05-20 RX ADMIN — BUDESONIDE 500 MCG: 0.5 SUSPENSION RESPIRATORY (INHALATION) at 18:48

## 2021-05-20 RX ADMIN — ARFORMOTEROL TARTRATE 15 MCG: 15 SOLUTION RESPIRATORY (INHALATION) at 06:57

## 2021-05-20 RX ADMIN — SODIUM CHLORIDE, PRESERVATIVE FREE 10 ML: 5 INJECTION INTRAVENOUS at 08:13

## 2021-05-20 RX ADMIN — ARFORMOTEROL TARTRATE 15 MCG: 15 SOLUTION RESPIRATORY (INHALATION) at 18:48

## 2021-05-20 ASSESSMENT — PAIN DESCRIPTION - PAIN TYPE: TYPE: SURGICAL PAIN

## 2021-05-20 ASSESSMENT — PAIN SCALES - GENERAL
PAINLEVEL_OUTOF10: 3
PAINLEVEL_OUTOF10: 9
PAINLEVEL_OUTOF10: 2
PAINLEVEL_OUTOF10: 6
PAINLEVEL_OUTOF10: 0
PAINLEVEL_OUTOF10: 4
PAINLEVEL_OUTOF10: 3

## 2021-05-20 ASSESSMENT — PAIN DESCRIPTION - FREQUENCY: FREQUENCY: INTERMITTENT

## 2021-05-20 ASSESSMENT — PAIN DESCRIPTION - DESCRIPTORS: DESCRIPTORS: DISCOMFORT

## 2021-05-20 ASSESSMENT — PAIN DESCRIPTION - ONSET: ONSET: ON-GOING

## 2021-05-20 ASSESSMENT — PAIN - FUNCTIONAL ASSESSMENT: PAIN_FUNCTIONAL_ASSESSMENT: PREVENTS OR INTERFERES SOME ACTIVE ACTIVITIES AND ADLS

## 2021-05-20 ASSESSMENT — PAIN DESCRIPTION - PROGRESSION: CLINICAL_PROGRESSION: NOT CHANGED

## 2021-05-20 ASSESSMENT — PAIN DESCRIPTION - LOCATION
LOCATION: HIP
LOCATION: HIP

## 2021-05-20 ASSESSMENT — PAIN DESCRIPTION - ORIENTATION
ORIENTATION: RIGHT
ORIENTATION: RIGHT

## 2021-05-20 NOTE — PROGRESS NOTES
Physical Therapy EVALUATION Note  DATE:  2021  NAME:  Silver Foley  :  1950  (72 y.o.,female)  MRN:  [id number]    HEIGHT:  Height: 5' 9\" (175.3 cm)  WEIGHT:  Weight: 156 lb (70.8 kg)    PATIENT DIAGNOSIS(ES):    Diagnosis: R HIP HEMIARTHROPLASTY; R DISPLACED SUBCAPITAL FEMORAL NECK FX    Additional Pertinent Hx: CHRONIC BRONCHITIS, HYPOTHYROIDISM, COPD, HX OF TIA, HX OF DIVERTICULAR DISEASE OF COLON   RESTRICTIONS/PRECAUTIONS:    Restrictions/Precautions  Restrictions/Precautions: Weight Bearing, Fall Risk  Required Braces or Orthoses?: No  Implants present? : Pacemaker  Position Activity Restriction  Hip Precautions: No ABduction, No hip extension, No hip external rotation  Other position/activity restrictions: Ant. hip precautions  OVERALL  ORIENTATION STATUS:     PAIN:  Pain Level: 3  Pain Type: Surgical pain    Pain Location: Hip     Pain Orientation: Right      NEUROLOGICAL                          STRENGTH  Strength RLE  Strength RLE: Exception  Comment: 3-/5 grossly  Strength LLE  Strength LLE: WFL  Comment: 5/5  ROM  AROM RLE (degrees)  RLE AROM: Exceptions  RLE General AROM: HIP FLEXION TO 90 DUE TO PAIN, HIP ABD AND EXTERNAL ROTATION LIMITED DUE TO PRECAUTIONS  AROM LLE (degrees)  LLE AROM : WFL  POSTURE/BALANCE  Balance  Sitting - Static: Good  Standing - Dynamic: Poor, +       ACTIVITY TOLERANCE  Activity Tolerance  Activity Tolerance: Patient Tolerated treatment well      BED MOBILITY  Bed Mobility  Rolling: Minimal assistance (TO THE L; NOT PERFORMED TO THE R DUE TO SURGICAL PAIN)  Supine to Sit: Minimal assistance (TRIPLANAR TECHNIQUE TO L; REQUIRED ASSIST WITH R LE)  TRANSFERS  Sit to Stand: Minimal Assistance (WITH RW)               WHEELCHAIR PROPULSION 1     WHEELCHAIR PROPULSION 2     AMBULATION 1  Ambulation 1  Surface: level tile  Device: Rolling Walker  Other Apparatus: Wheelchair follow  Assistance: Minimal assistance, Moderate assistance  Quality of Gait: R FOOT EXTERNALLY ROTATED, ANTALGIC GAIT DUE TO PAIN IN R LE, EXCESSIVE HIP FLEXION DUE TO PAIN  Gait Deviations: Slow Jane, Decreased step length, Decreased step height  Distance: 8 FEET X2  Comments: TO/ FROM BATHROOM FROM BED  AMBULATION 2     STAIRS       GOALS:  Short term goals  Time Frame for Short term goals: 1-2 WEEKS  Short term goal 1: SUPERVISION WITH BED MOBILITY  Short term goal 2: SBA WITH TRANSFERS  Short term goal 3: SBA WITH CAR TRANSFER  Short term goal 4: NEGOTIATE 1 STEP CGA  Short term goal 5: PROPEL W/C 150 FT SUPERVISION    Long term goals  Time Frame for Long term goals : 2-3 WEEKS  Long term goal 1: INDEP WITH BED MOBILITY  Long term goal 2: INDEP WITH TRANSFERS  Long term goal 3: INDEP WITH CAR TRANSFER  Long term goal 4: NEGOTIATE 4 STEPS INDEP  Long term goal 5: PROPEL W/C 150 FT INDEP  HOME LIVING:                    ASSESSMENT (IMPAIRMENTS/BARRIERS): Body structures, Functions, Activity limitations: Decreased functional mobility , Decreased ROM, Decreased strength, Decreased endurance, Decreased balance, Increased pain, Decreased posture  Assessment: PATIENT HAS SWOLLEN R LE WITH PAIN TO PALPATION AT R CALF. PATIENT ABLE TO PERFORM SUPINE TO SIT WITH MIN A TO GUIDE R LE WITHOUT BREAKING PRECAUTIONS. PATIENT REQUIRES CUES ON PROPER GAIT PATTERN DUE TO ANTALGIC GAIT FROM PAIN.     Activity Tolerance: Patient Tolerated treatment well     PLAN:  Plan  Times per week: 5-6  Times per day:  (1-2)  Plan weeks: 2-3  Current Treatment Recommendations: Strengthening, ROM, Balance Training, Functional Mobility Training, Transfer Training, Endurance Training, Wheelchair Mobility Training, Gait Training, Stair training, Home Exercise Program, Safety Education & Training, Patient/Caregiver Education & Training, Modalities  Discharge Recommendations: Continue to assess pending progress  PATIENT REQUIRES AND IS REASONABLY EXPECTED TO ACTIVELY PARTICIPATE IN AT LEAST 3 HOURS OF INTENSIVE THERAPY PER DAY AT LEAST 5 DAYS PER WEEK, AND BE EXPECTED TO MAKE MEASURABLE IMPROVEMENT THAT WILL BE OF PRACTICAL VALUE TO IMPROVE THE PATIENT'S FUNCTIONAL CAPACITY OR ADAPTATION TO IMPAIRMENTS.    PATIENT GOAL FOR REHAB:  RETURN TO PRIOR LEVEL OF FUNCTION       IRF/MAGNOLIA  Roll Left and Right  Assistance Needed: Supervision or touching assistance  CARE Score: 4  Discharge Goal: Independent    Sit to Lying  Reason if not Attempted: Not attempted due to medical condition or safety concerns  CARE Score: 88  Discharge Goal: Independent    Lying to Sitting on Side of Bed  Assistance Needed: Supervision or touching assistance  CARE Score: 4  Discharge Goal: Independent    Sit to Stand  Assistance Needed: Supervision or touching assistance  CARE Score: 4  Discharge Goal: Independent    Chair/Bed-to-Chair Transfer  Reason if not Attempted: Not attempted due to medical condition or safety concerns  CARE Score: 88  Discharge Goal: Independent    Car Transfer  Reason if not Attempted: Not attempted due to medical condition or safety concerns  CARE Score: 88  Discharge Goal: Independent    Walk 10 Feet  Reason if not Attempted: Not attempted due to medical condition or safety concerns  CARE Score: 88  Discharge Goal: Independent    Walk 50 Feet with Two Turns  Reason if not Attempted: Not attempted due to medical condition or safety concerns  CARE Score: 88  Discharge Goal: Independent    Walk 150 Feet  Reason if not Attempted: Not attempted due to medical condition or safety concerns  CARE Score: 88  Discharge Goal: Independent    Walking 10 Feet on Uneven Surfaces  Reason if not Attempted: Not attempted due to medical condition or safety concerns  CARE Score: 88  Discharge Goal: Independent    1 Step (Curb)  Reason if not Attempted: Not attempted due to medical condition or safety concerns  CARE Score: 88  Discharge Goal: Independent    4 Steps  Reason if not Attempted: Not attempted due to medical condition or safety concerns  CARE Score: 88  Discharge Goal: Supervision or touching assistance    12 Steps  Reason if not Attempted: Not attempted due to medical condition or safety concerns  CARE Score: 88  Discharge Goal: Supervision or touching assistance    Wheel 50 Feet with Two Turns  Reason if not Attempted: Not attempted due to medical condition or safety concerns  CARE Score: 88  Discharge Goal: Independent    Wheel 150 Feet  Reason if not Attempted: Not attempted due to medical condition or safety concerns  CARE Score: 88  Discharge Goal: Independent      LAST TREATMENT TIME  PT Individual Minutes  Time In: 1000  Time Out: 1100  Minutes: 60

## 2021-05-20 NOTE — PROGRESS NOTES
Vascular lab preliminary. Bilateral L/E venous duplex scan completed. No evidence for DVT or SVT at this time. Final report pending.

## 2021-05-20 NOTE — H&P
[facility]   History and Physical        Patient:   Olivia Marcial  MR#:    [id number]  Account Number:                   [id number]      Room:    [address] [place] [address]-   YOB: 1950  Date of Progress Note: 5/20/2021  Time of Note                           7:57 AM  Attending Physician:  Chino Oleary MD        Admitting diagnosis:Unspecified intracapsular fracture of right femur, initial encounter for closed fracture    Secondary diagnoses:Hyperlipidemia, unspecified,Unspecified chronic bronchitis,Hypothyroidism, unspecified,Nicotine dependence, cigarettes, uncomplicated,Chronic obstructive pulmonary disease, unspecified    CHIEF COMPLAINT:  S/P right femur fracture and repair       HISTORY OF PRESENT ILLNESS:   This 72 y.o. female with history of COPD/chronic bronchitis, tobacco abuse, hyperlipidemia, hypothyroidism, and bradycardia s/p pacemaker. She presented to [facility] ER on 5/15/21 after having a fall. She had severe onset of right hip/leg pain and was unable to bear weight. X-rays done revealed a right displaced subcapital femoral neck fracture. She was admitted to the hospitalist service with consult for orthopedic surgery. She was seen by Dr. Tito Burnham, who recommended right cemented hip hemiarthroplasty. She was in agreement and proceeded to surgery. She tolerated the procedure well. She will be weightbearing as tolerated on her right lower extremity with anterior hip precautions. She is participating in both PT/OT. She is felt to need a stay on Rehab to work towards her goal of returning home with assist of her son. She is now felt ready to start the Rehab program.    REVIEW OF SYSTEMS:  Constitutional  No fever or chills. No diaphoresis or significant fatigue. HENT   No tinnitus or significant hearing loss.   Eyes  no sudden vision change or eye pain  Respiratory  no significant shortness of breath or cough  Cardiovascular  no chest pain No palpitations or significant leg swelling  Gastrointestinal  no abdominal swelling or pain. Genitourinary  No difficulty urinating, dysuria  Musculoskeletal  no back pain or myalgia. Skin  no color change or rash  Neurologic  No seizures. No lateralizing weakness. Hematologic  no easy bruising or excessive bleeding. Psychiatric  no severe anxiety or nervousness. All other review of systems are negative.       Past Medical History:      Diagnosis Date    Cerebral artery occlusion with cerebral infarction ([facility] [address]     TIA    Diverticular disease of colon     Hyperlipidemia     Hypothyroidism     Pneumonia     Psychiatric problem     Seizures ([facility] [address]     Tobacco abuse     Vitamin B12 deficiency        Past Surgical History:      Procedure Laterality Date    CARDIAC PACEMAKER PLACEMENT      COLONOSCOPY      HIP SURGERY Right 5/15/2021    HIP HEMIARTHROPLASTY performed by Deirdre Loyola MD at [address] [facility]      collapsed lung    LA COLONOSCOPY W/BIOPSY SINGLE/MULTIPLE N/A 6/27/2017    Dr Caity West-extensive and large mouthed diverticular disease throughout the right colon-Tubular AP (villiform) (-) dysplasia x 1, tubular AP (-) dysplasia x 1--5 yr recall    TUBAL LIGATION         Medications in Hospital:      Current Facility-Administered Medications:     enoxaparin (LOVENOX) injection 40 mg, 40 mg, Subcutaneous, Daily, Mallika Cisneros MD    oxyCODONE-acetaminophen (PERCOCET) 5-325 MG per tablet 1 tablet, 1 tablet, Oral, Q4H PRN, Mallika Cisneros MD, 1 tablet at 05/19/21 2355    [DISCONTINUED] acetaminophen (TYLENOL) tablet 650 mg, 650 mg, Oral, Q6H PRN **OR** acetaminophen (TYLENOL) suppository 650 mg, 650 mg, Rectal, Q6H PRN, Mallika Cisneros MD    magnesium sulfate 2000 mg in 50 mL IVPB premix, 2,000 mg, Intravenous, PRN, Mallika Cisneros MD    potassium chloride (KLOR-CON M) extended release tablet 40 mEq, 40 mEq, Oral, PRN **OR** potassium bicarb-citric acid (EFFER-K) effervescent tablet 40 mEq, 40 mEq, Oral, PRN **OR** potassium chloride 10 mEq/100 mL IVPB (Peripheral Line), 10 mEq, Intravenous, PRN, Martha Syed MD    promethazine (PHENERGAN) tablet 12.5 mg, 12.5 mg, Oral, Q6H PRN **OR** ondansetron (ZOFRAN) injection 4 mg, 4 mg, Intravenous, Q6H PRN, Martha Syed MD    sodium chloride flush 0.9 % injection 5-40 mL, 5-40 mL, Intravenous, 2 times per day, Martha Syed MD, 10 mL at 05/19/21 2225    sodium chloride flush 0.9 % injection 5-40 mL, 5-40 mL, Intravenous, PRN, Martha Syed MD    Arformoterol Tartrate [facility]) nebulizer solution 15 mcg, 15 mcg, Nebulization, BID, Martha Syed MD, 15 mcg at 05/20/21 2176    atorvastatin (LIPITOR) tablet 40 mg, 40 mg, Oral, Nightly, Martha Syed MD, 40 mg at 05/19/21 2225    budesonide (PULMICORT) nebulizer suspension 500 mcg, 0.5 mg, Nebulization, BID, Martha Syed MD, 500 mcg at 05/20/21 0657    ipratropium-albuterol (DUONEB) nebulizer solution 1 ampule, 1 ampule, Inhalation, 4x daily, Martha Syed MD, 1 ampule at 05/20/21 2305    levothyroxine (SYNTHROID) tablet 88 mcg, 88 mcg, Oral, Daily, Martha Syed MD, 88 mcg at 05/20/21 0316    acetaminophen (TYLENOL) tablet 650 mg, 650 mg, Oral, Q4H PRN, Indra Sanchez MD    polyethylene glycol (GLYCOLAX) packet 17 g, 17 g, Oral, Daily PRN, Indra Sanchez MD    Allergies:  Patient has no known allergies. Social History:   TOBACCO:   reports that she has been smoking cigarettes. She started smoking about 50 years ago. She has a 15.00 pack-year smoking history. She has never used smokeless tobacco.  ETOH:   reports no history of alcohol use.     Family History:       Problem Relation Age of Onset    Other Mother         COPD, dementia    Heart Disease Mother     Stroke Mother     Heart Disease Father     Stroke Father     Diabetes Brother     Diabetes Brother     High Blood Pressure Brother     High Cholesterol Brother     Colon Polyps Brother     Colon Cancer Neg Hx     Liver Cancer Neg Hx     Liver Disease Neg Hx     Esophageal Cancer Neg Hx     Rectal Cancer Neg Hx     Stomach Cancer Neg Hx            Physical Exam:    Vitals: BP 99/65   Pulse 82   Temp 97.2 °F (36.2 °C) (Temporal)   Resp 16   Ht 5' 9\" (1.753 m)   Wt 156 lb (70.8 kg)   SpO2 94%   BMI 23.04 kg/m²     Constitutional  well developed, well nourished. Eyes  conjunctiva normal.  Pupils not tested  Ear, nose, throat -hearing intact to finger rub No scars, masses, or lesions over external nose or ears, no atrophy of tongue  Neck-symmetric, no masses noted, no jugular vein distension  Respiration- chest wall appears symmetric, good expansion,   normal effort without use of accessory muscles  Musculoskeletal  no significant wasting of muscles noted, no bony deformities  Extremities-no clubbing, cyanosis or edema  Skin  warm, dry, and intact. No rash, erythema, or pallor.   Psychiatric  mood, affect, and behavior appear normal.      Neurological exam  Awake, alert, fluent oriented x 3 appropriate affect  Attention and concentration appear appropriate  Recent and remote memory appears unremarkable  Speech normal without dysarthria  No clear issues with language of fund of knowledge    Cranial Nerve Exam   CN II- Visual fields grossly unremarkable  CN III, IV,VI-EOMI, No nystagmus, conjugate eye movements, no ptosis  CN V-sensation intact to LT over face  CN VII-no facial assymetry  CN VIII-Hearing intact to finger rub  CN IX and X- Palate not tested  CN XI-not test shoulder shrug  CN XII-Tongue midline with no fasciculations or fibrillations    Motor Exam  Antigravity throughout upper extremities bilaterallyand left lower extremities , no cogwheeling, normal tone  Able to plantar flex and dorsiflex right foot    Sensory Exam  Sensation intact to light touch and temperature upper and lower extremities bilaterally    Reflexes   Not tested    Tremors- no tremors in hands or head noted    Gait  Not tested    Coordination  Finger to active management of the ongoing conditions and potential complications stated in the admission note    Intensive rehabilitation nursing: The patient demonstrates the need for 24-hour rehabilitation nursing care for active management of the multiple medical issues documented in the admission note    Appropriate therapy needed: The patient requires the active and ongoing therapeutic intervention of at least 2 therapeutic disciplines, one of which must be physical or occupational therapy and/or speech therapy    Intensive therapy: The patient requires and is reasonably expected to actively participate in at least 3 hours of therapy per day at least 5 days per week, and expected to make measurable improvements that will be of practical value to improve the patient's functional capacity or adaptation to impairments. In addition, therapy treatments will begin within 36 hours from midnight of the day of the patient's admission to the inpatient rehabilitation facility    Expected duration and frequency therapy: 180 minutes per day, 5 days per week    Interdisciplinary team: The patient demonstrates the need for an interdisciplinary team for active management of the following medical issues including ataxia, motor planning, balance, disease management, elimination, endurance, family training, education, independent ADLs, pain management, precautions, range of motion, safety, strength, and transfers    I have reviewed the preadmission screening documents and concur with the findings. I believe the patient meets criteria and is sufficiently stable to allow participation in the program. This requires an intensive level of therapy, close medical supervision, and an interdisciplinary team approach provided through an individualized plan of care.  I approve admitting this patient for an intensive inpatient rehabilitation program.    Please feel free to call with any questions   [phone] (cell phone)    Dr Reta López certified in Neurology  Board Certified in Clinical Neurophysiology  Fellowship Trained in [facility] Neurophysiology      EMR Dragon/transcription disclaimer:Significant part of this  encounter note is electronic transcription/translation of spoken language to printed text. The electronic translation of spoken language may be erroneous, or at times, nonsensical words or phrases may be inadvertently transcribed.  Although I have reviewed the note for such errors, some may still exist.

## 2021-05-20 NOTE — PROGRESS NOTES
Subjective   General  Chart Reviewed: Yes  Patient assessed for rehabilitation services?: Yes  Additional Pertinent Hx: R hip fx, THR with anterior hip precautions     Social/Functional History  Social/Functional History  Occupation: Caregiver  Type of occupation: CNA at [employer]: Impaired  Vision Exceptions: Wears glasses at all times  Hearing: Within functional limits    Orientation  Overall Orientation Status: Within Normal Limits           LUE AROM (degrees)  LUE AROM : WNL  Left Hand AROM (degrees)  Left Hand AROM: WNL  RUE AROM (degrees)  RUE AROM : WNL  Right Hand AROM (degrees)  Right Hand AROM: WNL  LUE Strength  Gross LUE Strength: WFL  RUE Strength  Gross RUE Strength: WFL                   Plan   Plan  Current Treatment Recommendations: Strengthening, Balance Training, Functional Mobility Training, Endurance Training, Safety Education & Training, Patient/Caregiver Education & Training, Equipment Evaluation, Education, & procurement, Self-Care / ADL, Home Management Training         OutComes Score    Eating  Assistance Needed: Setup or clean-up assistance  CARE Score: 5  Discharge Goal: Independent    Oral Hygiene  Assistance Needed: Setup or clean-up assistance  CARE Score: 5  Discharge Goal: Independent     Toileting Hygiene  Assistance Needed: Partial/moderate assistance  Comment: Mod A  CARE Score: 3  Discharge Goal: Independent     Shower/Bathe Self  Assistance Needed: Partial/moderate assistance  Comment:  Mod A  CARE Score: 3  Discharge Goal: Independent     Upper Body Dressing  Assistance Needed: Setup or clean-up assistance  CARE Score: 5  Discharge Goal: Independent     Lower Body Dressing  Assistance Needed: Substantial/maximal assistance  CARE Score: 2  Discharge Goal: Independent     Putting On/Taking Off Footwear  Assistance Needed: Dependent  CARE Score: 1  Discharge Goal: Independent     Toilet Transfer  Assistance Needed: Partial/moderate assistance  CARE Score: 3  Discharge Goal: Independent     Picking Up Object  Assistance Needed: Dependent  CARE Score: 1  Discharge Goal: Supervision or touching assistance       Goals  Short term goals  Short term goal 1: CGA with clothing management/hygiene for toileting. Short term goal 2: CGA with toilet transfers. Short term goal 3: Min A with LB dressing using AE. Short term goal 4: Min A with donning/doffing socks and shoes using AE. Short term goal 5: Min A with ambulatory homemaking tasks. Short term goal 6: Pt will complete 1-2 static standing task for 3 minutes, requiring CGA. Long term goals  Time Frame for Long term goals : 2 weeks  Long term goal 1: Modified independent with toileting and toilet transfers. Long term goal 2: Modified independent with overall dressing. Long term goal 3: Modified independent with ambulatory homemaking tasks. Long term goal 4: Patient verbalize DME needs. Patient Goals   Patient goals : Return home independently.        Therapy Time   Individual Concurrent Group Co-treatment   Time In 1100         Time Out 1215         Minutes 75         Timed Code Treatment Minutes: 75 Diego Sanchez OT

## 2021-05-20 NOTE — PROGRESS NOTES
Occupational Therapy  Facility/Department: [facility] 8 REHAB UNIT  Daily Treatment Note  NAME: Ellen Morton  : 1950  MRN: [id number]    Date of Service: 2021    Discharge Recommendations:  Continue to assess pending progress       Assessment   Performance deficits / Impairments: Decreased functional mobility ; Decreased ADL status; Decreased endurance;Decreased balance;Decreased high-level IADLs  Assessment: Pt requires increased assistance with ADLs and IADLs due to pain in RLE. She requires skilled OT to address the above deficits and return the pt home independently with assist from son PRN. Treatment Diagnosis: Right hip fx s/p hip hemiarthroplasty  Prognosis: Good  Decision Making: Low Complexity  OT Education: OT Role;Plan of Care;ADL Adaptive Strategies;Transfer Training  Activity Tolerance  Activity Tolerance: Patient Tolerated treatment well  Safety Devices  Safety Devices in place: Yes (Left with PT)  Type of devices: Left in chair         Patient Diagnosis(es): There were no encounter diagnoses. has a past medical history of Cerebral artery occlusion with cerebral infarction [facility]), Diverticular disease of colon, Hyperlipidemia, Hypothyroidism, Pneumonia, Psychiatric problem, Seizures ([facility] [address] Tobacco abuse, and Vitamin B12 deficiency. has a past surgical history that includes Tubal ligation; Lung surgery; pr colonoscopy w/biopsy single/multiple (N/A, 2017); Colonoscopy; Cardiac pacemaker placement; and hip surgery (Right, 5/15/2021).     Restrictions  Restrictions/Precautions  Restrictions/Precautions: Weight Bearing, Fall Risk  Required Braces or Orthoses?: No  Implants present? : Pacemaker  Lower Extremity Weight Bearing Restrictions  Right Lower Extremity Weight Bearing: Weight Bearing As Tolerated  Left Lower Extremity Weight Bearing: Weight Bearing As Tolerated  Position Activity Restriction  Hip Precautions: No ABduction, No hip extension, No hip external rotation  Other position/activity restrictions: Ant. hip precautions  Subjective   General  Chart Reviewed: Yes  Patient assessed for rehabilitation services?: Yes  Additional Pertinent Hx: R hip fx, THR with anterior hip precautions      Orientation  Orientation  Overall Orientation Status: Within Normal Limits  Objective    Balance  Sitting Balance: Independent  Standing Balance: Contact guard assistance  Functional Mobility  Functional - Mobility Device: Rolling Walker  Activity: To/from bathroom  Assist Level: Contact guard assistance  Functional Mobility Comments: increased time to complete due to pain. Cues for sequencing and pushing RW out further  Bed mobility  Supine to Sit: Minimal assistance  Transfers  Stand Step Transfers: Minimal assistance  Sit to stand: Minimal assistance  Stand to sit: Minimal assistance     Cognition  Overall Cognitive Status: WFL      LUE AROM (degrees)  LUE AROM : WNL  Left Hand AROM (degrees)  Left Hand AROM: WNL  RUE AROM (degrees)  RUE AROM : WNL  Right Hand AROM (degrees)  Right Hand AROM: WNL                 Plan   Plan  Current Treatment Recommendations: Strengthening, Balance Training, Functional Mobility Training, Endurance Training, Safety Education & Training, Patient/Caregiver Education & Training, Equipment Evaluation, Education, & procurement, Self-Care / ADL, Home Management Training       OutComes Score       05/20/21 [address] Needed Supervision or touching assistance   Comment CGA   CARE Score 4             05/20/21 1440   Toilet Transfer   Assistance Needed Partial/moderate assistance   Comment Min A   CARE Score 3            Goals  Short term goals  Short term goal 1: CGA with clothing management/hygiene for toileting. Short term goal 2: CGA with toilet transfers. Short term goal 3: Min A with LB dressing using AE. Short term goal 4: Min A with donning/doffing socks and shoes using AE. Short term goal 5: Min A with ambulatory homemaking tasks. Short term goal 6: Pt will complete 1-2 static standing task for 3 minutes, requiring CGA. Long term goals  Time Frame for Long term goals : 2 weeks  Long term goal 1: Modified independent with toileting and toilet transfers. Long term goal 2: Modified independent with overall dressing. Long term goal 3: Modified independent with ambulatory homemaking tasks. Long term goal 4: Patient verbalize DME needs. Patient Goals   Patient goals : Return home independently.        Therapy Time   Individual Concurrent Group Co-treatment   Time In 1440         Time Out 1515         Minutes 35         Timed Code Treatment Minutes: 101 Ian Pulido, OT

## 2021-05-20 NOTE — PLAN OF CARE
Problem: Falls - Risk of:  Goal: Will remain free from falls  Description: Will remain free from falls  Outcome: Ongoing  Goal: Absence of physical injury  Description: Absence of physical injury  Outcome: Ongoing     Problem: Infection:  Goal: Will remain free from infection  Description: Will remain free from infection  Outcome: Ongoing     Problem: Safety:  Goal: Free from accidental physical injury  Description: Free from accidental physical injury  Outcome: Ongoing  Goal: Free from intentional harm  Description: Free from intentional harm  Outcome: Ongoing     Problem: Discharge Planning:  Goal: Patients continuum of care needs are met  Description: Patients continuum of care needs are met  Outcome: Ongoing

## 2021-05-21 ENCOUNTER — TELEPHONE (OUTPATIENT)
Dept: INPATIENT UNIT | Age: 71
End: 2021-05-21

## 2021-05-21 LAB
HCT VFR BLD CALC: 33.8 % (ref 37–47)
HEMOGLOBIN: 10.6 G/DL (ref 12–16)
MCH RBC QN AUTO: 27.4 PG (ref 27–31)
MCHC RBC AUTO-ENTMCNC: 31.4 G/DL (ref 33–37)
MCV RBC AUTO: 87.3 FL (ref 81–99)
PDW BLD-RTO: 13.8 % (ref 11.5–14.5)
PLATELET # BLD: 120 K/UL (ref 130–400)
PMV BLD AUTO: 11.2 FL (ref 9.4–12.3)
RBC # BLD: 3.87 M/UL (ref 4.2–5.4)
WBC # BLD: 9.5 K/UL (ref 4.8–10.8)

## 2021-05-21 PROCEDURE — 97116 GAIT TRAINING THERAPY: CPT

## 2021-05-21 PROCEDURE — 1180000000 HC REHAB R&B

## 2021-05-21 PROCEDURE — 6360000002 HC RX W HCPCS: Performed by: HOSPITALIST

## 2021-05-21 PROCEDURE — 6370000000 HC RX 637 (ALT 250 FOR IP): Performed by: PSYCHIATRY & NEUROLOGY

## 2021-05-21 PROCEDURE — 97110 THERAPEUTIC EXERCISES: CPT

## 2021-05-21 PROCEDURE — 85027 COMPLETE CBC AUTOMATED: CPT

## 2021-05-21 PROCEDURE — 6370000000 HC RX 637 (ALT 250 FOR IP): Performed by: HOSPITALIST

## 2021-05-21 PROCEDURE — 94640 AIRWAY INHALATION TREATMENT: CPT

## 2021-05-21 PROCEDURE — 6360000002 HC RX W HCPCS: Performed by: PSYCHIATRY & NEUROLOGY

## 2021-05-21 PROCEDURE — 2580000003 HC RX 258: Performed by: HOSPITALIST

## 2021-05-21 PROCEDURE — 36415 COLL VENOUS BLD VENIPUNCTURE: CPT

## 2021-05-21 PROCEDURE — 97535 SELF CARE MNGMENT TRAINING: CPT

## 2021-05-21 PROCEDURE — 99232 SBSQ HOSP IP/OBS MODERATE 35: CPT | Performed by: PSYCHIATRY & NEUROLOGY

## 2021-05-21 RX ADMIN — POLYETHYLENE GLYCOL 3350 17 G: 17 POWDER, FOR SOLUTION ORAL at 05:30

## 2021-05-21 RX ADMIN — BUDESONIDE 500 MCG: 0.5 SUSPENSION RESPIRATORY (INHALATION) at 18:52

## 2021-05-21 RX ADMIN — ARFORMOTEROL TARTRATE 15 MCG: 15 SOLUTION RESPIRATORY (INHALATION) at 18:52

## 2021-05-21 RX ADMIN — LEVOTHYROXINE SODIUM 88 MCG: 0.09 TABLET ORAL at 06:38

## 2021-05-21 RX ADMIN — ENOXAPARIN SODIUM 40 MG: 40 INJECTION SUBCUTANEOUS at 09:04

## 2021-05-21 RX ADMIN — OXYCODONE HYDROCHLORIDE AND ACETAMINOPHEN 1 TABLET: 5; 325 TABLET ORAL at 22:55

## 2021-05-21 RX ADMIN — SODIUM CHLORIDE, PRESERVATIVE FREE 10 ML: 5 INJECTION INTRAVENOUS at 10:35

## 2021-05-21 RX ADMIN — ARFORMOTEROL TARTRATE 15 MCG: 15 SOLUTION RESPIRATORY (INHALATION) at 06:17

## 2021-05-21 RX ADMIN — OXYCODONE HYDROCHLORIDE AND ACETAMINOPHEN 1 TABLET: 5; 325 TABLET ORAL at 13:09

## 2021-05-21 RX ADMIN — OXYCODONE HYDROCHLORIDE AND ACETAMINOPHEN 1 TABLET: 5; 325 TABLET ORAL at 05:30

## 2021-05-21 RX ADMIN — OXYCODONE HYDROCHLORIDE AND ACETAMINOPHEN 1 TABLET: 5; 325 TABLET ORAL at 09:04

## 2021-05-21 RX ADMIN — ATORVASTATIN CALCIUM 40 MG: 40 TABLET, FILM COATED ORAL at 20:34

## 2021-05-21 RX ADMIN — OXYCODONE HYDROCHLORIDE AND ACETAMINOPHEN 1 TABLET: 5; 325 TABLET ORAL at 19:07

## 2021-05-21 RX ADMIN — BUDESONIDE 500 MCG: 0.5 SUSPENSION RESPIRATORY (INHALATION) at 06:17

## 2021-05-21 ASSESSMENT — PAIN - FUNCTIONAL ASSESSMENT
PAIN_FUNCTIONAL_ASSESSMENT: PREVENTS OR INTERFERES SOME ACTIVE ACTIVITIES AND ADLS
PAIN_FUNCTIONAL_ASSESSMENT: PREVENTS OR INTERFERES SOME ACTIVE ACTIVITIES AND ADLS

## 2021-05-21 ASSESSMENT — PAIN DESCRIPTION - DESCRIPTORS
DESCRIPTORS: DISCOMFORT
DESCRIPTORS: DISCOMFORT

## 2021-05-21 ASSESSMENT — PAIN DESCRIPTION - ONSET
ONSET: ON-GOING
ONSET: ON-GOING

## 2021-05-21 ASSESSMENT — PAIN SCALES - GENERAL
PAINLEVEL_OUTOF10: 2
PAINLEVEL_OUTOF10: 9
PAINLEVEL_OUTOF10: 2
PAINLEVEL_OUTOF10: 3
PAINLEVEL_OUTOF10: 3
PAINLEVEL_OUTOF10: 0
PAINLEVEL_OUTOF10: 4
PAINLEVEL_OUTOF10: 4
PAINLEVEL_OUTOF10: 0
PAINLEVEL_OUTOF10: 5
PAINLEVEL_OUTOF10: 3

## 2021-05-21 ASSESSMENT — PAIN DESCRIPTION - FREQUENCY
FREQUENCY: INTERMITTENT
FREQUENCY: INTERMITTENT

## 2021-05-21 ASSESSMENT — PAIN DESCRIPTION - ORIENTATION
ORIENTATION: RIGHT
ORIENTATION: RIGHT

## 2021-05-21 ASSESSMENT — PAIN DESCRIPTION - LOCATION
LOCATION: HIP
LOCATION: HIP

## 2021-05-21 ASSESSMENT — PAIN DESCRIPTION - PROGRESSION
CLINICAL_PROGRESSION: NOT CHANGED
CLINICAL_PROGRESSION: NOT CHANGED

## 2021-05-21 ASSESSMENT — PAIN DESCRIPTION - PAIN TYPE
TYPE: SURGICAL PAIN
TYPE: SURGICAL PAIN

## 2021-05-21 NOTE — PROGRESS NOTES
Name: Olivia Marcial  MRN:  [id number]  Date of service:  5/21/2021 05/21/21 0943   Subjective   Subjective Pt states she is ready for therapy. Pain Screening   Patient Currently in Pain Denies   Bed Mobility   Supine to Sit Minimal assistance   Scooting Stand by assistance   Transfers   Sit to Stand Minimal Assistance;Contact guard assistance   Stand to sit Minimal Assistance;Contact guard assistance   Comment Assisted pt to restroom, assisted pt with underwear and pants. Ambulation   Ambulation? Yes   WB Status WBAT RLE   Ambulation 1   Surface level tile   Device Rolling Walker   Assistance Contact guard assistance   Quality of Gait antalgic   Gait Deviations Slow Jane;Decreased step length;Decreased step height   Distance 10', 50', 15'   Comments amb in room   Exercises   Heelslides 20x   Hip Flexion 20x L, R AAROM 20x   Hip Abduction 20x   Knee Long Arc Quad 20x   Ankle Pumps 20x, heel raises 20x   Comments hip add squeeze 20x   Short term goals   Time Frame for Short term goals 1-2 WEEKS   Short term goal 1 SUPERVISION WITH BED MOBILITY   Short term goal 2 SBA WITH TRANSFERS   Short term goal 3 SBA WITH CAR TRANSFER   Short term goal 4 NEGOTIATE 1 STEP CGA   Short term goal 5 PROPEL W/C 150 FT SUPERVISION   Long term goals   Time Frame for Long term goals  2-3 WEEKS   Long term goal 1 INDEP WITH BED MOBILITY   Long term goal 2 INDEP WITH TRANSFERS   Long term goal 3 INDEP WITH CAR TRANSFER   Long term goal 4 NEGOTIATE 4 STEPS INDEP   Long term goal 5 PROPEL W/C 150 FT INDEP   Conditions Requiring Skilled Therapeutic Intervention   Body structures, Functions, Activity limitations Decreased functional mobility ; Decreased ROM; Decreased strength;Decreased endurance;Decreased balance; Increased pain;Decreased posture   Assessment PT was able to increase amb distance and perform ex in sitting. Activity Tolerance   Activity Tolerance Patient limited by endurance; Patient Tolerated treatment well   Safety Devices   Type of devices Left in bed;Call light within reach; Bed alarm in place     Electronically signed by Isha Renae PTA on 5/21/2021 at 9:59 AM

## 2021-05-21 NOTE — TELEPHONE ENCOUNTER
Follow up phone call x 1 attempt. Left a message. Expecting a return call.   Electronically signed by Sheila Horotn RN on 5/21/2021 at 1:10 PM

## 2021-05-21 NOTE — PLAN OF CARE
Problem: Falls - Risk of:  Goal: Will remain free from falls  Description: Will remain free from falls  Outcome: Ongoing  Goal: Absence of physical injury  Description: Absence of physical injury  Outcome: Ongoing     Problem: Infection:  Goal: Will remain free from infection  Description: Will remain free from infection  Outcome: Ongoing     Problem: Safety:  Goal: Free from accidental physical injury  Description: Free from accidental physical injury  Outcome: Ongoing  Goal: Free from intentional harm  Description: Free from intentional harm  Outcome: Ongoing     Problem: Discharge Planning:  Goal: Patients continuum of care needs are met  Description: Patients continuum of care needs are met  Outcome: Ongoing     Problem: Skin Integrity:  Goal: Will show no infection signs and symptoms  Description: Will show no infection signs and symptoms  Outcome: Ongoing  Goal: Absence of new skin breakdown  Description: Absence of new skin breakdown  Outcome: Ongoing     Problem: Pain:  Goal: Pain level will decrease  Description: Pain level will decrease  Outcome: Ongoing  Goal: Control of acute pain  Description: Control of acute pain  Outcome: Ongoing  Goal: Control of chronic pain  Description: Control of chronic pain  Outcome: Ongoing

## 2021-05-21 NOTE — PROGRESS NOTES
Physical Therapy  Daksha Malik  [id number]       05/21/21 1430   Position Activity Restriction   Hip Precautions No ABduction; No hip extension; No hip external rotation   Transfers   Sit to Stand Contact guard assistance   Stand to sit Stand by assistance   Bed to Chair Contact guard assistance   Ambulation   Ambulation?  Yes   WB Status WBAT   Ambulation 1   Surface level tile   Device Rolling Walker   Assistance Contact guard assistance   Quality of Gait antalgic   Gait Deviations Slow Jane;Decreased step length;Decreased step height;Deviated path   Distance 48'    Comments Cues for Neutral R foot positioning    Exercises   Gluteal Sets 20   Hip Flexion 20   Knee Long Arc Quad 20 x 2 - RLE AAROM    Ankle Pumps 20   Comments 2# LLE    Electronically signed by Rose Medina PTA on 5/21/2021 at 6:07 PM

## 2021-05-21 NOTE — PROGRESS NOTES
Occupational Therapy     05/21/21 1005   General   Additional Pertinent Hx R hip fx, THR with anterior hip precautions   Pain Assessment   Patient Currently in Pain Denies   Pain Assessment 0-10   Pain Level 0   Balance   Sitting Balance Independent   Standing Balance Contact guard assistance   Functional Mobility   Functional - Mobility Device Rolling Walker   Activity To/from bathroom   Assist Level Contact guard assistance   Transfers   Sit to stand Minimal assistance   Stand to sit Minimal assistance;Contact guard assistance   Toilet Transfers   Toilet - Technique Ambulating   Equipment Used Raised toilet seat with rails   Toilet Transfer Minimal assistance   Assessment   Performance deficits / Impairments Decreased functional mobility ; Decreased ADL status; Decreased endurance;Decreased balance;Decreased high-level IADLs   Treatment Diagnosis Right hip fx s/p hip hemiarthroplasty   Prognosis Good   Timed Code Treatment Minutes 90 Minutes   Activity Tolerance   Activity Tolerance Patient Tolerated treatment well   Safety Devices   Safety Devices in place Yes   Type of devices Call light within reach; Chair alarm in place   Plan   Current Treatment Recommendations Strengthening;Balance Training;Functional Mobility Training; Endurance Training; Safety Education & Training;Patient/Caregiver Education & Training;Equipment Evaluation, Education, & procurement;Self-Care / ADL; Home Management Training      05/21/21 1005   Oral Hygiene   Assistance Needed Setup or clean-up assistance   CARE Score 5   Toileting Hygiene   Assistance Needed Supervision or touching assistance   Comment CGA. CARE Score 4   Shower/Bathe Self   Assistance Needed Partial/moderate assistance   Comment CGA for standing portion of task, utilized AE, assistance drying lower legs.    CARE Score 3   Upper Body Dressing   Assistance Needed Setup or clean-up assistance   CARE Score 5   Lower Body Dressing   Assistance Needed Supervision or touching assistance   Comment CGA for standing portion of task, cues for tech with AE. CARE Score 4   Putting On/Taking Off Footwear   Assistance Needed Substantial/maximal assistance   Comment Socks only, using AE, will requried further practice using sock aid. CARE Score 2      05/21/21 1005   Toilet Transfer   Assistance Needed Partial/moderate assistance   Comment Min A.    CARE Score 3

## 2021-05-21 NOTE — PROGRESS NOTES
Patient:   Silver Foley  MR#:    [id number]   Room:    [facility]/821-02   YOB: 1950  Date of Progress Note: 5/21/2021  Time of Note                           11:32 AM  Consulting Physician:   Quentin Saavedra M.D. Attending Physician:  Quentin Saavedra MD     Chief complaint S/P right femur fracture and repair     S:This 72 y.o. female  with history of COPD/chronic bronchitis, tobacco abuse, hyperlipidemia, hypothyroidism, and bradycardia s/p pacemaker. She presented to [facility] ER on 5/15/21 after having a fall. She had severe onset of right hip/leg pain and was unable to bear weight. X-rays done revealed a right displaced subcapital femoral neck fracture. She was admitted to the hospitalist service with consult for orthopedic surgery. She was seen by Dr. Tami Irwin, who recommended right cemented hip hemiarthroplasty. She was in agreement and proceeded to surgery. She tolerated the procedure well. She will be weightbearing as tolerated on her right lower extremity with anterior hip precautions. She is participating in both PT/OT. She is felt to need a stay on Rehab to work towards her goal of returning home with assist of her son. She is now felt ready to start the Rehab program. No new complaints.     REVIEW OF SYSTEMS:  Constitutional: No fevers No chills  Neck:No stiffness  Respiratory: No shortness of breath  Cardiovascular: No chest pain No palpitations  Gastrointestinal: No abdominal pain    Genitourinary: No Dysuria  Neurological: No headache, no confusion    Past Medical History:      Diagnosis Date    Cerebral artery occlusion with cerebral infarction ([facility] [address]     TIA    Diverticular disease of colon     Hyperlipidemia     Hypothyroidism     Pneumonia     Psychiatric problem     Seizures ([address]     Tobacco abuse     Vitamin B12 deficiency        Past Surgical History:      Procedure Laterality Date    CARDIAC PACEMAKER PLACEMENT      COLONOSCOPY      HIP SURGERY Right 5/15/2021    HIP HEMIARTHROPLASTY performed by Brandin Longoria MD at [address] [facility]      collapsed lung    MI COLONOSCOPY W/BIOPSY SINGLE/MULTIPLE N/A 6/27/2017    Dr Jacinta West-extensive and large mouthed diverticular disease throughout the right colon-Tubular AP (villiform) (-) dysplasia x 1, tubular AP (-) dysplasia x 1--5 yr recall    TUBAL LIGATION         Medications in Hospital:      Current Facility-Administered Medications:     ipratropium-albuterol (DUONEB) nebulizer solution 1 ampule, 1 ampule, Inhalation, Q4H PRN, Jada Do MD    Arformoterol Tartrate (BROVANA) nebulizer solution 15 mcg, 15 mcg, Nebulization, BID, Jada Do MD, 15 mcg at 05/21/21 0617    enoxaparin (LOVENOX) injection 40 mg, 40 mg, Subcutaneous, Daily, Nathaniel Myles MD, 40 mg at 05/21/21 0904    oxyCODONE-acetaminophen (PERCOCET) 5-325 MG per tablet 1 tablet, 1 tablet, Oral, Q4H PRN, Nathaniel Myles MD, 1 tablet at 05/21/21 9450    [DISCONTINUED] acetaminophen (TYLENOL) tablet 650 mg, 650 mg, Oral, Q6H PRN **OR** acetaminophen (TYLENOL) suppository 650 mg, 650 mg, Rectal, Q6H PRN, Nathaniel Myles MD    magnesium sulfate 2000 mg in 50 mL IVPB premix, 2,000 mg, Intravenous, PRN, Nathaniel Myles MD    potassium chloride (KLOR-CON M) extended release tablet 40 mEq, 40 mEq, Oral, PRN **OR** potassium bicarb-citric acid (EFFER-K) effervescent tablet 40 mEq, 40 mEq, Oral, PRN **OR** potassium chloride 10 mEq/100 mL IVPB (Peripheral Line), 10 mEq, Intravenous, PRN, Nathaniel Myles MD    promethazine (PHENERGAN) tablet 12.5 mg, 12.5 mg, Oral, Q6H PRN **OR** ondansetron (ZOFRAN) injection 4 mg, 4 mg, Intravenous, Q6H PRN, Nathaniel Myles MD    sodium chloride flush 0.9 % injection 5-40 mL, 5-40 mL, Intravenous, 2 times per day, Nathaniel Myles MD, 10 mL at 05/21/21 1035    sodium chloride flush 0.9 % injection 5-40 mL, 5-40 mL, Intravenous, PRN, Nathaniel Myles MD    atorvastatin (LIPITOR) tablet 40 mg, 40 mg, Oral, Nightly, Whitney Valerio MD, 40 mg at 05/20/21 2000    budesonide (PULMICORT) nebulizer suspension 500 mcg, 0.5 mg, Nebulization, BID, Whitney Valerio MD, 500 mcg at 05/21/21 0617    levothyroxine (SYNTHROID) tablet 88 mcg, 88 mcg, Oral, Daily, Whitney Valerio MD, 88 mcg at 05/21/21 9605    acetaminophen (TYLENOL) tablet 650 mg, 650 mg, Oral, Q4H PRN, Matilda Foy MD    polyethylene glycol (GLYCOLAX) packet 17 g, 17 g, Oral, Daily PRN, Matilda Foy MD, 17 g at 05/21/21 0530    Allergies:  Patient has no known allergies. Social History:   TOBACCO:   reports that she has been smoking cigarettes. She started smoking about 50 years ago. She has a 15.00 pack-year smoking history. She has never used smokeless tobacco.  ETOH:   reports no history of alcohol use. Family History:       Problem Relation Age of Onset    Other Mother         COPD, dementia    Heart Disease Mother     Stroke Mother     Heart Disease Father     Stroke Father     Diabetes Brother     Diabetes Brother     High Blood Pressure Brother     High Cholesterol Brother     Colon Polyps Brother     Colon Cancer Neg Hx     Liver Cancer Neg Hx     Liver Disease Neg Hx     Esophageal Cancer Neg Hx     Rectal Cancer Neg Hx     Stomach Cancer Neg Hx          PHYSICAL EXAM:  /60   Pulse 75   Temp 97.1 °F (36.2 °C) (Temporal)   Resp 16   Ht 5' 9\" (1.753 m)   Wt 156 lb (70.8 kg)   SpO2 92%   BMI 23.04 kg/m²     Constitutional  well developed, well nourished. Eyes  conjunctiva normal.   Ear, nose, throat - No scars, masses, or lesions over external nose or ears, no atrophy of tongue  Neck-symmetric, no masses noted, no jugular vein distension  Respiration- chest wall appears symmetric, good expansion,   normal effort without use of accessory muscles  Musculoskeletal  no significant wasting of muscles noted, no bony deformities  Extremities-no clubbing, cyanosis or edema  Skin  warm, dry, and intact.  No rash, erythema, or pallor. Psychiatric  mood, affect, and behavior appear normal.      Neurological exam  Awake, alert, fluent oriented appropriate affect  Attention and concentration appear appropriate  Recent and remote memory appears unremarkable  Speech normal without dysarthria  No clear issues with language of fund of knowledge     Cranial Nerve Exam     CN III, IV,VI-EOMI, No nystagmus, conjugate eye movements, no ptosis    CN VII-no facial assymetry       Motor Exam  antigravity throughout upper extremities bilaterally      Tremors- no tremors in hands or head noted     Gait  Not tested      Nursing/pcp notes, imaging,labs and vitals reviewed.      PT,OT and/or speech notes reviewed    Lab Results   Component Value Date    WBC 9.5 05/21/2021    HGB 10.6 (L) 05/21/2021    HCT 33.8 (L) 05/21/2021    MCV 87.3 05/21/2021     (L) 05/21/2021     Lab Results   Component Value Date     05/20/2021    K 4.3 05/20/2021    K 4.3 05/20/2021     05/20/2021    CO2 28 05/20/2021    BUN 13 05/20/2021    CREATININE 0.4 (L) 05/20/2021    GLUCOSE 110 (H) 05/20/2021    CALCIUM 8.9 05/20/2021    PROT 4.9 (L) 05/20/2021    LABALBU 2.9 (L) 05/20/2021    BILITOT 0.7 05/20/2021    ALKPHOS 58 05/20/2021    AST 15 05/20/2021    ALT 13 05/20/2021    LABGLOM >60 05/20/2021    GFRAA >59 05/20/2021    GLOB 2.9 10/16/2016     Lab Results   Component Value Date    INR 1.07 05/19/2021    INR 1.01 05/15/2021    INR 1.01 10/05/2018    PROTIME 13.8 05/19/2021    PROTIME 13.3 05/15/2021    PROTIME 13.2 10/05/2018     Aldair Feng PTA   Physical Therapist Assistant   Physical Medicine and Rehabilitation   Progress Notes       Cosign Needed   Date of Service:  5/21/2021  9:59 AM               Cosign Needed             Show:Clear all  []Manual[x]Template[]Copied    Added by:  Jimi Esparza PTA    []Savage for details  Name: Silver Foley  MRN:  [id number]  Date of service:  5/21/2021 05/21/21 0943   Subjective   Subjective Pt states she is ready for therapy. Pain Screening   Patient Currently in Pain Denies   Bed Mobility   Supine to Sit Minimal assistance   Scooting Stand by assistance   Transfers   Sit to Stand Minimal Assistance;Contact guard assistance   Stand to sit Minimal Assistance;Contact guard assistance   Comment Assisted pt to restroom, assisted pt with underwear and pants. Ambulation   Ambulation? Yes   WB Status WBAT RLE   Ambulation 1   Surface level tile   Device Rolling Walker   Assistance Contact guard assistance   Quality of Gait antalgic   Gait Deviations Slow Jane;Decreased step length;Decreased step height   Distance 10', 50', 15'   Comments amb in room   Exercises   Heelslides 20x   Hip Flexion 20x L, R AAROM 20x   Hip Abduction 20x   Knee Long Arc Quad 20x   Ankle Pumps 20x, heel raises 20x   Comments hip add squeeze 20x   Short term goals   Time Frame for Short term goals 1-2 WEEKS   Short term goal 1 SUPERVISION WITH BED MOBILITY   Short term goal 2 SBA WITH TRANSFERS   Short term goal 3 SBA WITH CAR TRANSFER   Short term goal 4 NEGOTIATE 1 STEP CGA   Short term goal 5 PROPEL W/C 150 FT SUPERVISION   Long term goals   Time Frame for Long term goals  2-3 WEEKS   Long term goal 1 INDEP WITH BED MOBILITY   Long term goal 2 INDEP WITH TRANSFERS   Long term goal 3 INDEP WITH CAR TRANSFER   Long term goal 4 NEGOTIATE 4 STEPS INDEP   Long term goal 5 PROPEL W/C 150 FT INDEP   Conditions Requiring Skilled Therapeutic Intervention   Body structures, Functions, Activity limitations Decreased functional mobility ; Decreased ROM; Decreased strength;Decreased endurance;Decreased balance; Increased pain;Decreased posture   Assessment PT was able to increase amb distance and perform ex in sitting. Activity Tolerance   Activity Tolerance Patient limited by endurance; Patient Tolerated treatment well   Safety Devices   Type of devices Left in bed;Call light within reach; Bed alarm in place      Electronically signed by Elisa Miranda Ja Whitman, PTA on 5/21/2021 at 9:59 AM                      VL DUP LOWER EXTREMITY VENOUS BILATERAL [[id number]]    Collected: 05/20/21 1345    Updated: 05/20/21 1701    Narrative:     Vascular Lower Extremities DVT Study Procedure      Demographics        Patient Name [facility]       Age                70                  LOYD        Patient       [id number]       Gender             Female    Number        Visit Number  [id number]    Interpreting       Veronica Minor MD                               Physician        Date of Birth 1950   Referring          Noé Gifford                               Physician        Accession     [id number]   Sonographer        Maribel Jimenez RVS,    Number                                        RCS       Procedure   Type of Study:        Veins:Lower Extremities DVT Study, VL LOWER EXTREMITY BILATERAL VENOUS    DUPLEX .       Indications for Study:Edema, bilateral lower extremity. Allergies     - No known allergies.      Impression        There is no evidence of deep venous thrombosis (DVT) in the bilateral    lower extremity(ies).   Lenetta Cave is no evidence of superficial thrombophlebitis of the bilateral    lower extremity(ies).    Evidence of reflux in the deep veins of the right lower extremity.    Evidence of reflux in the deep veins of the left lower extremity.        Signature        ----------------------------------------------------------------    Electronically signed by Veronica Minor MD(Interpreting    physician) on 05/20/2021 05:00 PM    ----------------------------------------------------------------       Velocities are measured in cm/s ; Diameters are measured in mm     Right Lower Extremities DVT Study Measurements   Right 2D Measurements   +------------------------------------+----------+---------------+----------+   ! Location                            ! Visualized! Compressibility! Thrombosis! +------------------------------------+----------+---------------+----------+   ! Sapheno Femoral Junction            ! Yes       ! Yes            !None      !   +------------------------------------+----------+---------------+----------+   ! Common Femoral                      ! Yes       ! Yes            !None      !   +------------------------------------+----------+---------------+----------+   ! Prox Femoral                        ! Yes       ! Yes            !None      !   +------------------------------------+----------+---------------+----------+   ! Mid Femoral                         ! Yes       ! Yes            !None      !   +------------------------------------+----------+---------------+----------+   ! Dist Femoral                        ! Yes       ! Yes            !None      !   +------------------------------------+----------+---------------+----------+   ! Deep Femoral                        ! Yes       ! Yes            !None      !   +------------------------------------+----------+---------------+----------+   ! Popliteal                           ! Yes       ! Yes            !None      !   +------------------------------------+----------+---------------+----------+   ! SSV                                 ! Yes       ! Yes            !None      !   +------------------------------------+----------+---------------+----------+   ! Gastroc                             ! Yes       ! Yes            !None      !   +------------------------------------+----------+---------------+----------+   ! PTV                                 ! Yes       ! Yes            !None      !   +------------------------------------+----------+---------------+----------+   ! GSV                                 ! Yes       ! Yes            !None      !   +------------------------------------+----------+---------------+----------+   ! ATV                                 ! Yes       ! Yes            !None      ! +------------------------------------+----------+---------------+----------+   ! Peroneal                            !Partial   ! Yes            !None      !   +------------------------------------+----------+---------------+----------+   ! Soleal                              !No        !               !          !   +------------------------------------+----------+---------------+----------+     Right Doppler Measurements   +---------+------+------+--------------------------------------------------+   ! Location ! Signal!Reflux! Reflux (msec)                                     !   +---------+------+------+--------------------------------------------------+   ! Popliteal!      ! Yes   !3103                                              !   +---------+------+------+--------------------------------------------------+     Left Lower Extremities DVT Study Measurements   Left 2D Measurements   +------------------------------------+----------+---------------+----------+   ! Location                            ! Visualized! Compressibility! Thrombosis! +------------------------------------+----------+---------------+----------+   ! Sapheno Femoral Junction            ! Yes       ! Yes            !None      !   +------------------------------------+----------+---------------+----------+   ! Common Femoral                      ! Yes       ! Yes            !None      !   +------------------------------------+----------+---------------+----------+   ! Prox Femoral                        ! Yes       ! Yes            !None      !   +------------------------------------+----------+---------------+----------+   ! Mid Femoral                         ! Yes       ! Yes            !None      !   +------------------------------------+----------+---------------+----------+   ! Dist Femoral                        ! Yes       ! Yes            !None      !   +------------------------------------+----------+---------------+----------+   ! Deep Femoral                        ! Yes       ! Yes            !None      !   +------------------------------------+----------+---------------+----------+   ! Popliteal                           ! Yes       ! Yes            !None      !   +------------------------------------+----------+---------------+----------+   ! SSV                                 ! Yes       ! Yes            !None      !   +------------------------------------+----------+---------------+----------+   ! Gastroc                             ! Yes       ! Yes            !None      !   +------------------------------------+----------+---------------+----------+   ! PTV                                 ! Yes       ! Yes            !None      !   +------------------------------------+----------+---------------+----------+   ! GSV                                 ! Yes       ! Yes            !None      !   +------------------------------------+----------+---------------+----------+   ! ATV                                 ! Yes       ! Yes            !None      !   +------------------------------------+----------+---------------+----------+   ! Peroneal                            ! Yes       ! Yes            !None      !   +------------------------------------+----------+---------------+----------+   ! Soleal                              ! Yes       ! Yes            !None      !   +------------------------------------+----------+---------------+----------+     Left Doppler Measurements   +----------------------------+------+------+-------------------------------+   ! Location                    !Signal!Reflux! Reflux (msec)                  !   +----------------------------+------+------+-------------------------------+   ! Common Femoral              !      ! Yes   !1100                           !   +----------------------------+------+------+-------------------------------+   ! Dist Femoral                !      ! Yes   !1570                           ! +----------------------------+------+------+-------------------------------+   Current IP Meds          RECORD REVIEW: Previous medical records, medications were reviewed at today's visit    IMPRESSION:   1. S/P right femur fracture and repair-pain control/mobilization  2. Hyperlipidemia-on statin  3. DVT prophylaxis-Lovenox  4. Hypothyroidism-on synthroid  5. COPD-nebs PRN  6. Pain control-Percocet PRN  7. GI-bowel regimen  8. Thrombocytopenia-monitor   9.  PT/OT    Continue care      Expected duration and frequency therapy: 180 minutes per day, 5 days per week    [address] [facility]  Dr Arelis Yeung

## 2021-05-22 LAB
HCT VFR BLD CALC: 33.5 % (ref 37–47)
HEMOGLOBIN: 10.5 G/DL (ref 12–16)
MCH RBC QN AUTO: 27.8 PG (ref 27–31)
MCHC RBC AUTO-ENTMCNC: 31.3 G/DL (ref 33–37)
MCV RBC AUTO: 88.6 FL (ref 81–99)
PDW BLD-RTO: 13.8 % (ref 11.5–14.5)
PLATELET # BLD: 133 K/UL (ref 130–400)
PMV BLD AUTO: 11.4 FL (ref 9.4–12.3)
RBC # BLD: 3.78 M/UL (ref 4.2–5.4)
WBC # BLD: 8.2 K/UL (ref 4.8–10.8)

## 2021-05-22 PROCEDURE — 6370000000 HC RX 637 (ALT 250 FOR IP): Performed by: PSYCHIATRY & NEUROLOGY

## 2021-05-22 PROCEDURE — 36415 COLL VENOUS BLD VENIPUNCTURE: CPT

## 2021-05-22 PROCEDURE — 6370000000 HC RX 637 (ALT 250 FOR IP): Performed by: HOSPITALIST

## 2021-05-22 PROCEDURE — 97535 SELF CARE MNGMENT TRAINING: CPT

## 2021-05-22 PROCEDURE — 6360000002 HC RX W HCPCS: Performed by: HOSPITALIST

## 2021-05-22 PROCEDURE — 85027 COMPLETE CBC AUTOMATED: CPT

## 2021-05-22 PROCEDURE — 97116 GAIT TRAINING THERAPY: CPT

## 2021-05-22 PROCEDURE — 2580000003 HC RX 258: Performed by: HOSPITALIST

## 2021-05-22 PROCEDURE — 99232 SBSQ HOSP IP/OBS MODERATE 35: CPT | Performed by: PSYCHIATRY & NEUROLOGY

## 2021-05-22 PROCEDURE — 1180000000 HC REHAB R&B

## 2021-05-22 PROCEDURE — 94640 AIRWAY INHALATION TREATMENT: CPT

## 2021-05-22 PROCEDURE — 6360000002 HC RX W HCPCS: Performed by: PSYCHIATRY & NEUROLOGY

## 2021-05-22 PROCEDURE — 97530 THERAPEUTIC ACTIVITIES: CPT

## 2021-05-22 PROCEDURE — 97110 THERAPEUTIC EXERCISES: CPT

## 2021-05-22 RX ADMIN — BUDESONIDE 500 MCG: 0.5 SUSPENSION RESPIRATORY (INHALATION) at 18:27

## 2021-05-22 RX ADMIN — ENOXAPARIN SODIUM 40 MG: 40 INJECTION SUBCUTANEOUS at 08:10

## 2021-05-22 RX ADMIN — OXYCODONE HYDROCHLORIDE AND ACETAMINOPHEN 1 TABLET: 5; 325 TABLET ORAL at 08:21

## 2021-05-22 RX ADMIN — SODIUM CHLORIDE, PRESERVATIVE FREE 10 ML: 5 INJECTION INTRAVENOUS at 08:10

## 2021-05-22 RX ADMIN — ARFORMOTEROL TARTRATE 15 MCG: 15 SOLUTION RESPIRATORY (INHALATION) at 18:27

## 2021-05-22 RX ADMIN — MAGNESIUM CITRATE 296 ML: 1.75 LIQUID ORAL at 12:03

## 2021-05-22 RX ADMIN — LEVOTHYROXINE SODIUM 88 MCG: 0.09 TABLET ORAL at 06:20

## 2021-05-22 RX ADMIN — ARFORMOTEROL TARTRATE 15 MCG: 15 SOLUTION RESPIRATORY (INHALATION) at 06:35

## 2021-05-22 RX ADMIN — BUDESONIDE 500 MCG: 0.5 SUSPENSION RESPIRATORY (INHALATION) at 06:35

## 2021-05-22 RX ADMIN — OXYCODONE HYDROCHLORIDE AND ACETAMINOPHEN 1 TABLET: 5; 325 TABLET ORAL at 04:25

## 2021-05-22 RX ADMIN — OXYCODONE HYDROCHLORIDE AND ACETAMINOPHEN 1 TABLET: 5; 325 TABLET ORAL at 12:39

## 2021-05-22 RX ADMIN — ATORVASTATIN CALCIUM 40 MG: 40 TABLET, FILM COATED ORAL at 19:46

## 2021-05-22 RX ADMIN — OXYCODONE HYDROCHLORIDE AND ACETAMINOPHEN 1 TABLET: 5; 325 TABLET ORAL at 19:46

## 2021-05-22 ASSESSMENT — PAIN SCALES - GENERAL
PAINLEVEL_OUTOF10: 0
PAINLEVEL_OUTOF10: 0
PAINLEVEL_OUTOF10: 6
PAINLEVEL_OUTOF10: 4
PAINLEVEL_OUTOF10: 9
PAINLEVEL_OUTOF10: 6
PAINLEVEL_OUTOF10: 5

## 2021-05-22 ASSESSMENT — PAIN DESCRIPTION - ORIENTATION
ORIENTATION: RIGHT
ORIENTATION: RIGHT

## 2021-05-22 ASSESSMENT — PAIN DESCRIPTION - DESCRIPTORS
DESCRIPTORS: DISCOMFORT
DESCRIPTORS: DISCOMFORT

## 2021-05-22 ASSESSMENT — PAIN DESCRIPTION - PAIN TYPE
TYPE: SURGICAL PAIN
TYPE: SURGICAL PAIN

## 2021-05-22 ASSESSMENT — PAIN DESCRIPTION - FREQUENCY
FREQUENCY: INTERMITTENT
FREQUENCY: INTERMITTENT

## 2021-05-22 ASSESSMENT — PAIN DESCRIPTION - ONSET
ONSET: ON-GOING
ONSET: ON-GOING

## 2021-05-22 ASSESSMENT — PAIN DESCRIPTION - PROGRESSION
CLINICAL_PROGRESSION: NOT CHANGED
CLINICAL_PROGRESSION: NOT CHANGED

## 2021-05-22 ASSESSMENT — PAIN DESCRIPTION - LOCATION
LOCATION: HIP
LOCATION: HIP

## 2021-05-22 NOTE — PROGRESS NOTES
05/22/21 1401 05/22/21 1402 05/22/21 1413   Pain Screening   Patient Currently in Pain  --   --   --    Bed Mobility   Supine to Sit Stand by assistance  --   --    Transfers   Sit to Stand  --  Contact guard assistance;Stand by assistance  --    Stand to sit  --  Contact guard assistance;Stand by assistance  --    Bed to Chair  --  Contact guard assistance;Stand by assistance  (With RW)  --    Lateral Transfers  --  Contact guard assistance;Stand by assistance  (Assisted pt to Bathroom. Ind with hygiene and Verbal cues for clothing mgmt.)  --    Ambulation   Ambulation?  --   --  Yes   WB Status  --   --  WBAT   Ambulation 1   Surface  --   --  level tile   Device  --   --  Rolling Walker   Assistance  --   --  Contact guard assistance   Quality of Gait  --   --  Alternated between step-to and reciprocal patterns. Distance  --   --  72'   Comments  --   --  Incorporated turns. Conditions Requiring Skilled Therapeutic Intervention   Assessment  --   --   --    Activity Tolerance   Activity Tolerance  --   --   --       05/22/21 1430   Pain Screening   Patient Currently in Pain No   Bed Mobility   Supine to Sit  --    Transfers   Sit to Stand  --    Stand to sit  --    Bed to Chair  --    Lateral Transfers  --    Ambulation   Ambulation?  --    WB Status  --    Ambulation 1   Surface  --    Device  --    Assistance  --    Quality of Gait  --    Distance  --    Comments  --    Conditions Requiring Skilled Therapeutic Intervention   Assessment Pt. improving with ambulation. Alternates between step-to and reciprocal patterns.    Activity Tolerance   Activity Tolerance Patient limited by endurance   Electronically signed by Demario Siu PTA on 5/22/2021 at 3:53 PM

## 2021-05-22 NOTE — PROGRESS NOTES
05/22/21 0920 05/22/21 0934 05/22/21 0935   Transfers   Sit to Stand Contact guard assistance  --   --    Stand to sit Stand by assistance  --   --    Ambulation   Ambulation? Yes  --   --    WB Status WBAT  --   --    Ambulation 1   Surface level tile  --   --    Device Rolling Walker  --   --    Assistance Contact guard assistance  --   --    Quality of Gait antalgic  --   --    Gait Deviations Slow Jane;Decreased step length;Decreased step height;Deviated path  --   --    Distance 28' x2  --   --    Comments Amb chair to chair; incorporated turns. --   --    Wheelchair Activities   Propulsion Yes  --   --    Propulsion 1   Propulsion Manual  --   --    Level Level Tile  --   --    Method RUE;LUE  --   --    Level of Assistance Stand by assistance  --   --    Description/ Details Verbal cues for steering. Veers to right. No turns. --   --    Distance 150'  --   --    Exercises   Comments  --  Sitting BLE ex  with 1-1/2 lb weight on LLE. 20 reps with Left. 10 reps/ 2 sets Right. AA with hip flex Right.  --    Conditions Requiring Skilled Therapeutic Intervention   Assessment  --   --  Celestine amb and sitting LE exerises with rests. Activity Tolerance   Activity Tolerance  --   --  Patient limited by endurance; Patient limited by fatigue   Safety Devices   Type of devices  --   --   --       05/22/21 0956   Transfers   Sit to Stand  --    Stand to sit  --    Ambulation   Ambulation?  --    WB Status  --    Ambulation 1   Surface  --    Device  --    Assistance  --    Quality of Gait  --    Gait Deviations  --    Distance  --    Comments  --    Wheelchair Activities   Propulsion  --    Propulsion 1   Propulsion  --    Level  --    Method  --    Level of Assistance  --    Description/ Details  --    Distance  --    Exercises   Comments  --    Conditions Requiring Skilled Therapeutic Intervention   Assessment  --    Activity Tolerance   Activity Tolerance  --    Safety Devices   Type of devices Call light within reach   Electronically signed by Desirae Cole PTA on 5/22/2021 at 9:58 AM

## 2021-05-22 NOTE — PROGRESS NOTES
Occupational Therapy  Facility/Department: [facility] 8 REHAB UNIT  Daily Treatment Note  NAME: Gold Genao  : 1950  MRN: [id number]    Date of Service: 2021    Discharge Recommendations:  Continue to assess pending progress       Assessment   Performance deficits / Impairments: Decreased functional mobility ; Decreased ADL status; Decreased endurance;Decreased balance;Decreased high-level IADLs  Assessment: Pt practiced LE dressing with use of AE. Pt tolerated tx well. Pt continues to benefit from skilled OT services. Treatment Diagnosis: Right hip fx s/p hip hemiarthroplasty  Prognosis: Good  Activity Tolerance  Activity Tolerance: Patient Tolerated treatment well         Patient Diagnosis(es): There were no encounter diagnoses. has a past medical history of Cerebral artery occlusion with cerebral infarction [facility]), Diverticular disease of colon, Hyperlipidemia, Hypothyroidism, Pneumonia, Psychiatric problem, Seizures ([facility] [address] Tobacco abuse, and Vitamin B12 deficiency. has a past surgical history that includes Tubal ligation; Lung surgery; pr colonoscopy w/biopsy single/multiple (N/A, 2017); Colonoscopy; Cardiac pacemaker placement; and hip surgery (Right, 5/15/2021).     Restrictions  Restrictions/Precautions  Restrictions/Precautions: Weight Bearing, Fall Risk  Required Braces or Orthoses?: No  Implants present? : Pacemaker  Lower Extremity Weight Bearing Restrictions  Right Lower Extremity Weight Bearing: Weight Bearing As Tolerated  Left Lower Extremity Weight Bearing: Weight Bearing As Tolerated  Position Activity Restriction  Hip Precautions: No ABduction, No hip extension, No hip external rotation  Other position/activity restrictions: Anterior hip precautions  Subjective   General  Chart Reviewed: Yes  Patient assessed for rehabilitation services?: Yes  Additional Pertinent Hx: R hip fx, THR with anterior hip precautions  Response to previous treatment: Patient with no complaints from previous session  Family / Caregiver Present: No  Pre Treatment Pain Screening  Pain at present: 0  Scale Used: Numeric Score  Intervention List: Patient able to continue with treatment  Vital Signs  Patient Currently in Pain: No   Orientation     Objective    ADL  LE Dressing: Stand by assistance (SBA for seated aspects of LE dressing.)  Toileting: Contact guard assistance        Balance  Standing Balance: Contact guard assistance     Transfers  Sit to stand: Contact guard assistance  Stand to sit: Contact guard assistance                                                           Plan   Plan  Times per day: Daily  Current Treatment Recommendations: Strengthening, Balance Training, Functional Mobility Training, Endurance Training, Safety Education & Training, Patient/Caregiver Education & Training, Equipment Evaluation, Education, & procurement, Self-Care / ADL, Home Management Training  Goals  Short term goals  Short term goal 1: MET  Short term goal 2: CGA with toilet transfers. Short term goal 3: Min A with LB dressing using AE. Short term goal 4: Min A with donning/doffing socks and shoes using AE. Short term goal 5: Min A with ambulatory homemaking tasks. Short term goal 6: Pt will complete 1-2 static standing task for 3 minutes, requiring CGA. Long term goals  Time Frame for Long term goals : 2 weeks  Long term goal 1: Modified independent with toileting and toilet transfers. Long term goal 2: Modified independent with overall dressing. Long term goal 3: Modified independent with ambulatory homemaking tasks. Long term goal 4: Patient verbalize DME needs. Patient Goals   Patient goals : Return home independently.        Therapy Time   Individual Concurrent Group Co-treatment   Time In 1430         Time Out 1515         Minutes 45         Timed Code Treatment Minutes: [address]  Electronically signed by MICHAEL Alva on 5/22/2021 at 3:31 PM

## 2021-05-22 NOTE — PROGRESS NOTES
Occupational Therapy  Facility/Department: [facility] 8 REHAB UNIT  Daily Treatment Note  NAME: Anny Steele  : 1950  MRN: [id number]    Date of Service: 2021    Discharge Recommendations:  Continue to assess pending progress       Assessment   Performance deficits / Impairments: Decreased functional mobility ; Decreased ADL status; Decreased endurance;Decreased balance;Decreased high-level IADLs  Assessment: Pt tolerated tx well. Pt able to maneuver clothing up and down over hips with SBA to CGA. Pt continues to benefit from skilled OT services to address above deficits. Treatment Diagnosis: Right hip fx s/p hip hemiarthroplasty  Prognosis: Good  Activity Tolerance  Activity Tolerance: Patient Tolerated treatment well         Patient Diagnosis(es): There were no encounter diagnoses. has a past medical history of Cerebral artery occlusion with cerebral infarction [facility]), Diverticular disease of colon, Hyperlipidemia, Hypothyroidism, Pneumonia, Psychiatric problem, Seizures ([facility] [address] Tobacco abuse, and Vitamin B12 deficiency. has a past surgical history that includes Tubal ligation; Lung surgery; pr colonoscopy w/biopsy single/multiple (N/A, 2017); Colonoscopy; Cardiac pacemaker placement; and hip surgery (Right, 5/15/2021).     Restrictions  Restrictions/Precautions  Restrictions/Precautions: Weight Bearing, Fall Risk  Required Braces or Orthoses?: No  Implants present? : Pacemaker  Lower Extremity Weight Bearing Restrictions  Right Lower Extremity Weight Bearing: Weight Bearing As Tolerated  Left Lower Extremity Weight Bearing: Weight Bearing As Tolerated  Position Activity Restriction  Hip Precautions: No ABduction, No hip extension, No hip external rotation  Other position/activity restrictions: Anterior hip precautions  Subjective   General  Chart Reviewed: Yes  Patient assessed for rehabilitation services?: Yes  Additional Pertinent Hx: R hip fx, THR with anterior hip precautions  Response to

## 2021-05-22 NOTE — PROGRESS NOTES
Patient:   Bronwyn Crespo  MR#:    [id number]   Room:    21/821-02   YOB: 1950  Date of Progress Note: 5/22/2021  Time of Note                           11:34 AM  Consulting Physician:   Muna Lim M.D. Attending Physician:  Muna Lim MD     Chief complaint S/P right femur fracture and repair     S:This 72 y.o. female  with history of COPD/chronic bronchitis, tobacco abuse, hyperlipidemia, hypothyroidism, and bradycardia s/p pacemaker. She presented to [facility] ER on 5/15/21 after having a fall. She had severe onset of right hip/leg pain and was unable to bear weight. X-rays done revealed a right displaced subcapital femoral neck fracture. She was admitted to the hospitalist service with consult for orthopedic surgery. She was seen by Dr. Wesley Bedoya, who recommended right cemented hip hemiarthroplasty. She was in agreement and proceeded to surgery. She tolerated the procedure well. She will be weightbearing as tolerated on her right lower extremity with anterior hip precautions. She is participating in both PT/OT. She is felt to need a stay on Rehab to work towards her goal of returning home with assist of her son. She is now felt ready to start the Rehab program. Feels constipated.        REVIEW OF SYSTEMS:  Constitutional: No fevers No chills  Neck:No stiffness  Respiratory: No shortness of breath  Cardiovascular: No chest pain No palpitations  Gastrointestinal: No abdominal pain    Genitourinary: No Dysuria  Neurological: No headache, no confusion    Past Medical History:      Diagnosis Date    Cerebral artery occlusion with cerebral infarction ([address]     TIA    Diverticular disease of colon     Hyperlipidemia     Hypothyroidism     Pneumonia     Psychiatric problem     Seizures ([address]     Tobacco abuse     Vitamin B12 deficiency        Past Surgical History:      Procedure Laterality Date    CARDIAC PACEMAKER PLACEMENT      COLONOSCOPY      HIP SURGERY Right 5/15/2021    HIP HEMIARTHROPLASTY performed by Annette Juares MD at 2021 [facility]      collapsed lung    MA COLONOSCOPY W/BIOPSY SINGLE/MULTIPLE N/A 6/27/2017    Dr Juancho [place]-extensive and large mouthed diverticular disease throughout the right colon-Tubular AP (villiform) (-) dysplasia x 1, tubular AP (-) dysplasia x 1--5 yr recall    TUBAL LIGATION         Medications in Hospital:      Current Facility-Administered Medications:     ipratropium-albuterol (DUONEB) nebulizer solution 1 ampule, 1 ampule, Inhalation, Q4H PRN, Krista Cortes MD    Arformoterol Tartrate (BROVANA) nebulizer solution 15 mcg, 15 mcg, Nebulization, BID, Krisat Cortes MD, 15 mcg at 05/22/21 0635    enoxaparin (LOVENOX) injection 40 mg, 40 mg, Subcutaneous, Daily, Dioni Thornton MD, 40 mg at 05/22/21 0810    oxyCODONE-acetaminophen (PERCOCET) 5-325 MG per tablet 1 tablet, 1 tablet, Oral, Q4H PRN, Dioni Thornton MD, 1 tablet at 05/22/21 7778    [DISCONTINUED] acetaminophen (TYLENOL) tablet 650 mg, 650 mg, Oral, Q6H PRN **OR** acetaminophen (TYLENOL) suppository 650 mg, 650 mg, Rectal, Q6H PRN, Dioni Thornton MD    magnesium sulfate 2000 mg in 50 mL IVPB premix, 2,000 mg, Intravenous, PRN, Dioni Thornton MD    potassium chloride (KLOR-CON M) extended release tablet 40 mEq, 40 mEq, Oral, PRN **OR** potassium bicarb-citric acid (EFFER-K) effervescent tablet 40 mEq, 40 mEq, Oral, PRN **OR** potassium chloride 10 mEq/100 mL IVPB (Peripheral Line), 10 mEq, Intravenous, PRN, Dioni Thornton MD    promethazine (PHENERGAN) tablet 12.5 mg, 12.5 mg, Oral, Q6H PRN **OR** ondansetron (ZOFRAN) injection 4 mg, 4 mg, Intravenous, Q6H PRN, Dioni Thornton MD    sodium chloride flush 0.9 % injection 5-40 mL, 5-40 mL, Intravenous, 2 times per day, Dioni Thornton MD, 10 mL at 05/22/21 0810    sodium chloride flush 0.9 % injection 5-40 mL, 5-40 mL, Intravenous, PRN, Dioni Thornton MD    atorvastatin (LIPITOR) tablet 40 mg, 40 mg, Oral, Nightly, Derrick Smith MD, 40 mg at 05/21/21 2034    budesonide (PULMICORT) nebulizer suspension 500 mcg, 0.5 mg, Nebulization, BID, Derrick Smith MD, 500 mcg at 05/22/21 9785    levothyroxine (SYNTHROID) tablet 88 mcg, 88 mcg, Oral, Daily, Derrick Smith MD, 88 mcg at 05/22/21 0620    acetaminophen (TYLENOL) tablet 650 mg, 650 mg, Oral, Q4H PRN, Monserrat Amador MD    polyethylene glycol (GLYCOLAX) packet 17 g, 17 g, Oral, Daily PRN, Monserrat Amador MD, 17 g at 05/21/21 0530    Allergies:  Patient has no known allergies. Social History:   TOBACCO:   reports that she has been smoking cigarettes. She started smoking about 50 years ago. She has a 15.00 pack-year smoking history. She has never used smokeless tobacco.  ETOH:   reports no history of alcohol use. Family History:       Problem Relation Age of Onset    Other Mother         COPD, dementia    Heart Disease Mother     Stroke Mother     Heart Disease Father     Stroke Father     Diabetes Brother     Diabetes Brother     High Blood Pressure Brother     High Cholesterol Brother     Colon Polyps Brother     Colon Cancer Neg Hx     Liver Cancer Neg Hx     Liver Disease Neg Hx     Esophageal Cancer Neg Hx     Rectal Cancer Neg Hx     Stomach Cancer Neg Hx          PHYSICAL EXAM:  /66   Pulse 62   Temp 96.5 °F (35.8 °C) (Temporal)   Resp 16   Ht 5' 9\" (1.753 m)   Wt 156 lb (70.8 kg)   SpO2 100%   BMI 23.04 kg/m²     Constitutional  well developed, well nourished. Eyes  conjunctiva normal.   Ear, nose, throat - No scars, masses, or lesions over external nose or ears, no atrophy of tongue  Neck-symmetric, no masses noted, no jugular vein distension  Respiration- chest wall appears symmetric, good expansion,   normal effort without use of accessory muscles  Musculoskeletal  no significant wasting of muscles noted, no bony deformities  Extremities-no clubbing, cyanosis or edema  Skin  warm, dry, and intact.  No rash, erythema, or pallor. Psychiatric  mood, affect, and behavior appear normal.      Neurological exam  Awake, alert, fluent oriented appropriate affect  Attention and concentration appear appropriate  Recent and remote memory appears unremarkable  Speech normal without dysarthria  No clear issues with language of fund of knowledge     Cranial Nerve Exam     CN III, IV,VI-EOMI, No nystagmus, conjugate eye movements, no ptosis    CN VII-no facial assymetry       Motor Exam  antigravity throughout upper extremities bilaterally      Tremors- no tremors in hands or head noted     Gait  Not tested      Nursing/pcp notes, imaging,labs and vitals reviewed.      PT,OT and/or speech notes reviewed    Lab Results   Component Value Date    WBC 8.2 05/22/2021    HGB 10.5 (L) 05/22/2021    HCT 33.5 (L) 05/22/2021    MCV 88.6 05/22/2021     05/22/2021     Lab Results   Component Value Date     05/20/2021    K 4.3 05/20/2021    K 4.3 05/20/2021     05/20/2021    CO2 28 05/20/2021    BUN 13 05/20/2021    CREATININE 0.4 (L) 05/20/2021    GLUCOSE 110 (H) 05/20/2021    CALCIUM 8.9 05/20/2021    PROT 4.9 (L) 05/20/2021    LABALBU 2.9 (L) 05/20/2021    BILITOT 0.7 05/20/2021    ALKPHOS 58 05/20/2021    AST 15 05/20/2021    ALT 13 05/20/2021    LABGLOM >60 05/20/2021    GFRAA >59 05/20/2021    GLOB 2.9 10/16/2016     Lab Results   Component Value Date    INR 1.07 05/19/2021    INR 1.01 05/15/2021    INR 1.01 10/05/2018    PROTIME 13.8 05/19/2021    PROTIME 13.3 05/15/2021    PROTIME 13.2 10/05/2018     Arianna Sequeira PTA   Physical Therapist Assistant   Physical Medicine and Rehabilitation   Progress Notes       Cosign Needed   Date of Service:  5/21/2021  9:59 AM               Cosign Needed             Show:Clear all  []Manual[x]Template[]Copied    Added by:  Louie Bravo PTA    []Savage for details  Name: Kallie Mckenzie  MRN:  [id number]  Date of service:  5/21/2021 05/21/21 0943   Subjective   Subjective Pt states she is +------------------------------------+----------+---------------+----------+   ! Sapheno Femoral Junction            ! Yes       ! Yes            !None      !   +------------------------------------+----------+---------------+----------+   ! Common Femoral                      ! Yes       ! Yes            !None      !   +------------------------------------+----------+---------------+----------+   ! Prox Femoral                        ! Yes       ! Yes            !None      !   +------------------------------------+----------+---------------+----------+   ! Mid Femoral                         ! Yes       ! Yes            !None      !   +------------------------------------+----------+---------------+----------+   ! Dist Femoral                        ! Yes       ! Yes            !None      !   +------------------------------------+----------+---------------+----------+   ! Deep Femoral                        ! Yes       ! Yes            !None      !   +------------------------------------+----------+---------------+----------+   ! Popliteal                           ! Yes       ! Yes            !None      !   +------------------------------------+----------+---------------+----------+   ! SSV                                 ! Yes       ! Yes            !None      !   +------------------------------------+----------+---------------+----------+   ! Gastroc                             ! Yes       ! Yes            !None      !   +------------------------------------+----------+---------------+----------+   ! PTV                                 ! Yes       ! Yes            !None      !   +------------------------------------+----------+---------------+----------+   ! GSV                                 ! Yes       ! Yes            !None      !   +------------------------------------+----------+---------------+----------+   ! ATV                                 ! Yes       ! Yes            !None      ! +------------------------------------+----------+---------------+----------+   ! Peroneal                            !Partial   ! Yes            !None      !   +------------------------------------+----------+---------------+----------+   ! Soleal                              !No        !               !          !   +------------------------------------+----------+---------------+----------+     Right Doppler Measurements   +---------+------+------+--------------------------------------------------+   ! Location ! Signal!Reflux! Reflux (msec)                                     !   +---------+------+------+--------------------------------------------------+   ! Popliteal!      ! Yes   !3103                                              !   +---------+------+------+--------------------------------------------------+     Left Lower Extremities DVT Study Measurements   Left 2D Measurements   +------------------------------------+----------+---------------+----------+   ! Location                            ! Visualized! Compressibility! Thrombosis! +------------------------------------+----------+---------------+----------+   ! Sapheno Femoral Junction            ! Yes       ! Yes            !None      !   +------------------------------------+----------+---------------+----------+   ! Common Femoral                      ! Yes       ! Yes            !None      !   +------------------------------------+----------+---------------+----------+   ! Prox Femoral                        ! Yes       ! Yes            !None      !   +------------------------------------+----------+---------------+----------+   ! Mid Femoral                         ! Yes       ! Yes            !None      !   +------------------------------------+----------+---------------+----------+   ! Dist Femoral                        ! Yes       ! Yes            !None      !   +------------------------------------+----------+---------------+----------+   ! Deep Femoral                        ! Yes       ! Yes            !None      !   +------------------------------------+----------+---------------+----------+   ! Popliteal                           ! Yes       ! Yes            !None      !   +------------------------------------+----------+---------------+----------+   ! SSV                                 ! Yes       ! Yes            !None      !   +------------------------------------+----------+---------------+----------+   ! Gastroc                             ! Yes       ! Yes            !None      !   +------------------------------------+----------+---------------+----------+   ! PTV                                 ! Yes       ! Yes            !None      !   +------------------------------------+----------+---------------+----------+   ! GSV                                 ! Yes       ! Yes            !None      !   +------------------------------------+----------+---------------+----------+   ! ATV                                 ! Yes       ! Yes            !None      !   +------------------------------------+----------+---------------+----------+   ! Peroneal                            ! Yes       ! Yes            !None      !   +------------------------------------+----------+---------------+----------+   ! Soleal                              ! Yes       ! Yes            !None      !   +------------------------------------+----------+---------------+----------+     Left Doppler Measurements   +----------------------------+------+------+-------------------------------+   ! Location                    !Signal!Reflux! Reflux (msec)                  !   +----------------------------+------+------+-------------------------------+   ! Common Femoral              !      ! Yes   !1100                           !   +----------------------------+------+------+-------------------------------+   ! Dist Femoral                !      ! Yes   !1570                           ! +----------------------------+------+------+-------------------------------+   Current  Meds          RECORD REVIEW: Previous medical records, medications were reviewed at today's visit    IMPRESSION:   1. S/P right femur fracture and repair-pain control/mobilization  2. Hyperlipidemia-on statin  3. DVT prophylaxis-Lovenox  4. Hypothyroidism-on synthroid  5. COPD-nebs PRN  6. Pain control-Percocet PRN  7. GI-bowel regimen  8. Thrombocytopenia-monitor   9.  PT/OT    Continue current care      Expected duration and frequency therapy: 180 minutes per day, 5 days per week    Magno Junes  [phone] CELL  Dr Indra Sanchez

## 2021-05-23 LAB
HCT VFR BLD CALC: 31.7 % (ref 37–47)
HEMOGLOBIN: 10.2 G/DL (ref 12–16)
MCH RBC QN AUTO: 27.9 PG (ref 27–31)
MCHC RBC AUTO-ENTMCNC: 32.2 G/DL (ref 33–37)
MCV RBC AUTO: 86.8 FL (ref 81–99)
PDW BLD-RTO: 13.6 % (ref 11.5–14.5)
PLATELET # BLD: 139 K/UL (ref 130–400)
PMV BLD AUTO: 11.4 FL (ref 9.4–12.3)
RBC # BLD: 3.65 M/UL (ref 4.2–5.4)
WBC # BLD: 10 K/UL (ref 4.8–10.8)

## 2021-05-23 PROCEDURE — 6370000000 HC RX 637 (ALT 250 FOR IP): Performed by: HOSPITALIST

## 2021-05-23 PROCEDURE — 6360000002 HC RX W HCPCS: Performed by: HOSPITALIST

## 2021-05-23 PROCEDURE — 6360000002 HC RX W HCPCS: Performed by: PSYCHIATRY & NEUROLOGY

## 2021-05-23 PROCEDURE — 6370000000 HC RX 637 (ALT 250 FOR IP): Performed by: PSYCHIATRY & NEUROLOGY

## 2021-05-23 PROCEDURE — 99232 SBSQ HOSP IP/OBS MODERATE 35: CPT | Performed by: PSYCHIATRY & NEUROLOGY

## 2021-05-23 PROCEDURE — 94640 AIRWAY INHALATION TREATMENT: CPT

## 2021-05-23 PROCEDURE — 36415 COLL VENOUS BLD VENIPUNCTURE: CPT

## 2021-05-23 PROCEDURE — 85027 COMPLETE CBC AUTOMATED: CPT

## 2021-05-23 PROCEDURE — 2580000003 HC RX 258: Performed by: HOSPITALIST

## 2021-05-23 PROCEDURE — 1180000000 HC REHAB R&B

## 2021-05-23 RX ADMIN — OXYCODONE HYDROCHLORIDE AND ACETAMINOPHEN 1 TABLET: 5; 325 TABLET ORAL at 11:01

## 2021-05-23 RX ADMIN — BUDESONIDE 500 MCG: 0.5 SUSPENSION RESPIRATORY (INHALATION) at 06:31

## 2021-05-23 RX ADMIN — ATORVASTATIN CALCIUM 40 MG: 40 TABLET, FILM COATED ORAL at 21:10

## 2021-05-23 RX ADMIN — SODIUM CHLORIDE, PRESERVATIVE FREE 10 ML: 5 INJECTION INTRAVENOUS at 10:24

## 2021-05-23 RX ADMIN — LEVOTHYROXINE SODIUM 88 MCG: 0.09 TABLET ORAL at 05:59

## 2021-05-23 RX ADMIN — OXYCODONE HYDROCHLORIDE AND ACETAMINOPHEN 1 TABLET: 5; 325 TABLET ORAL at 21:11

## 2021-05-23 RX ADMIN — POLYETHYLENE GLYCOL 3350 17 G: 17 POWDER, FOR SOLUTION ORAL at 21:11

## 2021-05-23 RX ADMIN — OXYCODONE HYDROCHLORIDE AND ACETAMINOPHEN 1 TABLET: 5; 325 TABLET ORAL at 06:02

## 2021-05-23 RX ADMIN — ARFORMOTEROL TARTRATE 15 MCG: 15 SOLUTION RESPIRATORY (INHALATION) at 06:30

## 2021-05-23 RX ADMIN — BUDESONIDE 500 MCG: 0.5 SUSPENSION RESPIRATORY (INHALATION) at 18:13

## 2021-05-23 RX ADMIN — ENOXAPARIN SODIUM 40 MG: 40 INJECTION SUBCUTANEOUS at 07:33

## 2021-05-23 RX ADMIN — ARFORMOTEROL TARTRATE 15 MCG: 15 SOLUTION RESPIRATORY (INHALATION) at 18:13

## 2021-05-23 ASSESSMENT — PAIN - FUNCTIONAL ASSESSMENT
PAIN_FUNCTIONAL_ASSESSMENT: ACTIVITIES ARE NOT PREVENTED
PAIN_FUNCTIONAL_ASSESSMENT: PREVENTS OR INTERFERES SOME ACTIVE ACTIVITIES AND ADLS
PAIN_FUNCTIONAL_ASSESSMENT: PREVENTS OR INTERFERES WITH MANY ACTIVE NOT PASSIVE ACTIVITIES

## 2021-05-23 ASSESSMENT — PAIN DESCRIPTION - PAIN TYPE
TYPE: SURGICAL PAIN

## 2021-05-23 ASSESSMENT — PAIN DESCRIPTION - PROGRESSION
CLINICAL_PROGRESSION: GRADUALLY IMPROVING
CLINICAL_PROGRESSION: NOT CHANGED
CLINICAL_PROGRESSION: GRADUALLY IMPROVING

## 2021-05-23 ASSESSMENT — PAIN SCALES - GENERAL
PAINLEVEL_OUTOF10: 6
PAINLEVEL_OUTOF10: 0
PAINLEVEL_OUTOF10: 10
PAINLEVEL_OUTOF10: 9
PAINLEVEL_OUTOF10: 8

## 2021-05-23 ASSESSMENT — PAIN DESCRIPTION - ORIENTATION
ORIENTATION: RIGHT

## 2021-05-23 ASSESSMENT — PAIN DESCRIPTION - ONSET
ONSET: ON-GOING
ONSET: ON-GOING

## 2021-05-23 ASSESSMENT — PAIN DESCRIPTION - FREQUENCY
FREQUENCY: INTERMITTENT
FREQUENCY: INTERMITTENT

## 2021-05-23 ASSESSMENT — PAIN DESCRIPTION - DESCRIPTORS
DESCRIPTORS: ACHING;DISCOMFORT;SORE
DESCRIPTORS: ACHING;DISCOMFORT
DESCRIPTORS: ACHING;SORE;DISCOMFORT

## 2021-05-23 ASSESSMENT — PAIN DESCRIPTION - LOCATION
LOCATION: HIP

## 2021-05-23 NOTE — PROGRESS NOTES
03/11/21: [employer] remote pacemaker interrogation     Next [employer] home check scheduled for 6/10/21

## 2021-05-23 NOTE — PROGRESS NOTES
Patient:   Salena Kenney  MR#:    [id number]   Room:    [facility]/821-   YOB: 1950  Date of Progress Note: 5/23/2021  Time of Note                           4:56 PM  Consulting Physician:   Rosalba Orta M.D. Attending Physician:  Rosalba Orta MD     Chief complaint S/P right femur fracture and repair     S:This 72 y.o. female  with history of COPD/chronic bronchitis, tobacco abuse, hyperlipidemia, hypothyroidism, and bradycardia s/p pacemaker. She presented to [facility] ER on 5/15/21 after having a fall. She had severe onset of right hip/leg pain and was unable to bear weight. X-rays done revealed a right displaced subcapital femoral neck fracture. She was admitted to the hospitalist service with consult for orthopedic surgery. She was seen by Dr. Gloria Swenson, who recommended right cemented hip hemiarthroplasty. She was in agreement and proceeded to surgery. She tolerated the procedure well. She will be weightbearing as tolerated on her right lower extremity with anterior hip precautions. She is participating in both PT/OT. She is felt to need a stay on Rehab to work towards her goal of returning home with assist of her son. She is now felt ready to start the Rehab program. No longer constipated.       REVIEW OF SYSTEMS:  Constitutional: No fevers No chills  Neck:No stiffness  Respiratory: No shortness of breath  Cardiovascular: No chest pain No palpitations  Gastrointestinal: No abdominal pain    Genitourinary: No Dysuria  Neurological: No headache, no confusion    Past Medical History:      Diagnosis Date    Cerebral artery occlusion with cerebral infarction ([address]     TIA    Diverticular disease of colon     Hyperlipidemia     Hypothyroidism     Pneumonia     Psychiatric problem     Seizures ([address]     Tobacco abuse     Vitamin B12 deficiency        Past Surgical History:      Procedure Laterality Date    CARDIAC PACEMAKER PLACEMENT      COLONOSCOPY      HIP SURGERY Right 5/15/2021    HIP HEMIARTHROPLASTY performed by Ivonne Orosco MD at [address] [facility]      collapsed lung    [place] COLONOSCOPY W/BIOPSY SINGLE/MULTIPLE N/A 6/27/2017    Dr Kei West-extensive and large mouthed diverticular disease throughout the right colon-Tubular AP (villiform) (-) dysplasia x 1, tubular AP (-) dysplasia x 1--5 yr recall    TUBAL LIGATION         Medications in Hospital:      Current Facility-Administered Medications:     ipratropium-albuterol (DUONEB) nebulizer solution 1 ampule, 1 ampule, Inhalation, Q4H PRN, Mortimer Bidding, MD    Arformoterol Tartrate (BROVANA) nebulizer solution 15 mcg, 15 mcg, Nebulization, BID, Mortimer Bidding, MD, 15 mcg at 05/23/21 0630    enoxaparin (LOVENOX) injection 40 mg, 40 mg, Subcutaneous, Daily, Candy Brown MD, 40 mg at 05/23/21 0733    oxyCODONE-acetaminophen (PERCOCET) 5-325 MG per tablet 1 tablet, 1 tablet, Oral, Q4H PRN, Candy Brown MD, 1 tablet at 05/23/21 1101    [DISCONTINUED] acetaminophen (TYLENOL) tablet 650 mg, 650 mg, Oral, Q6H PRN **OR** acetaminophen (TYLENOL) suppository 650 mg, 650 mg, Rectal, Q6H PRN, Candy Brown MD    magnesium sulfate 2000 mg in 50 mL IVPB premix, 2,000 mg, Intravenous, PRN, Candy Brown MD    potassium chloride (KLOR-CON M) extended release tablet 40 mEq, 40 mEq, Oral, PRN **OR** potassium bicarb-citric acid (EFFER-K) effervescent tablet 40 mEq, 40 mEq, Oral, PRN **OR** potassium chloride 10 mEq/100 mL IVPB (Peripheral Line), 10 mEq, Intravenous, PRN, Candy Brown MD    promethazine (PHENERGAN) tablet 12.5 mg, 12.5 mg, Oral, Q6H PRN **OR** ondansetron (ZOFRAN) injection 4 mg, 4 mg, Intravenous, Q6H PRN, Candy Brown MD    sodium chloride flush 0.9 % injection 5-40 mL, 5-40 mL, Intravenous, 2 times per day, Candy Brown MD, 10 mL at 05/23/21 1024    sodium chloride flush 0.9 % injection 5-40 mL, 5-40 mL, Intravenous, PRN, Candy Brown MD    atorvastatin (LIPITOR) tablet 40 mg, 40 mg, Oral, Nightly, states she is ready for therapy. Pain Screening   Patient Currently in Pain Denies   Bed Mobility   Supine to Sit Minimal assistance   Scooting Stand by assistance   Transfers   Sit to Stand Minimal Assistance;Contact guard assistance   Stand to sit Minimal Assistance;Contact guard assistance   Comment Assisted pt to restroom, assisted pt with underwear and pants. Ambulation   Ambulation? Yes   WB Status WBAT RLE   Ambulation 1   Surface level tile   Device Rolling Walker   Assistance Contact guard assistance   Quality of Gait antalgic   Gait Deviations Slow Jane;Decreased step length;Decreased step height   Distance 10', 50', 15'   Comments amb in room   Exercises   Heelslides 20x   Hip Flexion 20x L, R AAROM 20x   Hip Abduction 20x   Knee Long Arc Quad 20x   Ankle Pumps 20x, heel raises 20x   Comments hip add squeeze 20x   Short term goals   Time Frame for Short term goals 1-2 WEEKS   Short term goal 1 SUPERVISION WITH BED MOBILITY   Short term goal 2 SBA WITH TRANSFERS   Short term goal 3 SBA WITH CAR TRANSFER   Short term goal 4 NEGOTIATE 1 STEP CGA   Short term goal 5 PROPEL W/C 150 FT SUPERVISION   Long term goals   Time Frame for Long term goals  2-3 WEEKS   Long term goal 1 INDEP WITH BED MOBILITY   Long term goal 2 INDEP WITH TRANSFERS   Long term goal 3 INDEP WITH CAR TRANSFER   Long term goal 4 NEGOTIATE 4 STEPS INDEP   Long term goal 5 PROPEL W/C 150 FT INDEP   Conditions Requiring Skilled Therapeutic Intervention   Body structures, Functions, Activity limitations Decreased functional mobility ; Decreased ROM; Decreased strength;Decreased endurance;Decreased balance; Increased pain;Decreased posture   Assessment PT was able to increase amb distance and perform ex in sitting. Activity Tolerance   Activity Tolerance Patient limited by endurance; Patient Tolerated treatment well   Safety Devices   Type of devices Left in bed;Call light within reach; Bed alarm in place      Electronically signed by Yadira Salcedo, PTA on 5/21/2021 at 9:59 AM                      VL DUP LOWER EXTREMITY VENOUS BILATERAL [[id number]]    Collected: 05/20/21 1345    Updated: 05/20/21 1701    Narrative:     Vascular Lower Extremities DVT Study Procedure      Demographics        Patient Name [facility]       Age                70                  LOYD        Patient       [id number]       Gender             Female    Number        Visit Number  [id number]    Interpreting       Maribel Araiza MD                               Physician        Date of Birth 1950   Referring          Noé Gifford                               Physician        Accession     [id number]   Sonographer        Maribel Jimenez RVS,    Number                                        RCS       Procedure   Type of Study:        Veins:Lower Extremities DVT Study, VL LOWER EXTREMITY BILATERAL VENOUS    DUPLEX .       Indications for Study:Edema, bilateral lower extremity. Allergies     - No known allergies.      Impression        There is no evidence of deep venous thrombosis (DVT) in the bilateral    lower extremity(ies).   Rickford Math is no evidence of superficial thrombophlebitis of the bilateral    lower extremity(ies).    Evidence of reflux in the deep veins of the right lower extremity.    Evidence of reflux in the deep veins of the left lower extremity.        Signature        ----------------------------------------------------------------    Electronically signed by Maribel Araiza MD(Interpreting    physician) on 05/20/2021 05:00 PM    ----------------------------------------------------------------       Velocities are measured in cm/s ; Diameters are measured in mm     Right Lower Extremities DVT Study Measurements   Right 2D Measurements   +------------------------------------+----------+---------------+----------+   ! Location                            ! Visualized! Compressibility! Thrombosis! +----------------------------+------+------+-------------------------------+   Current IP Meds          RECORD REVIEW: Previous medical records, medications were reviewed at today's visit    IMPRESSION:   1. S/P right femur fracture and repair-pain control/mobilization  2. Hyperlipidemia-on statin  3. DVT prophylaxis-Lovenox  4. Hypothyroidism-on synthroid  5. COPD-nebs PRN  6. Pain control-Percocet PRN  7. GI-bowel regimen  8. Thrombocytopenia-monitor   9.  PT/OT    Continue present care      Expected duration and frequency therapy: 180 minutes per day, 5 days per week    [address] [facility]  [phone] CELL  Dr Arelis Yeung

## 2021-05-24 LAB
ALBUMIN SERPL-MCNC: 3.1 G/DL (ref 3.5–5.2)
ALP BLD-CCNC: 72 U/L (ref 35–104)
ALT SERPL-CCNC: 27 U/L (ref 5–33)
ANION GAP SERPL CALCULATED.3IONS-SCNC: 6 MMOL/L (ref 7–19)
AST SERPL-CCNC: 26 U/L (ref 5–32)
BASOPHILS ABSOLUTE: 0 K/UL (ref 0–0.2)
BASOPHILS RELATIVE PERCENT: 0.5 % (ref 0–1)
BILIRUB SERPL-MCNC: 0.4 MG/DL (ref 0.2–1.2)
BUN BLDV-MCNC: 16 MG/DL (ref 8–23)
CALCIUM SERPL-MCNC: 8.8 MG/DL (ref 8.8–10.2)
CHLORIDE BLD-SCNC: 105 MMOL/L (ref 98–111)
CO2: 30 MMOL/L (ref 22–29)
CREAT SERPL-MCNC: 0.5 MG/DL (ref 0.5–0.9)
EOSINOPHILS ABSOLUTE: 0.2 K/UL (ref 0–0.6)
EOSINOPHILS RELATIVE PERCENT: 1.9 % (ref 0–5)
GFR AFRICAN AMERICAN: >59
GFR NON-AFRICAN AMERICAN: >60
GLUCOSE BLD-MCNC: 105 MG/DL (ref 74–109)
HCT VFR BLD CALC: 32.1 % (ref 37–47)
HEMOGLOBIN: 10.3 G/DL (ref 12–16)
IMMATURE GRANULOCYTES #: 0.1 K/UL
LYMPHOCYTES ABSOLUTE: 1.9 K/UL (ref 1.1–4.5)
LYMPHOCYTES RELATIVE PERCENT: 21.9 % (ref 20–40)
MCH RBC QN AUTO: 28.1 PG (ref 27–31)
MCHC RBC AUTO-ENTMCNC: 32.1 G/DL (ref 33–37)
MCV RBC AUTO: 87.5 FL (ref 81–99)
MONOCYTES ABSOLUTE: 0.7 K/UL (ref 0–0.9)
MONOCYTES RELATIVE PERCENT: 8.2 % (ref 0–10)
NEUTROPHILS ABSOLUTE: 5.7 K/UL (ref 1.5–7.5)
NEUTROPHILS RELATIVE PERCENT: 66.6 % (ref 50–65)
PDW BLD-RTO: 13.6 % (ref 11.5–14.5)
PLATELET # BLD: 150 K/UL (ref 130–400)
PMV BLD AUTO: 11.3 FL (ref 9.4–12.3)
POTASSIUM REFLEX MAGNESIUM: 4.4 MMOL/L (ref 3.5–5)
RBC # BLD: 3.67 M/UL (ref 4.2–5.4)
SODIUM BLD-SCNC: 141 MMOL/L (ref 136–145)
TOTAL PROTEIN: 5 G/DL (ref 6.6–8.7)
WBC # BLD: 8.6 K/UL (ref 4.8–10.8)

## 2021-05-24 PROCEDURE — 1180000000 HC REHAB R&B

## 2021-05-24 PROCEDURE — 97530 THERAPEUTIC ACTIVITIES: CPT

## 2021-05-24 PROCEDURE — 97116 GAIT TRAINING THERAPY: CPT

## 2021-05-24 PROCEDURE — 99232 SBSQ HOSP IP/OBS MODERATE 35: CPT | Performed by: PSYCHIATRY & NEUROLOGY

## 2021-05-24 PROCEDURE — 6360000002 HC RX W HCPCS: Performed by: HOSPITALIST

## 2021-05-24 PROCEDURE — 97110 THERAPEUTIC EXERCISES: CPT

## 2021-05-24 PROCEDURE — 94640 AIRWAY INHALATION TREATMENT: CPT

## 2021-05-24 PROCEDURE — 97535 SELF CARE MNGMENT TRAINING: CPT

## 2021-05-24 PROCEDURE — 6370000000 HC RX 637 (ALT 250 FOR IP): Performed by: HOSPITALIST

## 2021-05-24 PROCEDURE — 80053 COMPREHEN METABOLIC PANEL: CPT

## 2021-05-24 PROCEDURE — 6360000002 HC RX W HCPCS: Performed by: PSYCHIATRY & NEUROLOGY

## 2021-05-24 PROCEDURE — 36415 COLL VENOUS BLD VENIPUNCTURE: CPT

## 2021-05-24 PROCEDURE — 2580000003 HC RX 258: Performed by: HOSPITALIST

## 2021-05-24 PROCEDURE — 6370000000 HC RX 637 (ALT 250 FOR IP): Performed by: PSYCHIATRY & NEUROLOGY

## 2021-05-24 PROCEDURE — 85025 COMPLETE CBC W/AUTO DIFF WBC: CPT

## 2021-05-24 RX ORDER — SODIUM PHOSPHATE, DIBASIC AND SODIUM PHOSPHATE, MONOBASIC 7; 19 G/133ML; G/133ML
1 ENEMA RECTAL
Status: ACTIVE | OUTPATIENT
Start: 2021-05-24 | End: 2021-05-24

## 2021-05-24 RX ADMIN — BUDESONIDE 500 MCG: 0.5 SUSPENSION RESPIRATORY (INHALATION) at 06:13

## 2021-05-24 RX ADMIN — OXYCODONE HYDROCHLORIDE AND ACETAMINOPHEN 1 TABLET: 5; 325 TABLET ORAL at 20:48

## 2021-05-24 RX ADMIN — SODIUM CHLORIDE, PRESERVATIVE FREE 10 ML: 5 INJECTION INTRAVENOUS at 20:49

## 2021-05-24 RX ADMIN — ENOXAPARIN SODIUM 40 MG: 40 INJECTION SUBCUTANEOUS at 08:07

## 2021-05-24 RX ADMIN — LEVOTHYROXINE SODIUM 88 MCG: 0.09 TABLET ORAL at 05:54

## 2021-05-24 RX ADMIN — SODIUM CHLORIDE, PRESERVATIVE FREE 10 ML: 5 INJECTION INTRAVENOUS at 08:08

## 2021-05-24 RX ADMIN — ARFORMOTEROL TARTRATE 15 MCG: 15 SOLUTION RESPIRATORY (INHALATION) at 06:13

## 2021-05-24 RX ADMIN — LINACLOTIDE 145 MCG: 145 CAPSULE, GELATIN COATED ORAL at 08:11

## 2021-05-24 RX ADMIN — ATORVASTATIN CALCIUM 40 MG: 40 TABLET, FILM COATED ORAL at 20:48

## 2021-05-24 ASSESSMENT — PAIN DESCRIPTION - ONSET
ONSET: ON-GOING
ONSET: ON-GOING

## 2021-05-24 ASSESSMENT — PAIN DESCRIPTION - ORIENTATION
ORIENTATION: RIGHT
ORIENTATION: RIGHT

## 2021-05-24 ASSESSMENT — PAIN DESCRIPTION - PAIN TYPE
TYPE: SURGICAL PAIN;ACUTE PAIN
TYPE: SURGICAL PAIN

## 2021-05-24 ASSESSMENT — PAIN SCALES - GENERAL
PAINLEVEL_OUTOF10: 9
PAINLEVEL_OUTOF10: 3
PAINLEVEL_OUTOF10: 5
PAINLEVEL_OUTOF10: 3
PAINLEVEL_OUTOF10: 0

## 2021-05-24 ASSESSMENT — PAIN DESCRIPTION - LOCATION
LOCATION: HIP
LOCATION: HIP

## 2021-05-24 ASSESSMENT — PAIN DESCRIPTION - PROGRESSION
CLINICAL_PROGRESSION: NOT CHANGED
CLINICAL_PROGRESSION: NOT CHANGED

## 2021-05-24 ASSESSMENT — PAIN DESCRIPTION - DESCRIPTORS
DESCRIPTORS: ACHING
DESCRIPTORS: ACHING

## 2021-05-24 ASSESSMENT — PAIN - FUNCTIONAL ASSESSMENT
PAIN_FUNCTIONAL_ASSESSMENT: ACTIVITIES ARE NOT PREVENTED
PAIN_FUNCTIONAL_ASSESSMENT: PREVENTS OR INTERFERES SOME ACTIVE ACTIVITIES AND ADLS

## 2021-05-24 ASSESSMENT — PAIN DESCRIPTION - FREQUENCY
FREQUENCY: INTERMITTENT
FREQUENCY: INTERMITTENT

## 2021-05-24 NOTE — PLAN OF CARE
Problem: Falls - Risk of:  Goal: Will remain free from falls  Description: Will remain free from falls  5/24/2021 0010 by Mary Kay Rojas RN  Outcome: Ongoing  5/23/2021 1535 by Olinda Lujan LPN  Outcome: Ongoing  Goal: Absence of physical injury  Description: Absence of physical injury  5/24/2021 0010 by Mary Kay Rojas RN  Outcome: Ongoing  5/23/2021 1535 by Olinda Lujan LPN  Outcome: Ongoing     Problem: Safety:  Goal: Free from accidental physical injury  Description: Free from accidental physical injury  5/24/2021 0010 by Mary Kay Rojas RN  Outcome: Ongoing  5/23/2021 1535 by Olinda Lujan LPN  Outcome: Ongoing  Goal: Free from intentional harm  Description: Free from intentional harm  5/24/2021 0010 by Mary Kay Rojas RN  Outcome: Ongoing  5/23/2021 1535 by Olinda Lujan LPN  Outcome: Ongoing     Problem: Discharge Planning:  Goal: Patients continuum of care needs are met  Description: Patients continuum of care needs are met  5/24/2021 0010 by Mary Kay Rojas RN  Outcome: Ongoing  5/23/2021 1535 by Olinda Lujan LPN  Outcome: Ongoing     Problem: Skin Integrity:  Goal: Will show no infection signs and symptoms  Description: Will show no infection signs and symptoms  5/24/2021 0010 by Mary Kay Rojas RN  Outcome: Ongoing  5/23/2021 1535 by Olinda Lujan LPN  Outcome: Ongoing  Goal: Absence of new skin breakdown  Description: Absence of new skin breakdown  5/24/2021 0010 by Mary Kay Rojas RN  Outcome: Ongoing  5/23/2021 1535 by Olinda Lujan LPN  Outcome: Ongoing     Problem: Pain:  Description: Pain management should include both nonpharmacologic and pharmacologic interventions.   Goal: Pain level will decrease  Description: Pain level will decrease  5/24/2021 0010 by Mary Kay Rojas RN  Outcome: Ongoing  5/23/2021 1535 by Olinda Lujan LPN  Outcome: Ongoing  Goal: Control of acute pain  Description: Control of acute pain  5/24/2021 0010 by Mary Kay Rojas RN  Outcome: Ongoing  5/23/2021 1535 by Debo Garcia

## 2021-05-24 NOTE — PROGRESS NOTES
05/24/21 1345   Bed Mobility   Supine to Sit Contact guard assistance;Stand by assistance  (TRIPLANR TO LEFT WITH RAIL)   Sit to Supine Contact guard assistance;Stand by assistance  (TRIPLANAR FROM LEFT SIDE OF BED)   Comment SUPINE TO SIT:  CUES FOR SEGMENTALLY MOVING LEGS UNILATERALLY THEN TRUNK BEFORE REACHING WITH RIGHT HAND FOR LEFT RAIL TO COME INTO SITTING. SIT TO SUPINE USING BILATERAL UES ON BED TO PIVOT HIPS/LES   Transfers   Sit to Stand Contact guard assistance   Stand to sit Contact guard assistance   Bed to Chair Contact guard assistance   Other exercises   Other exercises 1 SUPINE BILAT LE ANKLE PUMPS, QUAD SETS, GLUTE SETS, HEEL SLIDES, SUPINE HIP ABD, HOOKLYNE HIP ABD   Other exercises 2 PRACTICED SUPINE TO/FROM SIT   Conditions Requiring Skilled Therapeutic Intervention   Body structures, Functions, Activity limitations Decreased functional mobility ; Decreased ROM; Decreased strength;Decreased endurance;Decreased balance; Increased pain;Decreased posture   Assessment EMPHASIS ON SUPINE HIP ROM. WORKED ON TECHNIQUE FOR SUPINE TO/FROM SIT. LORNA SIT TO STAND WITH LEFT HAND ON BED, JOSE JOHN ON RW.

## 2021-05-24 NOTE — PROGRESS NOTES
Patient:   Chan Arriola  MR#:    [id number]   Room:    [facility]/821-   YOB: 1950  Date of Progress Note: 5/24/2021  Time of Note                           12:35 PM  Consulting Physician:   Robby Holley M.D. Attending Physician:  Robby Holley MD     Chief complaint S/P right femur fracture and repair     S:This 72 y.o. female  with history of COPD/chronic bronchitis, tobacco abuse, hyperlipidemia, hypothyroidism, and bradycardia s/p pacemaker. She presented to [facility] ER on 5/15/21 after having a fall. She had severe onset of right hip/leg pain and was unable to bear weight. X-rays done revealed a right displaced subcapital femoral neck fracture. She was admitted to the hospitalist service with consult for orthopedic surgery. She was seen by Dr. Jayden Gan, who recommended right cemented hip hemiarthroplasty. She was in agreement and proceeded to surgery. She tolerated the procedure well. She will be weightbearing as tolerated on her right lower extremity with anterior hip precautions. She is participating in both PT/OT. She is felt to need a stay on Rehab to work towards her goal of returning home with assist of her son. She is now felt ready to start the Rehab program. Still constipated.       REVIEW OF SYSTEMS:  Constitutional: No fevers No chills  Neck:No stiffness  Respiratory: No shortness of breath  Cardiovascular: No chest pain No palpitations  Gastrointestinal: No abdominal pain    Genitourinary: No Dysuria  Neurological: No headache, no confusion    Past Medical History:      Diagnosis Date    Cerebral artery occlusion with cerebral infarction ([address]     TIA    Diverticular disease of colon     Hyperlipidemia     Hypothyroidism     Pneumonia     Psychiatric problem     Seizures ([address]     Tobacco abuse     Vitamin B12 deficiency        Past Surgical History:      Procedure Laterality Date    CARDIAC PACEMAKER PLACEMENT      COLONOSCOPY      HIP SURGERY Right 5/15/2021    HIP HEMIARTHROPLASTY performed by Annette Juares MD at [facility]      collapsed lung    [place] COLONOSCOPY W/BIOPSY SINGLE/MULTIPLE N/A 6/27/2017    Dr Juancho West-extensive and large mouthed diverticular disease throughout the right colon-Tubular AP (villiform) (-) dysplasia x 1, tubular AP (-) dysplasia x 1--5 yr recall    TUBAL LIGATION         Medications in Hospital:      Current Facility-Administered Medications:     linaclotide (LINZESS) capsule 145 mcg, 145 mcg, Oral, QAM AC, Krista Cortes MD, 145 mcg at 05/24/21 9311    fleet rectal enema 1 enema, 1 enema, Rectal, Once PRN, Krista Cortes MD    ipratropium-albuterol (DUONEB) nebulizer solution 1 ampule, 1 ampule, Inhalation, Q4H PRN, Krista Cortes MD    Arformoterol Tartrate (BROVANA) nebulizer solution 15 mcg, 15 mcg, Nebulization, BID, Krista Cortes MD, 15 mcg at 05/24/21 0613    enoxaparin (LOVENOX) injection 40 mg, 40 mg, Subcutaneous, Daily, Dioni Thornton MD, 40 mg at 05/24/21 0807    oxyCODONE-acetaminophen (PERCOCET) 5-325 MG per tablet 1 tablet, 1 tablet, Oral, Q4H PRN, Dioni Thornton MD, 1 tablet at 05/23/21 2111    [DISCONTINUED] acetaminophen (TYLENOL) tablet 650 mg, 650 mg, Oral, Q6H PRN **OR** acetaminophen (TYLENOL) suppository 650 mg, 650 mg, Rectal, Q6H PRN, Dioni Thornton MD    magnesium sulfate 2000 mg in 50 mL IVPB premix, 2,000 mg, Intravenous, PRN, Dioni Thornton MD    potassium chloride (KLOR-CON M) extended release tablet 40 mEq, 40 mEq, Oral, PRN **OR** potassium bicarb-citric acid (EFFER-K) effervescent tablet 40 mEq, 40 mEq, Oral, PRN **OR** potassium chloride 10 mEq/100 mL IVPB (Peripheral Line), 10 mEq, Intravenous, PRN, Dioni Thornton MD    promethazine (PHENERGAN) tablet 12.5 mg, 12.5 mg, Oral, Q6H PRN **OR** ondansetron (ZOFRAN) injection 4 mg, 4 mg, Intravenous, Q6H PRN, Dioni Thornton MD    sodium chloride flush 0.9 % injection 5-40 mL, 5-40 mL, Intravenous, 2 times per day, Dioni Thornton, MD, 10 mL at 05/24/21 0808    sodium chloride flush 0.9 % injection 5-40 mL, 5-40 mL, Intravenous, PRN, Honey Louise MD    atorvastatin (LIPITOR) tablet 40 mg, 40 mg, Oral, Nightly, Honey Louise MD, 40 mg at 05/23/21 2110    budesonide (PULMICORT) nebulizer suspension 500 mcg, 0.5 mg, Nebulization, BID, Honey Louise MD, 500 mcg at 05/24/21 2003    levothyroxine (SYNTHROID) tablet 88 mcg, 88 mcg, Oral, Daily, Honey Louise MD, 88 mcg at 05/24/21 0554    acetaminophen (TYLENOL) tablet 650 mg, 650 mg, Oral, Q4H PRN, Aung Hood MD    polyethylene glycol (GLYCOLAX) packet 17 g, 17 g, Oral, Daily PRN, Aung Hood MD, 17 g at 05/23/21 2111    Allergies:  Patient has no known allergies. Social History:   TOBACCO:   reports that she has been smoking cigarettes. She started smoking about 50 years ago. She has a 15.00 pack-year smoking history. She has never used smokeless tobacco.  ETOH:   reports no history of alcohol use. Family History:       Problem Relation Age of Onset    Other Mother         COPD, dementia    Heart Disease Mother     Stroke Mother     Heart Disease Father     Stroke Father     Diabetes Brother     Diabetes Brother     High Blood Pressure Brother     High Cholesterol Brother     Colon Polyps Brother     Colon Cancer Neg Hx     Liver Cancer Neg Hx     Liver Disease Neg Hx     Esophageal Cancer Neg Hx     Rectal Cancer Neg Hx     Stomach Cancer Neg Hx          PHYSICAL EXAM:  /62   Pulse 61   Temp 97.4 °F (36.3 °C) (Temporal)   Resp 16   Ht 5' 9\" (1.753 m)   Wt 157 lb 3.2 oz (71.3 kg)   SpO2 93%   BMI 23.21 kg/m²     Constitutional  well developed, well nourished.    Eyes  conjunctiva normal.   Ear, nose, throat - No scars, masses, or lesions over external nose or ears, no atrophy of tongue  Neck-symmetric, no masses noted, no jugular vein distension  Respiration- chest wall appears symmetric, good expansion,   normal effort without use of accessory muscles  Musculoskeletal  no significant wasting of muscles noted, no bony deformities  Extremities-no clubbing, cyanosis or edema  Skin  warm, dry, and intact. No rash, erythema, or pallor. Psychiatric  mood, affect, and behavior appear normal.      Neurological exam  Awake, alert, fluent oriented appropriate affect  Attention and concentration appear appropriate  Recent and remote memory appears unremarkable  Speech normal without dysarthria  No clear issues with language of fund of knowledge     Cranial Nerve Exam     CN III, IV,VI-EOMI, No nystagmus, conjugate eye movements, no ptosis    CN VII-no facial assymetry       Motor Exam  antigravity throughout upper extremities bilaterally      Tremors- no tremors in hands or head noted     Gait  Not tested      Nursing/pcp notes, imaging,labs and vitals reviewed.      PT,OT and/or speech notes reviewed    Lab Results   Component Value Date    WBC 8.6 05/24/2021    HGB 10.3 (L) 05/24/2021    HCT 32.1 (L) 05/24/2021    MCV 87.5 05/24/2021     05/24/2021     Lab Results   Component Value Date     05/24/2021    K 4.4 05/24/2021     05/24/2021    CO2 30 (H) 05/24/2021    BUN 16 05/24/2021    CREATININE 0.5 05/24/2021    GLUCOSE 105 05/24/2021    CALCIUM 8.8 05/24/2021    PROT 5.0 (L) 05/24/2021    LABALBU 3.1 (L) 05/24/2021    BILITOT 0.4 05/24/2021    ALKPHOS 72 05/24/2021    AST 26 05/24/2021    ALT 27 05/24/2021    LABGLOM >60 05/24/2021    GFRAA >59 05/24/2021    GLOB 2.9 10/16/2016     Lab Results   Component Value Date    INR 1.07 05/19/2021    INR 1.01 05/15/2021    INR 1.01 10/05/2018    PROTIME 13.8 05/19/2021    PROTIME 13.3 05/15/2021    PROTIME 13.2 10/05/2018     Jennifer Mcqueen PTA   Physical Therapist Assistant   Specialty:  Physical Therapy   Progress Notes       Cosign Needed   Date of Service:  5/22/2021  3:52 PM               Cosign Needed             Show:Clear all  []Manual[x]Template[]Copied    Added by:  [x]Kiki 3. DVT prophylaxis-Lovenox  4. Hypothyroidism-on synthroid  5. COPD-nebs PRN  6. Pain control-Percocet PRN  7. GI-bowel regimen  8. Thrombocytopenia-monitor   9.  PT/OT    Continue current care      Expected duration and frequency therapy: 180 minutes per day, 5 days per week    [address] [facility]  [phone] CELL  Dr Chino Oleary

## 2021-05-24 NOTE — PROGRESS NOTES
Occupational Therapy     05/24/21 0815   Pre Treatment Pain Screening   Intervention List Patient able to continue with treatment   General   Additional Pertinent Hx R hip fx, THR with anterior hip precautions   Pain Assessment   Patient Currently in Pain Yes   Pain Assessment 0-10   Pain Level 3   Pain Type Surgical pain   Pain Location Hip   Pain Orientation Right   Pain Descriptors Aching   Pain Frequency Intermittent   Clinical Progression Not changed   Response to Pain Intervention Patient Satisfied   Balance   Sitting Balance Independent   Standing Balance Stand by assistance   Functional Mobility   Functional - Mobility Device Rolling Walker   Activity To/from bathroom   Assist Level Contact guard assistance   Transfers   Sit to stand Stand by assistance   Stand to sit Stand by assistance   Toilet Transfers   Toilet - Technique Ambulating   Equipment Used Standard bedside commode   Toilet Transfer Stand by assistance   Assessment   Performance deficits / Impairments Decreased functional mobility ; Decreased ADL status; Decreased endurance;Decreased balance;Decreased high-level IADLs   Treatment Diagnosis Right hip fx s/p hip hemiarthroplasty   Prognosis Good   Timed Code Treatment Minutes 45 Minutes   Activity Tolerance   Activity Tolerance Patient Tolerated treatment well   Safety Devices   Safety Devices in place Yes   Type of devices Call light within reach; Chair alarm in place   Plan   Current Treatment Recommendations Strengthening;Balance Training;Functional Mobility Training; Endurance Training; Safety Education & Training;Patient/Caregiver Education & Training;Equipment Evaluation, Education, & procurement;Self-Care / ADL; Home Management Training      05/24/21 0815   [address] Needed Supervision or touching assistance   Comment SBA.    CARE Score 4   Shower/Bathe Self   Assistance Needed Supervision or touching assistance   CARE Score 4   Upper Body Dressing   Assistance Needed Independent   CARE Score 6   Lower Body Dressing   Assistance Needed Supervision or touching assistance   Comment SBA, cues for tech with AE. CARE Score 4   Putting On/Taking Off Footwear   Assistance Needed Partial/moderate assistance   Comment Socks only, using AE.    CARE Score 3

## 2021-05-24 NOTE — PATIENT CARE CONFERENCE
WITH R.   Gait Deviations: Slow Jane, Decreased step length, Decreased step height  Distance: 20 FT X4  Comments: Incorporated turns. STAIRS     GOALS:  Short term goals  Time Frame for Short term goals: 1-2 WEEKS  Short term goal 1: SUPERVISION WITH BED MOBILITY  Short term goal 2: SBA WITH TRANSFERS  Short term goal 3: SBA WITH CAR TRANSFER  Short term goal 4: NEGOTIATE 1 STEP CGA  Short term goal 5: PROPEL W/C 150 FT SUPERVISION    Long term goals  Time Frame for Long term goals : 2-3 WEEKS  Long term goal 1: INDEP WITH BED MOBILITY  Long term goal 2: INDEP WITH TRANSFERS  Long term goal 3: INDEP WITH CAR TRANSFER  Long term goal 4: NEGOTIATE 4 STEPS INDEP  Long term goal 5: PROPEL W/C 150 FT INDEP    ASSESSMENT:  Assessment: EMPHASIS ON SUPINE HIP ROM. WORKED ON TECHNIQUE FOR SUPINE TO/FROM SIT. IMROVED SIT TO STAND WITH LEFT HAND ON BED, RIGH TUE ON RW.       SPEECH THERAPY      OCCUPATIONAL THERAPY    CURRENT IRF-MAGNOLIA SCORES  Eating: CARE Score: 5       Oral Hygiene: CARE Score: 5        Toileting: CARE Score: 4  Comment: SBA. Shower/Bathe: CARE Score: 4  Comment: CGA for standing portion of task, utilized AE, assistance drying lower legs. Upper Body Dressing: CARE Score: 6         Lower Body Dressing: CARE Score: 4  Comment: SBA, cues for tech with AE. Footwear: CARE Score: 3  Comment: Socks only, using AE. Toilet Transfers: CARE Score: 4  Comment: SBA. Picking Up Object:  CARE Score: 1          UE Functioning:  B UE AROM WNLs  B UE strength WFLs    Pain Assessment:  Pain Level: 0  Pain Location: Hip    STGs:  Short term goals  Short term goal 1: MET  Short term goal 2: CGA with toilet transfers. Short term goal 3: Min A with LB dressing using AE. Short term goal 4: Min A with donning/doffing socks and shoes using AE. Short term goal 5: Min A with ambulatory homemaking tasks. Short term goal 6: Pt will complete 1-2 static standing task for 3 minutes, requiring CGA. LTGs:  Long term goals  Time Frame for Long term goals : 2 weeks  Long term goal 1: Modified independent with toileting and toilet transfers. Long term goal 2: Modified independent with overall dressing. Long term goal 3: Modified independent with ambulatory homemaking tasks. Long term goal 4: Patient verbalize DME needs. Assessment:  Performance deficits / Impairments: Decreased functional mobility , Decreased ADL status, Decreased endurance, Decreased balance, Decreased high-level IADLs                NUTRITION  Current Wt: Weight: 157 lb 3.2 oz (71.3 kg) / Body mass index is 23.21 kg/m². Admission Wt: Admission Body Weight: 156 lb (70.8 kg)  Oral Diet Orders: General    Pt remains adequately nourished at this time AEB most meals >75% and stable wt since admission. Continue current diet. Please see nutrition note for details. NURSING    Wounds/Incisions/Ulcers: Incision healing well     Brent Scale Score: 17    Pain: Percocet 5 q 4 hrs PRN    Consultations/Labs/X-rays:     Family Education: Need to make contact with family to initiate education    Fall Risk:  Shankar Score: 100    Fall in the last week? NONE      Other Nursing Issues: Alert and oriented x4, cont of bowel and bladder, meds whole with water, assist x1 with transfers, pacemaker left chest, general diet,         SOCIAL WORK/CASE MANAGEMENT  Assessment: Has adult disabled son living in home with her (after his divorce) but she states he can assist her. 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 Her Social Security benefit is very low-advised to inquire about SSI eligibility. Requests information about how to get incontinence supplies covered.     Discharge Plan   Estimated Length of Stay: 6/3/21  Destination: home health    Pass: No    Services at Discharge: [id number], Occupational Therapy and Nursing per evaluations    Equipment at Discharge: wheelchair, bath bench and bedside commode    Progress made in the prior week:  1. Improved weightbearing during ambulation  2.complete toileting with CGA  3.  4.  5.      Goals for following week:  1. Bed mob sba  2. Complete footwear with min A with AE  3.   4.   5.     Factors facilitating achievement of predicted outcomes: Motivated, Cooperative and Pleasant    Barriers to the achievement of predicted outcomes: Pain, Decreased endurance, Decreased sensation, Decreased proprioception, Upper extremity weakness and Lower extremity weakness    Team Members Present at Conference:  : Adriana [facility]   Occupational Therapist: Teagan Fernandez OTR/L  Physical Therapist: Deirdre Mayorga PT,DPT  Speech Therapist: N/A  Nurse: Malcolm Delarosa, RN,BSN   Nurse Manager:  Malcolm Delarosa RN, BSN  Dietitian:  Teofilo Lowry, MS, RD, LD  Rehab Director:  Matheus Valencia      I approve the established interdisciplinary plan of care as documented within the medical record of Mitchell Coley.

## 2021-05-24 NOTE — PROGRESS NOTES
05/24/21 1000   Restrictions/Precautions   Restrictions/Precautions Weight Bearing; Fall Risk   Required Braces or Orthoses? No   Implants present? Pacemaker   Lower Extremity Weight Bearing Restrictions   Right Lower Extremity Weight Bearing Weight Bearing As Tolerated   Left Lower Extremity Weight Bearing Weight Bearing As Tolerated   Hearing   Hearing X   Hearing Exceptions Hard of hearing/hearing concerns   General   Diagnosis R HIP HEMIARTHROPLASTY; R DISPLACED SUBCAPITAL FEMORAL NECK FX   Pain Screening   Patient Currently in Pain Yes   Pain Assessment   Pain Assessment 0-10   Pain Level 3   Patient's Stated Pain Goal No pain   Pain Type Surgical pain;Acute pain   Pain Location Hip   Pain Orientation Right   Pain Descriptors Aching   Functional Pain Assessment Prevents or interferes some active activities and ADLs   Non-Pharmaceutical Pain Intervention(s) Distraction   Response to Pain Intervention Patient Satisfied   Pain Frequency Intermittent   Pain Onset On-going   Clinical Progression Not changed   Oxygen Therapy   O2 Device None (Room air)   Bed Mobility   Supine to Sit Moderate assistance  (TO THE R; ASSIST WITH R LE AND TRUNK FLEXION)   Sit to Supine Moderate assistance  (TO THE R; TRIPLANAR TECHNIQUE; ASSIST WITH R LE )   Scooting Stand by assistance   Transfers   Sit to Stand Contact guard assistance  (EXCESSIVE HIP FLEXION; CUED TO SCOOT AND LEAN FORWARD)   Stand to sit Contact guard assistance  (SLOW AND CONTROLLED)   Ambulation   Ambulation? Yes   WB Status WBAT   More Ambulation? Yes   Ambulation 1   Surface level tile   Device Rolling Walker   Assistance Contact guard assistance   Quality of Gait BEGAN WITH RECIPROCAL THEN CHANGED TO STEP TO 3 POINT GAIT PATTERN LEADING WITH R.    Gait Deviations Slow Jane;Decreased step length;Decreased step height   Distance 20 FT X4   Stairs/Curb   Stairs?  No   Other exercises   Other exercises 1 SUPINE BILAT QUAD SETS, X10   Other exercises 2 SUPINE BILAT HEEL SLIDES, X10   Conditions Requiring Skilled Therapeutic Intervention   Assessment PATIENT REQUIRED MULTIPLE CUES FOR SIT TO STAND TO AVOID EXCESSIVE HIP FLEXION. CUES INCLUDED SCOOTING FORWARD, PUTTING FEET UNDERNEATH THE LEGS, LEANING FORWARD, AND USING ARM RESTS TO PUSH OFF. DURING AMBULATION, PATIENT HAS DIFFICULTY KEEPING R LE FROM EXTERNALLY ROTATING. PATIENT IS HARD OF HEARING AND REQUIRES MULTIPLE CUES FOR ACTIVITY. Safety Devices   Type of devices Left in chair;Call light within reach; Chair alarm in place

## 2021-05-24 NOTE — PROGRESS NOTES
Occupational Therapy     05/24/21 1430   General   Additional Pertinent Hx R hip fx, THR with anterior hip precautions   Family / Caregiver Present No   Pain Assessment   Patient Currently in Pain No   Pain Assessment 0-10   Pain Level 0   ADL   Toileting Stand by assistance   Balance   Sitting Balance Independent   Standing Balance Stand by assistance   Functional Mobility   Functional - Mobility Device Rolling Walker   Activity To/from bathroom   Assist Level Contact guard assistance   Transfers   Sit to stand Stand by assistance   Stand to sit Stand by assistance   Toilet Transfers   Toilet - Technique Ambulating   Equipment Used Grab bars   Toilet Transfer Stand by assistance   Type of ROM/Therapeutic Exercise   Type of ROM/Therapeutic Exercise Free weights   Comment 2# BUE 1 set x 10 reps, all planes. Assessment   Performance deficits / Impairments Decreased functional mobility ; Decreased ADL status; Decreased endurance;Decreased balance;Decreased high-level IADLs   Treatment Diagnosis Right hip fx s/p hip hemiarthroplasty   Prognosis Good   Timed Code Treatment Minutes 45 Minutes   Activity Tolerance   Activity Tolerance Patient Tolerated treatment well   Safety Devices   Safety Devices in place Yes   Type of devices Call light within reach; Bed alarm in place   Plan   Current Treatment Recommendations Strengthening;Balance Training;Functional Mobility Training; Endurance Training; Safety Education & Training;Patient/Caregiver Education & Training;Equipment Evaluation, Education, & procurement;Self-Care / ADL; Home Management Training

## 2021-05-24 NOTE — PROGRESS NOTES
Continue Regimen: Ultravate ointment Nutrition Assessment     Type and Reason for Visit: Reassess    Nutrition Recommendations/Plan: Continue current diet. Nutrition Assessment: Pt sleeping at time of visit. Pt remains adequately nourished at this time AEB most meals >75% and stable wt since admission. Continue current diet. Malnutrition Assessment:  Malnutrition Status: No malnutrition     Current Nutrition Therapies:    DIET GENERAL; Anthropometric Measures:  · Height: 5' 9\" (175.3 cm)  · Current Body Wt: 157 lb (71.2 kg)   · BMI: 23.2    Nutrition Interventions:   Food and/or Nutrient Delivery:  Continue Current Diet  Coordination of Nutrition Care:  Continue to monitor while inpatient    Goals:  Pt will continue with po intake 50% or greater of meals. wt stable.        Nutrition Monitoring and Evaluation:   Food/Nutrient Intake Outcomes:  Food and Nutrient Intake  Physical Signs/Symptoms Outcomes:  Biochemical Data, Nutrition Focused Physical Findings, Skin, Weight     Discharge Planning:    No discharge needs at this time     Electronically signed by Francisco Foley, MS, RD, LD on 5/24/21 at 1:58 PM CDT    Contact: [phone] Detail Level: Detailed

## 2021-05-24 NOTE — PLAN OF CARE
Problem: Falls - Risk of:  Goal: Will remain free from falls  Description: Will remain free from falls  5/24/2021 1030 by Eusebia Ocampo RN  Outcome: Ongoing  5/24/2021 0010 by Kaz Price RN  Outcome: Ongoing  Goal: Absence of physical injury  Description: Absence of physical injury  5/24/2021 1030 by Eusebia Ocampo RN  Outcome: Ongoing  5/24/2021 0010 by Kaz Price RN  Outcome: Ongoing     Problem: Infection:  Goal: Will remain free from infection  Description: Will remain free from infection  5/24/2021 1030 by Eusebia Ocampo RN  Outcome: Ongoing  5/24/2021 0010 by Kaz Price RN  Outcome: Ongoing     Problem: Safety:  Goal: Free from accidental physical injury  Description: Free from accidental physical injury  5/24/2021 1030 by Eusebia Ocampo RN  Outcome: Ongoing  5/24/2021 0010 by Kaz Price RN  Outcome: Ongoing  Goal: Free from intentional harm  Description: Free from intentional harm  5/24/2021 1030 by Eusebia Ocampo RN  Outcome: Ongoing  5/24/2021 0010 by Kaz Price RN  Outcome: Ongoing     Problem: Discharge Planning:  Goal: Patients continuum of care needs are met  Description: Patients continuum of care needs are met  5/24/2021 1030 by Eusebia Ocampo RN  Outcome: Ongoing  5/24/2021 0010 by Kaz Price RN  Outcome: Ongoing     Problem: Skin Integrity:  Goal: Will show no infection signs and symptoms  Description: Will show no infection signs and symptoms  5/24/2021 1030 by Eusebia Ocampo RN  Outcome: Ongoing  5/24/2021 0010 by Kaz Price RN  Outcome: Ongoing  Goal: Absence of new skin breakdown  Description: Absence of new skin breakdown  5/24/2021 1030 by Eusebia Ocampo RN  Outcome: Ongoing  5/24/2021 0010 by Kaz Price RN  Outcome: Ongoing     Problem: Pain:  Goal: Pain level will decrease  Description: Pain level will decrease  5/24/2021 1030 by Eusebia Ocampo RN  Outcome: Ongoing  5/24/2021 0010 by Kaz Price RN  Outcome: Ongoing  Goal: Control of acute pain  Description: Control of acute pain  5/24/2021 1030 by Serge Childress RN  Outcome: Ongoing  5/24/2021 0010 by Jamshid Brady RN  Outcome: Ongoing  Goal: Control of chronic pain  Description: Control of chronic pain  5/24/2021 1030 by Serge Childress RN  Outcome: Ongoing  5/24/2021 0010 by Jamshid Brady RN  Outcome: Ongoing

## 2021-05-25 PROCEDURE — 2580000003 HC RX 258: Performed by: HOSPITALIST

## 2021-05-25 PROCEDURE — 6360000002 HC RX W HCPCS: Performed by: PSYCHIATRY & NEUROLOGY

## 2021-05-25 PROCEDURE — 97535 SELF CARE MNGMENT TRAINING: CPT

## 2021-05-25 PROCEDURE — 1180000000 HC REHAB R&B

## 2021-05-25 PROCEDURE — 97530 THERAPEUTIC ACTIVITIES: CPT

## 2021-05-25 PROCEDURE — 94640 AIRWAY INHALATION TREATMENT: CPT

## 2021-05-25 PROCEDURE — 97110 THERAPEUTIC EXERCISES: CPT

## 2021-05-25 PROCEDURE — 97116 GAIT TRAINING THERAPY: CPT

## 2021-05-25 PROCEDURE — 6360000002 HC RX W HCPCS: Performed by: HOSPITALIST

## 2021-05-25 PROCEDURE — 6370000000 HC RX 637 (ALT 250 FOR IP): Performed by: HOSPITALIST

## 2021-05-25 PROCEDURE — 6370000000 HC RX 637 (ALT 250 FOR IP): Performed by: PSYCHIATRY & NEUROLOGY

## 2021-05-25 PROCEDURE — 99233 SBSQ HOSP IP/OBS HIGH 50: CPT | Performed by: PSYCHIATRY & NEUROLOGY

## 2021-05-25 RX ADMIN — ARFORMOTEROL TARTRATE 15 MCG: 15 SOLUTION RESPIRATORY (INHALATION) at 18:18

## 2021-05-25 RX ADMIN — BUDESONIDE 500 MCG: 0.5 SUSPENSION RESPIRATORY (INHALATION) at 07:54

## 2021-05-25 RX ADMIN — OXYCODONE HYDROCHLORIDE AND ACETAMINOPHEN 1 TABLET: 5; 325 TABLET ORAL at 22:36

## 2021-05-25 RX ADMIN — ENOXAPARIN SODIUM 40 MG: 40 INJECTION SUBCUTANEOUS at 07:28

## 2021-05-25 RX ADMIN — SODIUM CHLORIDE, PRESERVATIVE FREE 10 ML: 5 INJECTION INTRAVENOUS at 07:29

## 2021-05-25 RX ADMIN — ATORVASTATIN CALCIUM 40 MG: 40 TABLET, FILM COATED ORAL at 21:08

## 2021-05-25 RX ADMIN — SODIUM CHLORIDE, PRESERVATIVE FREE 10 ML: 5 INJECTION INTRAVENOUS at 22:26

## 2021-05-25 RX ADMIN — ARFORMOTEROL TARTRATE 15 MCG: 15 SOLUTION RESPIRATORY (INHALATION) at 07:54

## 2021-05-25 RX ADMIN — LINACLOTIDE 145 MCG: 145 CAPSULE, GELATIN COATED ORAL at 06:13

## 2021-05-25 RX ADMIN — BUDESONIDE 500 MCG: 0.5 SUSPENSION RESPIRATORY (INHALATION) at 18:18

## 2021-05-25 RX ADMIN — OXYCODONE HYDROCHLORIDE AND ACETAMINOPHEN 1 TABLET: 5; 325 TABLET ORAL at 18:24

## 2021-05-25 RX ADMIN — OXYCODONE HYDROCHLORIDE AND ACETAMINOPHEN 1 TABLET: 5; 325 TABLET ORAL at 09:27

## 2021-05-25 RX ADMIN — LEVOTHYROXINE SODIUM 88 MCG: 0.09 TABLET ORAL at 06:14

## 2021-05-25 ASSESSMENT — PAIN DESCRIPTION - ORIENTATION
ORIENTATION: RIGHT

## 2021-05-25 ASSESSMENT — PAIN DESCRIPTION - ONSET
ONSET: ON-GOING
ONSET: ON-GOING

## 2021-05-25 ASSESSMENT — PAIN DESCRIPTION - FREQUENCY
FREQUENCY: CONTINUOUS
FREQUENCY: CONTINUOUS

## 2021-05-25 ASSESSMENT — PAIN DESCRIPTION - LOCATION
LOCATION: HIP
LOCATION: HIP;LEG
LOCATION: HIP;LEG

## 2021-05-25 ASSESSMENT — PAIN DESCRIPTION - DESCRIPTORS
DESCRIPTORS: ACHING;SORE

## 2021-05-25 ASSESSMENT — PAIN DESCRIPTION - PROGRESSION
CLINICAL_PROGRESSION: GRADUALLY IMPROVING
CLINICAL_PROGRESSION: NOT CHANGED

## 2021-05-25 ASSESSMENT — PAIN DESCRIPTION - PAIN TYPE
TYPE: SURGICAL PAIN
TYPE: SURGICAL PAIN;ACUTE PAIN
TYPE: SURGICAL PAIN
TYPE: SURGICAL PAIN

## 2021-05-25 ASSESSMENT — PULMONARY FUNCTION TESTS: PEFR_L/MIN: 16

## 2021-05-25 ASSESSMENT — PAIN - FUNCTIONAL ASSESSMENT
PAIN_FUNCTIONAL_ASSESSMENT: ACTIVITIES ARE NOT PREVENTED
PAIN_FUNCTIONAL_ASSESSMENT: ACTIVITIES ARE NOT PREVENTED

## 2021-05-25 ASSESSMENT — PAIN SCALES - GENERAL
PAINLEVEL_OUTOF10: 6
PAINLEVEL_OUTOF10: 4
PAINLEVEL_OUTOF10: 3
PAINLEVEL_OUTOF10: 3
PAINLEVEL_OUTOF10: 6
PAINLEVEL_OUTOF10: 5
PAINLEVEL_OUTOF10: 6
PAINLEVEL_OUTOF10: 9

## 2021-05-25 NOTE — PROGRESS NOTES
Occupational Therapy     05/25/21 0815   Pre Treatment Pain Screening   Intervention List Patient able to continue with treatment   General   Additional Pertinent Hx R hip fx, THR with anterior hip precautions   Pain Assessment   Patient Currently in Pain Yes   Pain Assessment 0-10   Pain Level 6   Pain Type Surgical pain   Pain Location Hip;Leg   Pain Orientation Right   Pain Descriptors Aching; Sore   Pain Frequency Continuous   Clinical Progression Not changed   Response to Pain Intervention Patient Satisfied   Balance   Sitting Balance Independent   Standing Balance Supervision   Functional Mobility   Functional - Mobility Device Rolling Walker   Activity To/from bathroom; Retrieve items;Transport items   Assist Level Stand by assistance   Transfers   Sit to stand Supervision   Stand to sit Supervision   Toilet Transfers   Toilet - Technique Ambulating   Equipment Used Standard bedside commode   Toilet Transfer Supervision   Assessment   Performance deficits / Impairments Decreased functional mobility ; Decreased ADL status; Decreased endurance;Decreased balance;Decreased high-level IADLs   Treatment Diagnosis Right hip fx s/p hip hemiarthroplasty   Prognosis Good   Timed Code Treatment Minutes 90 Minutes   Activity Tolerance   Activity Tolerance Patient Tolerated treatment well   Safety Devices   Safety Devices in place Yes   Type of devices Call light within reach; Chair alarm in place   Plan   Current Treatment Recommendations Strengthening;Balance Training;Functional Mobility Training; Endurance Training; Safety Education & Training;Patient/Caregiver Education & Training;Equipment Evaluation, Education, & procurement;Self-Care / ADL; Home Management Training      05/25/21 0815   Oral Hygiene   Assistance Needed Independent   CARE Score 6   [address] Needed Supervision or touching assistance   CARE Score 4   Shower/Bathe Self   Assistance Needed Supervision or touching assistance   Comment

## 2021-05-25 NOTE — PROGRESS NOTES
Patient:   Mitchell Coley  MR#:    [id number]   Room:    [facility]/821-   YOB: 1950  Date of Progress Note: 5/25/2021  Time of Note                           8:23 AM  Consulting Physician:   Cheri Benson M.D. Attending Physician:  Cheri Benson MD     Chief complaint S/P right femur fracture and repair     S:This 72 y.o. female  with history of COPD/chronic bronchitis, tobacco abuse, hyperlipidemia, hypothyroidism, and bradycardia s/p pacemaker. She presented to [facility] ER on 5/15/21 after having a fall. She had severe onset of right hip/leg pain and was unable to bear weight. X-rays done revealed a right displaced subcapital femoral neck fracture. She was admitted to the hospitalist service with consult for orthopedic surgery. She was seen by Dr. Paola Palmer, who recommended right cemented hip hemiarthroplasty. She was in agreement and proceeded to surgery. She tolerated the procedure well. She will be weightbearing as tolerated on her right lower extremity with anterior hip precautions. She is participating in both PT/OT. She is felt to need a stay on Rehab to work towards her goal of returning home with assist of her son. She is now felt ready to start the Rehab program. Still constipated.  No new issues      REVIEW OF SYSTEMS:  Constitutional: No fevers No chills  Neck:No stiffness  Respiratory: No shortness of breath  Cardiovascular: No chest pain No palpitations  Gastrointestinal: No abdominal pain    Genitourinary: No Dysuria  Neurological: No headache, no confusion    Past Medical History:      Diagnosis Date    Cerebral artery occlusion with cerebral infarction ([facility] [address]     TIA    Diverticular disease of colon     Hyperlipidemia     Hypothyroidism     Pneumonia     Psychiatric problem     Seizures ([facility] [address]     Tobacco abuse     Vitamin B12 deficiency        Past Surgical History:      Procedure Laterality Date    CARDIAC PACEMAKER PLACEMENT      COLONOSCOPY      HIP SURGERY Right 5/15/2021 mL, 5-40 mL, Intravenous, PRN, Sven Gloria MD    atorvastatin (LIPITOR) tablet 40 mg, 40 mg, Oral, Nightly, Sven Gloria MD, 40 mg at 05/24/21 2048    budesonide (PULMICORT) nebulizer suspension 500 mcg, 0.5 mg, Nebulization, BID, Sven Gloria MD, 500 mcg at 05/25/21 0754    levothyroxine (SYNTHROID) tablet 88 mcg, 88 mcg, Oral, Daily, Sven Gloria MD, 88 mcg at 05/25/21 8728    acetaminophen (TYLENOL) tablet 650 mg, 650 mg, Oral, Q4H PRN, Aaron العلي MD    polyethylene glycol (GLYCOLAX) packet 17 g, 17 g, Oral, Daily PRN, Aaron العلي MD, 17 g at 05/23/21 2111    Allergies:  Patient has no known allergies. Social History:   TOBACCO:   reports that she has been smoking cigarettes. She started smoking about 50 years ago. She has a 15.00 pack-year smoking history. She has never used smokeless tobacco.  ETOH:   reports no history of alcohol use. Family History:       Problem Relation Age of Onset    Other Mother         COPD, dementia    Heart Disease Mother     Stroke Mother     Heart Disease Father     Stroke Father     Diabetes Brother     Diabetes Brother     High Blood Pressure Brother     High Cholesterol Brother     Colon Polyps Brother     Colon Cancer Neg Hx     Liver Cancer Neg Hx     Liver Disease Neg Hx     Esophageal Cancer Neg Hx     Rectal Cancer Neg Hx     Stomach Cancer Neg Hx          PHYSICAL EXAM:  BP 95/63   Pulse 68   Temp 97.8 °F (36.6 °C) (Temporal)   Resp 18   Ht 5' 9\" (1.753 m)   Wt 157 lb 3.2 oz (71.3 kg)   SpO2 94%   BMI 23.21 kg/m²     Constitutional  well developed, well nourished.    Eyes  conjunctiva normal.   Ear, nose, throat - No scars, masses, or lesions over external nose or ears, no atrophy of tongue  Neck-symmetric, no masses noted, no jugular vein distension  Respiration- chest wall appears symmetric, good expansion,   normal effort without use of accessory muscles  Musculoskeletal  no significant wasting of muscles noted, no bony deformities  Extremities-no clubbing, cyanosis or edema  Skin  warm, dry, and intact. No rash, erythema, or pallor. Psychiatric  mood, affect, and behavior appear normal.      Neurological exam  Awake, alert, fluent oriented appropriate affect  Attention and concentration appear appropriate  Recent and remote memory appears unremarkable  Speech normal without dysarthria  No clear issues with language of fund of knowledge     Cranial Nerve Exam     CN III, IV,VI-EOMI, No nystagmus, conjugate eye movements, no ptosis    CN VII-no facial assymetry       Motor Exam  antigravity throughout upper extremities bilaterally      Tremors- no tremors in hands or head noted     Gait  Not tested      Nursing/pcp notes, imaging,labs and vitals reviewed.      PT,OT and/or speech notes reviewed    Lab Results   Component Value Date    WBC 8.6 05/24/2021    HGB 10.3 (L) 05/24/2021    HCT 32.1 (L) 05/24/2021    MCV 87.5 05/24/2021     05/24/2021     Lab Results   Component Value Date     05/24/2021    K 4.4 05/24/2021     05/24/2021    CO2 30 (H) 05/24/2021    BUN 16 05/24/2021    CREATININE 0.5 05/24/2021    GLUCOSE 105 05/24/2021    CALCIUM 8.8 05/24/2021    PROT 5.0 (L) 05/24/2021    LABALBU 3.1 (L) 05/24/2021    BILITOT 0.4 05/24/2021    ALKPHOS 72 05/24/2021    AST 26 05/24/2021    ALT 27 05/24/2021    LABGLOM >60 05/24/2021    GFRAA >59 05/24/2021    GLOB 2.9 10/16/2016     Lab Results   Component Value Date    INR 1.07 05/19/2021    INR 1.01 05/15/2021    INR 1.01 10/05/2018    PROTIME 13.8 05/19/2021    PROTIME 13.3 05/15/2021    PROTIME 13.2 10/05/2018     Sherin Perez PTA   Physical Therapist Assistant   Specialty:  Physical Therapy   Progress Notes       Cosign Needed   Date of Service:  5/22/2021  3:52 PM               Cosign Needed             Show:Clear all  []Manual[x]Template[]Copied    Added by:  [x]Kiki Monterroso, PTA    []Savage for details       05/22/21 1401 05/22/21 1402 05/22/21 1413 Pain control-Percocet PRN  7. GI-bowel regimen  8. Thrombocytopenia-monitor   9.  PT/OT    Team conference with PT/OT/speech/nursing/Care coordinator to review in depth patients care and discharge planning     ft SBA  ambulation 20 ft RW CGA    [address]     Continue current care      Expected duration and frequency therapy: 180 minutes per day, 5 days per week    [address] [facility]  [phone] CELL  Dr Jenel Blizzard

## 2021-05-25 NOTE — PLAN OF CARE
Problem: Falls - Risk of:  Goal: Will remain free from falls  Description: Will remain free from falls  5/25/2021 1026 by Cira Lama LPN  Outcome: Ongoing  5/24/2021 2343 by Eleazar Cooney LPN  Outcome: Ongoing  Goal: Absence of physical injury  Description: Absence of physical injury  5/25/2021 1026 by Ciar Lama LPN  Outcome: Ongoing  5/24/2021 2343 by Eleazar Cooney LPN  Outcome: Ongoing     Problem: Infection:  Goal: Will remain free from infection  Description: Will remain free from infection  5/25/2021 1026 by Cira Lama LPN  Outcome: Ongoing  5/24/2021 2343 by Eleazar Cooney LPN  Outcome: Ongoing     Problem: Safety:  Goal: Free from accidental physical injury  Description: Free from accidental physical injury  5/25/2021 1026 by Cira Lama LPN  Outcome: Ongoing  5/24/2021 2343 by Eleazar Cooney LPN  Outcome: Ongoing  Goal: Free from intentional harm  Description: Free from intentional harm  5/25/2021 1026 by Cira Lama LPN  Outcome: Ongoing  5/24/2021 2343 by Eleazar Cooney LPN  Outcome: Ongoing     Problem: Discharge Planning:  Goal: Patients continuum of care needs are met  Description: Patients continuum of care needs are met  5/25/2021 1026 by Cira Lama LPN  Outcome: Ongoing  5/24/2021 2343 by Eleazar Cooney LPN  Outcome: Ongoing     Problem: Skin Integrity:  Goal: Will show no infection signs and symptoms  Description: Will show no infection signs and symptoms  5/25/2021 1026 by Cira Lama LPN  Outcome: Ongoing  5/24/2021 2343 by Eleazar Cooney LPN  Outcome: Ongoing  Goal: Absence of new skin breakdown  Description: Absence of new skin breakdown  5/25/2021 1026 by Cira Lama LPN  Outcome: Ongoing  5/24/2021 2343 by Eleazar Cooney LPN  Outcome: Ongoing     Problem: Pain:  Goal: Pain level will decrease  Description: Pain level will decrease  5/25/2021 1026 by Cira Lama LPN  Outcome: Ongoing  5/24/2021 2343 by Eleazar Cooney LPN  Outcome: Ongoing  Goal: Control of acute pain  Description: Control of acute pain  5/25/2021 1026 by Jarrett Jarrett LPN  Outcome: Ongoing  5/24/2021 2343 by Bety Gonzalez LPN  Outcome: Ongoing  Goal: Control of chronic pain  Description: Control of chronic pain  5/25/2021 1026 by Jarrett Jarrett LPN  Outcome: Ongoing  5/24/2021 2343 by Bety Gonzalez LPN  Outcome: Ongoing     Problem: Bleeding:  Goal: Will show no signs and symptoms of excessive bleeding  Description: Will show no signs and symptoms of excessive bleeding  Outcome: Ongoing     Problem: Infection - Surgical Site:  Goal: Will show no infection signs and symptoms  Description: Will show no infection signs and symptoms  5/25/2021 1026 by Jarrett Jarrett LPN  Outcome: Ongoing  5/24/2021 2343 by Bety Gonzalez LPN  Outcome: Ongoing

## 2021-05-25 NOTE — PROGRESS NOTES
Name: Marianne Guthrie  MRN: [id number]  Date of Service:  5/25/2021 05/25/21 1011   Restrictions/Precautions   Restrictions/Precautions Weight Bearing; Fall Risk   Required Braces or Orthoses? No   Implants present? Pacemaker   Lower Extremity Weight Bearing Restrictions   Right Lower Extremity Weight Bearing Weight Bearing As Tolerated   Left Lower Extremity Weight Bearing Weight Bearing As Tolerated   Position Activity Restriction   Hip Precautions No ABduction; No hip extension; No hip external rotation   Other position/activity restrictions Anterior hip precautions   Vision   Vision Impaired   Vision Exceptions Wears glasses at all times   Hearing   Hearing X   Hearing Exceptions Hard of hearing/hearing concerns   General   Additional Pertinent Hx CHRONIC BRONCHITIS, HYPOTHYROIDISM, COPD, HX OF TIA, HX OF DIVERTICULAR DISEASE OF COLON    Diagnosis R HIP HEMIARTHROPLASTY; R DISPLACED SUBCAPITAL FEMORAL NECK FX   Other (Comment) pt a little anxious   Subjective   Subjective Pt up in w/c talking with  upon arrival. Agrees to therapy. Pain Screening   Patient Currently in Pain Yes  (Pt ststed she had pain pill about 9am. )   Pain Assessment   Pain Assessment 0-10   Pain Level 5   Pain Type Surgical pain;Acute pain   Pain Location Hip;Leg   Pain Orientation Right   Pain Descriptors Aching; Sore   Functional Pain Assessment Activities are not prevented   Non-Pharmaceutical Pain Intervention(s) Distraction; Ambulation/Increased Activity   Response to Pain Intervention Patient Satisfied   Pain Frequency Continuous   Pain Onset On-going   Clinical Progression Gradually improving   Pre Treatment Pain Screening   Intervention List Patient able to continue with treatment   Transfers   Sit to Stand Contact guard assistance;Stand by assistance   Stand to sit Contact guard assistance;Stand by assistance   Lateral Transfers Contact guard assistance;Stand by assistance  (From w/c<>amb.  in gym with RW)   Ambulation

## 2021-05-25 NOTE — PLAN OF CARE
Problem: Falls - Risk of:  Goal: Will remain free from falls  Description: Will remain free from falls  5/24/2021 2343 by Maureen Sales LPN  Outcome: Ongoing  5/24/2021 1030 by Melba Peñaloza RN  Outcome: Ongoing  Goal: Absence of physical injury  Description: Absence of physical injury  5/24/2021 2343 by Maureen Sales LPN  Outcome: Ongoing  5/24/2021 1030 by Melba Peñaloza RN  Outcome: Ongoing     Problem: Infection:  Goal: Will remain free from infection  Description: Will remain free from infection  5/24/2021 2343 by Maureen Sales LPN  Outcome: Ongoing  5/24/2021 1030 by Melba Peñaloza RN  Outcome: Ongoing     Problem: Safety:  Goal: Free from accidental physical injury  Description: Free from accidental physical injury  5/24/2021 2343 by Maureen Sales LPN  Outcome: Ongoing  5/24/2021 1030 by Melba Peañloza RN  Outcome: Ongoing  Goal: Free from intentional harm  Description: Free from intentional harm  5/24/2021 2343 by Maureen Sales LPN  Outcome: Ongoing  5/24/2021 1030 by Melba Peñaloza RN  Outcome: Ongoing     Problem: Discharge Planning:  Goal: Patients continuum of care needs are met  Description: Patients continuum of care needs are met  5/24/2021 2343 by Maureen Sales LPN  Outcome: Ongoing  5/24/2021 1030 by Melba Peñaloza RN  Outcome: Ongoing     Problem: Skin Integrity:  Goal: Will show no infection signs and symptoms  Description: Will show no infection signs and symptoms  5/24/2021 2343 by Maureen Sales LPN  Outcome: Ongoing  5/24/2021 1030 by Melba Peñaloza RN  Outcome: Ongoing  Goal: Absence of new skin breakdown  Description: Absence of new skin breakdown  5/24/2021 2343 by Maureen Sales LPN  Outcome: Ongoing  5/24/2021 1030 by Melba Peñaloza RN  Outcome: Ongoing     Problem: Pain:  Goal: Pain level will decrease  Description: Pain level will decrease  5/24/2021 2343 by Maureen Sales LPN  Outcome: Ongoing  5/24/2021 1030 by Melba Peñaloza RN  Outcome: Ongoing  Goal: Control of

## 2021-05-26 PROCEDURE — 6360000002 HC RX W HCPCS: Performed by: HOSPITALIST

## 2021-05-26 PROCEDURE — 1180000000 HC REHAB R&B

## 2021-05-26 PROCEDURE — 94640 AIRWAY INHALATION TREATMENT: CPT

## 2021-05-26 PROCEDURE — 6370000000 HC RX 637 (ALT 250 FOR IP): Performed by: PSYCHIATRY & NEUROLOGY

## 2021-05-26 PROCEDURE — 6370000000 HC RX 637 (ALT 250 FOR IP): Performed by: HOSPITALIST

## 2021-05-26 PROCEDURE — 99232 SBSQ HOSP IP/OBS MODERATE 35: CPT | Performed by: PSYCHIATRY & NEUROLOGY

## 2021-05-26 PROCEDURE — 97110 THERAPEUTIC EXERCISES: CPT

## 2021-05-26 PROCEDURE — 97535 SELF CARE MNGMENT TRAINING: CPT

## 2021-05-26 PROCEDURE — 97116 GAIT TRAINING THERAPY: CPT

## 2021-05-26 PROCEDURE — 97530 THERAPEUTIC ACTIVITIES: CPT

## 2021-05-26 PROCEDURE — 2580000003 HC RX 258: Performed by: HOSPITALIST

## 2021-05-26 PROCEDURE — 6360000002 HC RX W HCPCS: Performed by: PSYCHIATRY & NEUROLOGY

## 2021-05-26 RX ORDER — OXYCODONE HYDROCHLORIDE AND ACETAMINOPHEN 5; 325 MG/1; MG/1
1 TABLET ORAL EVERY 4 HOURS PRN
Status: DISCONTINUED | OUTPATIENT
Start: 2021-05-26 | End: 2021-06-02 | Stop reason: HOSPADM

## 2021-05-26 RX ADMIN — LINACLOTIDE 145 MCG: 145 CAPSULE, GELATIN COATED ORAL at 06:03

## 2021-05-26 RX ADMIN — OXYCODONE HYDROCHLORIDE AND ACETAMINOPHEN 1 TABLET: 5; 325 TABLET ORAL at 10:39

## 2021-05-26 RX ADMIN — LEVOTHYROXINE SODIUM 88 MCG: 0.09 TABLET ORAL at 06:03

## 2021-05-26 RX ADMIN — SODIUM CHLORIDE, PRESERVATIVE FREE 10 ML: 5 INJECTION INTRAVENOUS at 08:04

## 2021-05-26 RX ADMIN — BUDESONIDE 500 MCG: 0.5 SUSPENSION RESPIRATORY (INHALATION) at 06:21

## 2021-05-26 RX ADMIN — SODIUM CHLORIDE, PRESERVATIVE FREE 10 ML: 5 INJECTION INTRAVENOUS at 21:07

## 2021-05-26 RX ADMIN — BUDESONIDE 500 MCG: 0.5 SUSPENSION RESPIRATORY (INHALATION) at 18:58

## 2021-05-26 RX ADMIN — OXYCODONE HYDROCHLORIDE AND ACETAMINOPHEN 1 TABLET: 5; 325 TABLET ORAL at 21:06

## 2021-05-26 RX ADMIN — ATORVASTATIN CALCIUM 40 MG: 40 TABLET, FILM COATED ORAL at 21:07

## 2021-05-26 RX ADMIN — ARFORMOTEROL TARTRATE 15 MCG: 15 SOLUTION RESPIRATORY (INHALATION) at 06:21

## 2021-05-26 RX ADMIN — ARFORMOTEROL TARTRATE 15 MCG: 15 SOLUTION RESPIRATORY (INHALATION) at 18:58

## 2021-05-26 RX ADMIN — ENOXAPARIN SODIUM 40 MG: 40 INJECTION SUBCUTANEOUS at 08:04

## 2021-05-26 ASSESSMENT — PAIN SCALES - GENERAL
PAINLEVEL_OUTOF10: 7
PAINLEVEL_OUTOF10: 9
PAINLEVEL_OUTOF10: 3
PAINLEVEL_OUTOF10: 0
PAINLEVEL_OUTOF10: 6
PAINLEVEL_OUTOF10: 10
PAINLEVEL_OUTOF10: 3

## 2021-05-26 ASSESSMENT — PAIN DESCRIPTION - LOCATION
LOCATION: HIP

## 2021-05-26 ASSESSMENT — PAIN - FUNCTIONAL ASSESSMENT
PAIN_FUNCTIONAL_ASSESSMENT: PREVENTS OR INTERFERES SOME ACTIVE ACTIVITIES AND ADLS
PAIN_FUNCTIONAL_ASSESSMENT: ACTIVITIES ARE NOT PREVENTED

## 2021-05-26 ASSESSMENT — PAIN DESCRIPTION - ONSET: ONSET: ON-GOING

## 2021-05-26 ASSESSMENT — PAIN DESCRIPTION - PAIN TYPE
TYPE: SURGICAL PAIN

## 2021-05-26 ASSESSMENT — PAIN DESCRIPTION - DESCRIPTORS
DESCRIPTORS: ACHING;SORE

## 2021-05-26 ASSESSMENT — PAIN DESCRIPTION - FREQUENCY
FREQUENCY: INTERMITTENT
FREQUENCY: INTERMITTENT

## 2021-05-26 ASSESSMENT — PAIN DESCRIPTION - ORIENTATION
ORIENTATION: RIGHT

## 2021-05-26 ASSESSMENT — PAIN DESCRIPTION - PROGRESSION: CLINICAL_PROGRESSION: GRADUALLY WORSENING

## 2021-05-26 NOTE — PROGRESS NOTES
Occupational Therapy  Facility/Department: [facility] 8 REHAB UNIT  Daily Treatment Note  NAME: Tila Foreman  : 1950  MRN: [id number]    Date of Service: 2021    Discharge Recommendations:  Continue to assess pending progress       Assessment   Performance deficits / Impairments: Decreased functional mobility ; Decreased ADL status; Decreased endurance;Decreased balance;Decreased high-level IADLs  Treatment Diagnosis: Right hip fx s/p hip hemiarthroplasty Decreased functional mobility ; Decreased ADL status; Decreased endurance; Decreased balance; Decreased high-level IADLs  Activity Tolerance  Activity Tolerance: Patient Tolerated treatment well         Patient Diagnosis(es): There were no encounter diagnoses. has a past medical history of Cerebral artery occlusion with cerebral infarction [facility]), Diverticular disease of colon, Hyperlipidemia, Hypothyroidism, Pneumonia, Psychiatric problem, Seizures ([facility] [address] Tobacco abuse, and Vitamin B12 deficiency. has a past surgical history that includes Tubal ligation; Lung surgery; pr colonoscopy w/biopsy single/multiple (N/A, 2017); Colonoscopy; Cardiac pacemaker placement; and hip surgery (Right, 5/15/2021).     Restrictions  Restrictions/Precautions  Restrictions/Precautions: Weight Bearing, Fall Risk  Required Braces or Orthoses?: No  Implants present? : Pacemaker  Lower Extremity Weight Bearing Restrictions  Right Lower Extremity Weight Bearing: Weight Bearing As Tolerated  Left Lower Extremity Weight Bearing: Weight Bearing As Tolerated  Position Activity Restriction  Hip Precautions: No ABduction, No hip extension, No hip external rotation  Other position/activity restrictions: Anterior hip precautions       Objective             Balance  Sitting Balance: Independent  Standing Balance: Supervision  Functional Mobility  Functional - Mobility Device: Rolling Walker  Activity: To/from bathroom  Assist Level: Stand by assistance     Transfers  Stand Step Transfers: Supervision  Sit to stand: Supervision  Stand to sit: Supervision        Plan   Plan  Times per day: Daily  Current Treatment Recommendations: Strengthening, Balance Training, Functional Mobility Training, Endurance Training, Safety Education & Training, Patient/Caregiver Education & Training, Equipment Evaluation, Education, & procurement, Self-Care / ADL, Home Management Training  OutComes Score       05/26/21 8801 [address] Needed Supervision or touching assistance   Comment Supervision   CARE Score 4   Putting On/Taking Off Footwear   Assistance Needed Partial/moderate assistance   Comment Shoes only, with elastic shoelaces   CARE Score 3          05/26/21 1415   Toilet Transfer   Assistance Needed Supervision or touching assistance   Comment Supervision   CARE Score 4           Goals  Short term goals  Short term goal 1: MET  Short term goal 2: MET  Short term goal 3: Min A with LB dressing using AE. Short term goal 4: Min A with donning/doffing socks and shoes using AE. Short term goal 5: Min A with ambulatory homemaking tasks. Short term goal 6: Pt will complete 1-2 static standing task for 3 minutes, requiring CGA. Long term goals  Time Frame for Long term goals : 2 weeks  Long term goal 1: Modified independent with toileting and toilet transfers. Long term goal 2: Modified independent with overall dressing. Long term goal 3: Modified independent with ambulatory homemaking tasks. Long term goal 4: Patient verbalize DME needs. Patient Goals   Patient goals : Return home independently.        Therapy Time   Individual Concurrent Group Co-treatment   Time In 0336         Time Out 1515         Minutes 30         Timed Code Treatment Minutes: Barbara Urban 95, OT

## 2021-05-26 NOTE — PROGRESS NOTES
Patient:   Vera Riley  MR#:    [id number]   Room:    0821/821-02   YOB: 1950  Date of Progress Note: 5/26/2021  Time of Note                           9:40 AM  Consulting Physician:   Toby Mckee M.D. Attending Physician:  Toby Mckee MD     Chief complaint S/P right femur fracture and repair     S:This 72 y.o. female  with history of COPD/chronic bronchitis, tobacco abuse, hyperlipidemia, hypothyroidism, and bradycardia s/p pacemaker. She presented to [facility] ER on 5/15/21 after having a fall. She had severe onset of right hip/leg pain and was unable to bear weight. X-rays done revealed a right displaced subcapital femoral neck fracture. She was admitted to the hospitalist service with consult for orthopedic surgery. She was seen by Dr. Lotus Carlisle, who recommended right cemented hip hemiarthroplasty. She was in agreement and proceeded to surgery. She tolerated the procedure well. She will be weightbearing as tolerated on her right lower extremity with anterior hip precautions. She is participating in both PT/OT. She is felt to need a stay on Rehab to work towards her goal of returning home with assist of her son. She is now felt ready to start the Rehab program. No new issues overnight.       REVIEW OF SYSTEMS:  Constitutional: No fevers No chills  Neck:No stiffness  Respiratory: No shortness of breath  Cardiovascular: No chest pain No palpitations  Gastrointestinal: No abdominal pain    Genitourinary: No Dysuria  Neurological: No headache, no confusion    Past Medical History:      Diagnosis Date    Cerebral artery occlusion with cerebral infarction ([facility] [address]     TIA    Diverticular disease of colon     Hyperlipidemia     Hypothyroidism     Pneumonia     Psychiatric problem     Seizures ([facility] [address]     Tobacco abuse     Vitamin B12 deficiency        Past Surgical History:      Procedure Laterality Date    CARDIAC PACEMAKER PLACEMENT      COLONOSCOPY      HIP SURGERY Right 5/15/2021    HIP HEMIARTHROPLASTY performed by Slick Stahl MD at [address] [facility] [facility]      collapsed lung    [place] COLONOSCOPY W/BIOPSY SINGLE/MULTIPLE N/A 6/27/2017    Dr Kian West-extensive and large mouthed diverticular disease throughout the right colon-Tubular AP (villiform) (-) dysplasia x 1, tubular AP (-) dysplasia x 1--5 yr recall    TUBAL LIGATION         Medications in Hospital:      Current Facility-Administered Medications:     linaclotide (LINZESS) capsule 145 mcg, 145 mcg, Oral, QAM AC, Cheri Benson MD, 145 mcg at 05/26/21 0603    ipratropium-albuterol (DUONEB) nebulizer solution 1 ampule, 1 ampule, Inhalation, Q4H PRN, Cheri Benson MD    Arformoterol Tartrate (BROVANA) nebulizer solution 15 mcg, 15 mcg, Nebulization, BID, Cheri Benson MD, 15 mcg at 05/26/21 0621    enoxaparin (LOVENOX) injection 40 mg, 40 mg, Subcutaneous, Daily, Lluvia Marinelli MD, 40 mg at 05/26/21 0804    oxyCODONE-acetaminophen (PERCOCET) 5-325 MG per tablet 1 tablet, 1 tablet, Oral, Q4H PRN, Lluvia Marinelli MD, 1 tablet at 05/25/21 2236    [DISCONTINUED] acetaminophen (TYLENOL) tablet 650 mg, 650 mg, Oral, Q6H PRN **OR** acetaminophen (TYLENOL) suppository 650 mg, 650 mg, Rectal, Q6H PRN, Lluvia Marinelli MD    magnesium sulfate 2000 mg in 50 mL IVPB premix, 2,000 mg, Intravenous, PRN, Lluvia Marinelli MD    potassium chloride (KLOR-CON M) extended release tablet 40 mEq, 40 mEq, Oral, PRN **OR** potassium bicarb-citric acid (EFFER-K) effervescent tablet 40 mEq, 40 mEq, Oral, PRN **OR** potassium chloride 10 mEq/100 mL IVPB (Peripheral Line), 10 mEq, Intravenous, PRN, Lluvia Marinelli MD    promethazine (PHENERGAN) tablet 12.5 mg, 12.5 mg, Oral, Q6H PRN **OR** ondansetron (ZOFRAN) injection 4 mg, 4 mg, Intravenous, Q6H PRN, Lluvia Marinelli MD    sodium chloride flush 0.9 % injection 5-40 mL, 5-40 mL, Intravenous, 2 times per day, Lluvia Marinelli MD, 10 mL at 05/26/21 0804    sodium chloride flush 0.9 % injection 5-40 mL, tile   Device Rolling Walker   Assistance Contact guard assistance   Quality of Gait Antalgic gait on RLE with WB    Gait Deviations Decreased step length;Decreased step height;Slow Jane   Distance 40'   Comments Including 1 L and R turn    Exercises   Heelslides X 20 reps LLE   X 10 reps RLE   Hip Flexion X 20 reps LLE 10 reps X 2 RLE AAROM    Knee Long Arc Quad X 20 reps LLE 10 reps X 2 RLE AAROM    Ankle Pumps X 20 reps LLE  10 reps X 2 RLE     Comments Pt instructed in BLE ther-ex with 1.5# LLE    Patient Goals    Patient goals  go home   Short term goals   Time Frame for Short term goals 1-2 WEEKS   Short term goal 1 SUPERVISION WITH BED MOBILITY   Short term goal 2 SBA WITH TRANSFERS   Short term goal 3 SBA WITH CAR TRANSFER   Short term goal 4 NEGOTIATE 1 STEP CGA   Short term goal 5 PROPEL W/C 150 FT SUPERVISION   Conditions Requiring Skilled Therapeutic Intervention   Body structures, Functions, Activity limitations Decreased functional mobility ; Decreased ROM; Decreased strength;Decreased endurance;Decreased balance; Increased pain;Decreased posture   Assessment Pt able to complete some ther-ex AROM, for others still requires AAROM on RLE. Treatment Diagnosis INTERFERENCE WITH ACTIVITY. History CHRONIC BRONCHITIS, HYPOTHYROIDISM, COPD, HX OF TIA, HX OF DIVERTICULAR DISEASE OF COLON, R HIP HEMIARTHROPLASTY; R DISPLACED SUBCAPITAL FEMORAL NECK FX    Treatment Initiated  gait, transfers, ther ex and bed mobility   Activity Tolerance   Activity Tolerance Patient limited by pain   PT Whiteboard Notes   Therapy Whiteboard RE 5/30 R hip amberly, ant prec, Verdis Essex               Electronically signed by Martinez Choudhury PTA on 5/25/2021 at 3:06 PM            Cosigned by: Zaki Ortega PT at 5/26/2021  8:03 AM   Revision History                        RECORD REVIEW: Previous medical records, medications were reviewed at today's visit    IMPRESSION:   1.  S/P right femur fracture and repair-pain control/mobilization  2. Hyperlipidemia-on statin  3. DVT prophylaxis-Lovenox  4. Hypothyroidism-on synthroid  5. COPD-nebs PRN  6. Pain control-Percocet PRN  7. GI-bowel regimen  8. Thrombocytopenia-monitor   9.  PT/OT        [address]     Continue present care      Expected duration and frequency therapy: 180 minutes per day, 5 days per week    [address] [facility]  [phone] CELL  Dr Ana Acuna

## 2021-05-26 NOTE — PLAN OF CARE
Ongoing  Goal: Control of acute pain  Description: Control of acute pain  5/26/2021 1030 by Di Sanchez LPN  Outcome: Ongoing  5/25/2021 2338 by Elspeth Landau, LPN  Outcome: Ongoing  Goal: Control of chronic pain  Description: Control of chronic pain  5/26/2021 1030 by Di Sanchez LPN  Outcome: Ongoing  5/25/2021 2338 by Elspeth Landau, LPN  Outcome: Ongoing     Problem: Bleeding:  Goal: Will show no signs and symptoms of excessive bleeding  Description: Will show no signs and symptoms of excessive bleeding  5/26/2021 1030 by Di Sanchez LPN  Outcome: Ongoing  5/25/2021 2338 by Elspeth Landau, LPN  Outcome: Ongoing     Problem: Infection - Surgical Site:  Goal: Will show no infection signs and symptoms  Description: Will show no infection signs and symptoms  5/26/2021 1030 by Di Sanchez LPN  Outcome: Ongoing  5/25/2021 2338 by Elspeth Landau, LPN  Outcome: Ongoing

## 2021-05-26 NOTE — PROGRESS NOTES
Name: Lyssa Potter  MRN:  [id number]  Date of service:  5/26/2021 05/26/21 1010   Restrictions/Precautions   Restrictions/Precautions Weight Bearing; Fall Risk   Required Braces or Orthoses? No   Lower Extremity Weight Bearing Restrictions   Right Lower Extremity Weight Bearing Weight Bearing As Tolerated   Left Lower Extremity Weight Bearing Weight Bearing As Tolerated   Subjective   Subjective Pt brought to PT gym by OT, ready to work with therapy. States that she has not had any pain meds this morning and her pain level is very high at this time. Pain Screening   Patient Currently in Pain Yes   Pain Assessment   Pain Assessment 0-10   Pain Level 10  (RN notified for pain meds)   Pain Type Surgical pain   Pain Location Hip   Pain Orientation Right   Pain Descriptors Aching; Sore   Pain Frequency Intermittent   Functional Pain Assessment Prevents or interferes some active activities and ADLs   Non-Pharmaceutical Pain Intervention(s) Ambulation/Increased Activity   Response to Pain Intervention None   Bed Mobility   Sit to Supine Minimal assistance  (BLE management)   Transfers   Sit to Stand Contact guard assistance   Stand to sit Contact guard assistance  (vc's to reach back for arm rests before sitting)   Ambulation   Ambulation?  Yes   WB Status WBAT   Ambulation 1   Surface level tile   Device Rolling Walker   Other Apparatus Wheelchair follow   Assistance Contact guard assistance   Quality of Gait Antalgic gait on RLE with WB    Gait Deviations Decreased step length;Decreased step height;Slow Jane   Distance 10'   Ambulation 2   Surface - 2 level tile   Device 2 Rolling Walker   Other Apparatus 2 Wheelchair follow   Assistance 2 Contact guard assistance   Quality of Gait 2 Antalgic gait on RLE with WB   Gait Deviations Slow Jane;Decreased step length;Decreased step height   Distance 50'   Exercises   Comments Seated BLE ther ex with 2# weight on LLE, AROM on RLE x20 reps each   Conditions Requiring Skilled Therapeutic Intervention   Body structures, Functions, Activity limitations Decreased functional mobility ; Decreased ROM; Decreased strength;Decreased endurance;Decreased balance; Increased pain;Decreased posture   Assessment Pt limited by pain this tx, able to increase amb distance toward end of tx secondary to rec'ing pain meds. Pt cont to exhibit antalgic gait pattern with decreased step length but is steady overall. Pt tolerates seated BLE ther ex well with minimal ROM on RLE d/t pain. Pt would cont to benefit from skilled therapy services to address remaining strength and ROM deficits.    Activity Tolerance   Activity Tolerance Patient limited by pain   Safety Devices   Type of devices Bed alarm in place;Call light within reach;Gait belt;Left in bed       Electronically signed by Felicia Cardenas PTA on 5/26/2021 at 11:10 AM

## 2021-05-26 NOTE — PROGRESS NOTES
Ruby Seitz  [id number]     05/26/21 1542 05/26/21 1543   Subjective   Subjective Pt agreed to therapy this afternoon. --    Pain Screening   Patient Currently in Pain No  --    Transfers   Sit to Stand Contact guard assistance  --    Stand to sit Contact guard assistance  --    Ambulation   Ambulation? Yes  --    WB Status WBAT  --    Ambulation 1   Surface level tile  --    Assistance Contact guard assistance  --    Quality of Gait Pt showed slow pace w/ antalgic gait pattern on RLE.  --    Distance 100'  --    Exercises   Comments  --  Assisted Pt w/ ADL's. Conditions Requiring Skilled Therapeutic Intervention   Body structures, Functions, Activity limitations  --  Decreased functional mobility ; Decreased ROM; Decreased strength;Decreased endurance;Decreased balance; Increased pain;Decreased posture   Assessment  --  Pt tolerated amb well, but did show increase in fatigue.    Prognosis  --  Good   Activity Tolerance   Activity Tolerance  --  Patient Tolerated treatment well   Electronically signed by Diana Chow PTA on 5/26/2021 at 3:45 PM

## 2021-05-26 NOTE — PROGRESS NOTES
Name: Silver Foley  MRN:  [id number]  Date of service:  5/26/2021 05/26/21 1315   Restrictions/Precautions   Restrictions/Precautions Weight Bearing; Fall Risk   Required Braces or Orthoses? No   Lower Extremity Weight Bearing Restrictions   Right Lower Extremity Weight Bearing Weight Bearing As Tolerated   Left Lower Extremity Weight Bearing Weight Bearing As Tolerated   Subjective   Subjective Pt up to BR with PCA, ready to work with therapy. Pain Screening   Patient Currently in Pain No   Pain Assessment   Pain Assessment 0-10   Pain Level 0   Transfers   Sit to Stand Contact guard assistance   Stand to sit Contact guard assistance  (vc's to back up to chair fully before sitting)   Ambulation   Ambulation? Yes   WB Status WBAT   Ambulation 1   Surface level tile   Device Rolling Walker   Other Apparatus Wheelchair follow   Assistance Contact guard assistance;Stand by assistance   Quality of Gait slightly less antalgic on RLE   Gait Deviations Decreased step length;Decreased step height;Slow Jane   Distance 150'   Comments including 3 left turns   Exercises   Comments Standing ex holding to rwx: heel raises, toes raises, marching in place x20 reps each AROM   Conditions Requiring Skilled Therapeutic Intervention   Body structures, Functions, Activity limitations Decreased functional mobility ; Decreased ROM; Decreased strength;Decreased endurance;Decreased balance; Increased pain;Decreased posture   Assessment Pt showing much improvement this tx with pain controlled. Able to increase amb distance with decreased antalgia present, exhibits toe out gait CANDIDA. Tolerates standing ther ex well. Pt would cont to benefit from skilled therapy services to address remaining strength and mobilty deficits. Activity Tolerance   Activity Tolerance Patient Tolerated treatment well   Safety Devices   Type of devices Call light within reach;Gait belt;Bed alarm in place; Left in bed       Electronically signed by Marcy Rey

## 2021-05-26 NOTE — PROGRESS NOTES
Occupational Therapy     05/26/21 0915   Pre Treatment Pain Screening   Intervention List Patient able to continue with treatment   General   Additional Pertinent Hx R hip fx, THR with anterior hip precautions   Pain Assessment   Patient Currently in Pain Yes   Pain Assessment 0-10   Pain Level 7   Pain Type Surgical pain   Pain Location Hip   Pain Orientation Right   Pain Descriptors Aching; Sore   Pain Frequency Intermittent   Clinical Progression Gradually worsening   Response to Pain Intervention None;Patient Satisfied   Balance   Sitting Balance Independent   Standing Balance Supervision   Functional Mobility   Functional - Mobility Device Rolling Walker   Activity Other   Assist Level Stand by assistance   Transfers   Sit to stand Supervision;Minimal assistance  (Min A from couch (built up with pillows), supervision from w/c.)   Stand to sit Supervision   Assessment   Performance deficits / Impairments Decreased functional mobility ; Decreased ADL status; Decreased endurance;Decreased balance;Decreased high-level IADLs   Treatment Diagnosis Right hip fx s/p hip hemiarthroplasty   Prognosis Good   Timed Code Treatment Minutes 45 Minutes   Activity Tolerance   Activity Tolerance Patient Tolerated treatment well   Safety Devices   Safety Devices in place Yes   Type of devices Gait belt  (Given to PT.)   Plan   Current Treatment Recommendations Strengthening;Balance Training;Functional Mobility Training; Endurance Training; Safety Education & Training;Patient/Caregiver Education & Training;Equipment Evaluation, Education, & procurement;Self-Care / ADL; Home Management Training

## 2021-05-26 NOTE — PLAN OF CARE
Problem: Falls - Risk of:  Goal: Will remain free from falls  Description: Will remain free from falls  5/25/2021 2338 by Ginna Kingston LPN  Outcome: Ongoing  5/25/2021 1026 by Ze Mckee LPN  Outcome: Ongoing  Goal: Absence of physical injury  Description: Absence of physical injury  5/25/2021 2338 by Ginna Kingston LPN  Outcome: Ongoing  5/25/2021 1026 by Ze Mckee LPN  Outcome: Ongoing     Problem: Infection:  Goal: Will remain free from infection  Description: Will remain free from infection  5/25/2021 2338 by Ginna Kingston LPN  Outcome: Ongoing  5/25/2021 1026 by Ze Mckee LPN  Outcome: Ongoing     Problem: Safety:  Goal: Free from accidental physical injury  Description: Free from accidental physical injury  5/25/2021 2338 by Ginna Kingston LPN  Outcome: Ongoing  5/25/2021 1026 by Ze Mckee LPN  Outcome: Ongoing  Goal: Free from intentional harm  Description: Free from intentional harm  5/25/2021 2338 by Ginna Kingston LPN  Outcome: Ongoing  5/25/2021 1026 by Ze Mckee LPN  Outcome: Ongoing     Problem: Discharge Planning:  Goal: Patients continuum of care needs are met  Description: Patients continuum of care needs are met  5/25/2021 2338 by Ginna Kingston LPN  Outcome: Ongoing  5/25/2021 1026 by Ze Mckee LPN  Outcome: Ongoing     Problem: Skin Integrity:  Goal: Will show no infection signs and symptoms  Description: Will show no infection signs and symptoms  5/25/2021 2338 by Ginna Kingston LPN  Outcome: Ongoing  5/25/2021 1026 by Ze Mckee LPN  Outcome: Ongoing  Goal: Absence of new skin breakdown  Description: Absence of new skin breakdown  5/25/2021 2338 by Ginna Kingston LPN  Outcome: Ongoing  5/25/2021 1026 by Ze Mckee LPN  Outcome: Ongoing     Problem: Pain:  Goal: Pain level will decrease  Description: Pain level will decrease  5/25/2021 2338 by Ginna Kingston LPN  Outcome: Ongoing  5/25/2021 1026 by Ze Mckee LPN  Outcome: Ongoing  Goal: Control of acute pain  Description: Control of acute pain  5/25/2021 2338 by Rosalba Funk LPN  Outcome: Ongoing  5/25/2021 1026 by Rod Raman LPN  Outcome: Ongoing  Goal: Control of chronic pain  Description: Control of chronic pain  5/25/2021 2338 by Rosalba Funk LPN  Outcome: Ongoing  5/25/2021 1026 by Rod Raman LPN  Outcome: Ongoing     Problem: Bleeding:  Goal: Will show no signs and symptoms of excessive bleeding  Description: Will show no signs and symptoms of excessive bleeding  5/25/2021 2338 by Rosalba Funk LPN  Outcome: Ongoing  5/25/2021 1026 by Rod Raman LPN  Outcome: Ongoing     Problem: Infection - Surgical Site:  Goal: Will show no infection signs and symptoms  Description: Will show no infection signs and symptoms  5/25/2021 2338 by Rosalba Funk LPN  Outcome: Ongoing  5/25/2021 1026 by Rod Raman LPN  Outcome: Ongoing

## 2021-05-27 LAB
ALBUMIN SERPL-MCNC: 3.5 G/DL (ref 3.5–5.2)
ALP BLD-CCNC: 88 U/L (ref 35–104)
ALT SERPL-CCNC: 36 U/L (ref 5–33)
ANION GAP SERPL CALCULATED.3IONS-SCNC: 8 MMOL/L (ref 7–19)
AST SERPL-CCNC: 22 U/L (ref 5–32)
BASOPHILS ABSOLUTE: 0 K/UL (ref 0–0.2)
BASOPHILS RELATIVE PERCENT: 0.5 % (ref 0–1)
BILIRUB SERPL-MCNC: 0.5 MG/DL (ref 0.2–1.2)
BUN BLDV-MCNC: 19 MG/DL (ref 8–23)
CALCIUM SERPL-MCNC: 9.6 MG/DL (ref 8.8–10.2)
CHLORIDE BLD-SCNC: 102 MMOL/L (ref 98–111)
CO2: 28 MMOL/L (ref 22–29)
CREAT SERPL-MCNC: 0.5 MG/DL (ref 0.5–0.9)
EOSINOPHILS ABSOLUTE: 0.1 K/UL (ref 0–0.6)
EOSINOPHILS RELATIVE PERCENT: 1.5 % (ref 0–5)
GFR AFRICAN AMERICAN: >59
GFR NON-AFRICAN AMERICAN: >60
GLUCOSE BLD-MCNC: 104 MG/DL (ref 74–109)
HCT VFR BLD CALC: 34.3 % (ref 37–47)
HEMOGLOBIN: 11.2 G/DL (ref 12–16)
IMMATURE GRANULOCYTES #: 0.1 K/UL
LYMPHOCYTES ABSOLUTE: 1.7 K/UL (ref 1.1–4.5)
LYMPHOCYTES RELATIVE PERCENT: 20.9 % (ref 20–40)
MCH RBC QN AUTO: 28.4 PG (ref 27–31)
MCHC RBC AUTO-ENTMCNC: 32.7 G/DL (ref 33–37)
MCV RBC AUTO: 86.8 FL (ref 81–99)
MONOCYTES ABSOLUTE: 0.6 K/UL (ref 0–0.9)
MONOCYTES RELATIVE PERCENT: 7.8 % (ref 0–10)
NEUTROPHILS ABSOLUTE: 5.6 K/UL (ref 1.5–7.5)
NEUTROPHILS RELATIVE PERCENT: 68.4 % (ref 50–65)
PDW BLD-RTO: 13.9 % (ref 11.5–14.5)
PLATELET # BLD: 188 K/UL (ref 130–400)
PMV BLD AUTO: 11 FL (ref 9.4–12.3)
POTASSIUM REFLEX MAGNESIUM: 4.4 MMOL/L (ref 3.5–5)
RBC # BLD: 3.95 M/UL (ref 4.2–5.4)
SODIUM BLD-SCNC: 138 MMOL/L (ref 136–145)
TOTAL PROTEIN: 5.7 G/DL (ref 6.6–8.7)
WBC # BLD: 8.2 K/UL (ref 4.8–10.8)

## 2021-05-27 PROCEDURE — 97110 THERAPEUTIC EXERCISES: CPT

## 2021-05-27 PROCEDURE — 85025 COMPLETE CBC W/AUTO DIFF WBC: CPT

## 2021-05-27 PROCEDURE — 6360000002 HC RX W HCPCS: Performed by: PSYCHIATRY & NEUROLOGY

## 2021-05-27 PROCEDURE — 80053 COMPREHEN METABOLIC PANEL: CPT

## 2021-05-27 PROCEDURE — 36415 COLL VENOUS BLD VENIPUNCTURE: CPT

## 2021-05-27 PROCEDURE — 2580000003 HC RX 258: Performed by: HOSPITALIST

## 2021-05-27 PROCEDURE — 94640 AIRWAY INHALATION TREATMENT: CPT

## 2021-05-27 PROCEDURE — 99232 SBSQ HOSP IP/OBS MODERATE 35: CPT | Performed by: PSYCHIATRY & NEUROLOGY

## 2021-05-27 PROCEDURE — 6360000002 HC RX W HCPCS: Performed by: HOSPITALIST

## 2021-05-27 PROCEDURE — 97116 GAIT TRAINING THERAPY: CPT

## 2021-05-27 PROCEDURE — 97530 THERAPEUTIC ACTIVITIES: CPT

## 2021-05-27 PROCEDURE — 1180000000 HC REHAB R&B

## 2021-05-27 PROCEDURE — 6370000000 HC RX 637 (ALT 250 FOR IP): Performed by: HOSPITALIST

## 2021-05-27 PROCEDURE — 6370000000 HC RX 637 (ALT 250 FOR IP): Performed by: PSYCHIATRY & NEUROLOGY

## 2021-05-27 PROCEDURE — 97535 SELF CARE MNGMENT TRAINING: CPT

## 2021-05-27 RX ADMIN — BUDESONIDE 500 MCG: 0.5 SUSPENSION RESPIRATORY (INHALATION) at 18:20

## 2021-05-27 RX ADMIN — ARFORMOTEROL TARTRATE 15 MCG: 15 SOLUTION RESPIRATORY (INHALATION) at 18:20

## 2021-05-27 RX ADMIN — SODIUM CHLORIDE, PRESERVATIVE FREE 10 ML: 5 INJECTION INTRAVENOUS at 08:56

## 2021-05-27 RX ADMIN — LINACLOTIDE 145 MCG: 145 CAPSULE, GELATIN COATED ORAL at 06:08

## 2021-05-27 RX ADMIN — ARFORMOTEROL TARTRATE 15 MCG: 15 SOLUTION RESPIRATORY (INHALATION) at 06:36

## 2021-05-27 RX ADMIN — BUDESONIDE 500 MCG: 0.5 SUSPENSION RESPIRATORY (INHALATION) at 06:36

## 2021-05-27 RX ADMIN — OXYCODONE HYDROCHLORIDE AND ACETAMINOPHEN 1 TABLET: 5; 325 TABLET ORAL at 20:03

## 2021-05-27 RX ADMIN — LEVOTHYROXINE SODIUM 88 MCG: 0.09 TABLET ORAL at 06:08

## 2021-05-27 RX ADMIN — ATORVASTATIN CALCIUM 40 MG: 40 TABLET, FILM COATED ORAL at 20:03

## 2021-05-27 RX ADMIN — ENOXAPARIN SODIUM 40 MG: 40 INJECTION SUBCUTANEOUS at 08:55

## 2021-05-27 ASSESSMENT — PAIN SCALES - GENERAL
PAINLEVEL_OUTOF10: 2
PAINLEVEL_OUTOF10: 0
PAINLEVEL_OUTOF10: 5

## 2021-05-27 ASSESSMENT — PAIN DESCRIPTION - DESCRIPTORS: DESCRIPTORS: ACHING;SORE

## 2021-05-27 ASSESSMENT — PAIN DESCRIPTION - ONSET: ONSET: GRADUAL

## 2021-05-27 ASSESSMENT — PAIN DESCRIPTION - FREQUENCY: FREQUENCY: INTERMITTENT

## 2021-05-27 ASSESSMENT — PAIN DESCRIPTION - LOCATION: LOCATION: KNEE

## 2021-05-27 ASSESSMENT — PAIN - FUNCTIONAL ASSESSMENT: PAIN_FUNCTIONAL_ASSESSMENT: ACTIVITIES ARE NOT PREVENTED

## 2021-05-27 ASSESSMENT — PAIN DESCRIPTION - ORIENTATION: ORIENTATION: RIGHT

## 2021-05-27 ASSESSMENT — PAIN DESCRIPTION - PAIN TYPE: TYPE: CHRONIC PAIN

## 2021-05-27 ASSESSMENT — PAIN DESCRIPTION - PROGRESSION: CLINICAL_PROGRESSION: NOT CHANGED

## 2021-05-27 NOTE — PROGRESS NOTES
Patient:   Gold Genao  MR#:    [id number]   Room:    0821/821-02   YOB: 1950  Date of Progress Note: 5/27/2021  Time of Note                           8:08 AM  Consulting Physician:   Brigid Ortiz M.D. Attending Physician:  Brigid Ortiz MD     Chief complaint S/P right femur fracture and repair     S:This 72 y.o. female  with history of COPD/chronic bronchitis, tobacco abuse, hyperlipidemia, hypothyroidism, and bradycardia s/p pacemaker. She presented to [facility] ER on 5/15/21 after having a fall. She had severe onset of right hip/leg pain and was unable to bear weight. X-rays done revealed a right displaced subcapital femoral neck fracture. She was admitted to the hospitalist service with consult for orthopedic surgery. She was seen by Dr. Kash Pedersen, who recommended right cemented hip hemiarthroplasty. She was in agreement and proceeded to surgery. She tolerated the procedure well. She will be weightbearing as tolerated on her right lower extremity with anterior hip precautions. She is participating in both PT/OT. She is felt to need a stay on Rehab to work towards her goal of returning home with assist of her son. She is now felt ready to start the Rehab program. No acute issues overnight.       REVIEW OF SYSTEMS:  Constitutional: No fevers No chills  Neck:No stiffness  Respiratory: No shortness of breath  Cardiovascular: No chest pain No palpitations  Gastrointestinal: No abdominal pain    Genitourinary: No Dysuria  Neurological: No headache, no confusion    Past Medical History:      Diagnosis Date    Cerebral artery occlusion with cerebral infarction ([facility] [address]     TIA    Diverticular disease of colon     Hyperlipidemia     Hypothyroidism     Pneumonia     Psychiatric problem     Seizures ([facility] [address]     Tobacco abuse     Vitamin B12 deficiency        Past Surgical History:      Procedure Laterality Date    CARDIAC PACEMAKER PLACEMENT      COLONOSCOPY      HIP SURGERY Right 5/15/2021    HIP HEMIARTHROPLASTY performed by Gia Perea MD at [facility]      collapsed lung    LA COLONOSCOPY W/BIOPSY SINGLE/MULTIPLE N/A 6/27/2017    Dr Ranulfo West-extensive and large mouthed diverticular disease throughout the right colon-Tubular AP (villiform) (-) dysplasia x 1, tubular AP (-) dysplasia x 1--5 yr recall    TUBAL LIGATION         Medications in Hospital:      Current Facility-Administered Medications:     oxyCODONE-acetaminophen (PERCOCET) 5-325 MG per tablet 1 tablet, 1 tablet, Oral, Q4H PRN, Ana Acuna MD, 1 tablet at 05/26/21 2106    linaclotide (LINZESS) capsule 145 mcg, 145 mcg, Oral, QAM AC, Ana Acuna MD, 145 mcg at 05/27/21 0608    ipratropium-albuterol (DUONEB) nebulizer solution 1 ampule, 1 ampule, Inhalation, Q4H PRN, Aan Acuna MD    Arformoterol Tartrate (BROVANA) nebulizer solution 15 mcg, 15 mcg, Nebulization, BID, Ana Acuna MD, 15 mcg at 05/27/21 0636    enoxaparin (LOVENOX) injection 40 mg, 40 mg, Subcutaneous, Daily, Brian Horton MD, 40 mg at 05/26/21 0804    [DISCONTINUED] acetaminophen (TYLENOL) tablet 650 mg, 650 mg, Oral, Q6H PRN **OR** acetaminophen (TYLENOL) suppository 650 mg, 650 mg, Rectal, Q6H PRN, Brian Horton MD    magnesium sulfate 2000 mg in 50 mL IVPB premix, 2,000 mg, Intravenous, PRN, Brian Horton MD    potassium chloride (KLOR-CON M) extended release tablet 40 mEq, 40 mEq, Oral, PRN **OR** potassium bicarb-citric acid (EFFER-K) effervescent tablet 40 mEq, 40 mEq, Oral, PRN **OR** potassium chloride 10 mEq/100 mL IVPB (Peripheral Line), 10 mEq, Intravenous, PRN, Brian Horton MD    promethazine (PHENERGAN) tablet 12.5 mg, 12.5 mg, Oral, Q6H PRN **OR** ondansetron (ZOFRAN) injection 4 mg, 4 mg, Intravenous, Q6H PRN, Brian Horton MD    sodium chloride flush 0.9 % injection 5-40 mL, 5-40 mL, Intravenous, 2 times per day, Brian Horton MD, 10 mL at 05/26/21 2107    sodium chloride flush 0.9 % injection 5-40 mL, 5-40 mL, Intravenous, PRN, Candy Brown MD    atorvastatin (LIPITOR) tablet 40 mg, 40 mg, Oral, Nightly, Candy Brown MD, 40 mg at 05/26/21 2107    budesonide (PULMICORT) nebulizer suspension 500 mcg, 0.5 mg, Nebulization, BID, Candy Brown MD, 500 mcg at 05/27/21 0636    levothyroxine (SYNTHROID) tablet 88 mcg, 88 mcg, Oral, Daily, Candy Brown MD, 88 mcg at 05/27/21 0608    acetaminophen (TYLENOL) tablet 650 mg, 650 mg, Oral, Q4H PRN, Mortimer Bidding, MD    polyethylene glycol (GLYCOLAX) packet 17 g, 17 g, Oral, Daily PRN, Mortimer Bidding, MD, 17 g at 05/23/21 2111    Allergies:  Patient has no known allergies. Social History:   TOBACCO:   reports that she has been smoking cigarettes. She started smoking about 50 years ago. She has a 15.00 pack-year smoking history. She has never used smokeless tobacco.  ETOH:   reports no history of alcohol use. Family History:       Problem Relation Age of Onset    Other Mother         COPD, dementia    Heart Disease Mother     Stroke Mother     Heart Disease Father     Stroke Father     Diabetes Brother     Diabetes Brother     High Blood Pressure Brother     High Cholesterol Brother     Colon Polyps Brother     Colon Cancer Neg Hx     Liver Cancer Neg Hx     Liver Disease Neg Hx     Esophageal Cancer Neg Hx     Rectal Cancer Neg Hx     Stomach Cancer Neg Hx          PHYSICAL EXAM:  BP 95/62   Pulse 60   Temp 97.7 °F (36.5 °C) (Temporal)   Resp 16   Ht 5' 9\" (1.753 m)   Wt 157 lb 3.2 oz (71.3 kg)   SpO2 92%   BMI 23.21 kg/m²     Constitutional  well developed, well nourished.    Eyes  conjunctiva normal.   Ear, nose, throat - No scars, masses, or lesions over external nose or ears, no atrophy of tongue  Neck-symmetric, no masses noted, no jugular vein distension  Respiration- chest wall appears symmetric, good expansion,   normal effort without use of accessory muscles  Musculoskeletal  no significant wasting of muscles noted, no   05/25/21 1011   Restrictions/Precautions   Restrictions/Precautions Weight Bearing; Fall Risk   Required Braces or Orthoses? No   Implants present? Pacemaker   Lower Extremity Weight Bearing Restrictions   Right Lower Extremity Weight Bearing Weight Bearing As Tolerated   Left Lower Extremity Weight Bearing Weight Bearing As Tolerated   Position Activity Restriction   Hip Precautions No ABduction; No hip extension; No hip external rotation   Other position/activity restrictions Anterior hip precautions   Vision   Vision Impaired   Vision Exceptions Wears glasses at all times   Hearing   Hearing X   Hearing Exceptions Hard of hearing/hearing concerns   General   Additional Pertinent Hx CHRONIC BRONCHITIS, HYPOTHYROIDISM, COPD, HX OF TIA, HX OF DIVERTICULAR DISEASE OF COLON    Diagnosis R HIP HEMIARTHROPLASTY; R DISPLACED SUBCAPITAL FEMORAL NECK FX   Other (Comment) pt a little anxious   Subjective   Subjective Pt up in w/c talking with  upon arrival. Agrees to therapy. Pain Screening   Patient Currently in Pain Yes  (Pt ststed she had pain pill about 9am. )   Pain Assessment   Pain Assessment 0-10   Pain Level 5   Pain Type Surgical pain;Acute pain   Pain Location Hip;Leg   Pain Orientation Right   Pain Descriptors Aching; Sore   Functional Pain Assessment Activities are not prevented   Non-Pharmaceutical Pain Intervention(s) Distraction; Ambulation/Increased Activity   Response to Pain Intervention Patient Satisfied   Pain Frequency Continuous   Pain Onset On-going   Clinical Progression Gradually improving   Pre Treatment Pain Screening   Intervention List Patient able to continue with treatment   Transfers   Sit to Stand Contact guard assistance;Stand by assistance   Stand to sit Contact guard assistance;Stand by assistance   Lateral Transfers Contact guard assistance;Stand by assistance  (From w/c<>amb. in gym with RW)   Ambulation   Ambulation?  Yes   WB Status WBAT   Ambulation 1   Surface level tile   Device Rolling Walker   Assistance Contact guard assistance   Quality of Gait Antalgic gait on RLE with WB    Gait Deviations Decreased step length;Decreased step height;Slow Jane   Distance 40'   Comments Including 1 L and R turn    Exercises   Heelslides X 20 reps LLE   X 10 reps RLE   Hip Flexion X 20 reps LLE 10 reps X 2 RLE AAROM    Knee Long Arc Quad X 20 reps LLE 10 reps X 2 RLE AAROM    Ankle Pumps X 20 reps LLE  10 reps X 2 RLE     Comments Pt instructed in BLE ther-ex with 1.5# LLE    Patient Goals    Patient goals  go home   Short term goals   Time Frame for Short term goals 1-2 WEEKS   Short term goal 1 SUPERVISION WITH BED MOBILITY   Short term goal 2 SBA WITH TRANSFERS   Short term goal 3 SBA WITH CAR TRANSFER   Short term goal 4 NEGOTIATE 1 STEP CGA   Short term goal 5 PROPEL W/C 150 FT SUPERVISION   Conditions Requiring Skilled Therapeutic Intervention   Body structures, Functions, Activity limitations Decreased functional mobility ; Decreased ROM; Decreased strength;Decreased endurance;Decreased balance; Increased pain;Decreased posture   Assessment Pt able to complete some ther-ex AROM, for others still requires AAROM on RLE. Treatment Diagnosis INTERFERENCE WITH ACTIVITY. History CHRONIC BRONCHITIS, HYPOTHYROIDISM, COPD, HX OF TIA, HX OF DIVERTICULAR DISEASE OF COLON, R HIP HEMIARTHROPLASTY; R DISPLACED SUBCAPITAL FEMORAL NECK FX    Treatment Initiated  gait, transfers, ther ex and bed mobility   Activity Tolerance   Activity Tolerance Patient limited by pain   PT Whiteboard Notes   Therapy Whiteboard RE 5/30 R hip amberly, ant prec, Florence Rank               Electronically signed by Deedee Palacio PTA on 5/25/2021 at 3:06 PM            Cosigned by: Halie Dumas PT at 5/26/2021  8:03 AM   Revision History                        RECORD REVIEW: Previous medical records, medications were reviewed at today's visit    IMPRESSION:   1.  S/P right femur fracture and repair-pain control/mobilization  2. Hyperlipidemia-on statin  3. DVT prophylaxis-Lovenox  4. Hypothyroidism-on synthroid  5. COPD-nebs PRN  6. Pain control-Percocet PRN  7. GI-bowel regimen  8. Thrombocytopenia-monitor   9.  PT/OT        [address]     Continue current care      Expected duration and frequency therapy: 180 minutes per day, 5 days per week    [facility]  [phone] CELL  Dr Zander Harper

## 2021-05-27 NOTE — PLAN OF CARE
Problem: Falls - Risk of:  Goal: Will remain free from falls  5/27/2021 0946 by Adam Maribel  Outcome: Ongoing  5/27/2021 0031 by Norman Avalos LPN  Outcome: Ongoing  Goal: Absence of physical injury  5/27/2021 0946 by Adam Elco  Outcome: Ongoing  5/27/2021 0031 by Norman Avalos LPN  Outcome: Ongoing     Problem: Infection:  Goal: Will remain free from infection  5/27/2021 0946 by Adam Elco  Outcome: Ongoing  5/27/2021 0031 by Norman Avalos LPN  Outcome: Ongoing     Problem: Safety:  Goal: Free from accidental physical injury  5/27/2021 0946 by Adam Maribel  Outcome: Ongoing  5/27/2021 0031 by Norman Avalos LPN  Outcome: Ongoing  Goal: Free from intentional harm  5/27/2021 0946 by Adam López  Outcome: Ongoing  5/27/2021 0031 by Norman Avalos LPN  Outcome: Ongoing     Problem: Discharge Planning:  Goal: Patients continuum of care needs are met  5/27/2021 0946 by Adam Maribel  Outcome: Ongoing  5/27/2021 0031 by Norman Avalos LPN  Outcome: Ongoing     Problem: Skin Integrity:  Goal: Will show no infection signs and symptoms  5/27/2021 0946 by Adam Elco  Outcome: Ongoing  5/27/2021 0031 by Norman Avalos LPN  Outcome: Ongoing  Goal: Absence of new skin breakdown  5/27/2021 0946 by Adam Maribel  Outcome: Ongoing  5/27/2021 0031 by Norman Avalos LPN  Outcome: Ongoing     Problem: Pain:  Description: Pain management should include both nonpharmacologic and pharmacologic interventions.   Goal: Pain level will decrease  5/27/2021 0946 by Adam Maribel  Outcome: Ongoing  5/27/2021 0031 by Norman Avalos LPN  Outcome: Ongoing  Goal: Control of acute pain  5/27/2021 0946 by Adam Elco  Outcome: Ongoing  5/27/2021 0031 by Norman Avalos LPN  Outcome: Ongoing  Goal: Control of chronic pain  5/27/2021 0946 by Adam Maribel  Outcome: Ongoing  5/27/2021 0031 by Norman Avalos LPN  Outcome: Ongoing     Problem: Bleeding:  Goal: Will show no signs and symptoms of excessive bleeding  5/27/2021 0946 by

## 2021-05-27 NOTE — PROGRESS NOTES
05/27/21 1100   Pain Screening   Patient Currently in Pain No   Ambulation   Ambulation? Yes   WB Status WBAT   More Ambulation? Yes   Ambulation 1   Surface level tile   Device Rolling Walker   Other Apparatus Wheelchair follow   Assistance Stand by assistance   Quality of Gait ANTALGIC GAIT ON RLE   Gait Deviations Decreased step length; Slow Jane   Distance 175   Stairs/Curb   Stairs? Yes   Stairs   # Steps  4   Stairs Height 4\"   Rails Bilateral   Assistance Minimal assistance;Contact guard assistance  (MIN ASSISTANCE DUE TO HESITATION ABOUT TRYING STEPS)   Other exercises   Other exercises 1 SEATED RESISTED DF/PF, KNEE EXTENSION    Other exercises 2 SEATED HIP ABD 1X10   Conditions Requiring Skilled Therapeutic Intervention   Assessment PATIENT ABLE TO AMB 175FT WITH W/C FOLLOWING; HESITANT TO TRY STEPS BUT WAS ABLE TO COMPLETE WITH MIN ASSISTANCE   Safety Devices   Type of devices All fall risk precautions in place; Left in chair;Call light within reach;Gait belt   Electronically signed by JOEL Nuñez on 5/27/21 at 3:36 PM CDT

## 2021-05-27 NOTE — PROGRESS NOTES
Occupational Therapy     05/27/21 1400   Pain Assessment   Patient Currently in Pain No   Pain Assessment 0-10   Pain Level 0   ADL   Toileting Supervision   Balance   Sitting Balance Independent   Standing Balance Supervision   Functional Mobility   Functional - Mobility Device Rolling Walker   Activity To/from bathroom; Other   Assist Level Stand by assistance   Bed mobility   Supine to Sit Modified independent   Transfers   Sit to stand Supervision   Stand to sit Supervision   Toilet Transfers   Toilet - Technique Ambulating   Equipment Used Standard bedside commode   Toilet Transfer Supervision   Assessment   Performance deficits / Impairments Decreased functional mobility ; Decreased ADL status; Decreased endurance;Decreased balance;Decreased high-level IADLs   Treatment Diagnosis Right hip fx s/p hip hemiarthroplasty   Prognosis Good   Timed Code Treatment Minutes 30 Minutes   Activity Tolerance   Activity Tolerance Patient Tolerated treatment well   Safety Devices   Safety Devices in place Yes   Type of devices Gait belt  (Given to PT.)   Plan   Current Treatment Recommendations Strengthening;Balance Training;Functional Mobility Training; Endurance Training; Safety Education & Training;Patient/Caregiver Education & Training;Equipment Evaluation, Education, & procurement;Self-Care / ADL; Home Management Training

## 2021-05-28 PROCEDURE — 94640 AIRWAY INHALATION TREATMENT: CPT

## 2021-05-28 PROCEDURE — 97535 SELF CARE MNGMENT TRAINING: CPT

## 2021-05-28 PROCEDURE — 6370000000 HC RX 637 (ALT 250 FOR IP): Performed by: PSYCHIATRY & NEUROLOGY

## 2021-05-28 PROCEDURE — 97110 THERAPEUTIC EXERCISES: CPT

## 2021-05-28 PROCEDURE — 6360000002 HC RX W HCPCS: Performed by: PSYCHIATRY & NEUROLOGY

## 2021-05-28 PROCEDURE — 6360000002 HC RX W HCPCS: Performed by: HOSPITALIST

## 2021-05-28 PROCEDURE — 97530 THERAPEUTIC ACTIVITIES: CPT

## 2021-05-28 PROCEDURE — 97116 GAIT TRAINING THERAPY: CPT

## 2021-05-28 PROCEDURE — 6370000000 HC RX 637 (ALT 250 FOR IP): Performed by: HOSPITALIST

## 2021-05-28 PROCEDURE — 99232 SBSQ HOSP IP/OBS MODERATE 35: CPT | Performed by: PSYCHIATRY & NEUROLOGY

## 2021-05-28 PROCEDURE — 1180000000 HC REHAB R&B

## 2021-05-28 RX ADMIN — ATORVASTATIN CALCIUM 40 MG: 40 TABLET, FILM COATED ORAL at 20:21

## 2021-05-28 RX ADMIN — ENOXAPARIN SODIUM 40 MG: 40 INJECTION SUBCUTANEOUS at 07:44

## 2021-05-28 RX ADMIN — BUDESONIDE 500 MCG: 0.5 SUSPENSION RESPIRATORY (INHALATION) at 18:07

## 2021-05-28 RX ADMIN — OXYCODONE HYDROCHLORIDE AND ACETAMINOPHEN 1 TABLET: 5; 325 TABLET ORAL at 04:38

## 2021-05-28 RX ADMIN — OXYCODONE HYDROCHLORIDE AND ACETAMINOPHEN 1 TABLET: 5; 325 TABLET ORAL at 22:39

## 2021-05-28 RX ADMIN — ARFORMOTEROL TARTRATE 15 MCG: 15 SOLUTION RESPIRATORY (INHALATION) at 06:27

## 2021-05-28 RX ADMIN — LEVOTHYROXINE SODIUM 88 MCG: 0.09 TABLET ORAL at 06:14

## 2021-05-28 RX ADMIN — BUDESONIDE 500 MCG: 0.5 SUSPENSION RESPIRATORY (INHALATION) at 06:26

## 2021-05-28 RX ADMIN — ARFORMOTEROL TARTRATE 15 MCG: 15 SOLUTION RESPIRATORY (INHALATION) at 18:07

## 2021-05-28 RX ADMIN — LINACLOTIDE 145 MCG: 145 CAPSULE, GELATIN COATED ORAL at 06:14

## 2021-05-28 ASSESSMENT — PAIN DESCRIPTION - ONSET
ONSET: GRADUAL
ONSET: GRADUAL

## 2021-05-28 ASSESSMENT — PAIN DESCRIPTION - DESCRIPTORS
DESCRIPTORS: ACHING;DISCOMFORT
DESCRIPTORS: ACHING
DESCRIPTORS: ACHING

## 2021-05-28 ASSESSMENT — PAIN DESCRIPTION - PROGRESSION
CLINICAL_PROGRESSION: NOT CHANGED

## 2021-05-28 ASSESSMENT — PAIN DESCRIPTION - LOCATION
LOCATION: HIP
LOCATION: HIP

## 2021-05-28 ASSESSMENT — PAIN DESCRIPTION - ORIENTATION
ORIENTATION: RIGHT
ORIENTATION: RIGHT

## 2021-05-28 ASSESSMENT — PAIN DESCRIPTION - PAIN TYPE
TYPE: SURGICAL PAIN
TYPE: SURGICAL PAIN

## 2021-05-28 ASSESSMENT — PAIN SCALES - GENERAL
PAINLEVEL_OUTOF10: 5
PAINLEVEL_OUTOF10: 0
PAINLEVEL_OUTOF10: 0
PAINLEVEL_OUTOF10: 9
PAINLEVEL_OUTOF10: 4
PAINLEVEL_OUTOF10: 0
PAINLEVEL_OUTOF10: 6

## 2021-05-28 ASSESSMENT — PAIN - FUNCTIONAL ASSESSMENT
PAIN_FUNCTIONAL_ASSESSMENT: ACTIVITIES ARE NOT PREVENTED

## 2021-05-28 ASSESSMENT — PAIN DESCRIPTION - FREQUENCY
FREQUENCY: INTERMITTENT
FREQUENCY: INTERMITTENT

## 2021-05-28 NOTE — PROGRESS NOTES
Patient:   Beverley Thayer  MR#:    [id number]   Room:    [facility]/821-02   YOB: 1950  Date of Progress Note: 5/28/2021  Time of Note                           1:23 PM  Consulting Physician:   Rhona Balderas M.D. Attending Physician:  Rhona Balderas MD     Chief complaint S/P right femur fracture and repair     S:This 72 y.o. female  with history of COPD/chronic bronchitis, tobacco abuse, hyperlipidemia, hypothyroidism, and bradycardia s/p pacemaker. She presented to [facility] ER on 5/15/21 after having a fall. She had severe onset of right hip/leg pain and was unable to bear weight. X-rays done revealed a right displaced subcapital femoral neck fracture. She was admitted to the hospitalist service with consult for orthopedic surgery. She was seen by Dr. [place] Palacios, who recommended right cemented hip hemiarthroplasty. She was in agreement and proceeded to surgery. She tolerated the procedure well. She will be weightbearing as tolerated on her right lower extremity with anterior hip precautions. She is participating in both PT/OT. She is felt to need a stay on Rehab to work towards her goal of returning home with assist of her son. She is now felt ready to start the Rehab program. No acute complaint overnight.       REVIEW OF SYSTEMS:  Constitutional: No fevers No chills  Neck:No stiffness  Respiratory: No shortness of breath  Cardiovascular: No chest pain No palpitations  Gastrointestinal: No abdominal pain    Genitourinary: No Dysuria  Neurological: No headache, no confusion    Past Medical History:      Diagnosis Date    Cerebral artery occlusion with cerebral infarction ([address]     TIA    Diverticular disease of colon     Hyperlipidemia     Hypothyroidism     Pneumonia     Psychiatric problem     Seizures ([address]     Tobacco abuse     Vitamin B12 deficiency        Past Surgical History:      Procedure Laterality Date    CARDIAC PACEMAKER PLACEMENT      COLONOSCOPY      HIP SURGERY Right 5/15/2021    HIP HEMIARTHROPLASTY performed by Romi Abraham MD at [facility]      collapsed lung    SC COLONOSCOPY W/BIOPSY SINGLE/MULTIPLE N/A 6/27/2017    Dr Frank West-extensive and large mouthed diverticular disease throughout the right colon-Tubular AP (villiform) (-) dysplasia x 1, tubular AP (-) dysplasia x 1--5 yr recall    TUBAL LIGATION         Medications in Hospital:      Current Facility-Administered Medications:     oxyCODONE-acetaminophen (PERCOCET) 5-325 MG per tablet 1 tablet, 1 tablet, Oral, Q4H PRN, Sonia Spence MD, 1 tablet at 05/28/21 0438    linaclotide (LINZESS) capsule 145 mcg, 145 mcg, Oral, QAM AC, Sonia Spence MD, 145 mcg at 05/28/21 0614    ipratropium-albuterol (DUONEB) nebulizer solution 1 ampule, 1 ampule, Inhalation, Q4H PRN, Sonia Spence MD    Arformoterol Tartrate (BROVANA) nebulizer solution 15 mcg, 15 mcg, Nebulization, BID, Sonia Spence MD, 15 mcg at 05/28/21 9702    enoxaparin (LOVENOX) injection 40 mg, 40 mg, Subcutaneous, Daily, Ashley Gipson MD, 40 mg at 05/28/21 0744    [DISCONTINUED] acetaminophen (TYLENOL) tablet 650 mg, 650 mg, Oral, Q6H PRN **OR** acetaminophen (TYLENOL) suppository 650 mg, 650 mg, Rectal, Q6H PRN, Ashley Gipson MD    magnesium sulfate 2000 mg in 50 mL IVPB premix, 2,000 mg, Intravenous, PRN, Ashley Gipson MD    potassium chloride (KLOR-CON M) extended release tablet 40 mEq, 40 mEq, Oral, PRN **OR** potassium bicarb-citric acid (EFFER-K) effervescent tablet 40 mEq, 40 mEq, Oral, PRN **OR** potassium chloride 10 mEq/100 mL IVPB (Peripheral Line), 10 mEq, Intravenous, PRN, Ashley Gipson MD    promethazine (PHENERGAN) tablet 12.5 mg, 12.5 mg, Oral, Q6H PRN **OR** ondansetron (ZOFRAN) injection 4 mg, 4 mg, Intravenous, Q6H PRN, Ashley Gipson MD    sodium chloride flush 0.9 % injection 5-40 mL, 5-40 mL, Intravenous, PRN, Ashley Gipson MD    atorvastatin (LIPITOR) tablet 40 mg, 40 mg, Oral, Nightly, Ashley Gipson MD, 40 mg at 05/27/21 2003    budesonide (PULMICORT) nebulizer suspension 500 mcg, 0.5 mg, Nebulization, BID, Danii Gibbs MD, 500 mcg at 05/28/21 8425    levothyroxine (SYNTHROID) tablet 88 mcg, 88 mcg, Oral, Daily, Danii Gibbs MD, 88 mcg at 05/28/21 9841    acetaminophen (TYLENOL) tablet 650 mg, 650 mg, Oral, Q4H PRN, Patrica Taylor MD    polyethylene glycol (GLYCOLAX) packet 17 g, 17 g, Oral, Daily PRN, Patrica Taylor MD, 17 g at 05/23/21 2111    Allergies:  Patient has no known allergies. Social History:   TOBACCO:   reports that she has been smoking cigarettes. She started smoking about 50 years ago. She has a 15.00 pack-year smoking history. She has never used smokeless tobacco.  ETOH:   reports no history of alcohol use. Family History:       Problem Relation Age of Onset    Other Mother         COPD, dementia    Heart Disease Mother     Stroke Mother     Heart Disease Father     Stroke Father     Diabetes Brother     Diabetes Brother     High Blood Pressure Brother     High Cholesterol Brother     Colon Polyps Brother     Colon Cancer Neg Hx     Liver Cancer Neg Hx     Liver Disease Neg Hx     Esophageal Cancer Neg Hx     Rectal Cancer Neg Hx     Stomach Cancer Neg Hx          PHYSICAL EXAM:  BP 96/60   Pulse 63   Temp 97.4 °F (36.3 °C) (Temporal)   Resp 18   Ht 5' 9\" (1.753 m)   Wt 157 lb 3.2 oz (71.3 kg)   SpO2 99%   BMI 23.21 kg/m²     Constitutional  well developed, well nourished. Eyes  conjunctiva normal.   Ear, nose, throat - No scars, masses, or lesions over external nose or ears, no atrophy of tongue  Neck-symmetric, no masses noted, no jugular vein distension  Respiration- chest wall appears symmetric, good expansion,   normal effort without use of accessory muscles  Musculoskeletal  no significant wasting of muscles noted, no bony deformities  Extremities-no clubbing, cyanosis or edema  Skin  warm, dry, and intact. No rash, erythema, or pallor.   Psychiatric  mood, affect, and behavior appear normal.      Neurological exam  Awake, alert, fluent oriented appropriate affect  Attention and concentration appear appropriate  Recent and remote memory appears unremarkable  Speech normal without dysarthria  No clear issues with language of fund of knowledge     Cranial Nerve Exam     CN III, IV,VI-EOMI, No nystagmus, conjugate eye movements, no ptosis    CN VII-no facial assymetry       Motor Exam  antigravity throughout upper extremities bilaterally      Tremors- no tremors in hands or head noted     Gait  Not tested      Nursing/pcp notes, imaging,labs and vitals reviewed. PT,OT and/or speech notes reviewed    Lab Results   Component Value Date    WBC 8.2 05/27/2021    HGB 11.2 (L) 05/27/2021    HCT 34.3 (L) 05/27/2021    MCV 86.8 05/27/2021     05/27/2021     Lab Results   Component Value Date     05/27/2021    K 4.4 05/27/2021     05/27/2021    CO2 28 05/27/2021    BUN 19 05/27/2021    CREATININE 0.5 05/27/2021    GLUCOSE 104 05/27/2021    CALCIUM 9.6 05/27/2021    PROT 5.7 (L) 05/27/2021    LABALBU 3.5 05/27/2021    BILITOT 0.5 05/27/2021    ALKPHOS 88 05/27/2021    AST 22 05/27/2021    ALT 36 (H) 05/27/2021    LABGLOM >60 05/27/2021    GFRAA >59 05/27/2021    GLOB 2.9 10/16/2016     Lab Results   Component Value Date    INR 1.07 05/19/2021    INR 1.01 05/15/2021    INR 1.01 10/05/2018    PROTIME 13.8 05/19/2021    PROTIME 13.3 05/15/2021    PROTIME 13.2 10/05/2018         Parish Shearer, SPT   Student Physical Therapist   Physical Therapy   Progress Notes       Cosign Needed   Date of Service:  5/28/2021  1:12 PM               Cosign Needed             Show:Clear all  []Manual[x]Template[]Copied    Added by:  [x]Mary Jo Spears, SPT    []Hover for details       05/28/21 1100   Restrictions/Precautions   Restrictions/Precautions Weight Bearing; Fall Risk   Required Braces or Orthoses?  No   Lower Extremity Weight Bearing Restrictions   Right Lower Extremity Weight Bearing Weight Bearing As Tolerated   Left Lower Extremity Weight Bearing Weight Bearing As Tolerated   General   Diagnosis R HIP HEMIARTHROPLASTY; R DISPLACED SUBCAPITAL FEMORAL NECK FX   Pain Screening   Patient Currently in Pain No   Transfers   Sit to Stand Supervision   Stand to sit Supervision   Bed to Chair Stand by assistance   Ambulation   Ambulation? Yes   WB Status WBAT   More Ambulation? Yes   Ambulation 1   Surface level tile   Device Rolling Walker   Other Apparatus Wheelchair follow   Assistance Stand by assistance;Contact guard assistance   Quality of Gait ANTALGIC GAIT ON RLE Pt PUTTING MOST WEIGHT THROUGH BUE WHEN ADVANCING RLE;    Gait Deviations Slow Jane;Decreased step length   Distance 200 FT   Ambulation 2   Surface - 2 level tile   Device 2 Rolling Walker   Assistance 2 Stand by assistance   Quality of Gait 2 Antalgic gait on RLE with WB   Distance 15 FT   Comments Pt ABLE TO AMB 15FT TO RESTROOM WITH SUPERVISION ASSISTANCE   Stairs/Curb   Stairs? Yes   Stairs   # Steps  4   Stairs Height 4\"   Rails Bilateral   Assistance Contact guard assistance   Other exercises   Other exercises 1 WEIGHT SHIFTING TO RT SIDE  X 5 WITH DECREASING UE ASSISTANCE EACH REPITITION   Conditions Requiring Skilled Therapeutic Intervention   Assessment Pt ABLE TO COMPLETE 4 STEPS WITH INCREASED COFIDENCE AND CG ASSISTANCE; Pt NEEDS VERBAL CUEING TO DECREASE AMOUNT OF WB THROUGH BUE WHEN WALKING   Activity Tolerance   Activity Tolerance Patient Tolerated treatment well   Safety Devices   Type of devices All fall risk precautions in place; Bed alarm in place;Call light within reach; Patient at risk for falls; Left in bed      Electronically signed by JOEL Brennan on 5/28/21 at 1:12 PM CDT                        RECORD REVIEW: Previous medical records, medications were reviewed at today's visit    IMPRESSION:   1. S/P right femur fracture and repair-pain control/mobilization  2. Hyperlipidemia-on statin  3. DVT prophylaxis-Lovenox  4. Hypothyroidism-on synthroid  5. COPD-nebs PRN  6. Pain control-Percocet PRN  7. GI-bowel regimen  8. Thrombocytopenia-monitor   9.  PT/OT        [address]     Continue present care      Expected duration and frequency therapy: 180 minutes per day, 5 days per week    [address] [facility]  [phone] CELL  Dr Robby Holley

## 2021-05-28 NOTE — PROGRESS NOTES
Occupational Therapy     05/28/21 7480   Restrictions/Precautions   Restrictions/Precautions Weight Bearing; Fall Risk   Position Activity Restriction   Hip Precautions No ABduction; No hip extension; No hip external rotation   Other position/activity restrictions Anterior hip precautions   General   Additional Pertinent Hx R hip fx, THR with anterior hip precautions   ADL   Toileting Supervision  (x 2 occ)   Balance   Sitting Balance Independent   Standing Balance Supervision   Functional Mobility   Functional - Mobility Device Rolling Walker   Activity To/from bathroom   Assist Level Stand by assistance   Functional Mobility Comments in OT and pt room   Bed mobility   Sit to Supine Minimal assistance  (difficulty with leg )   Transfers   Sit to stand Supervision   Stand to sit Supervision   Toilet Transfers   Toilet - Technique Ambulating   Equipment Used Standard bedside commode  (over toilet)   Toilet Transfer Supervision   Wheelchair Bed Transfers   Wheelchair/Bed - Technique Ambulating   Type of ROM/Therapeutic Exercise   Type of ROM/Therapeutic Exercise Free weights   Comment 2#   Assessment   Performance deficits / Impairments Decreased functional mobility ; Decreased ADL status; Decreased endurance;Decreased balance;Decreased high-level IADLs   Treatment Diagnosis Right hip fx s/p hip hemiarthroplasty   Timed Code Treatment Minutes 45 Minutes   Activity Tolerance   Activity Tolerance Patient Tolerated treatment well

## 2021-05-28 NOTE — PROGRESS NOTES
05/28/21 1345   Restrictions/Precautions   Restrictions/Precautions Weight Bearing; Fall Risk   Required Braces or Orthoses? No   General   Diagnosis R HIP HEMIARTHROPLASTY; R DISPLACED SUBCAPITAL FEMORAL NECK FX   Subjective   Subjective Pt IN SOME PAIN AT BEGINNING OF SESSION AND REPORTS GETTING OUT OF BED EARLIER AND MOVING THE WRONG WAY WHICH CAUSED IT TO START ACHING   Pain Screening   Patient Currently in Pain Yes   Pain Assessment   Pain Assessment 0-10   Pain Level 5   Patient's Stated Pain Goal No pain   Pain Descriptors Aching   Functional Pain Assessment Activities are not prevented   Non-Pharmaceutical Pain Intervention(s) Rest;Repositioned   Response to Pain Intervention Patient Satisfied   Multiple Pain Sites No   Transfers   Sit to Stand Supervision   Stand to sit Supervision   Ambulation   Ambulation?  Yes   WB Status WBAT   Ambulation 1   Surface level tile   Device Rolling Walker   Other Apparatus Wheelchair follow   Assistance Stand by assistance   Quality of Gait Pt ABLE TO DECREASE AMOUNT OF WB THROUGH ARMS DURING ADVANCEMENT OF RLE   Gait Deviations Slow Jane;Decreased step length   Distance 100FT   Comments 2 RIGHT TURNS; Pt HAD TO SIT IN W/C DUE TO INCREASED PAIN AND FATIGUE   Exercises   Hip Flexion X 20 reps LLE 2X5 RLE AAROM    Other exercises   Other exercises 1 RESISTED DF/PF AND KNEE EXT SEATED 1X10   Conditions Requiring Skilled Therapeutic Intervention   Assessment Pt LIMITED BY FATIGUE AND PAIN DURING AMB, NOTICEABLE BY THE INCREASE IN WB THROUGH BUE; DIFFICULTY WITH SEATED Rt HIP FLEXION DUE TO WEAKNESS AND FATIGUE   Activity Tolerance   Activity Tolerance Patient limited by fatigue;Patient limited by pain   Safety Devices   Type of devices All fall risk precautions in place;Gait belt;Left in chair  (Pt LEFT WITH OT)   Electronically signed by JOEL Boateng on 5/28/21 at 2:34 PM CDT

## 2021-05-28 NOTE — PLAN OF CARE
Problem: Falls - Risk of:  Goal: Will remain free from falls  Description: Will remain free from falls  5/27/2021 2214 by Breanna Peck RN  Outcome: Ongoing  5/27/2021 0946 by Elise Castrejon  Outcome: Ongoing  Goal: Absence of physical injury  Description: Absence of physical injury  5/27/2021 2214 by Breanna Peck RN  Outcome: Ongoing  5/27/2021 0946 by Elise Castrejon  Outcome: Ongoing     Problem: Infection:  Goal: Will remain free from infection  Description: Will remain free from infection  5/27/2021 2214 by Breanna Peck RN  Outcome: Ongoing  5/27/2021 0946 by Elise Castrejon  Outcome: Ongoing     Problem: Safety:  Goal: Free from accidental physical injury  Description: Free from accidental physical injury  5/27/2021 2214 by Breanna Peck RN  Outcome: Ongoing  5/27/2021 0946 by Elise Castrejon  Outcome: Ongoing  Goal: Free from intentional harm  Description: Free from intentional harm  5/27/2021 2214 by Breanna Peck RN  Outcome: Ongoing  5/27/2021 0946 by Elise Castrejon  Outcome: Ongoing     Problem: Discharge Planning:  Goal: Patients continuum of care needs are met  Description: Patients continuum of care needs are met  5/27/2021 2214 by Breanna Peck RN  Outcome: Ongoing  5/27/2021 0946 by Elise Castrejon  Outcome: Ongoing     Problem: Skin Integrity:  Goal: Will show no infection signs and symptoms  Description: Will show no infection signs and symptoms  5/27/2021 2214 by Breanna Peck RN  Outcome: Ongoing  5/27/2021 0946 by Elise Castrejon  Outcome: Ongoing  Goal: Absence of new skin breakdown  Description: Absence of new skin breakdown  5/27/2021 2214 by Breanna Peck RN  Outcome: Ongoing  5/27/2021 0946 by Elise Castrejon  Outcome: Ongoing     Problem: Pain:  Goal: Pain level will decrease  Description: Pain level will decrease  5/27/2021 2214 by Breanna Peck RN  Outcome: Ongoing  5/27/2021 0946 by Elise Castrejon  Outcome: Ongoing  Goal: Control of

## 2021-05-28 NOTE — PROGRESS NOTES
Orthopedic Surgery Progress Note    Anny Steele  5/28/2021      Subjective:     No significant issues today. Is scheduled for discharge from therapy. Routine postoperative surveillance. She states that her pain is reasonably well controlled. She is progressing reasonably well with therapy and is without complaint today. Objective:     Patient Vitals for the past 24 hrs:   BP Temp Temp src Pulse Resp SpO2   05/28/21 0654 96/60 97.4 °F (36.3 °C) Temporal 63 18 99 %   05/27/21 1906 102/62 97.4 °F (36.3 °C) Temporal 72 16 94 %       General: Awake, alert, no acute distress. Respiratory: Nonlabored respirations  Cardiovascular: Regular rate  Right lower extremity: Surgical incision is well approximated with staples in place. No active bleeding or drainage. There was no significant strikethrough on the dressing. Moderate edema, minimal ecchymosis about the periincisional area. Mild periincisional tenderness to palpation. EHL, FHL, tibialis anterior and gastrocsoleus complex motor intact. Gross sensation is intact to the right foot. No pain with calf squeeze. Data Review:  Recent Labs     05/27/21  0710   HGB 11.2*     Recent Labs     05/27/21  0710      K 4.4   CREATININE 0.5       Assessment:     73 y/o F approximately 2 weeks s/p R hip cemented hemiarthroplasty, significant medical history for hyperlipidemia, hypothyroidism, COPD and chronic tobacco use    Plan:     Pain: Controlled, continue current regimen  DVT prophylaxis: PAS boots, Lovenox with plans to transition to aspirin at discharge  Physical therapy/Occupational therapy  Activity: Full weight bearing, ice/elevate  Dressing: Staples were removed today and Steri-Strips were placed. Okay to leave wound open to air at this point. Disposition: Plan to follow-up in 1 month for repeat clinical and radiographic evaluation.   May call sooner with questions or concerns.       Electronically signed by Laisha Powell MD on 5/28/2021 at 4:24 PM

## 2021-05-28 NOTE — PROGRESS NOTES
Occupational Therapy     05/27/21 0900   Position Activity Restriction   Hip Precautions No ABduction; No hip extension; No hip external rotation   ADL   Toileting Supervision   Balance   Standing Balance Supervision   Functional Mobility   Functional - Mobility Device Rolling Walker   Activity Retrieve items;Transport items; To/from bathroom   Assist Level Stand by assistance   Functional Mobility Comments kitchen item retrieval task while reviewing safe walker management   Toilet Transfers   Toilet - Technique Ambulating   Toilet Transfer Stand by assistance   Type of ROM/Therapeutic Exercise   Type of ROM/Therapeutic Exercise Free weights   Comment 2#    Assessment   Performance deficits / Impairments Decreased functional mobility ; Decreased ADL status; Decreased endurance;Decreased balance;Decreased high-level IADLs   Activity Tolerance   Activity Tolerance Patient Tolerated treatment well

## 2021-05-28 NOTE — PROGRESS NOTES
Occupational Therapy     05/28/21 0900   General   Additional Pertinent Hx R hip fx, THR with anterior hip precautions   Pain Assessment   Patient Currently in Pain No   Pain Assessment 0-10   Pain Level 0   ADL   Grooming Independent   UE Bathing Other (Comment)  (Hair washed per pt. request.)   Toileting Supervision   Balance   Sitting Balance Independent   Standing Balance Supervision   Functional Mobility   Functional - Mobility Device Rolling Walker   Activity To/from bathroom; Other   Assist Level Stand by assistance   Transfers   Sit to stand Supervision   Stand to sit Supervision   Toilet Transfers   Toilet - Technique Ambulating   Equipment Used Standard bedside commode   Toilet Transfer Supervision   Tub Transfers   Tub - Transfer From Rolling walker   Tub - Transfer Type To and From   Tub - Transfer To Transfer tub bench   Tub - Technique Ambulating   Tub Transfers Supervision   Tub Transfers Comments Utilized leg  to bring RLE into tub. Assessment   Performance deficits / Impairments Decreased functional mobility ; Decreased ADL status; Decreased endurance;Decreased balance;Decreased high-level IADLs   Treatment Diagnosis Right hip fx s/p hip hemiarthroplasty   Prognosis Good   Timed Code Treatment Minutes 60 Minutes   Activity Tolerance   Activity Tolerance Patient Tolerated treatment well   Safety Devices   Safety Devices in place Yes   Type of devices Call light within reach; Bed alarm in place   Plan   Current Treatment Recommendations Strengthening;Balance Training;Functional Mobility Training; Endurance Training; Safety Education & Training;Patient/Caregiver Education & Training;Equipment Evaluation, Education, & procurement;Self-Care / ADL; Home Management Training

## 2021-05-29 PROCEDURE — 6360000002 HC RX W HCPCS: Performed by: HOSPITALIST

## 2021-05-29 PROCEDURE — 1180000000 HC REHAB R&B

## 2021-05-29 PROCEDURE — 94640 AIRWAY INHALATION TREATMENT: CPT

## 2021-05-29 PROCEDURE — 6360000002 HC RX W HCPCS: Performed by: PSYCHIATRY & NEUROLOGY

## 2021-05-29 PROCEDURE — 6370000000 HC RX 637 (ALT 250 FOR IP): Performed by: HOSPITALIST

## 2021-05-29 PROCEDURE — 6370000000 HC RX 637 (ALT 250 FOR IP): Performed by: PSYCHIATRY & NEUROLOGY

## 2021-05-29 PROCEDURE — 99232 SBSQ HOSP IP/OBS MODERATE 35: CPT | Performed by: PSYCHIATRY & NEUROLOGY

## 2021-05-29 RX ADMIN — LEVOTHYROXINE SODIUM 88 MCG: 0.09 TABLET ORAL at 05:47

## 2021-05-29 RX ADMIN — BUDESONIDE 500 MCG: 0.5 SUSPENSION RESPIRATORY (INHALATION) at 06:20

## 2021-05-29 RX ADMIN — ARFORMOTEROL TARTRATE 15 MCG: 15 SOLUTION RESPIRATORY (INHALATION) at 06:20

## 2021-05-29 RX ADMIN — ENOXAPARIN SODIUM 40 MG: 40 INJECTION SUBCUTANEOUS at 07:35

## 2021-05-29 RX ADMIN — ATORVASTATIN CALCIUM 40 MG: 40 TABLET, FILM COATED ORAL at 20:06

## 2021-05-29 RX ADMIN — LINACLOTIDE 145 MCG: 145 CAPSULE, GELATIN COATED ORAL at 05:47

## 2021-05-29 ASSESSMENT — PAIN SCALES - GENERAL: PAINLEVEL_OUTOF10: 0

## 2021-05-29 ASSESSMENT — PAIN DESCRIPTION - PROGRESSION: CLINICAL_PROGRESSION: NOT CHANGED

## 2021-05-29 NOTE — PROGRESS NOTES
Nutrient Intake  Physical Signs/Symptoms Outcomes:  Biochemical Data, Weight, Skin, Nutrition Focused Physical Findings     Discharge Planning:    Continue current diet     Electronically signed by Kasandra Dudley, MS, RD, LD on 5/29/21 at 1:44 PM CDT    Contact: [phone]

## 2021-05-29 NOTE — PROGRESS NOTES
Patient:   Marco Maxwell  MR#:    [id number]   Room:    0821/821-02   YOB: 1950  Date of Progress Note: 5/29/2021  Time of Note                           11:25 AM  Consulting Physician:   Monserrat Amador M.D. Attending Physician:  Monserrat Amador MD     Chief complaint S/P right femur fracture and repair     S:This 72 y.o. female  with history of COPD/chronic bronchitis, tobacco abuse, hyperlipidemia, hypothyroidism, and bradycardia s/p pacemaker. She presented to [place] ER on 5/15/21 after having a fall. She had severe onset of right hip/leg pain and was unable to bear weight. X-rays done revealed a right displaced subcapital femoral neck fracture. She was admitted to the hospitalist service with consult for orthopedic surgery. She was seen by Dr. Boaz Kamara, who recommended right cemented hip hemiarthroplasty. She was in agreement and proceeded to surgery. She tolerated the procedure well. She will be weightbearing as tolerated on her right lower extremity with anterior hip precautions. She is participating in both PT/OT. She is felt to need a stay on Rehab to work towards her goal of returning home with assist of her son. She is now felt ready to start the Rehab program. No new complaint overnight.       REVIEW OF SYSTEMS:  Constitutional: No fevers No chills  Neck:No stiffness  Respiratory: No shortness of breath  Cardiovascular: No chest pain No palpitations  Gastrointestinal: No abdominal pain    Genitourinary: No Dysuria  Neurological: No headache, no confusion    Past Medical History:      Diagnosis Date    Cerebral artery occlusion with cerebral infarction ([address]     TIA    Diverticular disease of colon     Hyperlipidemia     Hypothyroidism     Pneumonia     Psychiatric problem     Seizures ([address]     Tobacco abuse     Vitamin B12 deficiency        Past Surgical History:      Procedure Laterality Date    CARDIAC PACEMAKER PLACEMENT      COLONOSCOPY      HIP SURGERY Right 5/15/2021    HIP HEMIARTHROPLASTY performed by Froylan Patel MD at [address] [facility] [address]      collapsed lung    [place] COLONOSCOPY W/BIOPSY SINGLE/MULTIPLE N/A 6/27/2017    Dr Yaneth West-extensive and large mouthed diverticular disease throughout the right colon-Tubular AP (villiform) (-) dysplasia x 1, tubular AP (-) dysplasia x 1--5 yr recall    TUBAL LIGATION         Medications in Hospital:      Current Facility-Administered Medications:     oxyCODONE-acetaminophen (PERCOCET) 5-325 MG per tablet 1 tablet, 1 tablet, Oral, Q4H PRN, Sandy Vee MD, 1 tablet at 05/28/21 2239    linaclotide (LINZESS) capsule 145 mcg, 145 mcg, Oral, QAM AC, Sandy Vee MD, 145 mcg at 05/29/21 0547    ipratropium-albuterol (DUONEB) nebulizer solution 1 ampule, 1 ampule, Inhalation, Q4H PRN, Sandy Vee MD    Arformoterol Tartrate (BROVANA) nebulizer solution 15 mcg, 15 mcg, Nebulization, BID, Sandy Vee MD, 15 mcg at 05/29/21 0620    enoxaparin (LOVENOX) injection 40 mg, 40 mg, Subcutaneous, Daily, Jon Arora MD, 40 mg at 05/29/21 0735    [DISCONTINUED] acetaminophen (TYLENOL) tablet 650 mg, 650 mg, Oral, Q6H PRN **OR** acetaminophen (TYLENOL) suppository 650 mg, 650 mg, Rectal, Q6H PRN, Jon Arora MD    magnesium sulfate 2000 mg in 50 mL IVPB premix, 2,000 mg, Intravenous, PRN, Jon Arora MD    potassium chloride (KLOR-CON M) extended release tablet 40 mEq, 40 mEq, Oral, PRN **OR** potassium bicarb-citric acid (EFFER-K) effervescent tablet 40 mEq, 40 mEq, Oral, PRN **OR** potassium chloride 10 mEq/100 mL IVPB (Peripheral Line), 10 mEq, Intravenous, PRN, Jon Arora MD    promethazine (PHENERGAN) tablet 12.5 mg, 12.5 mg, Oral, Q6H PRN **OR** ondansetron (ZOFRAN) injection 4 mg, 4 mg, Intravenous, Q6H PRN, Jon Arora MD    sodium chloride flush 0.9 % injection 5-40 mL, 5-40 mL, Intravenous, PRN, Jon Arora MD    atorvastatin (LIPITOR) tablet 40 mg, 40 mg, Oral, Nightly, Jon Arora MD, 40 mg at 05/28/21 2021    budesonide (PULMICORT) nebulizer suspension 500 mcg, 0.5 mg, Nebulization, BID, Danny Pugh MD, 500 mcg at 05/29/21 0620    levothyroxine (SYNTHROID) tablet 88 mcg, 88 mcg, Oral, Daily, Danny Pugh MD, 88 mcg at 05/29/21 0547    acetaminophen (TYLENOL) tablet 650 mg, 650 mg, Oral, Q4H PRN, Jossue Sahni MD    polyethylene glycol (GLYCOLAX) packet 17 g, 17 g, Oral, Daily PRN, Jossue Sahni MD, 17 g at 05/23/21 2111    Allergies:  Patient has no known allergies. Social History:   TOBACCO:   reports that she has been smoking cigarettes. She started smoking about 50 years ago. She has a 15.00 pack-year smoking history. She has never used smokeless tobacco.  ETOH:   reports no history of alcohol use. Family History:       Problem Relation Age of Onset    Other Mother         COPD, dementia    Heart Disease Mother     Stroke Mother     Heart Disease Father     Stroke Father     Diabetes Brother     Diabetes Brother     High Blood Pressure Brother     High Cholesterol Brother     Colon Polyps Brother     Colon Cancer Neg Hx     Liver Cancer Neg Hx     Liver Disease Neg Hx     Esophageal Cancer Neg Hx     Rectal Cancer Neg Hx     Stomach Cancer Neg Hx          PHYSICAL EXAM:  /70   Pulse 70   Temp 96.8 °F (36 °C) (Temporal)   Resp 17   Ht 5' 9\" (1.753 m)   Wt 147 lb 9 oz (66.9 kg)   SpO2 98%   BMI 21.79 kg/m²     Constitutional  well developed, well nourished. Eyes  conjunctiva normal.   Ear, nose, throat - No scars, masses, or lesions over external nose or ears, no atrophy of tongue  Neck-symmetric, no masses noted, no jugular vein distension  Respiration- chest wall appears symmetric, good expansion,   normal effort without use of accessory muscles  Musculoskeletal  no significant wasting of muscles noted, no bony deformities  Extremities-no clubbing, cyanosis or edema  Skin  warm, dry, and intact. No rash, erythema, or pallor.   Psychiatric  mood, affect, and behavior appear normal.      Neurological exam  Awake, alert, fluent oriented appropriate affect  Attention and concentration appear appropriate  Recent and remote memory appears unremarkable  Speech normal without dysarthria  No clear issues with language of fund of knowledge     Cranial Nerve Exam     CN III, IV,VI-EOMI, No nystagmus, conjugate eye movements, no ptosis    CN VII-no facial assymetry       Motor Exam  antigravity throughout upper extremities bilaterally      Tremors- no tremors in hands or head noted     Gait  Not tested      Nursing/pcp notes, imaging,labs and vitals reviewed. PT,OT and/or speech notes reviewed    Lab Results   Component Value Date    WBC 8.2 05/27/2021    HGB 11.2 (L) 05/27/2021    HCT 34.3 (L) 05/27/2021    MCV 86.8 05/27/2021     05/27/2021     Lab Results   Component Value Date     05/27/2021    K 4.4 05/27/2021     05/27/2021    CO2 28 05/27/2021    BUN 19 05/27/2021    CREATININE 0.5 05/27/2021    GLUCOSE 104 05/27/2021    CALCIUM 9.6 05/27/2021    PROT 5.7 (L) 05/27/2021    LABALBU 3.5 05/27/2021    BILITOT 0.5 05/27/2021    ALKPHOS 88 05/27/2021    AST 22 05/27/2021    ALT 36 (H) 05/27/2021    LABGLOM >60 05/27/2021    GFRAA >59 05/27/2021    GLOB 2.9 10/16/2016     Lab Results   Component Value Date    INR 1.07 05/19/2021    INR 1.01 05/15/2021    INR 1.01 10/05/2018    PROTIME 13.8 05/19/2021    PROTIME 13.3 05/15/2021    PROTIME 13.2 10/05/2018         JOEL Muñiz   Student Physical Therapist   Physical Therapy   Progress Notes       Cosign Needed   Date of Service:  5/28/2021  1:12 PM               Cosign Needed             Show:Clear all  []Manual[x]Template[]Copied    Added by:  [x]Mary Jo Spears, SPT    []Hover for details       05/28/21 1100   Restrictions/Precautions   Restrictions/Precautions Weight Bearing; Fall Risk   Required Braces or Orthoses?  No   Lower Extremity Weight Bearing Restrictions   Right Lower Extremity Weight Bearing Weight Bearing As Tolerated   Left Lower Extremity Weight Bearing Weight Bearing As Tolerated   General   Diagnosis R HIP HEMIARTHROPLASTY; R DISPLACED SUBCAPITAL FEMORAL NECK FX   Pain Screening   Patient Currently in Pain No   Transfers   Sit to Stand Supervision   Stand to sit Supervision   Bed to Chair Stand by assistance   Ambulation   Ambulation? Yes   WB Status WBAT   More Ambulation? Yes   Ambulation 1   Surface level tile   Device Rolling Walker   Other Apparatus Wheelchair follow   Assistance Stand by assistance;Contact guard assistance   Quality of Gait ANTALGIC GAIT ON RLE Pt PUTTING MOST WEIGHT THROUGH BUE WHEN ADVANCING RLE;    Gait Deviations Slow Jane;Decreased step length   Distance 200 FT   Ambulation 2   Surface - 2 level tile   Device 2 Rolling Walker   Assistance 2 Stand by assistance   Quality of Gait 2 Antalgic gait on RLE with WB   Distance 15 FT   Comments Pt ABLE TO AMB 15FT TO RESTROOM WITH SUPERVISION ASSISTANCE   Stairs/Curb   Stairs? Yes   Stairs   # Steps  4   Stairs Height 4\"   Rails Bilateral   Assistance Contact guard assistance   Other exercises   Other exercises 1 WEIGHT SHIFTING TO RT SIDE  X 5 WITH DECREASING UE ASSISTANCE EACH REPITITION   Conditions Requiring Skilled Therapeutic Intervention   Assessment Pt ABLE TO COMPLETE 4 STEPS WITH INCREASED COFIDENCE AND CG ASSISTANCE; Pt NEEDS VERBAL CUEING TO DECREASE AMOUNT OF WB THROUGH BUE WHEN WALKING   Activity Tolerance   Activity Tolerance Patient Tolerated treatment well   Safety Devices   Type of devices All fall risk precautions in place; Bed alarm in place;Call light within reach; Patient at risk for falls; Left in bed      Electronically signed by JOEL Nguyen on 5/28/21 at 1:12 PM CDT                        RECORD REVIEW: Previous medical records, medications were reviewed at today's visit    IMPRESSION:   1. S/P right femur fracture and repair-pain control/mobilization  2. Hyperlipidemia-on statin  3. DVT prophylaxis-Lovenox  4. Hypothyroidism-on synthroid  5. COPD-nebs PRN  6. Pain control-Percocet PRN  7. GI-bowel regimen  8. Thrombocytopenia-monitor   9.  PT/OT        [address]     Continue current care      Expected duration and frequency therapy: 180 minutes per day, 5 days per week    [address] [facility]  [phone] CELL  Dr Aguila Navarrete

## 2021-05-29 NOTE — PLAN OF CARE
Problem: Falls - Risk of:  Goal: Will remain free from falls  Description: Will remain free from falls  Outcome: Ongoing  Goal: Absence of physical injury  Description: Absence of physical injury  Outcome: Ongoing     Problem: Infection:  Goal: Will remain free from infection  Description: Will remain free from infection  Outcome: Ongoing     Problem: Safety:  Goal: Free from accidental physical injury  Description: Free from accidental physical injury  Outcome: Ongoing  Goal: Free from intentional harm  Description: Free from intentional harm  Outcome: Ongoing     Problem: Discharge Planning:  Goal: Patients continuum of care needs are met  Description: Patients continuum of care needs are met  Outcome: Ongoing     Problem: Skin Integrity:  Goal: Will show no infection signs and symptoms  Description: Will show no infection signs and symptoms  Outcome: Ongoing  Goal: Absence of new skin breakdown  Description: Absence of new skin breakdown  Outcome: Ongoing     Problem: Pain:  Goal: Pain level will decrease  Description: Pain level will decrease  Outcome: Ongoing  Goal: Control of acute pain  Description: Control of acute pain  Outcome: Ongoing  Goal: Control of chronic pain  Description: Control of chronic pain  Outcome: Ongoing     Problem: Bleeding:  Goal: Will show no signs and symptoms of excessive bleeding  Description: Will show no signs and symptoms of excessive bleeding  Outcome: Ongoing     Problem: Infection - Surgical Site:  Goal: Will show no infection signs and symptoms  Description: Will show no infection signs and symptoms  Outcome: Ongoing

## 2021-05-29 NOTE — PROGRESS NOTES
Patient refuses bed alarm but states she will not get up out of bed unless she calls for assistance.

## 2021-05-30 PROCEDURE — 6370000000 HC RX 637 (ALT 250 FOR IP): Performed by: PSYCHIATRY & NEUROLOGY

## 2021-05-30 PROCEDURE — 99232 SBSQ HOSP IP/OBS MODERATE 35: CPT | Performed by: PSYCHIATRY & NEUROLOGY

## 2021-05-30 PROCEDURE — 6360000002 HC RX W HCPCS: Performed by: HOSPITALIST

## 2021-05-30 PROCEDURE — 6370000000 HC RX 637 (ALT 250 FOR IP): Performed by: HOSPITALIST

## 2021-05-30 PROCEDURE — 1180000000 HC REHAB R&B

## 2021-05-30 PROCEDURE — 6360000002 HC RX W HCPCS: Performed by: PSYCHIATRY & NEUROLOGY

## 2021-05-30 PROCEDURE — 94640 AIRWAY INHALATION TREATMENT: CPT

## 2021-05-30 RX ADMIN — ARFORMOTEROL TARTRATE 15 MCG: 15 SOLUTION RESPIRATORY (INHALATION) at 06:39

## 2021-05-30 RX ADMIN — LEVOTHYROXINE SODIUM 88 MCG: 0.09 TABLET ORAL at 05:31

## 2021-05-30 RX ADMIN — ATORVASTATIN CALCIUM 40 MG: 40 TABLET, FILM COATED ORAL at 20:17

## 2021-05-30 RX ADMIN — BUDESONIDE 500 MCG: 0.5 SUSPENSION RESPIRATORY (INHALATION) at 06:39

## 2021-05-30 RX ADMIN — OXYCODONE HYDROCHLORIDE AND ACETAMINOPHEN 1 TABLET: 5; 325 TABLET ORAL at 03:01

## 2021-05-30 RX ADMIN — LINACLOTIDE 145 MCG: 145 CAPSULE, GELATIN COATED ORAL at 05:31

## 2021-05-30 RX ADMIN — BUDESONIDE 500 MCG: 0.5 SUSPENSION RESPIRATORY (INHALATION) at 18:24

## 2021-05-30 RX ADMIN — ENOXAPARIN SODIUM 40 MG: 40 INJECTION SUBCUTANEOUS at 07:30

## 2021-05-30 RX ADMIN — ARFORMOTEROL TARTRATE 15 MCG: 15 SOLUTION RESPIRATORY (INHALATION) at 18:24

## 2021-05-30 ASSESSMENT — PAIN - FUNCTIONAL ASSESSMENT: PAIN_FUNCTIONAL_ASSESSMENT: ACTIVITIES ARE NOT PREVENTED

## 2021-05-30 ASSESSMENT — PAIN DESCRIPTION - ORIENTATION: ORIENTATION: RIGHT

## 2021-05-30 ASSESSMENT — PAIN SCALES - GENERAL
PAINLEVEL_OUTOF10: 4
PAINLEVEL_OUTOF10: 0

## 2021-05-30 ASSESSMENT — PAIN DESCRIPTION - PROGRESSION: CLINICAL_PROGRESSION: NOT CHANGED

## 2021-05-30 ASSESSMENT — PAIN DESCRIPTION - FREQUENCY: FREQUENCY: INTERMITTENT

## 2021-05-30 ASSESSMENT — PAIN DESCRIPTION - LOCATION: LOCATION: HIP

## 2021-05-30 ASSESSMENT — PAIN DESCRIPTION - DESCRIPTORS: DESCRIPTORS: ACHING

## 2021-05-30 ASSESSMENT — PAIN DESCRIPTION - PAIN TYPE: TYPE: SURGICAL PAIN

## 2021-05-30 ASSESSMENT — PAIN DESCRIPTION - ONSET: ONSET: GRADUAL

## 2021-05-30 NOTE — PROGRESS NOTES
Patient:   Jhony Mendez  MR#:    [id number]   Room:    [facility]-   YOB: 1950  Date of Progress Note: 5/30/2021  Time of Note                           11:22 AM  Consulting Physician:   Jenel Blizzard, M.D. Attending Physician:  Jenel Blizzard, MD     Chief complaint S/P right femur fracture and repair     S:This 72 y.o. female  with history of COPD/chronic bronchitis, tobacco abuse, hyperlipidemia, hypothyroidism, and bradycardia s/p pacemaker. She presented to [facility] ER on 5/15/21 after having a fall. She had severe onset of right hip/leg pain and was unable to bear weight. X-rays done revealed a right displaced subcapital femoral neck fracture. She was admitted to the hospitalist service with consult for orthopedic surgery. She was seen by Dr. Bess Verduzco, who recommended right cemented hip hemiarthroplasty. She was in agreement and proceeded to surgery. She tolerated the procedure well. She will be weightbearing as tolerated on her right lower extremity with anterior hip precautions. She is participating in both PT/OT. She is felt to need a stay on Rehab to work towards her goal of returning home with assist of her son. She is now felt ready to start the Rehab program. No new issues overnight.       REVIEW OF SYSTEMS:  Constitutional: No fevers No chills  Neck:No stiffness  Respiratory: No shortness of breath  Cardiovascular: No chest pain No palpitations  Gastrointestinal: No abdominal pain    Genitourinary: No Dysuria  Neurological: No headache, no confusion    Past Medical History:      Diagnosis Date    [address] with [address] infarction [address] [address]     TIA    Diverticular disease of colon     Hyperlipidemia     Hypothyroidism     Pneumonia     Psychiatric problem     Seizures [address] [address]     Tobacco abuse     Vitamin B12 deficiency        Past Surgical History:      Procedure Laterality Date    CARDIAC PACEMAKER PLACEMENT      COLONOSCOPY      HIP SURGERY Right 5/15/2021    HIP HEMIARTHROPLASTY performed by Merary Chan MD at [facility]      collapsed lung    [place] COLONOSCOPY W/BIOPSY SINGLE/MULTIPLE N/A 6/27/2017    Dr Jen West-extensive and large mouthed diverticular disease throughout the right colon-Tubular AP (villiform) (-) dysplasia x 1, tubular AP (-) dysplasia x 1--5 yr recall    TUBAL LIGATION         Medications in Hospital:      Current Facility-Administered Medications:     oxyCODONE-acetaminophen (PERCOCET) 5-325 MG per tablet 1 tablet, 1 tablet, Oral, Q4H PRN, Edinson Arteaga MD, 1 tablet at 05/30/21 0301    linaclotide (LINZESS) capsule 145 mcg, 145 mcg, Oral, QAM AC, Edinson Arteaga MD, 145 mcg at 05/30/21 0531    ipratropium-albuterol (DUONEB) nebulizer solution 1 ampule, 1 ampule, Inhalation, Q4H PRN, Edinson Arteaga MD    Arformoterol Tartrate (BROVANA) nebulizer solution 15 mcg, 15 mcg, Nebulization, BID, Edinson Arteaga MD, 15 mcg at 05/30/21 0639    enoxaparin (LOVENOX) injection 40 mg, 40 mg, Subcutaneous, Daily, Andrews Woods MD, 40 mg at 05/30/21 0730    [DISCONTINUED] acetaminophen (TYLENOL) tablet 650 mg, 650 mg, Oral, Q6H PRN **OR** acetaminophen (TYLENOL) suppository 650 mg, 650 mg, Rectal, Q6H PRN, Andrews Woods MD    magnesium sulfate 2000 mg in 50 mL IVPB premix, 2,000 mg, Intravenous, PRN, Andrews Woods MD    potassium chloride (KLOR-CON M) extended release tablet 40 mEq, 40 mEq, Oral, PRN **OR** potassium bicarb-citric acid (EFFER-K) effervescent tablet 40 mEq, 40 mEq, Oral, PRN **OR** potassium chloride 10 mEq/100 mL IVPB (Peripheral Line), 10 mEq, Intravenous, PRN, Andrews Woods MD    promethazine (PHENERGAN) tablet 12.5 mg, 12.5 mg, Oral, Q6H PRN **OR** ondansetron (ZOFRAN) injection 4 mg, 4 mg, Intravenous, Q6H PRN, Andrews Woods MD    sodium chloride flush 0.9 % injection 5-40 mL, 5-40 mL, Intravenous, PRN, Andrews Woods MD    atorvastatin (LIPITOR) tablet 40 mg, 40 mg, Oral, Nightly, Andrews Woods MD, 40 mg at 05/29/21 2006    budesonide (PULMICORT) nebulizer suspension 500 mcg, 0.5 mg, Nebulization, BID, Vijay Quick MD, 500 mcg at 05/30/21 9647    levothyroxine (SYNTHROID) tablet 88 mcg, 88 mcg, Oral, Daily, Vijay Quick MD, 88 mcg at 05/30/21 0531    acetaminophen (TYLENOL) tablet 650 mg, 650 mg, Oral, Q4H PRN, Brigid Ortiz MD    polyethylene glycol (GLYCOLAX) packet 17 g, 17 g, Oral, Daily PRN, Brigid Ortiz MD, 17 g at 05/23/21 2111    Allergies:  Patient has no known allergies. Social History:   TOBACCO:   reports that she has been smoking cigarettes. She started smoking about 50 years ago. She has a 15.00 pack-year smoking history. She has never used smokeless tobacco.  ETOH:   reports no history of alcohol use. Family History:       Problem Relation Age of Onset    Other Mother         COPD, dementia    Heart Disease Mother     Stroke Mother     Heart Disease Father     Stroke Father     Diabetes Brother     Diabetes Brother     High Blood Pressure Brother     High Cholesterol Brother     Colon Polyps Brother     Colon Cancer Neg Hx     Liver Cancer Neg Hx     Liver Disease Neg Hx     Esophageal Cancer Neg Hx     Rectal Cancer Neg Hx     Stomach Cancer Neg Hx          PHYSICAL EXAM:  BP (!) 101/59   Pulse 62   Temp 96.9 °F (36.1 °C) (Temporal)   Resp 17   Ht 5' 9\" (1.753 m)   Wt 147 lb 9 oz (66.9 kg)   SpO2 94%   BMI 21.79 kg/m²     Constitutional  well developed, well nourished. Eyes  conjunctiva normal.   Ear, nose, throat - No scars, masses, or lesions over external nose or ears, no atrophy of tongue  Neck-symmetric, no masses noted, no jugular vein distension  Respiration- chest wall appears symmetric, good expansion,   normal effort without use of accessory muscles  Musculoskeletal  no significant wasting of muscles noted, no bony deformities  Extremities-no clubbing, cyanosis or edema  Skin  warm, dry, and intact. No rash, erythema, or pallor.   Psychiatric  mood, affect, and behavior appear normal.      Neurological exam  Awake, alert, fluent oriented appropriate affect  Attention and concentration appear appropriate  Recent and remote memory appears unremarkable  Speech normal without dysarthria  No clear issues with language of fund of knowledge     Cranial Nerve Exam     CN III, IV,VI-EOMI, No nystagmus, conjugate eye movements, no ptosis    CN VII-no facial assymetry       Motor Exam  antigravity throughout upper extremities bilaterally      Tremors- no tremors in hands or head noted     Gait  Not tested      Nursing/pcp notes, imaging,labs and vitals reviewed. PT,OT and/or speech notes reviewed    Lab Results   Component Value Date    WBC 8.2 05/27/2021    HGB 11.2 (L) 05/27/2021    HCT 34.3 (L) 05/27/2021    MCV 86.8 05/27/2021     05/27/2021     Lab Results   Component Value Date     05/27/2021    K 4.4 05/27/2021     05/27/2021    CO2 28 05/27/2021    BUN 19 05/27/2021    CREATININE 0.5 05/27/2021    GLUCOSE 104 05/27/2021    CALCIUM 9.6 05/27/2021    PROT 5.7 (L) 05/27/2021    LABALBU 3.5 05/27/2021    BILITOT 0.5 05/27/2021    ALKPHOS 88 05/27/2021    AST 22 05/27/2021    ALT 36 (H) 05/27/2021    LABGLOM >60 05/27/2021    GFRAA >59 05/27/2021    GLOB 2.9 10/16/2016     Lab Results   Component Value Date    INR 1.07 05/19/2021    INR 1.01 05/15/2021    INR 1.01 10/05/2018    PROTIME 13.8 05/19/2021    PROTIME 13.3 05/15/2021    PROTIME 13.2 10/05/2018         Henrietta Wallace, SPT   Student Physical Therapist   Physical Therapy   Progress Notes       Cosign Needed   Date of Service:  5/28/2021  1:12 PM               Cosign Needed             Show:Clear all  []Manual[x]Template[]Copied    Added by:  [x]Mary Jo Spears, SPT    []Hover for details       05/28/21 1100   Restrictions/Precautions   Restrictions/Precautions Weight Bearing; Fall Risk   Required Braces or Orthoses?  No   Lower Extremity Weight Bearing Restrictions   Right Lower Extremity Weight Bearing Weight Bearing As Tolerated   Left Lower Extremity Weight Bearing Weight Bearing As Tolerated   General   Diagnosis R HIP HEMIARTHROPLASTY; R DISPLACED SUBCAPITAL FEMORAL NECK FX   Pain Screening   Patient Currently in Pain No   Transfers   Sit to Stand Supervision   Stand to sit Supervision   Bed to Chair Stand by assistance   Ambulation   Ambulation? Yes   WB Status WBAT   More Ambulation? Yes   Ambulation 1   Surface level tile   Device Rolling Walker   Other Apparatus Wheelchair follow   Assistance Stand by assistance;Contact guard assistance   Quality of Gait ANTALGIC GAIT ON RLE Pt PUTTING MOST WEIGHT THROUGH BUE WHEN ADVANCING RLE;    Gait Deviations Slow Jane;Decreased step length   Distance 200 FT   Ambulation 2   Surface - 2 level tile   Device 2 Rolling Walker   Assistance 2 Stand by assistance   Quality of Gait 2 Antalgic gait on RLE with WB   Distance 15 FT   Comments Pt ABLE TO AMB 15FT TO RESTROOM WITH SUPERVISION ASSISTANCE   Stairs/Curb   Stairs? Yes   Stairs   # Steps  4   Stairs Height 4\"   Rails Bilateral   Assistance Contact guard assistance   Other exercises   Other exercises 1 WEIGHT SHIFTING TO RT SIDE  X 5 WITH DECREASING UE ASSISTANCE EACH REPITITION   Conditions Requiring Skilled Therapeutic Intervention   Assessment Pt ABLE TO COMPLETE 4 STEPS WITH INCREASED COFIDENCE AND CG ASSISTANCE; Pt NEEDS VERBAL CUEING TO DECREASE AMOUNT OF WB THROUGH BUE WHEN WALKING   Activity Tolerance   Activity Tolerance Patient Tolerated treatment well   Safety Devices   Type of devices All fall risk precautions in place; Bed alarm in place;Call light within reach; Patient at risk for falls; Left in bed      Electronically signed by JOEL Dumont on 5/28/21 at 1:12 PM CDT                        RECORD REVIEW: Previous medical records, medications were reviewed at today's visit    IMPRESSION:   1. S/P right femur fracture and repair-pain control/mobilization  2. Hyperlipidemia-on statin  3. DVT prophylaxis-Lovenox  4. Hypothyroidism-on synthroid  5. COPD-nebs PRN  6. Pain control-Percocet PRN  7. GI-bowel regimen  8. Thrombocytopenia-monitor   9.  PT/OT        [address]     Continue present care      Expected duration and frequency therapy: 180 minutes per day, 5 days per week    [address] [facility]  [phone] CELL  Dr Arelis Yeung

## 2021-05-31 LAB
ALBUMIN SERPL-MCNC: 3.3 G/DL (ref 3.5–5.2)
ALP BLD-CCNC: 98 U/L (ref 35–104)
ALT SERPL-CCNC: 18 U/L (ref 5–33)
ANION GAP SERPL CALCULATED.3IONS-SCNC: 8 MMOL/L (ref 7–19)
AST SERPL-CCNC: 15 U/L (ref 5–32)
BASOPHILS ABSOLUTE: 0 K/UL (ref 0–0.2)
BASOPHILS RELATIVE PERCENT: 0.5 % (ref 0–1)
BILIRUB SERPL-MCNC: <0.2 MG/DL (ref 0.2–1.2)
BUN BLDV-MCNC: 17 MG/DL (ref 8–23)
CALCIUM SERPL-MCNC: 9.2 MG/DL (ref 8.8–10.2)
CHLORIDE BLD-SCNC: 105 MMOL/L (ref 98–111)
CO2: 27 MMOL/L (ref 22–29)
CREAT SERPL-MCNC: 0.6 MG/DL (ref 0.5–0.9)
EOSINOPHILS ABSOLUTE: 0.1 K/UL (ref 0–0.6)
EOSINOPHILS RELATIVE PERCENT: 1.5 % (ref 0–5)
GFR AFRICAN AMERICAN: >59
GFR NON-AFRICAN AMERICAN: >60
GLUCOSE BLD-MCNC: 106 MG/DL (ref 74–109)
HCT VFR BLD CALC: 33.1 % (ref 37–47)
HEMOGLOBIN: 10.2 G/DL (ref 12–16)
IMMATURE GRANULOCYTES #: 0 K/UL
LYMPHOCYTES ABSOLUTE: 1.7 K/UL (ref 1.1–4.5)
LYMPHOCYTES RELATIVE PERCENT: 26.3 % (ref 20–40)
MCH RBC QN AUTO: 27.9 PG (ref 27–31)
MCHC RBC AUTO-ENTMCNC: 30.8 G/DL (ref 33–37)
MCV RBC AUTO: 90.4 FL (ref 81–99)
MONOCYTES ABSOLUTE: 0.5 K/UL (ref 0–0.9)
MONOCYTES RELATIVE PERCENT: 8 % (ref 0–10)
NEUTROPHILS ABSOLUTE: 4.2 K/UL (ref 1.5–7.5)
NEUTROPHILS RELATIVE PERCENT: 63.2 % (ref 50–65)
PDW BLD-RTO: 14.4 % (ref 11.5–14.5)
PLATELET # BLD: 179 K/UL (ref 130–400)
PMV BLD AUTO: 10.7 FL (ref 9.4–12.3)
POTASSIUM REFLEX MAGNESIUM: 4.4 MMOL/L (ref 3.5–5)
RBC # BLD: 3.66 M/UL (ref 4.2–5.4)
SODIUM BLD-SCNC: 140 MMOL/L (ref 136–145)
TOTAL PROTEIN: 5.1 G/DL (ref 6.6–8.7)
WBC # BLD: 6.6 K/UL (ref 4.8–10.8)

## 2021-05-31 PROCEDURE — 94640 AIRWAY INHALATION TREATMENT: CPT

## 2021-05-31 PROCEDURE — 97116 GAIT TRAINING THERAPY: CPT

## 2021-05-31 PROCEDURE — 6370000000 HC RX 637 (ALT 250 FOR IP): Performed by: PSYCHIATRY & NEUROLOGY

## 2021-05-31 PROCEDURE — 6360000002 HC RX W HCPCS: Performed by: HOSPITALIST

## 2021-05-31 PROCEDURE — 97110 THERAPEUTIC EXERCISES: CPT

## 2021-05-31 PROCEDURE — 1180000000 HC REHAB R&B

## 2021-05-31 PROCEDURE — 97535 SELF CARE MNGMENT TRAINING: CPT

## 2021-05-31 PROCEDURE — 6360000002 HC RX W HCPCS: Performed by: PSYCHIATRY & NEUROLOGY

## 2021-05-31 PROCEDURE — 85025 COMPLETE CBC W/AUTO DIFF WBC: CPT

## 2021-05-31 PROCEDURE — 80053 COMPREHEN METABOLIC PANEL: CPT

## 2021-05-31 PROCEDURE — 97530 THERAPEUTIC ACTIVITIES: CPT

## 2021-05-31 PROCEDURE — 6370000000 HC RX 637 (ALT 250 FOR IP): Performed by: HOSPITALIST

## 2021-05-31 PROCEDURE — 36415 COLL VENOUS BLD VENIPUNCTURE: CPT

## 2021-05-31 RX ADMIN — ENOXAPARIN SODIUM 40 MG: 40 INJECTION SUBCUTANEOUS at 07:34

## 2021-05-31 RX ADMIN — OXYCODONE HYDROCHLORIDE AND ACETAMINOPHEN 1 TABLET: 5; 325 TABLET ORAL at 01:43

## 2021-05-31 RX ADMIN — ARFORMOTEROL TARTRATE 15 MCG: 15 SOLUTION RESPIRATORY (INHALATION) at 18:34

## 2021-05-31 RX ADMIN — LEVOTHYROXINE SODIUM 88 MCG: 0.09 TABLET ORAL at 05:57

## 2021-05-31 RX ADMIN — ARFORMOTEROL TARTRATE 15 MCG: 15 SOLUTION RESPIRATORY (INHALATION) at 09:03

## 2021-05-31 RX ADMIN — BUDESONIDE 500 MCG: 0.5 SUSPENSION RESPIRATORY (INHALATION) at 18:34

## 2021-05-31 RX ADMIN — ATORVASTATIN CALCIUM 40 MG: 40 TABLET, FILM COATED ORAL at 20:23

## 2021-05-31 RX ADMIN — BUDESONIDE 500 MCG: 0.5 SUSPENSION RESPIRATORY (INHALATION) at 09:03

## 2021-05-31 ASSESSMENT — PAIN SCALES - GENERAL
PAINLEVEL_OUTOF10: 7
PAINLEVEL_OUTOF10: 4
PAINLEVEL_OUTOF10: 0
PAINLEVEL_OUTOF10: 8
PAINLEVEL_OUTOF10: 0

## 2021-05-31 ASSESSMENT — PAIN DESCRIPTION - DESCRIPTORS
DESCRIPTORS: THROBBING
DESCRIPTORS: ACHING

## 2021-05-31 ASSESSMENT — PAIN DESCRIPTION - PROGRESSION
CLINICAL_PROGRESSION: NOT CHANGED
CLINICAL_PROGRESSION: NOT CHANGED

## 2021-05-31 ASSESSMENT — PAIN DESCRIPTION - PAIN TYPE
TYPE: SURGICAL PAIN
TYPE: SURGICAL PAIN

## 2021-05-31 ASSESSMENT — PAIN DESCRIPTION - FREQUENCY
FREQUENCY: INTERMITTENT
FREQUENCY: INTERMITTENT

## 2021-05-31 ASSESSMENT — PAIN DESCRIPTION - LOCATION
LOCATION: HIP
LOCATION: HIP

## 2021-05-31 ASSESSMENT — PAIN DESCRIPTION - ORIENTATION
ORIENTATION: RIGHT
ORIENTATION: RIGHT

## 2021-05-31 ASSESSMENT — PAIN DESCRIPTION - ONSET: ONSET: GRADUAL

## 2021-05-31 NOTE — PROGRESS NOTES
Name: Vera Riley  MRN: [id number]  Date of Service:  5/31/2021 05/31/21 0855   Restrictions/Precautions   Restrictions/Precautions Weight Bearing; Fall Risk   Required Braces or Orthoses? No   Implants present? Pacemaker   Lower Extremity Weight Bearing Restrictions   Right Lower Extremity Weight Bearing Weight Bearing As Tolerated   Left Lower Extremity Weight Bearing Weight Bearing As Tolerated   Position Activity Restriction   Hip Precautions No ABduction; No hip extension; No hip external rotation   Other position/activity restrictions Anterior hip precautions   General   Chart Reviewed Yes   Patient assessed for rehabilitation services? Yes   Additional Pertinent Hx CHRONIC BRONCHITIS, HYPOTHYROIDISM, COPD, HX OF TIA, HX OF DIVERTICULAR DISEASE OF COLON    Diagnosis R HIP HEMIARTHROPLASTY; R DISPLACED SUBCAPITAL FEMORAL NECK FX   Subjective   Subjective Pt sitting in recliner upon entering room. RNLopez leaving. Pt agrees to participate in therapy. Pt does not report any initial pain, just stiffness. Pain Screening   Patient Currently in Pain Yes   Pain Assessment   Pain Assessment Faces   Pain Level 7  (Pain with active R knee ext )   Pain Type Surgical pain   Pain Location Hip   Pain Orientation Right   Pain Descriptors Throbbing   Functional Pain Assessment Activities are not prevented   Non-Pharmaceutical Pain Intervention(s) Ambulation/Increased Activity; Rest  (Rest post therapy )   Response to Pain Intervention Patient Satisfied   Pain Frequency Intermittent   Clinical Progression Not changed   Pre Treatment Pain Screening   Intervention List Patient able to continue with treatment   Transfers   Sit to Stand Moderate Assistance;Minimal Assistance  (From low sitting recliner.  Mod A X1 , Min A X 2 )   Stand to sit Contact guard assistance;Minimal Assistance   Lateral Transfers Stand by assistance;Contact guard assistance  (From BR<recliner with RW )   Comment Pillow under pt in chair    Ambulation Ambulation? Yes   WB Status WBAT   More Ambulation? Yes   Ambulation 1   Surface level tile   Device Rolling Walker   Assistance Stand by assistance;Contact guard assistance   Quality of Gait Pt ABLE TO DECREASE AMOUNT OF WB THROUGH ARMS DURING ADVANCEMENT OF RLE  (Generally steady )   Gait Deviations Increased SANNA; Decreased step length;Decreased step height;Decreased head and trunk rotation   Distance 20'   Comments BR,recliner in room with RW    Exercises   Hip Flexion LLE X 20 reps   Knee Long Arc Quad LLE X 20 reps,  RLE 5 reps X 3    Ankle Pumps BLE X 20 reps   Comments Pt had increased R hip pain with active R knee extension    Patient Goals    Patient goals  go home   Short term goals   Time Frame for Short term goals 1-2 WEEKS   Short term goal 1 SUPERVISION WITH BED MOBILITY   Short term goal 2 SBA WITH TRANSFERS   Short term goal 3 SBA WITH CAR TRANSFER   Short term goal 4 NEGOTIATE 1 STEP CGA   Short term goal 5 PROPEL W/C 150 FT SUPERVISION   Conditions Requiring Skilled Therapeutic Intervention   Body structures, Functions, Activity limitations Decreased functional mobility ; Decreased ROM; Decreased strength;Decreased endurance;Decreased balance; Increased pain;Decreased posture   Assessment Pt asked about being Ad christiano in room. Communicated with PT Salty Haskins. Pt requires Supervision with dynamic balance on toilet, SBA while amb. with RW in BR. Pt independent with personal hygiene. Treatment Diagnosis INTERFERENCE WITH ACTIVITY.    History CHRONIC BRONCHITIS, HYPOTHYROIDISM, COPD, HX OF TIA, HX OF DIVERTICULAR DISEASE OF COLON, R HIP HEMIARTHROPLASTY; R DISPLACED SUBCAPITAL FEMORAL NECK FX    Treatment Initiated  gait, transfers, ther ex and bed mobility   Activity Tolerance   Activity Tolerance Patient Tolerated treatment well;Patient limited by pain   Safety Devices   Type of devices Patient at risk for falls;Gait belt;Left in chair  (OT coming into room soon )   PT Whiteboard Notes   Therapy Whiteboard RE 5/30 R hip amberly, ant prec, FWBAT, Yovany         Electronically signed by Deedee Palacio PTA on 5/31/2021 at 12:58 PM

## 2021-05-31 NOTE — PROGRESS NOTES
05/31/21 1935   Restrictions/Precautions   Restrictions/Precautions Weight Bearing; Fall Risk   Required Braces or Orthoses? No   Implants present? Pacemaker   Lower Extremity Weight Bearing Restrictions   Right Lower Extremity Weight Bearing Weight Bearing As Tolerated   Left Lower Extremity Weight Bearing Weight Bearing As Tolerated   Position Activity Restriction   Hip Precautions No ABduction; No hip extension; No hip external rotation   Other position/activity restrictions Anterior hip precautions   General   Diagnosis R HIP HEMIARTHROPLASTY; R DISPLACED SUBCAPITAL FEMORAL NECK FX   Bed Mobility   Supine to Sit Supervision   Transfers   Sit to Stand Stand by assistance  (WITH RW)   Stand to sit Stand by assistance  (WITH RW)   Ambulation   Ambulation? Yes   WB Status WBAT   More Ambulation? Yes   Ambulation 1   Surface level tile   Device Rolling Walker   Other Apparatus Wheelchair follow   Assistance Stand by assistance   Quality of Gait RECIPROCAL GAIT PATTERN. DECREASED STANCE TIME ON RLE. DECREASED WB THROUGH UES. Gait Deviations Decreased step length;Decreased step height;Slow Jane   Distance 50'   Comments DISTANCE LIMITED BY FATIGUE   Ambulation 2   Surface - 2 level tile   Device 2 Rolling Walker   Other Apparatus 2 Wheelchair follow   Assistance 2 Stand by assistance   Quality of Gait 2 RECIPROCAL GAIT PATTERN. DECREASED STANCE TIME ON RLE. Gait Deviations Slow Jane;Decreased step length;Decreased step height   Distance 30'   Comments DISTANCE LIMITED BY FATIGUE   Ambulation 3   Surface - 3 level tile   Device 3 Rolling Walker   Other Apparatus 3 Wheelchair follow   Assistance 3 Stand by assistance   Quality of Gait 3 RECIPROCAL GAIT PATTERN. DECREASED STANCE TIME ON RLE.     Gait Deviations Slow Jane;Decreased step height;Decreased step length   Distance 80'   Comments DISTANCE LIMITED BY FATIGUE   Other exercises   Other exercises 1 BILAT HIP FLEX, X10   Other exercises 2 BILAT KNEE EXT, 2X10  (2# ANKLE WEIGHT ON L WITH LAST SET)   Conditions Requiring Skilled Therapeutic Intervention   Assessment Pt LIMITED BY PAIN/STIFFNESS THAT GRADUALLY DECREASES WITH ACTIVITY; DEMONSTRATES RECIPROCAL GAIT PATTERN WITH LESS ASSISTANCE FROM UE WHEN DISTRACTED ; ABLE TO AMB TO BATHROOM WITH SUPERVISION ASSISTANCE   Activity Tolerance   Activity Tolerance Patient Tolerated treatment well;Patient limited by pain   Safety Devices   Type of devices All fall risk precautions in place;Gait belt;Left in chair  (LEFT AT OT)     Electronically signed by JOEL Andrews on 5/31/21 at 2:39 PM CDT

## 2021-05-31 NOTE — PATIENT CARE CONFERENCE
[facility] ACUTE INPATIENT REHABILITATION  TEAM CONFERENCE NOTE    Date: 2021  Patient Name: Beverley Thayer        MRN: [id number]    : 1950  (76 y.o.)  Gender: female      Diagnosis: R HIP HEMIARTHROPLASTY; R DISPLACED SUBCAPITAL FEMORAL NECK FX      PHYSICAL THERAPY  STRENGTH  Strength RLE  Strength RLE: Exception  Comment: 3-/5 grossly  Strength LLE  Strength LLE: WFL  Comment: 5/5  ROM  AROM RLE (degrees)  RLE AROM: Exceptions  RLE General AROM: HIP FLEXION TO 90 DUE TO PAIN, HIP ABD AND EXTERNAL ROTATION LIMITED DUE TO PRECAUTIONS  AROM LLE (degrees)  LLE AROM : WFL  BED MOBILITY  Bed Mobility  Rolling: Minimal assistance (TO THE L; NOT PERFORMED TO THE R DUE TO SURGICAL PAIN)  Supine to Sit: Supervision  Sit to Supine: Minimal assistance (BLE management)  Scooting: Stand by assistance  Comment: SUPINE TO SIT:  CUES FOR SEGMENTALLY MOVING LEGS UNILATERALLY THEN TRUNK BEFORE REACHING WITH RIGHT HAND FOR LEFT RAIL TO COME INTO SITTING. SIT TO SUPINE USING BILATERAL UES ON BED TO PIVOT HIPS/LES  TRANSFERS  Transfers  Sit to Stand: Stand by assistance (WITH RW)  Stand to sit: Stand by assistance (WITH RW)  Bed to Chair: Stand by assistance  Lateral Transfers: Stand by assistance, Contact guard assistance (From BR<recliner with RW )  Comment: Pillow under pt in chair   WHEELCHAIR PROPULSION  Propulsion 1  Propulsion: Manual  Level: Level Tile  Method: JOSE LUIS ANDERSON  Level of Assistance: Stand by assistance  Description/ Details: Verbal cues for steering. Veers to right. No turns. Distance: 150'  AMBULATION  Ambulation 1  Surface: level tile  Device: Rolling Walker  Other Apparatus: Wheelchair follow  Assistance: Stand by assistance  Quality of Gait: RECIPROCAL GAIT PATTERN. DECREASED STANCE TIME ON RLE. DECREASED WB THROUGH UES.    Gait Deviations: Decreased step length, Decreased step height, Slow Jane  Distance: 50'  Comments: DISTANCE LIMITED BY FATIGUE  STAIRS  Stairs  # Steps : 4  Stairs Height: Assessment:  Performance deficits / Impairments: Decreased functional mobility , Decreased ADL status, Decreased endurance, Decreased balance, Decreased high-level IADLs                  NUTRITION  Current Wt: Weight: 147 lb 9 oz (66.9 kg) / Body mass index is 21.79 kg/m². Admission Wt: Admission Body Weight: 156 lb (70.8 kg)  Oral Diet Orders: General    Po intakes % most meals. 6% wt loss X 1 week. Will monitor for need of additional nutrition intervention. Please see nutrition note for details. NURSING    Wounds/Incisions/Ulcers: Right hip incision MICHAEL with steri strips and slightly red     Brent Scale Score: 16    Pain: Percocet 5 mg q4hrs prn for pain control    Consultations/Labs/X-rays:   Routine labs on Monday and Thursdays Consults Dr. Rosi Toro    Family Education: Need to make contact with family to initiate education prior to discharge. Fall Risk:  Shankar Score: [de-identified]    Fall in the last week? No falls this hospital stay      Other Nursing Issues:   Patient is Alert and Oriented. Incontinent of bladder at times and continent of bowel with last BM on 05/3021. Assist x 1 for transfers with walker. Patient is on General diet and takes meds whole with water. SOCIAL WORK/CASE MANAGEMENT  Assessment: Has been cooperative and appreciative. Her son, who is on disability, resides with her. 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 Avenues of assisting financially have been explored-she has applied for food stamps and Medicare Savings Plan while here, which will offer some assist.    Discharge Plan   Estimated Length of Stay: 6/2/21 and going to Dr. Camila Caceres office for follow up visit  Destination: home health    Pass: No    Services at Discharge: [email or web address] and Nursing per evaluations    Equipment at Discharge: To be determined    Progress made in the prior week:  1. Min A on bed mobility  2.  Supervision for all aspects of toileting  3.  4.  5.      Goals for following week: 1. SBA on bed mobility  2. Mod I for ADLs  3.   4.   5.     Factors facilitating achievement of predicted outcomes: Motivated    Barriers to the achievement of predicted outcomes: Lower extremity weakness    Team Members Present at Conference:  : Jovita Chau [facility]   Occupational Therapist: Ezekiel Damon OTR/L  Physical Therapist: Danial Holman DPT  Speech Therapist: N/A  Nurse: Avani Ahumada RN,BSN   Nurse Manager:  Avani Ahumada RN, BSN  Dietitian:  Demetria Montana, MS, RD, LD  Rehab Director:  Diana Buenrostro approve the established interdisciplinary plan of care as documented within the medical record of Jhony Corbett

## 2021-05-31 NOTE — PROGRESS NOTES
Occupational Therapy     05/31/21 0900   General   Additional Pertinent Hx R hip fx, THR with anterior hip precautions   Pain Assessment   Patient Currently in Pain No   Pain Assessment 0-10   Pain Level 0   Balance   Sitting Balance Independent   Standing Balance Supervision   Functional Mobility   Functional - Mobility Device Rolling Walker   Activity To/from bathroom   Assist Level Supervision   Transfers   Sit to stand Supervision   Stand to sit Supervision   Short term goals   Short term goal 3 MET   Assessment   Performance deficits / Impairments Decreased functional mobility ; Decreased ADL status; Decreased endurance;Decreased balance;Decreased high-level IADLs   Treatment Diagnosis Right hip fx s/p hip hemiarthroplasty   Prognosis Good   Timed Code Treatment Minutes 60 Minutes   Activity Tolerance   Activity Tolerance Patient Tolerated treatment well   Safety Devices   Safety Devices in place Yes   Type of devices Call light within reach  (Refused alarm, nursing notified.)   Plan   Current Treatment Recommendations Strengthening;Balance Training;Functional Mobility Training; Endurance Training; Safety Education & Training;Patient/Caregiver Education & Training;Equipment Evaluation, Education, & procurement;Self-Care / ADL; Home Management Training      05/31/21 0900   Eating   Assistance Needed Independent   CARE Score 6   [address] Needed Supervision or touching assistance   CARE Score 4   Shower/Bathe Self   Assistance Needed Supervision or touching assistance   Comment Shower, supervision. CARE Score 4   Upper Body Dressing   Assistance Needed Independent   CARE Score 6   Lower Body Dressing   Assistance Needed Supervision or touching assistance   Comment Supervision, utilized AE. CARE Score 4   Putting On/Taking Off Footwear   Assistance Needed Partial/moderate assistance   Comment Socks only, will need further practice donning shoes using AE.    CARE Score 3      05/31/21 0900 Toilet Transfer   Assistance Needed Supervision or touching assistance   CARE Score 4

## 2021-05-31 NOTE — PLAN OF CARE
Problem: Falls - Risk of:  Goal: Will remain free from falls  Description: Will remain free from falls  5/31/2021 0843 by Marisa Yao RN  Outcome: Ongoing  5/30/2021 2138 by Colby Ramirez RN  Outcome: Ongoing  Goal: Absence of physical injury  Description: Absence of physical injury  5/31/2021 0843 by Marisa Yao RN  Outcome: Ongoing  5/30/2021 2138 by Colby Ramirez RN  Outcome: Ongoing     Problem: Infection:  Goal: Will remain free from infection  Description: Will remain free from infection  5/31/2021 0843 by Marisa Yao RN  Outcome: Ongoing  5/30/2021 2138 by Colby Ramirez RN  Outcome: Ongoing     Problem: Safety:  Goal: Free from accidental physical injury  Description: Free from accidental physical injury  5/31/2021 0843 by Marisa Yao RN  Outcome: Ongoing  5/30/2021 2138 by Colby Ramirez RN  Outcome: Ongoing  Goal: Free from intentional harm  Description: Free from intentional harm  5/31/2021 0843 by Marisa Yao RN  Outcome: Ongoing  5/30/2021 2138 by Colby Ramirez RN  Outcome: Ongoing     Problem: Discharge Planning:  Goal: Patients continuum of care needs are met  Description: Patients continuum of care needs are met  5/31/2021 0843 by Marisa Yao RN  Outcome: Ongoing  5/30/2021 2138 by Colby Ramirez RN  Outcome: Ongoing     Problem: Skin Integrity:  Goal: Will show no infection signs and symptoms  Description: Will show no infection signs and symptoms  5/31/2021 0843 by Marisa Yao RN  Outcome: Ongoing  5/30/2021 2138 by Colby Ramirez RN  Outcome: Ongoing  Goal: Absence of new skin breakdown  Description: Absence of new skin breakdown  5/31/2021 0843 by Marisa Yao RN  Outcome: Ongoing  5/30/2021 2138 by Colby Ramirez RN  Outcome: Ongoing     Problem: Pain:  Goal: Pain level will decrease  Description: Pain level will decrease  5/31/2021 0843 by Marisa Yao RN  Outcome: Ongoing  5/30/2021 2138 by Colby Ramirez RN  Outcome: Ongoing  Goal: Control of acute pain  Description: Control of acute pain  5/31/2021 0843 by Jodie Wallace RN  Outcome: Ongoing  5/30/2021 2138 by Patricia Bernal RN  Outcome: Ongoing  Goal: Control of chronic pain  Description: Control of chronic pain  5/31/2021 0843 by Jodie Wallace RN  Outcome: Ongoing  5/30/2021 2138 by Patricia Bernal RN  Outcome: Ongoing     Problem: Bleeding:  Goal: Will show no signs and symptoms of excessive bleeding  Description: Will show no signs and symptoms of excessive bleeding  5/31/2021 0843 by Jodie Wallace RN  Outcome: Ongoing  5/30/2021 2138 by Patricia Bernal RN  Outcome: Ongoing     Problem: Infection - Surgical Site:  Goal: Will show no infection signs and symptoms  Description: Will show no infection signs and symptoms  5/31/2021 0843 by Jodie Wallace RN  Outcome: Ongoing  5/30/2021 2138 by Patricia Bernal RN  Outcome: Ongoing

## 2021-05-31 NOTE — PLAN OF CARE
Problem: Falls - Risk of:  Goal: Will remain free from falls  Description: Will remain free from falls  5/30/2021 2138 by Christopher Contreras RN  Outcome: Ongoing  5/30/2021 1052 by Lolly Bustillos RN  Outcome: Ongoing  Goal: Absence of physical injury  Description: Absence of physical injury  5/30/2021 2138 by Christopher Contreras RN  Outcome: Ongoing  5/30/2021 1052 by Lolly Bustillos RN  Outcome: Ongoing     Problem: Infection:  Goal: Will remain free from infection  Description: Will remain free from infection  5/30/2021 2138 by Christopher Contreras RN  Outcome: Ongoing  5/30/2021 1052 by Lolly Bustillos RN  Outcome: Ongoing     Problem: Safety:  Goal: Free from accidental physical injury  Description: Free from accidental physical injury  5/30/2021 2138 by Christopher Contreras RN  Outcome: Ongoing  5/30/2021 1052 by Lolly Bustillos RN  Outcome: Ongoing  Goal: Free from intentional harm  Description: Free from intentional harm  5/30/2021 2138 by Christopher Contreras RN  Outcome: Ongoing  5/30/2021 1052 by Lolly Bustillos RN  Outcome: Ongoing

## 2021-06-01 PROCEDURE — 1180000000 HC REHAB R&B

## 2021-06-01 PROCEDURE — 6370000000 HC RX 637 (ALT 250 FOR IP): Performed by: HOSPITALIST

## 2021-06-01 PROCEDURE — 6360000002 HC RX W HCPCS: Performed by: HOSPITALIST

## 2021-06-01 PROCEDURE — 97535 SELF CARE MNGMENT TRAINING: CPT

## 2021-06-01 PROCEDURE — 97530 THERAPEUTIC ACTIVITIES: CPT

## 2021-06-01 PROCEDURE — 99233 SBSQ HOSP IP/OBS HIGH 50: CPT | Performed by: PSYCHIATRY & NEUROLOGY

## 2021-06-01 PROCEDURE — 94640 AIRWAY INHALATION TREATMENT: CPT

## 2021-06-01 PROCEDURE — 6370000000 HC RX 637 (ALT 250 FOR IP): Performed by: PSYCHIATRY & NEUROLOGY

## 2021-06-01 PROCEDURE — 6360000002 HC RX W HCPCS: Performed by: PSYCHIATRY & NEUROLOGY

## 2021-06-01 PROCEDURE — 97110 THERAPEUTIC EXERCISES: CPT

## 2021-06-01 PROCEDURE — 97116 GAIT TRAINING THERAPY: CPT

## 2021-06-01 RX ADMIN — OXYCODONE HYDROCHLORIDE AND ACETAMINOPHEN 1 TABLET: 5; 325 TABLET ORAL at 02:19

## 2021-06-01 RX ADMIN — ENOXAPARIN SODIUM 40 MG: 40 INJECTION SUBCUTANEOUS at 07:34

## 2021-06-01 RX ADMIN — BUDESONIDE 500 MCG: 0.5 SUSPENSION RESPIRATORY (INHALATION) at 18:46

## 2021-06-01 RX ADMIN — ARFORMOTEROL TARTRATE 15 MCG: 15 SOLUTION RESPIRATORY (INHALATION) at 18:46

## 2021-06-01 RX ADMIN — BUDESONIDE 500 MCG: 0.5 SUSPENSION RESPIRATORY (INHALATION) at 06:15

## 2021-06-01 RX ADMIN — ARFORMOTEROL TARTRATE 15 MCG: 15 SOLUTION RESPIRATORY (INHALATION) at 06:15

## 2021-06-01 RX ADMIN — ATORVASTATIN CALCIUM 40 MG: 40 TABLET, FILM COATED ORAL at 20:44

## 2021-06-01 RX ADMIN — LEVOTHYROXINE SODIUM 88 MCG: 0.09 TABLET ORAL at 05:43

## 2021-06-01 RX ADMIN — OXYCODONE HYDROCHLORIDE AND ACETAMINOPHEN 1 TABLET: 5; 325 TABLET ORAL at 22:39

## 2021-06-01 ASSESSMENT — PAIN DESCRIPTION - ONSET
ONSET: GRADUAL
ONSET: GRADUAL

## 2021-06-01 ASSESSMENT — PAIN DESCRIPTION - LOCATION
LOCATION: HIP
LOCATION: HIP

## 2021-06-01 ASSESSMENT — PAIN DESCRIPTION - FREQUENCY
FREQUENCY: INTERMITTENT
FREQUENCY: INTERMITTENT

## 2021-06-01 ASSESSMENT — PAIN SCALES - GENERAL
PAINLEVEL_OUTOF10: 0
PAINLEVEL_OUTOF10: 9
PAINLEVEL_OUTOF10: 0
PAINLEVEL_OUTOF10: 8

## 2021-06-01 ASSESSMENT — PAIN DESCRIPTION - ORIENTATION
ORIENTATION: RIGHT
ORIENTATION: RIGHT

## 2021-06-01 ASSESSMENT — PAIN DESCRIPTION - PROGRESSION
CLINICAL_PROGRESSION: GRADUALLY IMPROVING
CLINICAL_PROGRESSION: GRADUALLY IMPROVING

## 2021-06-01 ASSESSMENT — PAIN DESCRIPTION - DESCRIPTORS
DESCRIPTORS: DISCOMFORT
DESCRIPTORS: DISCOMFORT

## 2021-06-01 ASSESSMENT — PAIN DESCRIPTION - PAIN TYPE
TYPE: SURGICAL PAIN
TYPE: SURGICAL PAIN

## 2021-06-01 NOTE — PROGRESS NOTES
06/01/21 0815   Restrictions/Precautions   Required Braces or Orthoses? No   Lower Extremity Weight Bearing Restrictions   Right Lower Extremity Weight Bearing Weight Bearing As Tolerated   Left Lower Extremity Weight Bearing Weight Bearing As Tolerated   Position Activity Restriction   Other position/activity restrictions Anterior hip precautions   General   Diagnosis R HIP HEMIARTHROPLASTY; R DISPLACED SUBCAPITAL FEMORAL NECK FX   Bed Mobility   Rolling Independent   Supine to Sit Independent   Sit to Supine Independent   Scooting Independent   Transfers   Sit to Stand Independent   Stand to sit Independent   Bed to Chair Independent  (wheelchair)   Ambulation   Ambulation? Yes   WB Status WBAT   More Ambulation?  Yes   Ambulation 1   Surface level tile   Device Rolling Walker   Assistance Supervision;Stand by assistance   Quality of Gait RECIPROCAL GAIT; DECREASED STANCE ON RLE; INCR WB THROUGH BUE WHEN Pt BECOMES FATIGUED   Gait Deviations Decreased step length;Decreased step height;Slow Jane   Distance 100 FT   Comments 2 TURNS    Ambulation 2   Surface - 2 level tile   Device 2 Rolling Walker   Assistance 2 Supervision;Stand by assistance   Quality of Gait 2 RECIPROCAL GAIT PATTERN WITH DEC STANCE TIME ON RLE; INC WB THROUGH BUE WHEN AS FATIGUE INC    Gait Deviations Slow Jane;Decreased step length;Decreased step height   Distance 125 FT   Comments 2 TURNS   Balance   Sitting - Static Good   Standing - Dynamic Good;-   Conditions Requiring Skilled Therapeutic Intervention   Assessment Pt CK AD YVES, ABLE TO AMB AND TRANSFER INDEP IN ROOM AND TO BATHROOM, Pt STILL HAS DIFFICULT TIME GETTING UP FROM RECLINER BECAUSE OF LOW HEIGHT AND REQ CG/MIN-A; Pt WAS ABLE TO AMB IN HALLWAY CONTINUOSLY WITHOUT WHEELCHAIR FOLLOW; Pt BELIEVES SHE IS BEING DISCHARGED WED (6/2) AND WOULD LIKE TO PRACTICE CAR TRANSFERS   Activity Tolerance   Activity Tolerance Patient Tolerated treatment well   Safety Devices   Type of devices All fall risk precautions in place; Left in bed;Call light within reach     Electronically signed by Charmayne Dallas, SPT on 6/1/21 at 10:24 AM CDT

## 2021-06-01 NOTE — PROGRESS NOTES
DURING SESSION DUE TO LACKING ENDURANCE IN RLE. Pt CONTINUES TO AMBULATE SUPERVISION WITH NO LOB. Activity Tolerance   Activity Tolerance Patient Tolerated treatment well;Patient limited by fatigue   Safety Devices   Type of devices Call light within reach; Left in bed; All fall risk precautions in place         Electronically signed by JOEL Lewis on 6/1/2021 at 2:08 PM

## 2021-06-01 NOTE — PROGRESS NOTES
[facility]  Test for Patient [place] in the Areas of Transfers/Ambulation    Ambulation/Transfers   · Independent ambulation in room with assistive device:  YES    -Device Type:  ROLLING WALKER  · Independent transfers from wheelchair to surface in room:  YES     Bathroom Transfers  · Independent transfers in patient bathroom: [place] and Therapies feel as though the patient qualifies for an acute rehabilitation test for independence in the areas of transfers and ambulation prior to discharging from Inpatient Rehabilitation Unit at [facility].  This test for independence in the areas of transfers and ambulation must be agreed upon by the patient's physician, nurse, and therapists.         Nurse Approval:  Electronically signed by Sissy Segura RN on 6/1/2021 at 10:20 AM    Physical Therapist Approval:  Electronically signed by Esau Alfonso PTA on 6/1/2021 at 9:07 AM    Occupational Therapist Approval: Electronically signed by MICHAEL Hermosillo on 6/1/2021 at 10:02 AM

## 2021-06-01 NOTE — PLAN OF CARE
Problem: Falls - Risk of:  Goal: Will remain free from falls  Description: Will remain free from falls  Outcome: Ongoing  Goal: Absence of physical injury  Description: Absence of physical injury  Outcome: Ongoing     Problem: Infection:  Goal: Will remain free from infection  Description: Will remain free from infection  Outcome: Ongoing     Problem: Safety:  Goal: Free from accidental physical injury  Description: Free from accidental physical injury  Outcome: Ongoing  Goal: Free from intentional harm  Description: Free from intentional harm  Outcome: Ongoing     Problem: Skin Integrity:  Goal: Will show no infection signs and symptoms  Description: Will show no infection signs and symptoms  Outcome: Ongoing  Goal: Absence of new skin breakdown  Description: Absence of new skin breakdown  Outcome: Ongoing     Problem: Pain:  Goal: Pain level will decrease  Description: Pain level will decrease  Outcome: Ongoing  Goal: Control of acute pain  Description: Control of acute pain  Outcome: Ongoing  Goal: Control of chronic pain  Description: Control of chronic pain  Outcome: Ongoing     Problem: Bleeding:  Goal: Will show no signs and symptoms of excessive bleeding  Description: Will show no signs and symptoms of excessive bleeding  Outcome: Ongoing     Problem: Infection - Surgical Site:  Goal: Will show no infection signs and symptoms  Description: Will show no infection signs and symptoms  Outcome: Ongoing

## 2021-06-01 NOTE — PROGRESS NOTES
Assistance Needed Independent   Comment Socks and shoes, used AE.    CARE Score 6      06/01/21 1000   Toilet Transfer   Assistance Needed Independent   CARE Score 6

## 2021-06-01 NOTE — PROGRESS NOTES
06/01/21 1430   Restrictions/Precautions   Restrictions/Precautions Fall Risk   Required Braces or Orthoses? No   Lower Extremity Weight Bearing Restrictions   Right Lower Extremity Weight Bearing Weight Bearing As Tolerated   Left Lower Extremity Weight Bearing Weight Bearing As Tolerated   Position Activity Restriction   Other position/activity restrictions Anterior hip precautions   General   Diagnosis R HIP HEMIARTHROPLASTY; R DISPLACED SUBCAPITAL FEMORAL NECK FX   Pain Screening   Patient Currently in Pain No   Transfers   Sit to Stand Independent   Stand to sit Independent   Car Transfer Contact guard assistance;Stand by assistance  (CG FOR SET-UP AND REQ CUES FOR HAND PLACEMENT)   Ambulation 1   Surface level tile   Device Rolling Walker   Assistance Supervision   Quality of Gait RECIPROCAL GAIT; DECREASED STANCE ON RLE; INCR WB THROUGH BUE WHEN Pt BECOMES FATIGUED   Gait Deviations Decreased step length;Decreased step height;Slow Jane   Distance 50FT   Comments 2 TURNS; Pt AMB TO BED   Other exercises   Other exercises 1 SIDE STEPS IN PARALLEL BARS 15FT EACH WAY   Conditions Requiring Skilled Therapeutic Intervention   Assessment Pt COMPLETED CAR TRANSFERTO THE LEFT WITH CG/SBA; Pt CONTINUES TO WB THROUGH HER BUE WHILE AMB AND NEEDS CUES TO RELAX HER ARMS; Pt REPORTED SOME FATIGUE BUT WAS ABLE TO COMPLETE TREATMENT SESSION WITH REST BREAKS BETWEEN EXERCISES   Activity Tolerance   Activity Tolerance Patient Tolerated treatment well   Safety Devices   Type of devices All fall risk precautions in place;Call light within reach; Left in bed   Electronically signed by Davida Scheuermann, SPT on 6/1/21 at 3:24 PM CDT

## 2021-06-01 NOTE — PROGRESS NOTES
Patient:   Adele Santamaria  MR#:    [id number]   Room:    0821/821-02   YOB: 1950  Date of Progress Note: 6/1/2021  Time of Note                           8:17 AM  Consulting Physician:   Mathew Cordoba M.D. Attending Physician:  Mathew Cordoba MD     Chief complaint S/P right femur fracture and repair     S:This 72 y.o. female  with history of COPD/chronic bronchitis, tobacco abuse, hyperlipidemia, hypothyroidism, and bradycardia s/p pacemaker. She presented to [facility] ER on 5/15/21 after having a fall. She had severe onset of right hip/leg pain and was unable to bear weight. X-rays done revealed a right displaced subcapital femoral neck fracture. She was admitted to the hospitalist service with consult for orthopedic surgery. She was seen by Dr. Deven Barrientos, who recommended right cemented hip hemiarthroplasty. She was in agreement and proceeded to surgery. She tolerated the procedure well. She will be weightbearing as tolerated on her right lower extremity with anterior hip precautions. She is participating in both PT/OT. She is felt to need a stay on Rehab to work towards her goal of returning home with assist of her son. She is now felt ready to start the Rehab program. No new issues overnight.       REVIEW OF SYSTEMS:  Constitutional: No fevers No chills  Neck:No stiffness  Respiratory: No shortness of breath  Cardiovascular: No chest pain No palpitations  Gastrointestinal: No abdominal pain    Genitourinary: No Dysuria  Neurological: No headache, no confusion    Past Medical History:      Diagnosis Date    Cerebral artery occlusion with cerebral infarction ([facility] [address]     TIA    Diverticular disease of colon     Hyperlipidemia     Hypothyroidism     Pneumonia     Psychiatric problem     Seizures ([address]     Tobacco abuse     Vitamin B12 deficiency        Past Surgical History:      Procedure Laterality Date    CARDIAC PACEMAKER PLACEMENT      COLONOSCOPY      HIP SURGERY Right 5/15/2021    HIP HEMIARTHROPLASTY performed by Aric Us MD at [facility]      collapsed lung    AR COLONOSCOPY W/BIOPSY SINGLE/MULTIPLE N/A 6/27/2017    Dr Dariela West-extensive and large mouthed diverticular disease throughout the right colon-Tubular AP (villiform) (-) dysplasia x 1, tubular AP (-) dysplasia x 1--5 yr recall    TUBAL LIGATION         Medications in Hospital:      Current Facility-Administered Medications:     oxyCODONE-acetaminophen (PERCOCET) 5-325 MG per tablet 1 tablet, 1 tablet, Oral, Q4H PRN, José Miguel Blackburn MD, 1 tablet at 06/01/21 0219    linaclotide (LINZESS) capsule 145 mcg, 145 mcg, Oral, QAM AC, José Miguel Blackburn MD, 145 mcg at 05/30/21 0531    ipratropium-albuterol (DUONEB) nebulizer solution 1 ampule, 1 ampule, Inhalation, Q4H PRN, José Miguel Blackburn MD    Arformoterol Tartrate (BROVANA) nebulizer solution 15 mcg, 15 mcg, Nebulization, BID, José Miguel Blackburn MD, 15 mcg at 06/01/21 0615    enoxaparin (LOVENOX) injection 40 mg, 40 mg, Subcutaneous, Daily, Jacob Belle MD, 40 mg at 06/01/21 0734    [DISCONTINUED] acetaminophen (TYLENOL) tablet 650 mg, 650 mg, Oral, Q6H PRN **OR** acetaminophen (TYLENOL) suppository 650 mg, 650 mg, Rectal, Q6H PRN, Jacob Belle MD    magnesium sulfate 2000 mg in 50 mL IVPB premix, 2,000 mg, Intravenous, PRN, Jacob Belle MD    potassium chloride (KLOR-CON M) extended release tablet 40 mEq, 40 mEq, Oral, PRN **OR** potassium bicarb-citric acid (EFFER-K) effervescent tablet 40 mEq, 40 mEq, Oral, PRN **OR** potassium chloride 10 mEq/100 mL IVPB (Peripheral Line), 10 mEq, Intravenous, PRN, Jacob Belle MD    promethazine (PHENERGAN) tablet 12.5 mg, 12.5 mg, Oral, Q6H PRN **OR** ondansetron (ZOFRAN) injection 4 mg, 4 mg, Intravenous, Q6H PRN, Jacob Belle MD    sodium chloride flush 0.9 % injection 5-40 mL, 5-40 mL, Intravenous, PRN, Jacob Belle MD    atorvastatin (LIPITOR) tablet 40 mg, 40 mg, Oral, Nightly, Jacob Belle MD, 40 mg at 05/31/21 2023    budesonide (PULMICORT) nebulizer suspension 500 mcg, 0.5 mg, Nebulization, BID, Romayne Cristal, MD, 500 mcg at 06/01/21 0615    levothyroxine (SYNTHROID) tablet 88 mcg, 88 mcg, Oral, Daily, Romayne Cristal, MD, 88 mcg at 06/01/21 0543    acetaminophen (TYLENOL) tablet 650 mg, 650 mg, Oral, Q4H PRN, Guero Gates MD    polyethylene glycol (GLYCOLAX) packet 17 g, 17 g, Oral, Daily PRN, Guero Gates MD, 17 g at 05/23/21 2111    Allergies:  Patient has no known allergies. Social History:   TOBACCO:   reports that she has been smoking cigarettes. She started smoking about 50 years ago. She has a 15.00 pack-year smoking history. She has never used smokeless tobacco.  ETOH:   reports no history of alcohol use. Family History:       Problem Relation Age of Onset    Other Mother         COPD, dementia    Heart Disease Mother     Stroke Mother     Heart Disease Father     Stroke Father     Diabetes Brother     Diabetes Brother     High Blood Pressure Brother     High Cholesterol Brother     Colon Polyps Brother     Colon Cancer Neg Hx     Liver Cancer Neg Hx     Liver Disease Neg Hx     Esophageal Cancer Neg Hx     Rectal Cancer Neg Hx     Stomach Cancer Neg Hx          PHYSICAL EXAM:  BP (!) 90/57   Pulse 66   Temp 96.6 °F (35.9 °C) (Temporal)   Resp 20   Ht 5' 9\" (1.753 m)   Wt 147 lb 9 oz (66.9 kg)   SpO2 98%   BMI 21.79 kg/m²     Constitutional  well developed, well nourished. Eyes  conjunctiva normal.   Ear, nose, throat - No scars, masses, or lesions over external nose or ears, no atrophy of tongue  Neck-symmetric, no masses noted, no jugular vein distension  Respiration- chest wall appears symmetric, good expansion,   normal effort without use of accessory muscles  Musculoskeletal  no significant wasting of muscles noted, no bony deformities  Extremities-no clubbing, cyanosis or edema  Skin  warm, dry, and intact. No rash, erythema, or pallor.   Psychiatric  mood, affect, and behavior appear normal.      Neurological exam  Awake, alert, fluent oriented appropriate affect  Attention and concentration appear appropriate  Recent and remote memory appears unremarkable  Speech normal without dysarthria  No clear issues with language of fund of knowledge     Cranial Nerve Exam     CN III, IV,VI-EOMI, No nystagmus, conjugate eye movements, no ptosis    CN VII-no facial assymetry       Motor Exam  antigravity throughout upper extremities bilaterally      Tremors- no tremors in hands or head noted     Gait  Not tested      Nursing/pcp notes, imaging,labs and vitals reviewed. PT,OT and/or speech notes reviewed    Lab Results   Component Value Date    WBC 6.6 05/31/2021    HGB 10.2 (L) 05/31/2021    HCT 33.1 (L) 05/31/2021    MCV 90.4 05/31/2021     05/31/2021     Lab Results   Component Value Date     05/31/2021    K 4.4 05/31/2021     05/31/2021    CO2 27 05/31/2021    BUN 17 05/31/2021    CREATININE 0.6 05/31/2021    GLUCOSE 106 05/31/2021    CALCIUM 9.2 05/31/2021    PROT 5.1 (L) 05/31/2021    LABALBU 3.3 (L) 05/31/2021    BILITOT <0.2 05/31/2021    ALKPHOS 98 05/31/2021    AST 15 05/31/2021    ALT 18 05/31/2021    LABGLOM >60 05/31/2021    GFRAA >59 05/31/2021    GLOB 2.9 10/16/2016     Lab Results   Component Value Date    INR 1.07 05/19/2021    INR 1.01 05/15/2021    INR 1.01 10/05/2018    PROTIME 13.8 05/19/2021    PROTIME 13.3 05/15/2021    PROTIME 13.2 10/05/2018         JOEL Huerta   Student Physical Therapist   Physical Therapy   Progress Notes       Cosign Needed   Date of Service:  5/28/2021  1:12 PM               Cosign Needed             Show:Clear all  []Manual[x]Template[]Copied    Added by:  [x]Mary Jo Spears SPT    []Hover for details       05/28/21 1100   Restrictions/Precautions   Restrictions/Precautions Weight Bearing; Fall Risk   Required Braces or Orthoses?  No   Lower Extremity Weight Bearing Restrictions   Right Lower Extremity Weight Bearing Weight Bearing As Tolerated   Left Lower Extremity Weight Bearing Weight Bearing As Tolerated   General   Diagnosis R HIP HEMIARTHROPLASTY; R DISPLACED SUBCAPITAL FEMORAL NECK FX   Pain Screening   Patient Currently in Pain No   Transfers   Sit to Stand Supervision   Stand to sit Supervision   Bed to Chair Stand by assistance   Ambulation   Ambulation? Yes   WB Status WBAT   More Ambulation? Yes   Ambulation 1   Surface level tile   Device Rolling Walker   Other Apparatus Wheelchair follow   Assistance Stand by assistance;Contact guard assistance   Quality of Gait ANTALGIC GAIT ON RLE Pt PUTTING MOST WEIGHT THROUGH BUE WHEN ADVANCING RLE;    Gait Deviations Slow Jane;Decreased step length   Distance 200 FT   Ambulation 2   Surface - 2 level tile   Device 2 Rolling Walker   Assistance 2 Stand by assistance   Quality of Gait 2 Antalgic gait on RLE with WB   Distance 15 FT   Comments Pt ABLE TO AMB 15FT TO RESTROOM WITH SUPERVISION ASSISTANCE   Stairs/Curb   Stairs? Yes   Stairs   # Steps  4   Stairs Height 4\"   Rails Bilateral   Assistance Contact guard assistance   Other exercises   Other exercises 1 WEIGHT SHIFTING TO RT SIDE  X 5 WITH DECREASING UE ASSISTANCE EACH REPITITION   Conditions Requiring Skilled Therapeutic Intervention   Assessment Pt ABLE TO COMPLETE 4 STEPS WITH INCREASED COFIDENCE AND CG ASSISTANCE; Pt NEEDS VERBAL CUEING TO DECREASE AMOUNT OF WB THROUGH BUE WHEN WALKING   Activity Tolerance   Activity Tolerance Patient Tolerated treatment well   Safety Devices   Type of devices All fall risk precautions in place; Bed alarm in place;Call light within reach; Patient at risk for falls; Left in bed      Electronically signed by JOEL Muñiz on 5/28/21 at 1:12 PM CDT                        RECORD REVIEW: Previous medical records, medications were reviewed at today's visit    IMPRESSION:   1. S/P right femur fracture and repair-pain control/mobilization  2. Hyperlipidemia-on statin  3. DVT prophylaxis-Lovenox  4. Hypothyroidism-on synthroid  5. COPD-nebs PRN  6. Pain control-Percocet PRN  7. GI-bowel regimen  8. Thrombocytopenia-monitor   9. PT/OT        EL[facility] [address]     Continue present care      Expected duration and frequency therapy: 180 minutes per day, 5 days per week    [address] [facility]  [phone] CELL  Dr Brigid Ortiz    Patient:   Gold Genao  MR#:    [id number]   Room:    [address] [place] [address]   YOB: 1950  Date of Progress Note: 6/1/2021  Time of Note                           8:17 AM  Consulting Physician:   Brigid Ortiz M.D. Attending Physician:  Brigid Ortiz MD     Chief complaint S/P right femur fracture and repair     S:This 72 y.o. female  with history of COPD/chronic bronchitis, tobacco abuse, hyperlipidemia, hypothyroidism, and bradycardia s/p pacemaker. She presented to [facility] ER on 5/15/21 after having a fall. She had severe onset of right hip/leg pain and was unable to bear weight. X-rays done revealed a right displaced subcapital femoral neck fracture. She was admitted to the hospitalist service with consult for orthopedic surgery. She was seen by Dr. Kash Pedersen, who recommended right cemented hip hemiarthroplasty. She was in agreement and proceeded to surgery. She tolerated the procedure well.    No c/o this am    REVIEW OF SYSTEMS:  Constitutional: No fevers No chills  Neck:No stiffness  Respiratory: No shortness of breath  Cardiovascular: No chest pain No palpitations  Gastrointestinal: No abdominal pain    Genitourinary: No Dysuria  Neurological: No headache, no confusion    Past Medical History:      Diagnosis Date    Cerebral artery occlusion with cerebral infarction ([address]     TIA    Diverticular disease of colon     Hyperlipidemia     Hypothyroidism     Pneumonia     Psychiatric problem     Seizures ([address]     Tobacco abuse     Vitamin B12 deficiency        Past Surgical History:      Procedure Laterality Date  CARDIAC PACEMAKER PLACEMENT      COLONOSCOPY      HIP SURGERY Right 5/15/2021    HIP HEMIARTHROPLASTY performed by Julien Payne MD at [facility]      collapsed lung    IN COLONOSCOPY W/BIOPSY SINGLE/MULTIPLE N/A 6/27/2017    Dr An West-extensive and large mouthed diverticular disease throughout the right colon-Tubular AP (villiform) (-) dysplasia x 1, tubular AP (-) dysplasia x 1--5 yr recall    TUBAL LIGATION         Medications in Hospital:      Current Facility-Administered Medications:     oxyCODONE-acetaminophen (PERCOCET) 5-325 MG per tablet 1 tablet, 1 tablet, Oral, Q4H PRN, Brigid Ortiz MD, 1 tablet at 06/01/21 0219    linaclotide (LINZESS) capsule 145 mcg, 145 mcg, Oral, QAM AC, Brigid Ortiz MD, 145 mcg at 05/30/21 0531    ipratropium-albuterol (DUONEB) nebulizer solution 1 ampule, 1 ampule, Inhalation, Q4H PRN, Brigid Ortiz MD    Arformoterol Tartrate (BROVANA) nebulizer solution 15 mcg, 15 mcg, Nebulization, BID, Brigid Ortiz MD, 15 mcg at 06/01/21 0615    enoxaparin (LOVENOX) injection 40 mg, 40 mg, Subcutaneous, Daily, Vijay Quick MD, 40 mg at 06/01/21 0734    [DISCONTINUED] acetaminophen (TYLENOL) tablet 650 mg, 650 mg, Oral, Q6H PRN **OR** acetaminophen (TYLENOL) suppository 650 mg, 650 mg, Rectal, Q6H PRN, Vijay Quick MD    magnesium sulfate 2000 mg in 50 mL IVPB premix, 2,000 mg, Intravenous, PRN, Vijay Qiuck MD    potassium chloride (KLOR-CON M) extended release tablet 40 mEq, 40 mEq, Oral, PRN **OR** potassium bicarb-citric acid (EFFER-K) effervescent tablet 40 mEq, 40 mEq, Oral, PRN **OR** potassium chloride 10 mEq/100 mL IVPB (Peripheral Line), 10 mEq, Intravenous, PRN, Vijay Quick MD    promethazine (PHENERGAN) tablet 12.5 mg, 12.5 mg, Oral, Q6H PRN **OR** ondansetron (ZOFRAN) injection 4 mg, 4 mg, Intravenous, Q6H PRN, Vijay Quick MD    sodium chloride flush 0.9 % injection 5-40 mL, 5-40 mL, Intravenous, PRN, Vijay Quick MD   atorvastatin (LIPITOR) tablet 40 mg, 40 mg, Oral, Nightly, Vijay Quick MD, 40 mg at 05/31/21 2023    budesonide (PULMICORT) nebulizer suspension 500 mcg, 0.5 mg, Nebulization, BID, Vijay Quick MD, 500 mcg at 06/01/21 0615    levothyroxine (SYNTHROID) tablet 88 mcg, 88 mcg, Oral, Daily, Vijay Quick MD, 88 mcg at 06/01/21 0543    acetaminophen (TYLENOL) tablet 650 mg, 650 mg, Oral, Q4H PRN, Brigid Ortiz MD    polyethylene glycol (GLYCOLAX) packet 17 g, 17 g, Oral, Daily PRN, Brigid Ortiz MD, 17 g at 05/23/21 2111    Allergies:  Patient has no known allergies. Social History:   TOBACCO:   reports that she has been smoking cigarettes. She started smoking about 50 years ago. She has a 15.00 pack-year smoking history. She has never used smokeless tobacco.  ETOH:   reports no history of alcohol use. Family History:       Problem Relation Age of Onset    Other Mother         COPD, dementia    Heart Disease Mother     Stroke Mother     Heart Disease Father     Stroke Father     Diabetes Brother     Diabetes Brother     High Blood Pressure Brother     High Cholesterol Brother     Colon Polyps Brother     Colon Cancer Neg Hx     Liver Cancer Neg Hx     Liver Disease Neg Hx     Esophageal Cancer Neg Hx     Rectal Cancer Neg Hx     Stomach Cancer Neg Hx          PHYSICAL EXAM:  BP (!) 90/57   Pulse 66   Temp 96.6 °F (35.9 °C) (Temporal)   Resp 20   Ht 5' 9\" (1.753 m)   Wt 147 lb 9 oz (66.9 kg)   SpO2 98%   BMI 21.79 kg/m²     Constitutional  well developed, well nourished.    Eyes  conjunctiva normal.   Ear, nose, throat - No scars, masses, or lesions over external nose or ears, no atrophy of tongue  Neck-symmetric, no masses noted, no jugular vein distension  Respiration- chest wall appears symmetric, good expansion,   normal effort without use of accessory muscles  Musculoskeletal  no significant wasting of muscles noted, no bony deformities  Extremities-no clubbing, cyanosis or edema  Skin  warm, dry, and intact. No rash, erythema, or pallor. Psychiatric  mood, affect, and behavior appear normal.      Neurological exam  Awake, alert, fluent oriented appropriate affect  Attention and concentration appear appropriate  Recent and remote memory appears unremarkable  Speech normal without dysarthria  No clear issues with language of fund of knowledge     Cranial Nerve Exam     CN III, IV,VI-EOMI, No nystagmus, conjugate eye movements, no ptosis    CN VII-no facial assymetry       Motor Exam  antigravity throughout upper extremities bilaterally      Tremors- no tremors in hands or head noted     Gait  Not tested      Nursing/pcp notes, imaging,labs and vitals reviewed.      PT,OT and/or speech notes reviewed    Lab Results   Component Value Date    WBC 6.6 05/31/2021    HGB 10.2 (L) 05/31/2021    HCT 33.1 (L) 05/31/2021    MCV 90.4 05/31/2021     05/31/2021     Lab Results   Component Value Date     05/31/2021    K 4.4 05/31/2021     05/31/2021    CO2 27 05/31/2021    BUN 17 05/31/2021    CREATININE 0.6 05/31/2021    GLUCOSE 106 05/31/2021    CALCIUM 9.2 05/31/2021    PROT 5.1 (L) 05/31/2021    LABALBU 3.3 (L) 05/31/2021    BILITOT <0.2 05/31/2021    ALKPHOS 98 05/31/2021    AST 15 05/31/2021    ALT 18 05/31/2021    LABGLOM >60 05/31/2021    GFRAA >59 05/31/2021    GLOB 2.9 10/16/2016     Lab Results   Component Value Date    INR 1.07 05/19/2021    INR 1.01 05/15/2021    INR 1.01 10/05/2018    PROTIME 13.8 05/19/2021    PROTIME 13.3 05/15/2021    PROTIME 13.2 10/05/2018        PHYSICAL THERAPY  STRENGTH  Strength RLE  Strength RLE: Exception  Comment: 3-/5 grossly  Strength LLE  Strength LLE: WFL  Comment: 5/5  ROM  AROM RLE (degrees)  RLE AROM: Exceptions  RLE General AROM: HIP FLEXION TO 90 DUE TO PAIN, HIP ABD AND EXTERNAL ROTATION LIMITED DUE TO PRECAUTIONS  AROM LLE (degrees)  LLE AROM : WFL  BED MOBILITY  Bed Mobility  Rolling: Minimal assistance (TO THE L; NOT PERFORMED TO THE R DUE TO SURGICAL PAIN)  Supine to Sit: Supervision  Sit to Supine: Minimal assistance (BLE management)  Scooting: Stand by assistance  Comment: SUPINE TO SIT:  CUES FOR SEGMENTALLY MOVING LEGS UNILATERALLY THEN TRUNK BEFORE REACHING WITH RIGHT HAND FOR LEFT RAIL TO COME INTO SITTING. SIT TO SUPINE USING BILATERAL UES ON BED TO PIVOT HIPS/LES  TRANSFERS  Transfers  Sit to Stand: Stand by assistance (WITH RW)  Stand to sit: Stand by assistance (WITH RW)  Bed to Chair: Stand by assistance  Lateral Transfers: Stand by assistance, Contact guard assistance (From BR<recliner with RW )  Comment: Pillow under pt in chair   WHEELCHAIR PROPULSION  Propulsion 1  Propulsion: Manual  Level: Level Tile  Method: JOSE LUIS ANDERSON  Level of Assistance: Stand by assistance  Description/ Details: Verbal cues for steering. Veers to right. No turns. Distance: 150'  AMBULATION  Ambulation 1  Surface: level tile  Device: Rolling Walker  Other Apparatus: Wheelchair follow  Assistance: Stand by assistance  Quality of Gait: RECIPROCAL GAIT PATTERN. DECREASED STANCE TIME ON RLE. DECREASED WB THROUGH UES.    Gait Deviations: Decreased step length, Decreased step height, Slow Jane  Distance: 50'  Comments: [address]  # Steps : 4  Stairs Height: 4\"  Rails: Bilateral  Assistance: Contact guard assistance  GOALS:  Short term goals  Time Frame for Short term goals: 1-2 WEEKS  Short term goal 1: SUPERVISION WITH BED MOBILITY  Short term goal 2: SBA WITH TRANSFERS  Short term goal 3: SBA WITH CAR TRANSFER  Short term goal 4: NEGOTIATE 1 STEP CGA  Short term goal 5: PROPEL W/C 150 FT SUPERVISION     Long term goals  Time Frame for Long term goals : 2-3 WEEKS  Long term goal 1: INDEP WITH BED MOBILITY  Long term goal 2: INDEP WITH TRANSFERS  Long term goal 3: INDEP WITH CAR TRANSFER  Long term goal 4: NEGOTIATE 4 STEPS INDEP  Long term goal 5: PROPEL W/C 150 FT INDEP     ASSESSMENT:  Assessment: Pt

## 2021-06-01 NOTE — PROGRESS NOTES
Durable Medical Equipment   Physician Order     Patient Name Marco Maxwell    Address   [address]   [address] Phone   [phone] (Home)   [phone] (Mobile)     Patient Height Height: 5' 9\" (175.3 cm)  Patient Weight 147 lb 9 oz (66.9 kg)   1950     F/O Payor/Plan Precert #   [address] [place] [address] & [facility]    Subscriber Subscriber #   Ted [place] [id number]   Address Phone   [address] [facility], [address] [facility] [phone]         DME NEEDS:  **BEDSIDE COMMODE  **WALKER WITH WHEELS        DIAGNOSIS:  Closed right hip fracture, initial encounter ([facility] [address] S72.001A   5/15/2021 Yes   Hypothyroidism E03.9 Medium  3/30/2015 Yes   Tobacco abuse Z72.0 Medium  Unknown Yes   Hyperlipidemia E78.5 Medium  Unknown Yes   Pacemaker Z95.0   2019 Yes   Chronic bronchitis ([facility] [address] Jessica Miller   5/15/2021 Yes   Hip pain M25.559   2021 Yes   Gait abnormality R26.9   2021 Yes       [facility]   History and Physical           Patient:                                    Marco Maxwell  MR#:                                        [id number]  Account Number:                   [id number]              Room:                                      7764/125-00      YOB: 1950  Date of Progress Note:           2021  Time of Note                           7:57 AM  Attending Physician:               Monserrat Amador MD           Admitting diagnosis:Unspecified intracapsular fracture of right femur, initial encounter for closed fracture     Secondary diagnoses:Hyperlipidemia, unspecified,Unspecified chronic bronchitis,Hypothyroidism, unspecified,Nicotine dependence, cigarettes, uncomplicated,Chronic obstructive pulmonary disease, unspecified     CHIEF COMPLAINT:  S/P right femur fracture and repair         HISTORY OF PRESENT ILLNESS:   This 72 y.o. female with history of COPD/chronic bronchitis, tobacco abuse, hyperlipidemia, hypothyroidism, and bradycardia s/p pacemaker. She presented to [facility] on 5/15/21 after having a fall. She had severe onset of right hip/leg pain and was unable to bear weight. X-rays done revealed a right displaced subcapital femoral neck fracture. She was admitted to the hospitalist service with consult for orthopedic surgery. She was seen by Dr. Shaina Short, who recommended right cemented hip hemiarthroplasty. She was in agreement and proceeded to surgery. She tolerated the procedure well. She will be weightbearing as tolerated on her right lower extremity with anterior hip precautions. She is participating in both PT/OT. She is felt to need a stay on Rehab to work towards her goal of returning home with assist of her son.  She is now felt ready to start the Rehab program.      ____________________ _____________________ ________________   Physician Signature      Physician Name (print)   Physician NPI          Date

## 2021-06-02 VITALS
RESPIRATION RATE: 20 BRPM | HEIGHT: 69 IN | TEMPERATURE: 96.6 F | SYSTOLIC BLOOD PRESSURE: 101 MMHG | DIASTOLIC BLOOD PRESSURE: 59 MMHG | BODY MASS INDEX: 21.85 KG/M2 | OXYGEN SATURATION: 94 % | WEIGHT: 147.56 LBS | HEART RATE: 61 BPM

## 2021-06-02 PROCEDURE — 6370000000 HC RX 637 (ALT 250 FOR IP): Performed by: PSYCHIATRY & NEUROLOGY

## 2021-06-02 PROCEDURE — 6370000000 HC RX 637 (ALT 250 FOR IP): Performed by: HOSPITALIST

## 2021-06-02 PROCEDURE — 94640 AIRWAY INHALATION TREATMENT: CPT

## 2021-06-02 PROCEDURE — 6360000002 HC RX W HCPCS: Performed by: HOSPITALIST

## 2021-06-02 PROCEDURE — 6360000002 HC RX W HCPCS: Performed by: PSYCHIATRY & NEUROLOGY

## 2021-06-02 PROCEDURE — 99239 HOSP IP/OBS DSCHRG MGMT >30: CPT | Performed by: PSYCHIATRY & NEUROLOGY

## 2021-06-02 RX ORDER — ASPIRIN/CALCIUM/MAG/ALUMINUM 325 MG
1 TABLET ORAL 2 TIMES DAILY
Qty: 60 TABLET | Refills: 0 | Status: SHIPPED | OUTPATIENT
Start: 2021-06-02 | End: 2021-07-23 | Stop reason: SDUPTHER

## 2021-06-02 RX ORDER — OXYCODONE HYDROCHLORIDE AND ACETAMINOPHEN 5; 325 MG/1; MG/1
1 TABLET ORAL EVERY 4 HOURS PRN
Qty: 30 TABLET | Refills: 0 | Status: SHIPPED | OUTPATIENT
Start: 2021-06-02 | End: 2021-06-09

## 2021-06-02 RX ORDER — ATORVASTATIN CALCIUM 40 MG/1
40 TABLET, FILM COATED ORAL NIGHTLY
Qty: 30 TABLET | Refills: 3 | Status: SHIPPED | OUTPATIENT
Start: 2021-06-02 | End: 2021-12-10 | Stop reason: SDUPTHER

## 2021-06-02 RX ORDER — LEVOTHYROXINE SODIUM 88 UG/1
88 TABLET ORAL DAILY
Qty: 30 TABLET | Refills: 3 | Status: SHIPPED | OUTPATIENT
Start: 2021-06-03 | End: 2021-07-23 | Stop reason: SDUPTHER

## 2021-06-02 RX ADMIN — LEVOTHYROXINE SODIUM 88 MCG: 0.09 TABLET ORAL at 05:44

## 2021-06-02 RX ADMIN — ENOXAPARIN SODIUM 40 MG: 40 INJECTION SUBCUTANEOUS at 07:42

## 2021-06-02 RX ADMIN — LINACLOTIDE 145 MCG: 145 CAPSULE, GELATIN COATED ORAL at 05:44

## 2021-06-02 RX ADMIN — BUDESONIDE 500 MCG: 0.5 SUSPENSION RESPIRATORY (INHALATION) at 06:15

## 2021-06-02 RX ADMIN — ARFORMOTEROL TARTRATE 15 MCG: 15 SOLUTION RESPIRATORY (INHALATION) at 06:15

## 2021-06-02 NOTE — PLAN OF CARE
Problem: Falls - Risk of:  Goal: Will remain free from falls  Description: Will remain free from falls  6/1/2021 2315 by Pete Groves RN  Outcome: Ongoing  6/1/2021 1348 by Rosaline Fields RN  Outcome: Ongoing  Goal: Absence of physical injury  Description: Absence of physical injury  6/1/2021 2315 by Pete Groves RN  Outcome: Ongoing  6/1/2021 1348 by Rosaline Fields RN  Outcome: Ongoing     Problem: Infection:  Goal: Will remain free from infection  Description: Will remain free from infection  6/1/2021 2315 by Pete Groves RN  Outcome: Ongoing  6/1/2021 1348 by Rosaline Fields RN  Outcome: Ongoing     Problem: Safety:  Goal: Free from accidental physical injury  Description: Free from accidental physical injury  6/1/2021 2315 by Pete Groves RN  Outcome: Ongoing  6/1/2021 1348 by Rosaline Fields RN  Outcome: Ongoing  Goal: Free from intentional harm  Description: Free from intentional harm  6/1/2021 2315 by Pete Groves RN  Outcome: Ongoing  6/1/2021 1348 by Rosaline Fields RN  Outcome: Ongoing     Problem: Skin Integrity:  Goal: Will show no infection signs and symptoms  Description: Will show no infection signs and symptoms  6/1/2021 2315 by Pete Groves RN  Outcome: Ongoing  6/1/2021 1348 by Rosaline Fields RN  Outcome: Ongoing  Goal: Absence of new skin breakdown  Description: Absence of new skin breakdown  6/1/2021 2315 by Pete Groves RN  Outcome: Ongoing  6/1/2021 1348 by Rosaline Fields RN  Outcome: Ongoing     Problem: Pain:  Goal: Pain level will decrease  Description: Pain level will decrease  6/1/2021 2315 by Pete Groves RN  Outcome: Ongoing  6/1/2021 1348 by Rosaline Fields RN  Outcome: Ongoing  Goal: Control of acute pain  Description: Control of acute pain  6/1/2021 2315 by Pete Groves RN  Outcome: Ongoing  6/1/2021 1348 by Rosaline Fields RN  Outcome: Ongoing  Goal: Control of chronic pain  Description: Control of chronic pain  6/1/2021 2315 by Pete Groves RN  Outcome: Ongoing  6/1/2021 7410 by Harsh Reyes RN  Outcome: Ongoing     Problem: Bleeding:  Goal: Will show no signs and symptoms of excessive bleeding  Description: Will show no signs and symptoms of excessive bleeding  6/1/2021 2315 by Geo Christian RN  Outcome: Ongoing  6/1/2021 1348 by Harsh Reyes RN  Outcome: Ongoing     Problem: Infection - Surgical Site:  Goal: Will show no infection signs and symptoms  Description: Will show no infection signs and symptoms  6/1/2021 2315 by Geo Christian RN  Outcome: Ongoing  6/1/2021 1348 by Harsh Reyes RN  Outcome: Ongoing

## 2021-06-02 NOTE — DISCHARGE SUMMARY
Occupational Therapy Discharge Summary         Date: 2021  Patient Name: Stephanie Clemens        MRN: [id number]    : 1950  (76 y.o.)  Gender: female      Diagnosis: R HIP HEMIARTHROPLASTY; R DISPLACED SUBCAPITAL FEMORAL NECK FX  Restrictions/Precautions  Restrictions/Precautions: Up Ad Alysia  Required Braces or Orthoses?: No  Implants present? : Pacemaker      Discharge Date: 2021    UE Functioning:  BUE AROM WNL     Home Management:  Functional Mobility  Functional - Mobility Device: Rolling Walker  Activity: To/from bathroom, [employer], Transport items  Assist Level: Modified independent   Functional Mobility Comments: Light laundry tasks and ADL prep tasks. Adaptive Equipment/DME Status:  Rolling walker, Bedside commode     Pain Assessment:  Pain Level: 0  Pain Location: Hip    Remaining Problems:  Decreased functional mobility ; Decreased ADL status; Decreased endurance; Decreased balance; Decreased high-level IADLs    STGs:  Short term goals  Short term goal 1: CGA with clothing management/hygiene for toileting. Short term goal 2: CGA with toilet transfers. Short term goal 3: Min A with LB dressing using AE. Short term goal 4: Min A with donning/doffing socks and shoes using AE. Short term goal 5: Min A with ambulatory homemaking tasks. Short term goal 6: Pt will complete 1-2 static standing task for 3 minutes, requiring CGA. MET 6/6 Short Term Goals    LTGs:  Long term goals  Time Frame for Long term goals : 2 weeks  Long term goal 1: Modified independent with toileting and toilet transfers. Long term goal 2: Modified independent with overall dressing. Long term goal 3: Modified independent with ambulatory homemaking tasks. Long term goal 4: Patient verbalize DME needs. MET 4/4 Long Term Goals    Discharge Setting and Recommendations:  Home independently with HEP.      Discharge Care Scores  Eating: CARE Score: 6  Oral Hygiene: CARE Score: 6  Toileting: CARE Score: 6 Shower/Bathe: CARE Score: 6  Upper Body Dressing: CARE Score: 6  Lower Body Dressing: CARE Score: 6  Footwear: CARE Score: 6  Toilet Transfers: CARE Score: 6  Picking Up Object:  CARE Score: 5    Electronically signed by China Decker OT on 6/2/21 at 8:55 AM CDT

## 2021-06-02 NOTE — DISCHARGE SUMMARY
Physical Therapy Discharge Note  DATE:  2021  NAME:  Kallie Mckenzie  :  1950  (72 y.o.,female)  MRN:  [id number]    HEIGHT:  Height: 5' 9\" (175.3 cm)  WEIGHT:  Weight: 147 lb 9 oz (66.9 kg)    PATIENT DIAGNOSIS(ES):    Diagnosis: R HIP HEMIARTHROPLASTY; R DISPLACED SUBCAPITAL FEMORAL NECK FX    Additional Pertinent Hx: CHRONIC BRONCHITIS, HYPOTHYROIDISM, COPD, HX OF TIA, HX OF DIVERTICULAR DISEASE OF COLON   RESTRICTIONS/PRECAUTIONS:    Restrictions/Precautions  Restrictions/Precautions: Up Ad Alysia  Required Braces or Orthoses?: No  Implants present? : Pacemaker  Position Activity Restriction  Hip Precautions: No ABduction, No hip extension, No hip external rotation  Other position/activity restrictions: Anterior hip precautions  OVERALL  ORIENTATION STATUS:     PAIN:  Pain Level: 0  Pain Type: Surgical pain    Pain Location: Hip     Pain Orientation: Right      NEUROLOGICAL                          STRENGTH  Strength RLE  Strength RLE: Exception  Comment: 3-/5 grossly  Strength LLE  Strength LLE: WFL  Comment: 5/5  ROM  AROM RLE (degrees)  RLE AROM: Exceptions  RLE General AROM: HIP FLEXION TO 90 DUE TO PAIN, HIP ABD AND EXTERNAL ROTATION LIMITED DUE TO PRECAUTIONS  AROM LLE (degrees)  LLE AROM : WFL  POSTURE/BALANCE  Balance  Sitting - Static: Good  Standing - Dynamic: Good, -       ACTIVITY TOLERANCE  Activity Tolerance  Activity Tolerance: Patient Tolerated treatment well      BED MOBILITY  Bed Mobility  Rolling: Independent  Supine to Sit: Independent  Sit to Supine: Independent  Scooting: Independent  Comment: SUPINE TO SIT:  CUES FOR SEGMENTALLY MOVING LEGS UNILATERALLY THEN TRUNK BEFORE REACHING WITH RIGHT HAND FOR LEFT RAIL TO COME INTO SITTING.   SIT TO SUPINE USING BILATERAL UES ON BED TO PIVOT HIPS/LES  TRANSFERS  Sit to Stand: Independent      Bed to Chair: Independent (WITH RW)  Car Transfer: Contact guard assistance, Stand by assistance (CG FOR SET-UP AND REQ CUES FOR HAND PLACEMENT) WHEELCHAIR PROPULSION 1  Propulsion 1  Propulsion: Manual  Level: Level Tile  Method: JOSE LUIS ANDERSON  Level of Assistance: Stand by assistance  Description/ Details: Verbal cues for steering. Veers to right. No turns. Distance: 150'  AMBULATION 1  Ambulation 1  Surface: level tile  Device: Rolling Walker  Other Apparatus:None  Assistance: Independent  Quality of Gait: RECIPROCAL GAIT; DECREASED STANCE ON RLE; INCR WB THROUGH BUE WHEN Pt BECOMES FATIGUED  Gait Deviations: Decreased step length, Decreased step height, Slow Jane  Distance: 50FT  Comments: 2 TURNS; Pt AMB TO BED  AMBULATION 2  Ambulation 2  Surface - 2: level tile  Device 2: Rolling Walker  Other Apparatus 2: Wheelchair follow  Assistance 2: Supervision  Quality of Gait 2: RECIPROCAL GAIT PATTERN WITH DEC STANCE TIME ON RLE; INC WB THROUGH BUE WHEN AS FATIGUE INC   Gait Deviations: Slow Jane, Decreased step length, Decreased step height  Distance: 125'  Comments: 2 TURNS  STAIRS  Stairs  # Steps : 4  Stairs Height: 4\"  Rails: Bilateral  Assistance: Contact guard assistance    GOALS:  Short term goals  Time Frame for Short term goals: 1-2 WEEKS  Short term goal 1: SUPERVISION WITH BED MOBILITY  Short term goal 2: SBA WITH TRANSFERS  Short term goal 3: SBA WITH CAR TRANSFER  Short term goal 4: NEGOTIATE 1 STEP CGA  Short term goal 5: PROPEL W/C 150 FT SUPERVISION    Long term goals  Time Frame for Long term goals : 2-3 WEEKS  Long term goal 1: INDEP WITH BED MOBILITY  Long term goal 2: INDEP WITH TRANSFERS  Long term goal 3: INDEP WITH CAR TRANSFER  Long term goal 4: NEGOTIATE 4 STEPS INDEP  Long term goal 5: PROPEL W/C 150 FT INDEP  HOME LIVING:                    ASSESSMENT (IMPAIRMENTS/BARRIERS):   Body structures, Functions, Activity limitations: Decreased functional mobility , Decreased ROM, Decreased strength, Decreased endurance, Decreased balance, Increased pain, Decreased posture  Assessment: Pt COMPLETED CAR TRANSFERTO THE LEFT WITH CG/SBA; Pt due to medical condition or safety concerns  CARE Score: 4  Discharge Goal: Independent    4 Steps  Assistance Needed: Supervision or touching assistance  Reason if not Attempted: Not attempted due to medical condition or safety concerns  CARE Score: 4  Discharge Goal: Supervision or touching assistance    12 Steps  Reason if not Attempted: Not attempted due to medical condition or safety concerns  CARE Score: 88  Discharge Goal: Supervision or touching assistance    Wheel 50 Feet with Two Turns  Assistance Needed: Supervision or touching assistance  Reason if not Attempted: Not attempted due to medical condition or safety concerns  CARE Score: 4  Discharge Goal: Independent    Wheel 150 Feet  Assistance Needed: Supervision or touching assistance  Reason if not Attempted: Not attempted due to medical condition or safety concerns  CARE Score: 4  Discharge Goal: Independent      Electronically signed by Oli Starks PT on 6/2/21 at 9:20 AM CDT

## 2021-06-02 NOTE — DISCHARGE SUMMARY
Neurology Discharge Summary     Patient Identification:  Anibal Don is a 72 y.o. female. :  1950  Admit Date:  2021  Discharge date : 21   Attending Provider: Saadia Meyer MD     Account Number: [de-identified]                                   Admission Diagnoses:   Closed right hip fracture, initial encounter [facility]) [S72.001A]    Discharge Diagnoses:  Principal Problem:    Closed right hip fracture, initial encounter [facility])  Active Problems:    Hypothyroidism    Tobacco abuse    Hyperlipidemia    Pacemaker    Chronic bronchitis ([address]    Hip pain    Gait abnormality  Resolved Problems:    * No resolved hospital problems. *      Discharge Medications:    Current Discharge Medication List           Details   oxyCODONE-acetaminophen (PERCOCET) 5-325 MG per tablet Take 1 tablet by mouth every 4 hours as needed for Pain for up to 7 days.   Qty: 30 tablet, Refills: 0    Comments: Reduce doses taken as pain becomes manageable  Associated Diagnoses: Closed right hip fracture, initial encounter ([facility])      Aspirin Buf,ZwYes-DlYqu-WfYrw, (BUFFERED ASPIRIN) 325 MG TABS Take 1 tablet by mouth 2 times daily  Qty: 60 tablet, Refills: 0              Details   atorvastatin (LIPITOR) 40 MG tablet Take 1 tablet by mouth nightly  Qty: 30 tablet, Refills: 3      levothyroxine (SYNTHROID) 88 MCG tablet Take 1 tablet by mouth Daily  Qty: 30 tablet, Refills: 3              Details   zoster recombinant adjuvanted vaccine (SHINGRIX) 50 MCG/0.5ML SUSR injection Inject 0.5 mLs into the muscle See Admin Instructions 1 dose now and repeat in 2-6 months  Qty: 0.5 mL, Refills: 0      albuterol sulfate HFA (PROAIR HFA) 108 (90 Base) MCG/ACT inhaler Inhale 2 puffs into the lungs every 6 hours as needed for Wheezing  Qty: 1 Inhaler, Refills: 3      budesonide-formoterol (SYMBICORT) 160-4.5 MCG/ACT AERO Inhale 2 puffs into the lungs 2 times daily  Qty: 1 Inhaler, Refills: 5           Current Discharge Medication List      STOP taking these medications       azithromycin (ZITHROMAX Z-KEELY) 250 MG tablet Comments:   Reason for Stopping:                 Consults:   none    Hospital Course: The patient did well during her stay in the rehab unit. She had no medical complications. Pain was well controlled. She had some mild thrombocytopenia which resolved. Disposition upon dischargeimproved.           Discharge Instructions     Patient Instructions:   Home  Therapy orders: PT   Discharge lab work: none  Code status: Full Code   Activity: activity as tolerated  Diet: DIET GENERAL;    Wound Care: as directed  Equipment: as per therapy      Anant Loco MD, MD    At least 35 minutes were spent in discharging the patient

## 2021-06-02 NOTE — CARE COORDINATION
Ms Eddie Noland is discharged, with son, going to follow up appointment with Dr. Slick Bianchi. Then home. Referral made to [facility]- requesting focus on steps at entry (that is on slope and no handrails).

## 2021-06-02 NOTE — PLAN OF CARE
Problem: Falls - Risk of:  Goal: Will remain free from falls  Description: Will remain free from falls  6/2/2021 0931 by Lucrecia Monreal RN  Outcome: Completed  6/1/2021 2315 by Carol Ann Malone RN  Outcome: Ongoing  Goal: Absence of physical injury  Description: Absence of physical injury  6/2/2021 0931 by Lucrecia Monreal RN  Outcome: Completed  6/1/2021 2315 by Carol Ann Malone RN  Outcome: Ongoing     Problem: Infection:  Goal: Will remain free from infection  Description: Will remain free from infection  6/2/2021 0931 by Lucrecia Monreal RN  Outcome: Completed  6/1/2021 2315 by Carol Ann Malone RN  Outcome: Ongoing     Problem: Safety:  Goal: Free from accidental physical injury  Description: Free from accidental physical injury  6/2/2021 0931 by Lucrecia Monreal RN  Outcome: Completed  6/1/2021 2315 by Carol Ann Malone RN  Outcome: Ongoing  Goal: Free from intentional harm  Description: Free from intentional harm  6/2/2021 0931 by Lucrecia Monreal RN  Outcome: Completed  6/1/2021 2315 by Carol Ann Malone RN  Outcome: Ongoing     Problem: Discharge Planning:  Goal: Patients continuum of care needs are met  Description: Patients continuum of care needs are met  6/2/2021 0931 by Lucrecia Monreal RN  Outcome: Completed  6/1/2021 2315 by Carol Ann Malone RN  Outcome: Ongoing     Problem: Skin Integrity:  Goal: Will show no infection signs and symptoms  Description: Will show no infection signs and symptoms  6/2/2021 0931 by Lucrecia Monreal RN  Outcome: Completed  6/1/2021 2315 by Carol Ann Malone RN  Outcome: Ongoing  Goal: Absence of new skin breakdown  Description: Absence of new skin breakdown  6/2/2021 0931 by Lucrecia Monreal RN  Outcome: Completed  6/1/2021 2315 by Carol Ann Malone RN  Outcome: Ongoing     Problem: Pain:  Goal: Pain level will decrease  Description: Pain level will decrease  6/2/2021 0931 by Lucrecia Monreal RN  Outcome: Completed  6/1/2021 2315 by Carol Ann Malone RN  Outcome: Ongoing  Goal: Control of acute pain  Description: Control of acute pain  6/2/2021 0931 by Costa Noonan RN  Outcome: Completed  6/1/2021 2315 by Radha Hernandez RN  Outcome: Ongoing  Goal: Control of chronic pain  Description: Control of chronic pain  6/2/2021 0931 by Costa Noonan RN  Outcome: Completed  6/1/2021 2315 by Radha Hernandez RN  Outcome: Ongoing     Problem: Bleeding:  Goal: Will show no signs and symptoms of excessive bleeding  Description: Will show no signs and symptoms of excessive bleeding  6/2/2021 0931 by Costa Noonan RN  Outcome: Completed  6/1/2021 2315 by Radha Hernandez RN  Outcome: Ongoing     Problem: Infection - Surgical Site:  Goal: Will show no infection signs and symptoms  Description: Will show no infection signs and symptoms  6/2/2021 0931 by Costa Noonan RN  Outcome: Completed  6/1/2021 2315 by Radha Hernandez RN  Outcome: Ongoing

## 2021-06-03 ENCOUNTER — CARE COORDINATION (OUTPATIENT)
Dept: CASE MANAGEMENT | Age: 71
End: 2021-06-03

## 2021-06-03 NOTE — CARE COORDINATION
patient, who verbalizes understanding of administration of home medications. Advised obtaining a 90-day supply of all daily and as-needed medications. Covid Risk Education     Educated patient about risk for severe COVID-19 due to risk factors according to CDC guidelines. CTN reviewed discharge instructions, medical action plan and red flag symptoms with the patient who verbalized understanding. Discussed COVID vaccination status: Yes. Education provided on COVID-19 vaccination as appropriate. Discussed exposure protocols and quarantine with CDC Guidelines. Patient was given an opportunity to verbalize any questions and concerns and agrees to contact CTN or health care provider for questions related to their healthcare. Reviewed and educated patient on any new and changed medications related to discharge diagnosis. Was patient discharged with a pulse oximeter? No Discussed and confirmed pulse oximeter discharge instructions and when to notify provider or seek emergency care. CTN provided contact information. Plan for follow-up call in 5-7 days based on severity of symptoms and risk factors. Care Transitions 24 Hour Call    Do you have any ongoing symptoms?: No  Do you have a copy of your discharge instructions?: Yes  Do you have all of your prescriptions and are they filled?: No  Have you been contacted by a [address] [place] [address]?: No  Were you discharged with any Home Care or Post Acute Services: Yes  Post Acute Services: [address] [place] [address] you feel like you have everything you need to keep you well at home?: Yes  Care Transitions Interventions         Follow Up  : Spoke with patient today for initial CT call after discharge from [facility]. She says she is doing well. She is picking up her meds this afternoon, sending her boyfriend to get them. She did review them today. She has an appointment with PCP on the 24th, she is not sure if she is going to move it up or not, still deciding.  She had HFU

## 2021-06-08 ENCOUNTER — OFFICE VISIT (OUTPATIENT)
Dept: PRIMARY CARE CLINIC | Age: 71
End: 2021-06-08
Payer: MEDICARE

## 2021-06-08 VITALS
HEIGHT: 69 IN | TEMPERATURE: 98.1 F | DIASTOLIC BLOOD PRESSURE: 70 MMHG | WEIGHT: 157 LBS | HEART RATE: 78 BPM | SYSTOLIC BLOOD PRESSURE: 110 MMHG | BODY MASS INDEX: 23.25 KG/M2 | OXYGEN SATURATION: 98 %

## 2021-06-08 DIAGNOSIS — J42 CHRONIC BRONCHITIS, UNSPECIFIED CHRONIC BRONCHITIS TYPE (HCC): ICD-10-CM

## 2021-06-08 DIAGNOSIS — S72.001A CLOSED RIGHT HIP FRACTURE, INITIAL ENCOUNTER (HCC): Primary | ICD-10-CM
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
## 2021-06-08 PROCEDURE — 99495 TRANSJ CARE MGMT MOD F2F 14D: CPT | Performed by: FAMILY MEDICINE

## 2021-06-08 PROCEDURE — 1111F DSCHRG MED/CURRENT MED MERGE: CPT | Performed by: FAMILY MEDICINE

## 2021-06-10 ENCOUNTER — CARE COORDINATION (OUTPATIENT)
Dept: CASE MANAGEMENT | Age: 71
End: 2021-06-10

## 2021-06-10 DIAGNOSIS — Z95.0 PACEMAKER: Primary | ICD-10-CM

## 2021-06-10 DIAGNOSIS — R00.1 BRADYCARDIA: ICD-10-CM

## 2021-06-10 PROCEDURE — 93296 REM INTERROG EVL PM/IDS: CPT | Performed by: CLINICAL NURSE SPECIALIST

## 2021-06-10 PROCEDURE — 93294 REM INTERROG EVL PM/LDLS PM: CPT | Performed by: CLINICAL NURSE SPECIALIST

## 2021-06-10 NOTE — CARE COORDINATION
Alic[facility] Transitions Follow Up Call    6/10/2021    Patient: Anibal Don  Patient : 1950   MRN: [id number]  Reason for Admission:   Discharge Date: 21 RARS: Readmission Risk Score: 13         Spoke with: N/A    Care Transitions Subsequent and Final Call    Subsequent and Final Calls  Are you currently active with any services?: Home Health  Care Transitions Interventions  Other Interventions: Follow Up ; Attempted to make contact with [facility] for f/u call without success. Unable to leave a message regarding intent of call and call back information. Will try again my next business day.      Future Appointments   Date Time Provider Zohaib Perez   2021 10:30 AM Antoine Bergman MD [id number]       Rubén Cabral RN

## 2021-06-11 ENCOUNTER — CARE COORDINATION (OUTPATIENT)
Dept: CASE MANAGEMENT | Age: 71
End: 2021-06-11

## 2021-06-11 NOTE — PROGRESS NOTES
Daily                   Medications marked \"taking\" at this time  Outpatient Medications Marked as Taking for the 6/8/21 encounter (Office Visit) with Monty Terry MD   Medication Sig Dispense Refill    atorvastatin (LIPITOR) 40 MG tablet Take 1 tablet by mouth nightly 30 tablet 3    levothyroxine (SYNTHROID) 88 MCG tablet Take 1 tablet by mouth Daily 30 tablet 3    Aspirin Buf,RrJrr-EeSce-WnStl, (BUFFERED ASPIRIN) 325 MG TABS Take 1 tablet by mouth 2 times daily (Patient taking differently: Take 1 tablet by mouth 2 times daily Picking up today) 60 tablet 0    albuterol sulfate HFA (PROAIR HFA) 108 (90 Base) MCG/ACT inhaler Inhale 2 puffs into the lungs every 6 hours as needed for Wheezing 1 Inhaler 3    budesonide-formoterol (SYMBICORT) 160-4.5 MCG/ACT AERO Inhale 2 puffs into the lungs 2 times daily 1 Inhaler 5        Medications patient taking as of now reconciled against medications ordered at time of hospital discharge: Yes    Chief Complaint   Patient presents with    Follow-Up from [address] [place] [address]     From recent fall and patient had to have right hip surgery. History of Present illness - Follow up of Hospital diagnosis(es): Patient was recently admitted to the hospital on May 15 after she fell from standing when she tripped. She fell on her right side and experienced severe pain immediately after she fell. She was also unable to bear weight. She was admitted to the hospital after she was found to have a right subcapital displaced femoral neck fracture. Orthopedics did see her and she did undergo a right hip arthroplasty. This was done by Dr. Ncihole Bower. She was kept inpatient until June 2 where she underwent rehab. She did have a slight dip in her hemoglobin and hematocrit after the surgery. She also suffers from COPD and they did treat her for that in the hospital as well. She states that she is currently doing well. She is doing therapy and that is helping with ambulation.   Her pain is well controlled. She states that overall she feels better and is getting better day by day. She denies any new problems today. Inpatient course: Discharge summary reviewed- see chart. Interval history/Current status: see note above. A comprehensive review of systems was negative except for what was noted in the HPI. Vitals:    06/08/21 0828   BP: 110/70   Site: Left Upper Arm   Pulse: 78   Temp: 98.1 °F (36.7 °C)   SpO2: 98%   Weight: 157 lb (71.2 kg)   Height: 5' 9\" (1.753 m)     Body mass index is 23.18 kg/m². Wt Readings from Last 3 Encounters:   06/08/21 157 lb (71.2 kg)   05/29/21 147 lb 9 oz (66.9 kg)   05/15/21 155 lb (70.3 kg)     BP Readings from Last 3 Encounters:   06/08/21 110/70   06/02/21 (!) 101/59   05/19/21 115/72        Physical Exam:  General Appearance: alert and oriented to person, place and time, well-developed and well-nourished, in no acute distress  Skin: warm and dry, no rash or erythema  Head: normocephalic and atraumatic  Pulmonary/Chest: clear to auscultation bilaterally- no wheezes, rales or rhonchi, normal air movement, no respiratory distress  Cardiovascular: normal rate, normal S1 and S2, no gallops, intact distal pulses and no carotid bruits  Abdomen: soft, non-tender, non-distended, normal bowel sounds, no masses or organomegaly  Extremities: no cyanosis and no clubbing  Musculoskeletal: Ambulating with walker and moving well  Neurologic: gait and coordination normal and speech normal    Assessment/Plan:  1. Closed right hip fracture, initial encounter ([facility]  Follow up with Dr. Bess Verduzco as scheduled. Continue therapy. 2. Chronic bronchitis, unspecified chronic bronchitis type ([facility]  Continue symbicort and albuterol as needed. Follow up with us if symptoms are worsening. 3. Fall, subsequent encounter  Continue therapy.          Medical Decision Making: moderate complexity

## 2021-06-11 NOTE — CARE COORDINATION
[facility] Transitions Follow Up Call    2021    Patient: Natividad Sargent  Patient : 1950   MRN: [id number]  Reason for Admission: Closed hip fracture  Discharge Date: 21 RARS: Readmission Risk Score: 13         Spoke with: [id number] Transitions Subsequent and Final Call    Schedule Follow Up Appointment with PCP: Completed  Subsequent and Final Calls  Do you have any ongoing symptoms?: No  Have your medications changed?: No  Do you have any questions related to your medications?: No  Do you currently have any active services?: Yes  Are you currently active with any services?: Home Health  Do you have any needs or concerns that I can assist you with?: No  Identified Barriers: None  Care Transitions Interventions  Other Interventions:         Placed a call to the patient for follow up. She reported that she is doing very well. She said that she is getting around very well. She has transitioned to a cane and the therapist feels it won't be long that she won't need it. She said she is not having any pain to speak of at this time. She said she has only taken pain meds twice since being home, not recently even. She is sleeping well. She is eating well, drinking plenty. She said her bowels are moving normally. She is not aware of any issues. She said she has all of her medications and is taking them as ordered. Reminded of COVID 19 risk information. Voiced understanding. Given patient is doing well and is not  Having any issues, will sign of at this time. Given the opportunity to ask questions and answered appropriately. Ensured to have CTN contact information to be used as needed. Encouraged to call for new issues, questions, concerns, etc.  No further scheduled calls needed at this time.         Follow Up  Future Appointments   Date Time Provider Zohaib Perez   2021 10:30 AM Chris Rivas MD [facility]       Kirstin Aguirre RN

## 2021-07-23 RX ORDER — ASPIRIN/CALCIUM/MAG/ALUMINUM 325 MG
1 TABLET ORAL 2 TIMES DAILY
Qty: 60 TABLET | Refills: 0 | Status: SHIPPED | OUTPATIENT
Start: 2021-07-23 | End: 2021-09-17 | Stop reason: SDUPTHER

## 2021-07-23 RX ORDER — LEVOTHYROXINE SODIUM 88 UG/1
88 TABLET ORAL DAILY
Qty: 30 TABLET | Refills: 3 | Status: SHIPPED | OUTPATIENT
Start: 2021-07-23 | End: 2021-12-28

## 2021-08-16 ENCOUNTER — OFFICE VISIT (OUTPATIENT)
Dept: PRIMARY CARE CLINIC | Age: 71
End: 2021-08-16
Payer: MEDICARE

## 2021-08-16 VITALS
DIASTOLIC BLOOD PRESSURE: 80 MMHG | TEMPERATURE: 97.4 F | HEART RATE: 73 BPM | SYSTOLIC BLOOD PRESSURE: 130 MMHG | WEIGHT: 152 LBS | HEIGHT: 69 IN | OXYGEN SATURATION: 99 % | BODY MASS INDEX: 22.51 KG/M2

## 2021-08-16 DIAGNOSIS — R47.81 SLURRED SPEECH: ICD-10-CM

## 2021-08-16 DIAGNOSIS — F32.1 CURRENT MODERATE EPISODE OF MAJOR DEPRESSIVE DISORDER WITHOUT PRIOR EPISODE (HCC): ICD-10-CM

## 2021-08-16 DIAGNOSIS — N39.0 URINARY TRACT INFECTION WITHOUT HEMATURIA, SITE UNSPECIFIED: ICD-10-CM

## 2021-08-16 DIAGNOSIS — R41.82 ALTERED MENTAL STATUS, UNSPECIFIED ALTERED MENTAL STATUS TYPE: Primary | ICD-10-CM

## 2021-08-16 DIAGNOSIS — R25.1 TREMORS OF NERVOUS SYSTEM: ICD-10-CM

## 2021-08-16 DIAGNOSIS — R30.0 DYSURIA: ICD-10-CM

## 2021-08-16 LAB
ALBUMIN SERPL-MCNC: 3.7 G/DL (ref 3.5–5.2)
ALP BLD-CCNC: 97 U/L (ref 35–104)
ALT SERPL-CCNC: 25 U/L (ref 5–33)
ANION GAP SERPL CALCULATED.3IONS-SCNC: 11 MMOL/L (ref 7–19)
APPEARANCE FLUID: ABNORMAL
AST SERPL-CCNC: 22 U/L (ref 5–32)
BASOPHILS ABSOLUTE: 0 K/UL (ref 0–0.2)
BASOPHILS RELATIVE PERCENT: 0.5 % (ref 0–1)
BILIRUB SERPL-MCNC: 0.3 MG/DL (ref 0.2–1.2)
BILIRUBIN, POC: ABNORMAL
BLOOD URINE, POC: ABNORMAL
BUN BLDV-MCNC: 9 MG/DL (ref 8–23)
CALCIUM SERPL-MCNC: 9.7 MG/DL (ref 8.8–10.2)
CHLORIDE BLD-SCNC: 103 MMOL/L (ref 98–111)
CLARITY, POC: ABNORMAL
CO2: 28 MMOL/L (ref 22–29)
COLOR, POC: YELLOW
CREAT SERPL-MCNC: 0.7 MG/DL (ref 0.5–0.9)
EOSINOPHILS ABSOLUTE: 0.1 K/UL (ref 0–0.6)
EOSINOPHILS RELATIVE PERCENT: 1.6 % (ref 0–5)
GFR AFRICAN AMERICAN: >59
GFR NON-AFRICAN AMERICAN: >60
GLUCOSE BLD-MCNC: 92 MG/DL (ref 74–109)
GLUCOSE URINE, POC: ABNORMAL
HCT VFR BLD CALC: 41.9 % (ref 37–47)
HEMOGLOBIN: 12.1 G/DL (ref 12–16)
HYPOCHROMIA: ABNORMAL
IMMATURE GRANULOCYTES #: 0.1 K/UL
KETONES, POC: ABNORMAL
LEUKOCYTE EST, POC: ABNORMAL
LYMPHOCYTES ABSOLUTE: 1.8 K/UL (ref 1.1–4.5)
LYMPHOCYTES RELATIVE PERCENT: 20.6 % (ref 20–40)
MCH RBC QN AUTO: 26.3 PG (ref 27–31)
MCHC RBC AUTO-ENTMCNC: 28.9 G/DL (ref 33–37)
MCV RBC AUTO: 91.1 FL (ref 81–99)
MONOCYTES ABSOLUTE: 0.5 K/UL (ref 0–0.9)
MONOCYTES RELATIVE PERCENT: 5.7 % (ref 0–10)
NEUTROPHILS ABSOLUTE: 6 K/UL (ref 1.5–7.5)
NEUTROPHILS RELATIVE PERCENT: 70.5 % (ref 50–65)
NITRITE, POC: ABNORMAL
PDW BLD-RTO: 14.3 % (ref 11.5–14.5)
PH, POC: 5
PLATELET # BLD: 280 K/UL (ref 130–400)
PLATELET SLIDE REVIEW: ADEQUATE
PMV BLD AUTO: 12.3 FL (ref 9.4–12.3)
POTASSIUM SERPL-SCNC: 4.4 MMOL/L (ref 3.5–5)
PROTEIN, POC: ABNORMAL
RBC # BLD: 4.6 M/UL (ref 4.2–5.4)
SODIUM BLD-SCNC: 142 MMOL/L (ref 136–145)
SPECIFIC GRAVITY, POC: 1.03
TOTAL PROTEIN: 7.5 G/DL (ref 6.6–8.7)
TSH SERPL DL<=0.05 MIU/L-ACNC: 9.81 UIU/ML (ref 0.27–4.2)
UROBILINOGEN, POC: 0.2
WBC # BLD: 8.6 K/UL (ref 4.8–10.8)

## 2021-08-16 PROCEDURE — 81002 URINALYSIS NONAUTO W/O SCOPE: CPT | Performed by: FAMILY MEDICINE

## 2021-08-16 PROCEDURE — 99214 OFFICE O/P EST MOD 30 MIN: CPT | Performed by: FAMILY MEDICINE

## 2021-08-16 RX ORDER — CIPROFLOXACIN 250 MG/1
250 TABLET, FILM COATED ORAL 2 TIMES DAILY
Qty: 14 TABLET | Refills: 0 | Status: SHIPPED | OUTPATIENT
Start: 2021-08-16 | End: 2021-08-23

## 2021-08-16 RX ORDER — RISPERIDONE 0.5 MG/1
0.5 TABLET, FILM COATED ORAL EVERY EVENING
Qty: 30 TABLET | Refills: 3 | Status: SHIPPED | OUTPATIENT
Start: 2021-08-16 | End: 2022-01-17

## 2021-08-16 RX ORDER — ESCITALOPRAM OXALATE 10 MG/1
10 TABLET ORAL DAILY
Qty: 30 TABLET | Refills: 5 | Status: SHIPPED | OUTPATIENT
Start: 2021-08-16 | End: 2021-09-27

## 2021-08-18 LAB
ORGANISM: ABNORMAL
ORGANISM: ABNORMAL
URINE CULTURE, ROUTINE: ABNORMAL

## 2021-08-23 ASSESSMENT — ENCOUNTER SYMPTOMS
ABDOMINAL PAIN: 0
WHEEZING: 0
DIARRHEA: 0
EYE DISCHARGE: 0
VOMITING: 0
COUGH: 0
NAUSEA: 0
COLOR CHANGE: 0
BACK PAIN: 0

## 2021-08-23 NOTE — PROGRESS NOTES
SUBJECTIVE:    Patient ID: James Genao is a 72 y.o. female. HPI:   Patient is here today because she states that she is just not feeling well. She states that she has had burning when she urinates for about a week. She states that she is drinking plenty of water. She states that she does not typically drink a lot of water during the day but has increased her water intake over the last couple of days. She denies any fevers or chills but she just has not felt well. She states she had an episode last week when she was driving to work where she felt like her steering wheel was shaking and so she had to pull over because it was shaking very badly. She states that she then pulled over it and continue to do it. She states that she did call her  and her son and they came and looked at her car and was not doing anything. She states she has just been foggy. She has stopped her Lexapro and Risperdal because she did not feel that she needed them anymore. She states that over the last few weeks that she feels more depressed and down. She is very tearful and anxious. She states that she has not had any suicidal thoughts or ideation but just does not feel like herself. She feels like she needs to get started back on these medications.     Past Medical History:   Diagnosis Date    Cerebral artery occlusion with cerebral infarction ([facility]     TIA    Diverticular disease of colon     Hyperlipidemia     Hypothyroidism     Pneumonia     Psychiatric problem     Seizures ([facility]     Tobacco abuse     Vitamin B12 deficiency       Current Outpatient Medications on File Prior to Visit   Medication Sig Dispense Refill    Aspirin Buf,WuOkk-InAzg-BdTwh, (BUFFERED ASPIRIN) 325 MG TABS Take 1 tablet by mouth 2 times daily 60 tablet 0    levothyroxine (SYNTHROID) 88 MCG tablet Take 1 tablet by mouth Daily 30 tablet 3    atorvastatin (LIPITOR) 40 MG tablet Take 1 tablet by mouth nightly 30 tablet 3    albuterol sulfate HFA (PROAIR HFA) 108 (90 Base) MCG/ACT inhaler Inhale 2 puffs into the lungs every 6 hours as needed for Wheezing 1 Inhaler 3    budesonide-formoterol (SYMBICORT) 160-4.5 MCG/ACT AERO Inhale 2 puffs into the lungs 2 times daily 1 Inhaler 5     No current facility-administered medications on file prior to visit. Allergies   Allergen Reactions    Norco [Hydrocodone-Acetaminophen] Palpitations       Review of Systems   Constitutional: Negative for activity change, appetite change and fever. HENT: Negative for congestion and nosebleeds. Eyes: Negative for discharge. Respiratory: Negative for cough and wheezing. Cardiovascular: Negative for chest pain and leg swelling. Gastrointestinal: Negative for abdominal pain, diarrhea, nausea and vomiting. Genitourinary: Negative for difficulty urinating, frequency and urgency. Musculoskeletal: Negative for back pain and gait problem. Skin: Negative for color change and rash. Neurological: Negative for dizziness and headaches. Hematological: Does not bruise/bleed easily. Psychiatric/Behavioral: Positive for agitation. Negative for sleep disturbance and suicidal ideas. The patient is nervous/anxious. OBJECTIVE:    Physical Exam  Vitals reviewed. Constitutional:       General: She is not in acute distress. Appearance: Normal appearance. She is well-developed. She is not diaphoretic. HENT:      Head: Normocephalic and atraumatic. Right Ear: External ear normal.      Left Ear: External ear normal.   Cardiovascular:      Rate and Rhythm: Normal rate and regular rhythm. Pulses: Normal pulses. Heart sounds: Normal heart sounds. No murmur heard. Pulmonary:      Effort: Pulmonary effort is normal. No respiratory distress. Breath sounds: Normal breath sounds. Musculoskeletal:      Cervical back: Normal range of motion and neck supple. Skin:     General: Skin is warm and dry.    Neurological: General: No focal deficit present. Mental Status: She is alert and oriented to person, place, and time. Mental status is at baseline. Psychiatric:         Mood and Affect: Mood normal.         Behavior: Behavior normal.         Thought Content: Thought content normal.         Judgment: Judgment normal.        /80   Pulse 73   Temp 97.4 °F (36.3 °C)   Ht 5' 9\" (1.753 m)   Wt 152 lb (68.9 kg)   SpO2 99%   BMI 22.45 kg/m²      ASSESSMENT/PLAN:    Janis Jewell was seen today for dysuria and anxiety. Diagnoses and all orders for this visit:    Altered mental status, unspecified altered mental status type  -     CT HEAD WO CONTRAST; Future  -     CBC Auto Differential  -     Comprehensive Metabolic Panel  -     TSH without Reflex    Dysuria  -     POCT Urinalysis no Micro  -     Culture, Urine  -     CBC Auto Differential  -     Comprehensive Metabolic Panel  -     TSH without Reflex    Tremors of nervous system  -     CT HEAD WO CONTRAST; Future  -     CBC Auto Differential  -     Comprehensive Metabolic Panel  -     TSH without Reflex    Slurred speech  -     CT HEAD WO CONTRAST; Future  -     CBC Auto Differential  -     Comprehensive Metabolic Panel  -     TSH without Reflex    Current moderate episode of major depressive disorder without prior episode (HCC)    Urinary tract infection without hematuria, site unspecified    Other orders  -     risperiDONE (RISPERDAL) 0.5 MG tablet; Take 1 tablet by mouth every evening  -     escitalopram (LEXAPRO) 10 MG tablet; Take 1 tablet by mouth daily  -     ciprofloxacin (CIPRO) 250 MG tablet; Take 1 tablet by mouth 2 times daily for 7 days        Problem List     Current moderate episode of major depressive disorder without prior episode ([facility]    Relevant Medications    escitalopram (LEXAPRO) 10 MG tablet            We are going to get a CT scan of her head. We will notify her of the results.   I will check labs on her today as well to make sure there is nothing off there that could be causing her symptoms. I am going to restart her Risperdal and Lexapro as she is took her self off of these because she did not feel like she needed them. I do feel like this is led to a lot of her anxiety and depression that she is having. If her symptoms or not getting better she is to let us know. Cipro was sent to the pharmacy for UTI. She is to drink plenty of water. If her symptoms or not getting better she is to let us know. EMR Dragon/transcription disclaimer:  Much of this encounter note is electronic transcription/translation of spoken language toprinted texts. The electronic translation of spoken language may be erroneous, or at times, nonsensical words or phrases may be inadvertently transcribed.   Although I have reviewed the note for such errors, some may stillexist.

## 2021-08-27 ENCOUNTER — TELEPHONE (OUTPATIENT)
Dept: PRIMARY CARE CLINIC | Age: 71
End: 2021-08-27

## 2021-09-09 ENCOUNTER — OFFICE VISIT (OUTPATIENT)
Dept: PRIMARY CARE CLINIC | Age: 71
End: 2021-09-09
Payer: MEDICARE

## 2021-09-09 VITALS
OXYGEN SATURATION: 97 % | DIASTOLIC BLOOD PRESSURE: 82 MMHG | BODY MASS INDEX: 22.62 KG/M2 | TEMPERATURE: 97.5 F | HEART RATE: 71 BPM | RESPIRATION RATE: 16 BRPM | SYSTOLIC BLOOD PRESSURE: 117 MMHG | WEIGHT: 153.2 LBS

## 2021-09-09 DIAGNOSIS — S76.212A INGUINAL STRAIN, LEFT, INITIAL ENCOUNTER: ICD-10-CM

## 2021-09-09 DIAGNOSIS — M25.552 LEFT HIP PAIN: Primary | ICD-10-CM

## 2021-09-09 DIAGNOSIS — Z95.0 PACEMAKER: Primary | ICD-10-CM

## 2021-09-09 DIAGNOSIS — R00.1 BRADYCARDIA: ICD-10-CM

## 2021-09-09 PROCEDURE — 93294 REM INTERROG EVL PM/LDLS PM: CPT | Performed by: NURSE PRACTITIONER

## 2021-09-09 PROCEDURE — 93296 REM INTERROG EVL PM/IDS: CPT | Performed by: NURSE PRACTITIONER

## 2021-09-09 PROCEDURE — 99213 OFFICE O/P EST LOW 20 MIN: CPT | Performed by: FAMILY MEDICINE

## 2021-09-09 ASSESSMENT — ENCOUNTER SYMPTOMS
EYE DISCHARGE: 0
NAUSEA: 0
WHEEZING: 0
COUGH: 0
BACK PAIN: 0
ABDOMINAL PAIN: 0
DIARRHEA: 0
COLOR CHANGE: 0
VOMITING: 0

## 2021-09-09 NOTE — PATIENT INSTRUCTIONS
Patient Education        Groin Strain: Care Instructions  Your Care Instructions  A groin strain is an injury that happens when you tear or overstretch (pull) a groin muscle. The groin muscles are in the area on either side of the body in the folds where the belly joins the legs. You can strain a groin muscle during exercise, such as running, skating, kicking in soccer, or playing basketball. It can happen when you lift, push, or pull heavy objects. You might pull a groin muscle when you fall. The injury can range from a minor pull to a more serious tear of the muscle. You may feel pain and tenderness that's worse when you squeeze your legs together. You may also have pain when you raise the knee of the injured side. There may be swelling or bruising in the groin area or inner thigh. If you have a bad strain, you may walk with a limp while it heals. Rest and other home care can help the muscle heal. Healing can take up to 3 weeks or more. Your doctor may want to see you again in 2 to 3 weeks. Follow-up care is a key part of your treatment and safety. Be sure to make and go to all appointments, and call your doctor if you are having problems. It's also a good idea to know your test results and keep a list of the medicines you take. How can you care for yourself at home? · Be safe with medicines. Read and follow all instructions on the label. ? If the doctor gave you a prescription medicine for pain, take it as prescribed. ? If you are not taking a prescription pain medicine, ask your doctor if you can take an over-the-counter medicine. · Rest and protect your injured or sore groin area for 1 to 2 weeks. Stop, change, or take a break from any activity that may be causing your pain or soreness. Do not do intense activities while you still have pain. · Put ice or a cold pack on your groin area for 10 to 20 minutes at a time.  Try to do this every 1 to 2 hours for the next 3 days (when you are awake) or until the swelling goes down. Put a thin cloth between the ice and your skin. · After 2 or 3 days, if your swelling is gone, apply heat. Put a warm water bottle, a heating pad set on low, or a warm cloth on your groin area. Do not go to sleep with a heating pad on your skin. · If your doctor gave you crutches, make sure you use them as directed. · Wear snug shorts or underwear that support the injured area. When should you call for help? Call your doctor now or seek immediate medical care if:    · You have new or severe pain or swelling in the groin area.     · Your groin or upper thigh is cool or pale or changes color.     · You have tingling, weakness, or numbness in your groin or leg.     · You cannot move your leg.     · You cannot put weight on your leg. Watch closely for changes in your health, and be sure to contact your doctor if:    · You do not get better as expected. Where can you learn more? Go to https://Solaire Generation.MailFrontier. org and sign in to your [employer] account. Enter G079 in the [facility] box to learn more about \"Groin Strain: Care Instructions. \"     If you do not have an account, please click on the \"Sign Up Now\" link. Current as of: November 16, 2020               Content Version: 12.9  © 2006-2021 Healthwise, Incorporated. Care instructions adapted under license by [facility] ([facility]). If you have questions about a medical condition or this instruction, always ask your healthcare professional. [place] [address] any warranty or liability for your use of this information. Patient Education        Hip Flexor Strain: Rehab Exercises  Introduction  Here are some examples of exercises for you to try. The exercises may be suggested for a condition or for rehabilitation. Start each exercise slowly. Ease off the exercises if you start to have pain. You will be told when to start these exercises and which ones will work best for you.   How to do the exercises  Pelvic tilt with marching   1. Lie on your back with your knees bent and your feet flat on the floor. 2. Tighten your belly muscles and buttocks, and press your lower back to the floor. 3. Keeping your knees bent, lift and then lower one leg up off the floor, and then lift and lower your other leg like you are marching. Each time you lift your leg, hold that position for about 6 seconds before lowering your leg. 4. Repeat 8 to 12 times. Scissors   1. Lie on your back with your knees bent at a 90-degree angle and your feet off the floor. 2. Tighten your belly muscles and buttocks, and press your lower back to the floor. 3. Slowly straighten one leg, and hold that position for about 6 seconds. Your leg should be about 12 inches off the floor. Bring that leg back to the starting position, and then straighten your other leg. Hold that position for about 6 seconds, and then switch legs again. 4. Repeat 8 to 12 times. Hamstring stretch (lying down)   1. Lie flat on your back with your legs straight. If you feel discomfort in your back, place a small towel roll under your lower back. 2. Holding the back of your affected leg for support, lift your leg straight up and toward your body until you feel a stretch at the back of your thigh. 3. Hold the stretch for at least 30 seconds. 4. Repeat 2 to 4 times. Quadricep and hip flexor stretch (lying on side)   1. Lie on your side with your good leg flat on the floor and your hand supporting your head. 2. Bend your top leg, and reach behind you to grab the front of that foot or ankle with your other hand. 3. Stretch your leg back by pulling your foot toward your buttock. You will feel the stretch in the front of your thigh. If this causes stress on your knee, do not do this stretch. 4. Hold the stretch for at least 15 to 30 seconds. 5. Repeat 2 to 4 times. Hip flexor stretch (kneeling)   1.  Kneel on your affected leg and bend your good leg out in front of you, with that foot flat on the floor. If you feel discomfort in the front of your knee, place a towel under your knee. 2. Keeping your back straight, slowly push your hips forward until you feel a stretch in the upper thigh of your back leg and hip. 3. Hold the stretch for at least 15 to 30 seconds. 4. Repeat 2 to 4 times. Hip flexor stretch (edge of table)   1. Lie flat on your back on a table or flat bench, with your knees and lower legs hanging off the edge of the table. 2. Grab your good leg at the knee, and pull that knee back toward your chest. Relax your affected leg and let it hang down toward the floor until you feel a stretch in the upper thigh of your affected leg and hip. 3. Hold the stretch for at least 15 to 30 seconds. 4. Repeat 2 to 4 times. Follow-up care is a key part of your treatment and safety. Be sure to make and go to all appointments, and call your doctor if you are having problems. It's also a good idea to know your test results and keep a list of the medicines you take. Where can you learn more? Go to https://Tanner ResearchpeUnyqe.AzulStar. org and sign in to your [employer] account. Enter P721 in the [facility] box to learn more about \"Hip Flexor Strain: Rehab Exercises. \"     If you do not have an account, please click on the \"Sign Up Now\" link. Current as of: November 16, 2020               Content Version: 12.9  © 2006-2021 Healthwise, Incorporated. Care instructions adapted under license by [facility] ([facility]). If you have questions about a medical condition or this instruction, always ask your healthcare professional. [place] [address] any warranty or liability for your use of this information.

## 2021-09-09 NOTE — PROGRESS NOTES
SUBJECTIVE:    Patient ID: Zahida Catalan is a 72 y.o. female. HPI:   Patient is seen today for problems with left groin pain. She states that this started about a couple of weeks ago. She states that she fell and broke her hip in her right hip back in May of this year. She has gone back to work and is working as a  because she is only working part-time. She states that she is up on her feet most of that time. She states that she has not had any recent injury to her hip. She states that she is not having any numbness or tingling but does occasionally feel like it wants to give out. She has not noticed any bruising or swelling of her hip. She states that she has not tried doing anything for the pain to this point. She states that it is located in the anterior aspect of her hip.     Past Medical History:   Diagnosis Date    Cerebral artery occlusion with cerebral infarction ([facility] [address]     TIA    Diverticular disease of colon     Hyperlipidemia     Hypothyroidism     Pneumonia     Psychiatric problem     Seizures ([facility] [address]     Tobacco abuse     Vitamin B12 deficiency       Current Outpatient Medications on File Prior to Visit   Medication Sig Dispense Refill    risperiDONE (RISPERDAL) 0.5 MG tablet Take 1 tablet by mouth every evening 30 tablet 3    escitalopram (LEXAPRO) 10 MG tablet Take 1 tablet by mouth daily 30 tablet 5    Aspirin Buf,AsTrj-IxPha-GqZxo, (BUFFERED ASPIRIN) 325 MG TABS Take 1 tablet by mouth 2 times daily 60 tablet 0    levothyroxine (SYNTHROID) 88 MCG tablet Take 1 tablet by mouth Daily 30 tablet 3    atorvastatin (LIPITOR) 40 MG tablet Take 1 tablet by mouth nightly 30 tablet 3    albuterol sulfate HFA (PROAIR HFA) 108 (90 Base) MCG/ACT inhaler Inhale 2 puffs into the lungs every 6 hours as needed for Wheezing 1 Inhaler 3    budesonide-formoterol (SYMBICORT) 160-4.5 MCG/ACT AERO Inhale 2 puffs into the lungs 2 times daily 1 Inhaler 5     No current facility-administered medications on file prior to visit. Allergies   Allergen Reactions    Norco [Hydrocodone-Acetaminophen] Palpitations       Review of Systems   Constitutional: Negative for activity change, appetite change and fever. HENT: Negative for congestion and nosebleeds. Eyes: Negative for discharge. Respiratory: Negative for cough and wheezing. Cardiovascular: Negative for chest pain and leg swelling. Gastrointestinal: Negative for abdominal pain, diarrhea, nausea and vomiting. Genitourinary: Negative for difficulty urinating, frequency and urgency. Musculoskeletal: Positive for arthralgias (left groin pain). Negative for back pain and gait problem. Skin: Negative for color change and rash. Neurological: Negative for dizziness and headaches. Hematological: Does not bruise/bleed easily. Psychiatric/Behavioral: Negative for sleep disturbance and suicidal ideas. OBJECTIVE:    Physical Exam  Vitals reviewed. Constitutional:       General: She is not in acute distress. Appearance: Normal appearance. She is well-developed. She is not diaphoretic. HENT:      Head: Normocephalic and atraumatic. Right Ear: External ear normal.      Left Ear: External ear normal.   Cardiovascular:      Rate and Rhythm: Normal rate and regular rhythm. Pulses: Normal pulses. Heart sounds: Normal heart sounds. No murmur heard. Pulmonary:      Effort: Pulmonary effort is normal. No respiratory distress. Breath sounds: Normal breath sounds. Musculoskeletal:      Cervical back: Normal range of motion and neck supple. Left hip: Tenderness present. Legs:    Skin:     General: Skin is warm and dry. Neurological:      General: No focal deficit present. Mental Status: She is alert and oriented to person, place, and time. Mental status is at baseline. Psychiatric:         Mood and Affect: Mood normal.         Behavior: Behavior normal.         Thought Content:  Thought content normal.         Judgment: Judgment normal.        /82   Pulse 71   Temp 97.5 °F (36.4 °C) (Temporal)   Resp 16   Wt 153 lb 3.2 oz (69.5 kg)   SpO2 97%   Breastfeeding No   BMI 22.62 kg/m²      ASSESSMENT/PLAN:    Parish Merrill was seen today for leg pain. Diagnoses and all orders for this visit:    Left hip pain  -     XR HIP 2-3 VW W PELVIS LEFT    Inguinal strain, left, initial encounter          We are going to get a x-ray of her hip. We will notify her of the results. Encouraged her to do heat then stretch and ice. If her symptoms not getting better over the next couple of weeks she is to let us know and we will determine if we need to do further imaging or put a referral in at that point. EMR Dragon/transcription disclaimer:  Much of this encounter note is electronic transcription/translation of spoken language toprinted texts. The electronic translation of spoken language may be erroneous, or at times, nonsensical words or phrases may be inadvertently transcribed.   Although I have reviewed the note for such errors, some may stillexist.

## 2021-09-17 RX ORDER — ASPIRIN/CALCIUM/MAG/ALUMINUM 325 MG
1 TABLET ORAL 2 TIMES DAILY
Qty: 60 TABLET | Refills: 0 | Status: SHIPPED | OUTPATIENT
Start: 2021-09-17 | End: 2021-11-09 | Stop reason: SDUPTHER

## 2021-09-27 ENCOUNTER — OFFICE VISIT (OUTPATIENT)
Dept: PRIMARY CARE CLINIC | Age: 71
End: 2021-09-27
Payer: MEDICARE

## 2021-09-27 VITALS
RESPIRATION RATE: 16 BRPM | HEIGHT: 69 IN | DIASTOLIC BLOOD PRESSURE: 84 MMHG | SYSTOLIC BLOOD PRESSURE: 126 MMHG | WEIGHT: 150.5 LBS | OXYGEN SATURATION: 100 % | HEART RATE: 52 BPM | TEMPERATURE: 96.8 F | BODY MASS INDEX: 22.29 KG/M2

## 2021-09-27 DIAGNOSIS — Z23 NEED FOR INFLUENZA VACCINATION: ICD-10-CM

## 2021-09-27 DIAGNOSIS — F32.1 CURRENT MODERATE EPISODE OF MAJOR DEPRESSIVE DISORDER WITHOUT PRIOR EPISODE (HCC): ICD-10-CM

## 2021-09-27 DIAGNOSIS — M25.551 RIGHT HIP PAIN: ICD-10-CM

## 2021-09-27 DIAGNOSIS — J42 CHRONIC BRONCHITIS, UNSPECIFIED CHRONIC BRONCHITIS TYPE (HCC): ICD-10-CM

## 2021-09-27 DIAGNOSIS — Z00.00 ROUTINE GENERAL MEDICAL EXAMINATION AT A HEALTH CARE FACILITY: ICD-10-CM

## 2021-09-27 DIAGNOSIS — E78.2 MIXED HYPERLIPIDEMIA: ICD-10-CM

## 2021-09-27 DIAGNOSIS — E03.9 ACQUIRED HYPOTHYROIDISM: Primary | ICD-10-CM

## 2021-09-27 PROCEDURE — 90694 VACC AIIV4 NO PRSRV 0.5ML IM: CPT | Performed by: FAMILY MEDICINE

## 2021-09-27 PROCEDURE — G0439 PPPS, SUBSEQ VISIT: HCPCS | Performed by: FAMILY MEDICINE

## 2021-09-27 PROCEDURE — G0008 ADMIN INFLUENZA VIRUS VAC: HCPCS | Performed by: FAMILY MEDICINE

## 2021-09-27 PROCEDURE — 99214 OFFICE O/P EST MOD 30 MIN: CPT | Performed by: FAMILY MEDICINE

## 2021-09-27 ASSESSMENT — ENCOUNTER SYMPTOMS
BACK PAIN: 0
WHEEZING: 0
ABDOMINAL PAIN: 0
EYE DISCHARGE: 0
COLOR CHANGE: 0
NAUSEA: 0
VOMITING: 0
DIARRHEA: 0
COUGH: 0

## 2021-09-27 ASSESSMENT — PATIENT HEALTH QUESTIONNAIRE - PHQ9
2. FEELING DOWN, DEPRESSED OR HOPELESS: 1
SUM OF ALL RESPONSES TO PHQ9 QUESTIONS 1 & 2: 2
SUM OF ALL RESPONSES TO PHQ QUESTIONS 1-9: 2
1. LITTLE INTEREST OR PLEASURE IN DOING THINGS: 1
SUM OF ALL RESPONSES TO PHQ QUESTIONS 1-9: 2
SUM OF ALL RESPONSES TO PHQ QUESTIONS 1-9: 2

## 2021-09-27 ASSESSMENT — LIFESTYLE VARIABLES: HOW OFTEN DO YOU HAVE A DRINK CONTAINING ALCOHOL: 0

## 2021-09-27 NOTE — PROGRESS NOTES
SUBJECTIVE:    Patient ID: Joann Rodriguez is a 72 y.o. female. HPI:   Patient is seen today for Medicare annual wellness but also has several chronic problems that we follow. She has a history of COPD. She states that her breathing has been fairly well controlled. She states that she is using her inhalers as prescribed. She denies any productive cough. She denies any night sweats fevers chills or weight loss. She is not fasting today for labs. She states that she will come in on Wednesday to get labs done. She does have a history of hypothyroidism. She is currently taking Synthroid regularly. She states that she is taking on an empty stomach. She states that she had to stop taking the Lexapro for anxiety because she did not feel like this did well for her and she had side effects to it. She is currently taking the Risperdal and she does feel like that she is tolerating this well and it is working.     Past Medical History:   Diagnosis Date    Cerebral artery occlusion with cerebral infarction ([facility] [address]     TIA    Diverticular disease of colon     Hyperlipidemia     Hypothyroidism     Pneumonia     Psychiatric problem     Seizures ([facility] [address]     Tobacco abuse     Vitamin B12 deficiency       Current Outpatient Medications on File Prior to Visit   Medication Sig Dispense Refill    Aspirin Buf,PqOep-EzFav-OlXpl, (BUFFERED ASPIRIN) 325 MG TABS Take 1 tablet by mouth 2 times daily 60 tablet 0    risperiDONE (RISPERDAL) 0.5 MG tablet Take 1 tablet by mouth every evening 30 tablet 3    levothyroxine (SYNTHROID) 88 MCG tablet Take 1 tablet by mouth Daily 30 tablet 3    atorvastatin (LIPITOR) 40 MG tablet Take 1 tablet by mouth nightly 30 tablet 3    albuterol sulfate HFA (PROAIR HFA) 108 (90 Base) MCG/ACT inhaler Inhale 2 puffs into the lungs every 6 hours as needed for Wheezing 1 Inhaler 3    budesonide-formoterol (SYMBICORT) 160-4.5 MCG/ACT AERO Inhale 2 puffs into the lungs 2 times daily 1 Inhaler 5     No current facility-administered medications on file prior to visit. Allergies   Allergen Reactions    Norco [Hydrocodone-Acetaminophen] Palpitations       Review of Systems   Constitutional: Negative for activity change, appetite change and fever. HENT: Negative for congestion and nosebleeds. Eyes: Negative for discharge. Respiratory: Negative for cough and wheezing. Cardiovascular: Negative for chest pain and leg swelling. Gastrointestinal: Negative for abdominal pain, diarrhea, nausea and vomiting. Genitourinary: Negative for difficulty urinating, frequency and urgency. Musculoskeletal: Negative for back pain and gait problem. Skin: Negative for color change and rash. Neurological: Negative for dizziness and headaches. Hematological: Does not bruise/bleed easily. Psychiatric/Behavioral: Negative for sleep disturbance and suicidal ideas. OBJECTIVE:    Physical Exam  Vitals reviewed. Constitutional:       General: She is not in acute distress. Appearance: Normal appearance. She is well-developed. She is not diaphoretic. HENT:      Head: Normocephalic and atraumatic. Right Ear: External ear normal.      Left Ear: External ear normal.   Cardiovascular:      Rate and Rhythm: Normal rate and regular rhythm. Pulses: Normal pulses. Heart sounds: Normal heart sounds. No murmur heard. Pulmonary:      Effort: Pulmonary effort is normal. No respiratory distress. Breath sounds: Normal breath sounds. Abdominal:      General: Abdomen is flat. Bowel sounds are normal.      Palpations: Abdomen is soft. Tenderness: There is no abdominal tenderness. Musculoskeletal:      Cervical back: Normal range of motion and neck supple. Skin:     General: Skin is warm and dry. Neurological:      General: No focal deficit present. Mental Status: She is alert and oriented to person, place, and time. Mental status is at baseline.    Psychiatric: Mood and Affect: Mood normal.         Behavior: Behavior normal.         Thought Content: Thought content normal.         Judgment: Judgment normal.        /84 (Site: Left Upper Arm, Position: Sitting, Cuff Size: Medium Adult)   Pulse 52   Temp 96.8 °F (36 °C) (Temporal)   Resp 16   Ht 5' 9\" (1.753 m)   Wt 150 lb 8 oz (68.3 kg)   SpO2 100%   BMI 22.22 kg/m²      ASSESSMENT/PLAN:    Jarocho Swenson was seen today for medicare awv. Diagnoses and all orders for this visit:    Acquired hypothyroidism  -     TSH without Reflex; Future  -     CBC Auto Differential; Future  -     Comprehensive Metabolic Panel; Future  -     Lipid Panel; Future    Mixed hyperlipidemia  -     TSH without Reflex; Future  -     CBC Auto Differential; Future  -     Comprehensive Metabolic Panel; Future  -     Lipid Panel; Future    Need for influenza vaccination  -     INFLUENZA, QUADV, ADJUVANTED, 72 YRS =, IM, PF, PREFILL SYR, 0.5ML (FLUAD)    Routine general medical examination at a health care facility    Right hip pain    Chronic bronchitis, unspecified chronic bronchitis type (HCC)    Current moderate episode of major depressive disorder without prior episode (HCC)        Problem List     Chronic bronchitis ([address]    Current moderate episode of major depressive disorder without prior episode (HCC)    Hyperlipidemia    Relevant Medications    atorvastatin (LIPITOR) 40 MG tablet    Aspirin Buf,GaEus-XbCnr-JqTuo, (BUFFERED ASPIRIN) 325 MG TABS    Other Relevant Orders    TSH without Reflex    CBC Auto Differential    Comprehensive Metabolic Panel    Lipid Panel    Hypothyroidism - Primary    Relevant Medications    levothyroxine (SYNTHROID) 88 MCG tablet    Other Relevant Orders    TSH without Reflex    CBC Auto Differential    Comprehensive Metabolic Panel    Lipid Panel            See fasting lab orders. Will notify results. Continue Synthroid at current dose for hypothyroidism. Continue Lipitor for hyperlipidemia.   Continue Symbicort and albuterol as needed for COPD. Follow-up with us in 6 months for a checkup unless needed sooner. Follow-up with orthopedic surgeon regarding her hip. EMR Dragon/transcription disclaimer:  Much of this encounter note is electronic transcription/translation of spoken language toprinted texts. The electronic translation of spoken language may be erroneous, or at times, nonsensical words or phrases may be inadvertently transcribed.   Although I have reviewed the note for such errors, some may stillexist.

## 2021-09-27 NOTE — PROGRESS NOTES
After obtaining consent, and per orders of Dr. Kemal Mina, injection of Influenza HD given in Left deltoid by Brandon Lundy. Pt tolerated well.

## 2021-09-27 NOTE — PATIENT INSTRUCTIONS
Personalized Preventive Plan for Juan Felipe - 9/27/2021  Medicare offers a range of preventive health benefits. Some of the tests and screenings are paid in full while other may be subject to a deductible, co-insurance, and/or copay. Some of these benefits include a comprehensive review of your medical history including lifestyle, illnesses that may run in your family, and various assessments and screenings as appropriate. After reviewing your medical record and screening and assessments performed today your provider may have ordered immunizations, labs, imaging, and/or referrals for you. A list of these orders (if applicable) as well as your Preventive Care list are included within your After Visit Summary for your review. Other Preventive Recommendations:    · A preventive eye exam performed by an eye specialist is recommended every 1-2 years to screen for glaucoma; cataracts, macular degeneration, and other eye disorders. · A preventive dental visit is recommended every 6 months. · Try to get at least 150 minutes of exercise per week or 10,000 steps per day on a pedometer . · Order or download the FREE \"Exercise & Physical Activity: Your Everyday Guide\" from [employer]. Call 8-578.960.4498 or search [employer] online. · You need 3909-4623 mg of calcium and 1777-4439 IU of vitamin D per day. It is possible to meet your calcium requirement with diet alone, but a vitamin D supplement is usually necessary to meet this goal.  · When exposed to the sun, use a sunscreen that protects against both UVA and UVB radiation with an SPF of 30 or greater. Reapply every 2 to 3 hours or after sweating, drying off with a towel, or swimming. · Always wear a seat belt when traveling in a car. Always wear a helmet when riding a bicycle or motorcycle.

## 2021-09-27 NOTE — PROGRESS NOTES
Medicare Annual Wellness Visit  Name: Louis Díaz Date: 2021   MRN: [id number] Sex: Female   Age: 72 y.o. Ethnicity: Non- / Non    : 1950 Race: White (non-)      Martinez Standard is here for Medicare AWV (Pt is here for [address] [place] [address] visit )    Screenings for behavioral, psychosocial and functional/safety risks, and cognitive dysfunction are all negative except as indicated below. These results, as well as other patient data from the [address] form, are documented in Flowsheets linked to this Encounter. Allergies   Allergen Reactions    Norco [Hydrocodone-Acetaminophen] Palpitations         Prior to Visit Medications    Medication Sig Taking?  Authorizing Provider   Aspirin Buf,MeMvq-OhPwq-RcGdi, (BUFFERED ASPIRIN) 325 MG TABS Take 1 tablet by mouth 2 times daily Yes Bulmaro Leonard MD   risperiDONE (RISPERDAL) 0.5 MG tablet Take 1 tablet by mouth every evening Yes Bulmaro Leonard MD   levothyroxine (SYNTHROID) 88 MCG tablet Take 1 tablet by mouth Daily Yes Bulmaro Leonard MD   atorvastatin (LIPITOR) 40 MG tablet Take 1 tablet by mouth nightly Yes Reggie Smith MD   albuterol sulfate HFA (PROAIR HFA) 108 (90 Base) MCG/ACT inhaler Inhale 2 puffs into the lungs every 6 hours as needed for Wheezing Yes Judy Flores MD   budesonide-formoterol (SYMBICORT) 160-4.5 MCG/ACT AERO Inhale 2 puffs into the lungs 2 times daily Yes Judy Flores MD         Past Medical History:   Diagnosis Date    Cerebral artery occlusion with cerebral infarction ([facility] [address]     TIA    Diverticular disease of colon     Hyperlipidemia     Hypothyroidism     Pneumonia     Psychiatric problem     Seizures ([facility] [address]     Tobacco abuse     Vitamin B12 deficiency        Past Surgical History:   Procedure Laterality Date    CARDIAC PACEMAKER PLACEMENT      COLONOSCOPY      HIP SURGERY Right 5/15/2021    HIP HEMIARTHROPLASTY performed by Essie Sampson MD at [facility] SUR[facility]ERY      collapsed lung    DE COLONOSCOPY W/BIOPSY SINGLE/MULTIPLE N/A 6/27/2017    Dr Mallory West-extensive and large mouthed diverticular disease throughout the right colon-Tubular AP (villiform) (-) dysplasia x 1, tubular AP (-) dysplasia x 1--5 yr recall    TUBAL LIGATION           Family History   Problem Relation Age of Onset    Other Mother         COPD, dementia    Heart Disease Mother     Stroke Mother     Heart Disease Father     Stroke Father     Diabetes Brother     Diabetes Brother     High Blood Pressure Brother     High Cholesterol Brother     Colon Polyps Brother     Colon Cancer Neg Hx     Liver Cancer Neg Hx     Liver Disease Neg Hx     Esophageal Cancer Neg Hx     Rectal Cancer Neg Hx     Stomach Cancer Neg Hx        CareTeam (Including outside providers/suppliers regularly involved in providing care):   Patient Care Team:  Mustapha Salazar MD as PCP - General (Family Medicine)  Mustapha Salazar MD as PCP - [facility] Provider  JORDYN Rios as Advanced Practice Nurse (Neurology)  JORDYN Mendoza as Nurse Practitioner (Certified Clinical Nurse Specialist)    Wt Readings from Last 3 Encounters:   09/27/21 150 lb 8 oz (68.3 kg)   09/09/21 153 lb 3.2 oz (69.5 kg)   08/16/21 152 lb (68.9 kg)     Vitals:    09/27/21 1107   BP: 126/84   Site: Left Upper Arm   Position: Sitting   Cuff Size: Medium Adult   Pulse: 52   Resp: 16   Temp: 96.8 °F (36 °C)   TempSrc: Temporal   SpO2: 100%   Weight: 150 lb 8 oz (68.3 kg)   Height: 5' 9\" (1.753 m)     Body mass index is 22.22 kg/m². Based upon direct observation of the patient, evaluation of cognition reveals recent and remote memory intact. Patient's complete Health Risk Assessment and screening values have been reviewed and are found in Flowsheets. The following problems were reviewed today and where indicated follow up appointments were made and/or referrals ordered.     Positive Risk Factor Screenings with Interventions:     Fall Risk:  Timed Up and Go Test > 12 seconds? (Complete if either Fall Risk answers are Yes): no  2 or more falls in past year?: no  Fall with injury in past year?: (!) yes  Fall Risk Interventions:    · Home safety tips provided          General Health and ACP:  General  In general, how would you say your health is?: Good  In the past 7 days, have you experienced any of the following?  New or Increased Pain, New or Increased Fatigue, Loneliness, Social Isolation, Stress or Anger?: (!) Stress, Anger  Do you get the social and emotional support that you need?: (!) No  Do you have a Living Will?: Yes  Advance Directives     Power of  Living Will ACP-Advance Directive ACP-Power of     Not on File Filed on 06/03/21 Filed Not on File      General Health Risk Interventions:  · Stress: patient declines any further evaluation/treatment for this issue  · Anger: patient declines any further evaluation/treatment for this issue  · Inadequate social/emotional support: patient declines any further intervention for this issue    Health Habits/Nutrition:  Health Habits/Nutrition  Do you exercise for at least 20 minutes 2-3 times per week?: (!) No  Have you lost any weight without trying in the past 3 months?: (!) Yes  Do you eat only one meal per day?: No  Have you seen the dentist within the past year?: Yes  Body mass index: 22.22  Health Habits/Nutrition Interventions:  · Inadequate physical activity:  educational materials provided to promote increased physical activity  · Nutritional issues:  educational materials for healthy, well-balanced diet provided     Safety:  Safety  Do you have working smoke detectors?: (!) No  Have all throw rugs been removed or fastened?: (!) No  Do you have non-slip mats or surfaces in all bathtubs/showers?: Yes  Do all of your stairways have a railing or banister?: (!) No  Are your doorways, halls and stairs free of clutter?: Yes  Do you always fasten your seatbelt when you are in a car?: Yes  Safety Interventions:  · Home safety tips provided     Personalized Preventive Plan   Current Health Maintenance Status  Immunization History   Administered Date(s) Administered    COVID-19, Pfizer, PF, 30mcg/0.3mL 02/03/2021, 03/01/2021    Influenza Virus Vaccine 10/10/2014, 10/05/2018, 10/09/2018, 10/15/2019, 10/01/2020    Influenza, High Dose (Fluzone 65 yrs and older) 10/05/2018, 10/08/2019    Influenza, Quadv, IM, (6 mo and older Fluzone, Flulaval, Fluarix and 3 yrs and older Afluria) 11/20/2017    Influenza, Quadv, IM, PF (6 mo and older Fluzone, Flulaval, Fluarix, and 3 yrs and older Afluria) 10/17/2016, 10/01/2020    Influenza, Quadv, adjuvanted, 65 yrs +, IM, PF (Fluad) 09/27/2021    Pneumococcal Conjugate 13-valent ([id number]) 02/20/2017    Pneumococcal Polysaccharide ([id number]) 02/20/2018        Health Maintenance   Topic Date Due    Annual Wellness Visit (AWV)  Never done    Breast cancer screen  06/07/2021    Lipid screen  06/18/2021    DTaP/Tdap/Td vaccine (1 - Tdap) 05/11/2022 (Originally [birth year]16/1969)    Shingles Vaccine (1 of 2) 06/08/2022 (Originally 12/16/2001)    Colon cancer screen colonoscopy  06/27/2022    TSH testing  08/16/2022    DEXA (modify frequency per FRAX score)  Completed    Flu vaccine  Completed    Pneumococcal 65+ years Vaccine  Completed    COVID-19 Vaccine  Completed    Hepatitis A vaccine  Aged Out    Hepatitis B vaccine  Aged Out    Hib vaccine  Aged Out    Meningococcal (ACWY) vaccine  Aged Out    Hepatitis C screen  Discontinued     Recommendations for [employer] Due: see orders and patient instructions/AVS.  . Recommended screening schedule for the next 5-10 years is provided to the patient in written form: see Patient Instructions/AVS.    Viviane Nagel was seen today for medicare awv. Diagnoses and all orders for this visit:    Acquired hypothyroidism  -     TSH without Reflex;  Future  - CBC Auto Differential; Future  -     Comprehensive Metabolic Panel; Future  -     Lipid Panel; Future    Mixed hyperlipidemia  -     TSH without Reflex; Future  -     CBC Auto Differential; Future  -     Comprehensive Metabolic Panel; Future  -     Lipid Panel;  Future    Need for influenza vaccination  -     INFLUENZA, QUADV, ADJUVANTED, 72 YRS =, IM, PF, PREFILL SYR, 0.5ML (FLUAD)    Routine general medical examination at a health care facility

## 2021-10-04 ENCOUNTER — APPOINTMENT (OUTPATIENT)
Dept: GENERAL RADIOLOGY | Age: 71
End: 2021-10-04
Payer: MEDICARE

## 2021-10-04 ENCOUNTER — APPOINTMENT (OUTPATIENT)
Dept: CT IMAGING | Age: 71
End: 2021-10-04
Payer: MEDICARE

## 2021-10-04 ENCOUNTER — HOSPITAL ENCOUNTER (EMERGENCY)
Age: 71
Discharge: HOME | End: 2021-10-04
Payer: MEDICARE

## 2021-10-04 VITALS
RESPIRATION RATE: 18 BRPM | WEIGHT: 150 LBS | SYSTOLIC BLOOD PRESSURE: 110 MMHG | HEART RATE: 69 BPM | TEMPERATURE: 98.6 F | HEIGHT: 69 IN | BODY MASS INDEX: 22.22 KG/M2 | DIASTOLIC BLOOD PRESSURE: 70 MMHG | OXYGEN SATURATION: 99 %

## 2021-10-04 DIAGNOSIS — S82.832A: Primary | ICD-10-CM

## 2021-10-04 PROCEDURE — 96372 THER/PROPH/DIAG INJ SC/IM: CPT

## 2021-10-04 PROCEDURE — 73700 CT LOWER EXTREMITY W/O DYE: CPT

## 2021-10-04 PROCEDURE — 6360000002 HC RX W HCPCS: Performed by: PHYSICIAN ASSISTANT

## 2021-10-04 PROCEDURE — 73590 X-RAY EXAM OF LOWER LEG: CPT

## 2021-10-04 PROCEDURE — 99285 EMERGENCY DEPT VISIT HI MDM: CPT

## 2021-10-04 RX ORDER — KETOROLAC TROMETHAMINE 30 MG/ML
15 INJECTION, SOLUTION INTRAMUSCULAR; INTRAVENOUS ONCE
Status: COMPLETED | OUTPATIENT
Start: 2021-10-04 | End: 2021-10-04

## 2021-10-04 RX ADMIN — KETOROLAC TROMETHAMINE 15 MG: 30 INJECTION, SOLUTION INTRAMUSCULAR; INTRAVENOUS at 17:02

## 2021-10-04 ASSESSMENT — ENCOUNTER SYMPTOMS
NAUSEA: 0
SORE THROAT: 0
EYE DISCHARGE: 0
COLOR CHANGE: 0
APNEA: 0
EYE PAIN: 0
BACK PAIN: 0
COUGH: 0
RHINORRHEA: 0
PHOTOPHOBIA: 0
ROS SKIN COMMENTS: BRUISING
ABDOMINAL PAIN: 0
ABDOMINAL DISTENTION: 0
SHORTNESS OF BREATH: 0

## 2021-10-04 ASSESSMENT — PAIN SCALES - GENERAL
PAINLEVEL_OUTOF10: 3
PAINLEVEL_OUTOF10: 7
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
## 2021-10-05 DIAGNOSIS — S82.192A OTHER CLOSED FRACTURE OF PROXIMAL END OF LEFT TIBIA, INITIAL ENCOUNTER: Primary | ICD-10-CM

## 2021-11-09 RX ORDER — ASPIRIN/CALCIUM/MAG/ALUMINUM 325 MG
1 TABLET ORAL 2 TIMES DAILY
Qty: 60 TABLET | Refills: 1 | Status: SHIPPED | OUTPATIENT
Start: 2021-11-09 | End: 2022-02-14 | Stop reason: SDUPTHER

## 2021-12-09 DIAGNOSIS — R00.1 BRADYCARDIA: ICD-10-CM

## 2021-12-09 DIAGNOSIS — Z95.0 PACEMAKER: Primary | ICD-10-CM

## 2021-12-09 PROCEDURE — 93294 REM INTERROG EVL PM/LDLS PM: CPT | Performed by: CLINICAL NURSE SPECIALIST

## 2021-12-09 PROCEDURE — 93296 REM INTERROG EVL PM/IDS: CPT | Performed by: CLINICAL NURSE SPECIALIST

## 2021-12-10 RX ORDER — ATORVASTATIN CALCIUM 40 MG/1
40 TABLET, FILM COATED ORAL NIGHTLY
Qty: 30 TABLET | Refills: 3 | Status: SHIPPED | OUTPATIENT
Start: 2021-12-10 | End: 2022-02-08 | Stop reason: SDUPTHER

## 2021-12-28 ENCOUNTER — OFFICE VISIT (OUTPATIENT)
Dept: PRIMARY CARE CLINIC | Age: 71
End: 2021-12-28

## 2021-12-28 VITALS
SYSTOLIC BLOOD PRESSURE: 120 MMHG | HEART RATE: 80 BPM | DIASTOLIC BLOOD PRESSURE: 70 MMHG | OXYGEN SATURATION: 97 % | HEIGHT: 69 IN | TEMPERATURE: 97 F | WEIGHT: 150 LBS | BODY MASS INDEX: 22.22 KG/M2

## 2021-12-28 DIAGNOSIS — Z72.0 TOBACCO ABUSE: ICD-10-CM

## 2021-12-28 DIAGNOSIS — Z91.81 AT HIGH RISK FOR FALLS: ICD-10-CM

## 2021-12-28 DIAGNOSIS — R06.2 WHEEZING: Primary | ICD-10-CM

## 2021-12-28 LAB — SARS-COV-2, PCR: NOT DETECTED

## 2021-12-28 PROCEDURE — 96372 THER/PROPH/DIAG INJ SC/IM: CPT | Performed by: NURSE PRACTITIONER

## 2021-12-28 PROCEDURE — 99214 OFFICE O/P EST MOD 30 MIN: CPT | Performed by: NURSE PRACTITIONER

## 2021-12-28 RX ORDER — TRIAMCINOLONE ACETONIDE 40 MG/ML
40 INJECTION, SUSPENSION INTRA-ARTICULAR; INTRAMUSCULAR ONCE
Status: COMPLETED | OUTPATIENT
Start: 2021-12-28 | End: 2021-12-28

## 2021-12-28 RX ORDER — PREDNISONE 10 MG/1
TABLET ORAL
Qty: 18 TABLET | Refills: 0 | Status: ON HOLD | OUTPATIENT
Start: 2021-12-28 | End: 2022-01-09 | Stop reason: HOSPADM

## 2021-12-28 RX ORDER — BENZONATATE 200 MG/1
200 CAPSULE ORAL 3 TIMES DAILY PRN
Qty: 30 CAPSULE | Refills: 0 | Status: SHIPPED | OUTPATIENT
Start: 2021-12-28 | End: 2022-01-04

## 2021-12-28 RX ORDER — LEVOFLOXACIN 500 MG/1
500 TABLET, FILM COATED ORAL DAILY
Qty: 7 TABLET | Refills: 0 | Status: SHIPPED | OUTPATIENT
Start: 2021-12-28 | End: 2022-01-04

## 2021-12-28 RX ORDER — GUAIFENESIN 600 MG/1
600 TABLET, EXTENDED RELEASE ORAL 2 TIMES DAILY
Qty: 30 TABLET | Refills: 0 | Status: SHIPPED | OUTPATIENT
Start: 2021-12-28 | End: 2022-01-12

## 2021-12-28 RX ADMIN — TRIAMCINOLONE ACETONIDE 40 MG: 40 INJECTION, SUSPENSION INTRA-ARTICULAR; INTRAMUSCULAR at 12:05

## 2021-12-28 NOTE — LETTER
[place] [address] [address] [address]  Phone: [phone]  Fax: [phone]    JORDYN Mcdaniel CNP        December 28, 2021     Patient: Rayray Medina   YOB: 1950   Date of Visit: 12/28/2021       To Whom It May Concern: It is my medical opinion that Rayray Medina may return to work on 12-30-21. If you have any questions or concerns, please don't hesitate to call.     Sincerely,        JORDYN Mcdaniel CNP

## 2021-12-28 NOTE — PATIENT INSTRUCTIONS
Use your inhaler every 4-6 hours  Mucinex for thinning secretions  Start the antibiotics today  Steroid shot today and start the steroid pack tomorrow  Stay at home until your Covid test is back. Tessalon perrles for cough  Lots of clear liquids. work on stop smoking.

## 2022-01-06 NOTE — PROGRESS NOTES
[facility] PHYSICIAN SERVICES  [facility]  [address] [place] [address]  [address] [place] [address]  Dept: [phone]  Dept Fax: [phone]  Loc: [phone]    Ria Shanks is a 80 y.o. female who presents today for her medical conditions/complaints as noted below. Ria Shanks is c/o of Congestion (head and chest with cough since Monday.  )        HPI:     HPI   Chief Complaint   Patient presents with    Congestion     head and chest with cough since Monday. She has not tested for Covid in a couple weeks. 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   Past Medical History:   Diagnosis Date    Cerebral artery occlusion with cerebral infarction ([facility] [address]     TIA    Diverticular disease of colon     Hyperlipidemia     Hypothyroidism     Pneumonia     Psychiatric problem     Seizures ([facility]     Tobacco abuse     Vitamin B12 deficiency       Past Surgical History:   Procedure Laterality Date    CARDIAC PACEMAKER PLACEMENT      COLONOSCOPY      HIP SURGERY Right 5/15/2021    HIP HEMIARTHROPLASTY performed by Juancho Strange MD at [address] [place] [address]      collapsed lung    [place] COLONOSCOPY W/BIOPSY SINGLE/MULTIPLE N/A 6/27/2017    Dr Indigo Coulter and large mouthed diverticular disease throughout the right colon-Tubular AP (villiform) (-) dysplasia x 1, tubular AP (-) dysplasia x 1--5 yr recall    TUBAL LIGATION         Vitals 12/28/2021 10/4/2021 10/4/2021 10/4/2021 9/27/2021 6/3/4742   SYSTOLIC 938 302 - 259 486 111   DIASTOLIC 70 70 - 70 84 82   Site - - - - Left Upper Arm -   Position - - - - Sitting -   Cuff Size - - - - Medium Adult -   Pulse 80 69 - 69 52 71   Temp 97 - - 98.6 96.8 97.5   Resp - 18 - 16 16 16   SpO2 97 99 - 99 100 97   Weight 150 lb - - 150 lb 150 lb 8 oz 153 lb 3.2 oz   Height 5' 9\" - - 5' 9\" 5' 9\" -   Body mass index 22.15 kg/m2 - - 22.15 kg/m2 22.22 kg/m2 -   Pain Level - 3 7 - - -   Some recent data might be hidden       Family History   Problem Relation Age of Onset    Other Mother         COPD, dementia    Heart Disease Mother     Stroke Mother     Heart Disease Father     Stroke Father     Diabetes Brother     Diabetes Brother     High Blood Pressure Brother     High Cholesterol Brother     Colon Polyps Brother     Colon Cancer Neg Hx     Liver Cancer Neg Hx     Liver Disease Neg Hx     Esophageal Cancer Neg Hx     Rectal Cancer Neg Hx     Stomach Cancer Neg Hx        Social History     Tobacco Use    Smoking status: Former Smoker     Packs/day: 0.50     Years: 30.00     Pack years: 15.00     Types: Cigarettes     Start date: 1970     Quit date: 5/15/2021     Years since quittin.6    Smokeless tobacco: Never Used    Tobacco comment: 1 pack of cigaretts last 1 1/2 days while at work   Substance Use Topics    Alcohol use: No      Current Outpatient Medications on File Prior to Visit   Medication Sig Dispense Refill    atorvastatin (LIPITOR) 40 MG tablet Take 1 tablet by mouth nightly 30 tablet 3    Aspirin Buf,SvUya-VdEdj-ReTns, (BUFFERED ASPIRIN) 325 MG TABS Take 1 tablet by mouth 2 times daily 60 tablet 1    risperiDONE (RISPERDAL) 0.5 MG tablet Take 1 tablet by mouth every evening 30 tablet 3    albuterol sulfate HFA (PROAIR HFA) 108 (90 Base) MCG/ACT inhaler Inhale 2 puffs into the lungs every 6 hours as needed for Wheezing 1 Inhaler 3    budesonide-formoterol (SYMBICORT) 160-4.5 MCG/ACT AERO Inhale 2 puffs into the lungs 2 times daily 1 Inhaler 5     No current facility-administered medications on file prior to visit.      Allergies   Allergen Reactions    Norco [Hydrocodone-Acetaminophen] Palpitations       Health Maintenance   Topic Date Due    Breast cancer screen  2021    COVID-19 Vaccine (3 - Booster for Pfizer series) 2021    DTaP/Tdap/Td vaccine (1 - Tdap) 2022 (Originally 1970)    Shingles Vaccine (1 of 2) 2022 (Originally 2001)    Colon cancer screen colonoscopy  2022    Lipid screen 09/29/2022    TSH testing  09/29/2022    DEXA (modify frequency per FRAX score)  Completed    Flu vaccine  Completed    Pneumococcal 65+ years Vaccine  Completed    Hepatitis A vaccine  Aged Out    Hepatitis B vaccine  Aged Out    Hib vaccine  Aged Out    Meningococcal (ACWY) vaccine  Aged Out    Hepatitis C screen  Discontinued       Subjective:      Review of Systems   Constitutional: Negative. HENT: Positive for congestion. Neurological: Positive for headaches. Psychiatric/Behavioral: Negative. Objective:     Physical Exam  Vitals and nursing note reviewed. Constitutional:       Appearance: Normal appearance. She is well-developed. HENT:      Head: Normocephalic. Right Ear: External ear normal.      Left Ear: External ear normal.   Eyes:      Pupils: Pupils are equal, round, and reactive to light. Cardiovascular:      Rate and Rhythm: Normal rate and regular rhythm. Heart sounds: Normal heart sounds. Pulmonary:      Effort: Pulmonary effort is normal.      Breath sounds: Examination of the left-upper field reveals wheezing. Wheezing present. Musculoskeletal:      Cervical back: Normal range of motion. Skin:     General: Skin is warm and dry. Capillary Refill: Capillary refill takes less than 2 seconds. Neurological:      General: No focal deficit present. Mental Status: She is alert and oriented to person, place, and time. Psychiatric:         Mood and Affect: Mood normal.         Behavior: Behavior normal.         Thought Content: Thought content normal.         Judgment: Judgment normal.       /70   Pulse 80   Temp 97 °F (36.1 °C)   Ht 5' 9\" (1.753 m)   Wt 150 lb (68 kg)   SpO2 97%   BMI 22.15 kg/m²     Assessment:       Diagnosis Orders   1. Wheezing  XR CHEST STANDARD (2 VW)    COVID-19   2. At high risk for falls     3.  Tobacco abuse  XR CHEST STANDARD (2 VW)         Plan:   More than 50% of the time was spent counseling and coordinating care for a total time of 25min face to face. XR CHEST STANDARD (2 VW)    Result Date: 12/28/2021  Exam:   XR CHEST (2 VW)  Date:  12/28/2021 History:  Female, age  80 years; R06.2 COMPARISON:  Chest x-ray dated May 15, 2021 Findings : Cardiac device with leads project over the right atrium and right ventricle. The heart and mediastinum are normal in size. Chronic interstitial lung changes. Ill-defined opacities have increased, concerning for superimposed infection. The bones show no acute pathology. Impression: Findings concerning for infection. Signed by Dr Diana Cerda  PDMP Monitoring:    Last PDMP Marina Bolanos as Reviewed [facility]):  Review User Review Instant Review Result            Urine Drug Screenings (1 yr)     Urine Drug Screen  Collected: 7/26/2019  1:05 PM (Final result)    Complete Results              Medication Contract and Consent for Opioid Use Documents Filed     Patient Documents     Type of Document Status Date Received Received By Description    Medication Contract Signed 8/29/2014  9:33 AM Carlo Figueroa                  Patient given educational materials -see patient instructions. Discussed use, benefit, and side effects of prescribed medications. All patient questions answered. Pt voiced understanding. Reviewed health maintenance. Instructed to continue currentmedications, diet and exercise. Patient agreed with treatment plan. Follow up as directed. MEDICATIONS:  Orders Placed This Encounter   Medications    triamcinolone acetonide (KENALOG-40) injection 40 mg    predniSONE (DELTASONE) 10 MG tablet     Sig: Days 1-3 take 1 PO TID, days 4-6 take 1 PO BID, days 7-9 take 1 PO daily.      Dispense:  18 tablet     Refill:  0    levoFLOXacin (LEVAQUIN) 500 MG tablet     Sig: Take 1 tablet by mouth daily for 7 days     Dispense:  7 tablet     Refill:  0    benzonatate (TESSALON) 200 MG capsule     Sig: Take 1 capsule by mouth 3 times daily as needed for Cough     Dispense:  30 capsule Spironolactone Pregnancy And Lactation Text: This medication can cause feminization of the male fetus and should be avoided during pregnancy. The active metabolite is also found in breast milk.

## 2022-01-08 ENCOUNTER — APPOINTMENT (OUTPATIENT)
Dept: CT IMAGING | Age: 72
DRG: 884 | End: 2022-01-08
Payer: MEDICARE

## 2022-01-08 ENCOUNTER — HOSPITAL ENCOUNTER (INPATIENT)
Age: 72
LOS: 1 days | Discharge: HOME | DRG: 884 | End: 2022-01-09
Attending: EMERGENCY MEDICINE | Admitting: INTERNAL MEDICINE
Payer: MEDICARE

## 2022-01-08 DIAGNOSIS — R41.82 ALTERED MENTAL STATUS, UNSPECIFIED ALTERED MENTAL STATUS TYPE: Primary | ICD-10-CM

## 2022-01-08 DIAGNOSIS — R79.89 ELEVATED TSH: ICD-10-CM

## 2022-01-08 LAB
ACETAMINOPHEN LEVEL: <15 UG/ML
ALBUMIN SERPL-MCNC: 3.4 G/DL (ref 3.5–5.2)
ALP BLD-CCNC: 92 U/L (ref 35–104)
ALT SERPL-CCNC: 14 U/L (ref 5–33)
AMPHETAMINE SCREEN, URINE: NEGATIVE
ANION GAP SERPL CALCULATED.3IONS-SCNC: 6 MMOL/L (ref 7–19)
AST SERPL-CCNC: 15 U/L (ref 5–32)
BARBITURATE SCREEN URINE: NEGATIVE
BENZODIAZEPINE SCREEN, URINE: NEGATIVE
BILIRUB SERPL-MCNC: 0.4 MG/DL (ref 0.2–1.2)
BILIRUBIN URINE: NEGATIVE
BLOOD, URINE: NEGATIVE
BUN BLDV-MCNC: 14 MG/DL (ref 8–23)
CALCIUM SERPL-MCNC: 8.8 MG/DL (ref 8.8–10.2)
CANNABINOID SCREEN URINE: NEGATIVE
CHLORIDE BLD-SCNC: 108 MMOL/L (ref 98–111)
CLARITY: CLEAR
CO2: 26 MMOL/L (ref 22–29)
COCAINE METABOLITE SCREEN URINE: NEGATIVE
COLOR: YELLOW
CREAT SERPL-MCNC: 0.5 MG/DL (ref 0.5–0.9)
ETHANOL: <10 MG/DL (ref 0–0.08)
GFR AFRICAN AMERICAN: >59
GFR NON-AFRICAN AMERICAN: >60
GLUCOSE BLD-MCNC: 97 MG/DL (ref 74–109)
GLUCOSE URINE: NEGATIVE MG/DL
HCT VFR BLD CALC: 40.4 % (ref 37–47)
HEMOGLOBIN: 12.2 G/DL (ref 12–16)
KETONES, URINE: NEGATIVE MG/DL
LEUKOCYTE ESTERASE, URINE: NEGATIVE
Lab: NORMAL
MCH RBC QN AUTO: 25.7 PG (ref 27–31)
MCHC RBC AUTO-ENTMCNC: 30.2 G/DL (ref 33–37)
MCV RBC AUTO: 85.1 FL (ref 81–99)
NITRITE, URINE: NEGATIVE
OPIATE SCREEN URINE: NEGATIVE
PDW BLD-RTO: 16.9 % (ref 11.5–14.5)
PH UA: 8 (ref 5–8)
PLATELET # BLD: 220 K/UL (ref 130–400)
PMV BLD AUTO: 11 FL (ref 9.4–12.3)
POTASSIUM REFLEX MAGNESIUM: 4.8 MMOL/L (ref 3.5–5)
PROTEIN UA: NEGATIVE MG/DL
RBC # BLD: 4.75 M/UL (ref 4.2–5.4)
SALICYLATE, SERUM: <3 MG/DL (ref 3–10)
SARS-COV-2, NAAT: NOT DETECTED
SODIUM BLD-SCNC: 140 MMOL/L (ref 136–145)
SPECIFIC GRAVITY UA: 1.01 (ref 1–1.03)
T4 FREE: 1.02 NG/DL (ref 0.93–1.7)
TOTAL PROTEIN: 6.2 G/DL (ref 6.6–8.7)
TROPONIN: <0.01 NG/ML (ref 0–0.03)
TSH REFLEX FT4: 14.5 UIU/ML (ref 0.35–5.5)
UROBILINOGEN, URINE: 0.2 E.U./DL
WBC # BLD: 8.6 K/UL (ref 4.8–10.8)

## 2022-01-08 PROCEDURE — 6370000000 HC RX 637 (ALT 250 FOR IP): Performed by: NURSE PRACTITIONER

## 2022-01-08 PROCEDURE — 93005 ELECTROCARDIOGRAM TRACING: CPT | Performed by: EMERGENCY MEDICINE

## 2022-01-08 PROCEDURE — 84443 ASSAY THYROID STIM HORMONE: CPT

## 2022-01-08 PROCEDURE — G0378 HOSPITAL OBSERVATION PER HR: HCPCS

## 2022-01-08 PROCEDURE — 80143 DRUG ASSAY ACETAMINOPHEN: CPT

## 2022-01-08 PROCEDURE — 85027 COMPLETE CBC AUTOMATED: CPT

## 2022-01-08 PROCEDURE — 6370000000 HC RX 637 (ALT 250 FOR IP): Performed by: INTERNAL MEDICINE

## 2022-01-08 PROCEDURE — 82077 ASSAY SPEC XCP UR&BREATH IA: CPT

## 2022-01-08 PROCEDURE — 87635 SARS-COV-2 COVID-19 AMP PRB: CPT

## 2022-01-08 PROCEDURE — 80179 DRUG ASSAY SALICYLATE: CPT

## 2022-01-08 PROCEDURE — 1210000000 HC MED SURG R&B

## 2022-01-08 PROCEDURE — 99285 EMERGENCY DEPT VISIT HI MDM: CPT

## 2022-01-08 PROCEDURE — 84484 ASSAY OF TROPONIN QUANT: CPT

## 2022-01-08 PROCEDURE — 36415 COLL VENOUS BLD VENIPUNCTURE: CPT

## 2022-01-08 PROCEDURE — 81003 URINALYSIS AUTO W/O SCOPE: CPT

## 2022-01-08 PROCEDURE — 84439 ASSAY OF FREE THYROXINE: CPT

## 2022-01-08 PROCEDURE — 80307 DRUG TEST PRSMV CHEM ANLYZR: CPT

## 2022-01-08 PROCEDURE — 70450 CT HEAD/BRAIN W/O DYE: CPT

## 2022-01-08 PROCEDURE — 80053 COMPREHEN METABOLIC PANEL: CPT

## 2022-01-08 RX ORDER — FAMOTIDINE 20 MG/1
20 TABLET, FILM COATED ORAL 2 TIMES DAILY
Status: DISCONTINUED | OUTPATIENT
Start: 2022-01-08 | End: 2022-01-09 | Stop reason: HOSPADM

## 2022-01-08 RX ORDER — SODIUM CHLORIDE 9 MG/ML
25 INJECTION, SOLUTION INTRAVENOUS PRN
Status: DISCONTINUED | OUTPATIENT
Start: 2022-01-08 | End: 2022-01-09 | Stop reason: HOSPADM

## 2022-01-08 RX ORDER — POTASSIUM CHLORIDE 7.45 MG/ML
10 INJECTION INTRAVENOUS PRN
Status: DISCONTINUED | OUTPATIENT
Start: 2022-01-08 | End: 2022-01-09 | Stop reason: HOSPADM

## 2022-01-08 RX ORDER — MAGNESIUM SULFATE IN WATER 40 MG/ML
2000 INJECTION, SOLUTION INTRAVENOUS PRN
Status: DISCONTINUED | OUTPATIENT
Start: 2022-01-08 | End: 2022-01-09 | Stop reason: HOSPADM

## 2022-01-08 RX ORDER — POLYETHYLENE GLYCOL 3350 17 G/17G
17 POWDER, FOR SOLUTION ORAL DAILY PRN
Status: DISCONTINUED | OUTPATIENT
Start: 2022-01-08 | End: 2022-01-09 | Stop reason: HOSPADM

## 2022-01-08 RX ORDER — ACETAMINOPHEN 325 MG/1
650 TABLET ORAL EVERY 4 HOURS PRN
Status: DISCONTINUED | OUTPATIENT
Start: 2022-01-08 | End: 2022-01-09

## 2022-01-08 RX ORDER — LORAZEPAM 2 MG/ML
1 INJECTION INTRAMUSCULAR ONCE
Status: DISCONTINUED | OUTPATIENT
Start: 2022-01-08 | End: 2022-01-09 | Stop reason: HOSPADM

## 2022-01-08 RX ORDER — SODIUM CHLORIDE 0.9 % (FLUSH) 0.9 %
5-40 SYRINGE (ML) INJECTION EVERY 12 HOURS SCHEDULED
Status: DISCONTINUED | OUTPATIENT
Start: 2022-01-08 | End: 2022-01-09 | Stop reason: HOSPADM

## 2022-01-08 RX ORDER — SODIUM CHLORIDE 0.9 % (FLUSH) 0.9 %
5-40 SYRINGE (ML) INJECTION PRN
Status: DISCONTINUED | OUTPATIENT
Start: 2022-01-08 | End: 2022-01-09 | Stop reason: HOSPADM

## 2022-01-08 RX ADMIN — FAMOTIDINE 20 MG: 20 TABLET, FILM COATED ORAL at 21:21

## 2022-01-08 RX ADMIN — ACETAMINOPHEN 650 MG: 325 TABLET ORAL at 21:21

## 2022-01-08 ASSESSMENT — ENCOUNTER SYMPTOMS
EYE PAIN: 0
ABDOMINAL PAIN: 0
SHORTNESS OF BREATH: 0
VOMITING: 0
DIARRHEA: 0

## 2022-01-08 ASSESSMENT — PAIN SCALES - GENERAL: PAINLEVEL_OUTOF10: 1

## 2022-01-08 NOTE — ED NOTES
Pt demands to talk to social work, social work notified. Pt would not tell nurse what she would like to talk to social work about. She just states that she will continue to call social work until she gets to see them.      Ej Guzman RN  01/08/22 6788

## 2022-01-08 NOTE — PLAN OF CARE
Problem: Non-Violent Restraints  Goal: Removal from restraints as soon as assessed to be safe  Outcome: Ongoing  Goal: No harm/injury to patient while restraints in use  Outcome: Ongoing  Goal: Patient's dignity will be maintained  Outcome: Ongoing     Problem: SAFETY  Goal: Free from accidental physical injury  Outcome: Ongoing  Goal: Free from intentional harm  Outcome: Ongoing     Problem: DAILY CARE  Goal: Daily care needs are met  Outcome: Ongoing     Problem: PAIN  Goal: Patient's pain/discomfort is manageable  Outcome: Ongoing     Problem: SKIN INTEGRITY  Goal: Skin integrity is maintained or improved  Outcome: Ongoing     Problem: KNOWLEDGE DEFICIT  Goal: Patient/S.O. demonstrates understanding of disease process, treatment plan, medications, and discharge instructions.   Outcome: Ongoing     Problem: DISCHARGE BARRIERS  Goal: Patient's continuum of care needs are met  Outcome: Ongoing     Problem: Discharge Planning:  Goal: Discharged to appropriate level of care  Description: Discharged to appropriate level of care  Outcome: Ongoing     Problem: Falls - Risk of:  Goal: Will remain free from falls  Description: Will remain free from falls  Outcome: Ongoing  Goal: Absence of physical injury  Description: Absence of physical injury  Outcome: Ongoing

## 2022-01-08 NOTE — ED NOTES
Spoke with pt son, son states that pt has a pacemaker. Pt has been confused for 3 days. Pt has complained of a headache last night.       Adali Brambila RN  01/08/22 9991

## 2022-01-08 NOTE — ED PROVIDER NOTES
[facility] EMERGENCY DEPT  eMERGENCY dEPARTMENT eNCOUnter      Pt Name: Dominique John  MRN: [id number]  [place] 1950  Date of evaluation: 1/8/2022  Provider: Lynne Headley MD    [address] [place] [address]       Chief Complaint   Patient presents with    Altered Mental Status      3-4 days per family, pt  with stuttery speech in ER but not altered         HISTORY OF PRESENT ILLNESS   (Location/Symptom, Timing/Onset,Context/Setting, Quality, Duration, Modifying Factors, Severity)  Note limiting factors. Dominique John is a 80 y.o. female who presents to the emergency department due to altered mental status. No further history available for me at this time. Patient very agitated. She seems confused. Do not know how long this has been going on. Patient resistant to any exam.  She has no complaints and said she wants to leave. Waiting for family to arrive at this time for further details. Patient denies any complaints and seems very angry that she is here. HPI    NursingNotes were reviewed. REVIEW OF SYSTEMS    (2-9 systems for level 4, 10 or more for level 5)     Review of Systems   Constitutional: Negative for fever. Eyes: Negative for pain. Respiratory: Negative for shortness of breath. Cardiovascular: Negative for chest pain and palpitations. Gastrointestinal: Negative for abdominal pain, diarrhea and vomiting. Genitourinary: Negative for dysuria. Skin: Negative for rash. Neurological: Negative for weakness and headaches. Psychiatric/Behavioral: Positive for confusion. A complete review of systems was performed and is negative except as noted above in the HPI.        PAST MEDICAL HISTORY     Past Medical History:   Diagnosis Date    Cerebral artery occlusion with cerebral infarction [address] [address]     TIA    Diverticular disease of colon     Hyperlipidemia     Hypothyroidism     Pneumonia     Psychiatric problem     Seizures ([facility] [address]     Tobacco abuse     Vitamin B12 deficiency SURGICAL HISTORY       Past Surgical History:   Procedure Laterality Date    CARDIAC PACEMAKER PLACEMENT      COLONOSCOPY      HIP SURGERY Right 5/15/2021    HIP HEMIARTHROPLASTY performed by Maritza Leija MD at [address] [facility]      collapsed lung    IN COLONOSCOPY W/BIOPSY SINGLE/MULTIPLE N/A 6/27/2017    Dr Ced eWst-extensive and large mouthed diverticular disease throughout the right colon-Tubular AP (villiform) (-) dysplasia x 1, tubular AP (-) dysplasia x 1--5 yr recall    TUBAL LIGATION           CURRENT MEDICATIONS       Previous Medications    ALBUTEROL SULFATE HFA (PROAIR HFA) 108 (90 BASE) MCG/ACT INHALER    Inhale 2 puffs into the lungs every 6 hours as needed for Wheezing    ASPIRIN BUF,LFPDT-LSSID-OMSDJ, (BUFFERED ASPIRIN) 325 MG TABS    Take 1 tablet by mouth 2 times daily    ATORVASTATIN (LIPITOR) 40 MG TABLET    Take 1 tablet by mouth nightly    BUDESONIDE-FORMOTEROL (SYMBICORT) 160-4.5 MCG/ACT AERO    Inhale 2 puffs into the lungs 2 times daily    EUTHYROX 88 MCG TABLET    Take 1 tablet by mouth once daily    GUAIFENESIN (MUCINEX) 600 MG EXTENDED RELEASE TABLET    Take 1 tablet by mouth 2 times daily for 15 days    PREDNISONE (DELTASONE) 10 MG TABLET    Days 1-3 take 1 PO TID, days 4-6 take 1 PO BID, days 7-9 take 1 PO daily.     RISPERIDONE (RISPERDAL) 0.5 MG TABLET    Take 1 tablet by mouth every evening       ALLERGIES     Norco [hydrocodone-acetaminophen]    FAMILY HISTORY       Family History   Problem Relation Age of Onset    Other Mother         COPD, dementia    Heart Disease Mother     Stroke Mother     Heart Disease Father     Stroke Father     Diabetes Brother     Diabetes Brother     High Blood Pressure Brother     High Cholesterol Brother     Colon Polyps Brother     Colon Cancer Neg Hx     Liver Cancer Neg Hx     Liver Disease Neg Hx     Esophageal Cancer Neg Hx     Rectal Cancer Neg Hx     Stomach Cancer Neg Hx           SOCIAL HISTORY       Social History     Socioeconomic History    Marital status:      Spouse name: None    Number of children: 2    Years of education: None    Highest education level: None   Occupational History    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    Smoking status: Former Smoker     Packs/day: 0.50     Years: 30.00     Pack years: 15.00     Types: Cigarettes     Start date: 1970     Quit date: 5/15/2021     Years since quittin.6    Smokeless tobacco: Never Used    Tobacco comment: 1 pack of cigaretts last 1 1/2 days while at work   Vaping Use    Vaping Use: Never used   Substance and Sexual Activity    Alcohol use: No    Drug use: No    Sexual activity: Not Currently   Other Topics Concern    None   Social History Narrative    None     Social Determinants of Health     Financial Resource Strain:     Difficulty of Paying Living Expenses: Not on file   Food Insecurity:     Worried About Running Out of Food in the Last Year: Not on file    Serafin of Food in the Last Year: Not on file   Transportation Needs:     Lack of Transportation (Medical): Not on file    Lack of Transportation (Non-Medical):  Not on file   Physical Activity:     Days of Exercise per Week: Not on file    Minutes of Exercise per Session: Not on file   Stress:     Feeling of Stress : Not on file   Social Connections:     Frequency of Communication with Friends and Family: Not on file    Frequency of Social Gatherings with Friends and Family: Not on file    Attends Episcopal Services: Not on file    Active Member of Clubs or Organizations: Not on file    Attends Club or Organization Meetings: Not on file    Marital Status: Not on file   Intimate Partner Violence:     Fear of Current or Ex-Partner: Not on file    Emotionally Abused: Not on file    Physically Abused: Not on file    Sexually Abused: Not on file   Housing Stability:     Unable to Pay for Housing in the Last Year: Not on file    Number of Places Lived in the Last Year: Not on file    Unstable Housing in the Last Year: Not on file       SCREENINGS    [place] Coma Scale  Eye Opening: Spontaneous  Best Verbal Response: Oriented  Best Motor Response: Obeys commands  Jarred Coma Scale Score: 15        PHYSICAL EXAM    (up to 7 for level 4, 8 or more for level 5)     ED Triage Vitals [01/08/22 1035]   BP Temp Temp src Pulse Resp SpO2 Height Weight   125/75 98.5 °F (36.9 °C) -- 62 17 93 % 5' 8\" (1.727 m) 151 lb (68.5 kg)       Physical Exam  Vitals reviewed. Constitutional:       General: She is not in acute distress. Appearance: She is well-developed. HENT:      Head: Normocephalic and atraumatic. Ears:      Comments: Patient would not allow me to examine ears or mouth or throat  Eyes:      General: No scleral icterus. Pupils: Pupils are equal, round, and reactive to light. Neck:      Vascular: No JVD. Cardiovascular:      Rate and Rhythm: Normal rate and regular rhythm. Pulses: Normal pulses. Heart sounds: Normal heart sounds. Pulmonary:      Effort: Pulmonary effort is normal. No respiratory distress. Breath sounds: Normal breath sounds. Abdominal:      General: There is no distension. Palpations: Abdomen is soft. Tenderness: There is no abdominal tenderness. There is no guarding or rebound. Musculoskeletal:         General: No swelling or tenderness. Cervical back: Normal range of motion and neck supple. No tenderness. Skin:     General: Skin is warm and dry. Capillary Refill: Capillary refill takes less than 2 seconds. Neurological:      Mental Status: She is alert. She is confused. GCS: GCS eye subscore is 4. GCS verbal subscore is 4. GCS motor subscore is 6. Motor: Motor function is intact. Comments: Somewhat limited exam but no focal deficits appreciated. Patient is confused   Psychiatric:         Mood and Affect: Affect is labile and angry.          Speech: Speech 62   Resp: 17   Temp: 98.5 °F (36.9 °C)   SpO2: 93%   Weight: 151 lb (68.5 kg)   Height: 5' 8\" (1.727 m)       MDM  Patient has Risperdal listed as one of her home medications and she admits she does not take it. She seems very agitated and she is confused. Clearly disoriented. Told me she wants to go to the \"old ER\";  am uncertain as to what she is talking about. Her son is here now and she has calmed down some but she clearly does not seem competent to make her own decisions right now. Son tells me that she has been confused for the last several days. Med list looks like she was on steroids recently so this might be contributing to her altered mental status. She is wanting to leave but I do not feel that she is competent to make her own decision so she has been placed on a 72-hour hold. I think she will need to be medically cleared and then seen by psychiatry. Patient's case discussed with hospitalist, Dr. Erica Auguste, who is agreeable plan of care and admission. CONSULTS:  IP CONSULT TO PSYCHIATRY  IP CONSULT TO SOCIAL WORK    PROCEDURES:  Unless otherwise notedbelow, none     Procedures    FINAL IMPRESSION     1. Altered mental status, unspecified altered mental status type    2.  Elevated TSH          DISPOSITION/PLAN   DISPOSITION Admitted 01/08/2022 03:50:25 PM      PATIENT REFERRED TO:  @FUP@    DISCHARGE MEDICATIONS:  New Prescriptions    No medications on file          (Please note that portions of this note were completed with a voice recognition program.  Efforts were made to edit the dictations butoccasionally words are mis-transcribed.)    Mateusz Borja MD (electronically signed)  AttendingEmerWad[facility] Physician         Mateusz Borja MD  01/08/22 1133

## 2022-01-08 NOTE — H&P
[place], [place], [address]    DEPARTMENT OF HOSPITALIST MEDICINE      HISTORY & PHYSICAL:          REASON FOR ADMISSION:  Chief Complaint   Patient presents with    Altered Mental Status      3-4 days per family, pt  with stuttery speech in ER but not altered        HISTORY OF PRESENT ILLNESS:  Zach Fish is an 80 y.o. female carries a history of of hyperlipidemia, COPD, tobacco abuse, she is fully vaccinated and booster, psychiatric history, seizure disorder, and hypothyroidism. Patient son brought patient and complaining of altered mental status over the last 4 days. Patient was seen by PCP on [phone] with upper respiratory infection . She was given a steroid shot and a Medrol Dosepak, along with Mucinex and cough medicine. Family reports 4 days ago she became more more confused paranoid and agitated. She is most definitely agitated in the room she is angry that the ER doctor put her on a 72-hour hold. She wants to give me a rundown all that is wrong with the emergency room. When I reach handle her to her health history she states she is fine and wants to go home. Questioned about her nutritional status and she began to cry. States she does not have food to eat at home all the time. Her son who is at bedside did not deny that. She must be redirected often due to her escalating anger. . I did agree to get her something to eat and drink which seemed to calm her down. She will be admitted for rule out some sort of infectious process causing her agitation and confusion versus steroid psychosis. He is placed on 72-hour hold by the ER doctor. Psych consult will be called.     PAST MEDICAL HISTORY:  Past Medical History:   Diagnosis Date    Cerebral artery occlusion with cerebral infarction ([facility] [address]     TIA    Diverticular disease of colon     Hyperlipidemia     Hypothyroidism     Pneumonia     Psychiatric problem     Seizures ([facility] [address]     Tobacco abuse     Vitamin B12 deficiency PAST SURGICAL HISTORY:  Past Surgical History:   Procedure Laterality Date    CARDIAC PACEMAKER PLACEMENT      COLONOSCOPY      HIP SURGERY Right 5/15/2021    HIP HEMIARTHROPLASTY performed by Hiwot Ramos MD at [address]      collapsed lung    MT COLONOSCOPY W/BIOPSY SINGLE/MULTIPLE N/A 2017    Dr Suad West-extensive and large mouthed diverticular disease throughout the right colon-Tubular AP (villiform) (-) dysplasia x 1, tubular AP (-) dysplasia x 1--5 yr recall    TUBAL LIGATION          SOCIAL HISTORY:  Social History     Socioeconomic History    Marital status:      Spouse name: None    Number of children: 2    Years of education: None    Highest education level: None   Occupational History    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    Smoking status: Former Smoker     Packs/day: 0.50     Years: 30.00     Pack years: 15.00     Types: Cigarettes     Start date: 1970     Quit date: 5/15/2021     Years since quittin.6    Smokeless tobacco: Never Used    Tobacco comment: 1 pack of cigaretts last 1 1/2 days while at work   Vaping Use    Vaping Use: Never used   Substance and Sexual Activity    Alcohol use: No    Drug use: No    Sexual activity: Not Currently   Other Topics Concern    None   Social History Narrative    None     Social Determinants of Health     Financial Resource Strain:     Difficulty of Paying Living Expenses: Not on file   Food Insecurity:     Worried About Running Out of Food in the Last Year: Not on file    Serafin of Food in the Last Year: Not on file   Transportation Needs:     Lack of Transportation (Medical): Not on file    Lack of Transportation (Non-Medical):  Not on file   Physical Activity:     Days of Exercise per Week: Not on file    Minutes of Exercise per Session: Not on file   Stress:     Feeling of Stress : Not on file   Social Connections:     Frequency of Communication with Friends and Family: Not on file    Frequency of Social Gatherings with Friends and Family: Not on file    Attends Yazdanism Services: Not on file    Active Member of Clubs or Organizations: Not on file    Attends Club or Organization Meetings: Not on file    Marital Status: Not on file   Intimate Partner Violence:     Fear of Current or Ex-Partner: Not on file    Emotionally Abused: Not on file    Physically Abused: Not on file    Sexually Abused: Not on file   Housing Stability:     Unable to Pay for Housing in the Last Year: Not on file    Number of Places Lived in the Last Year: Not on file    Unstable Housing in the Last Year: Not on file        FAMILY HISTORY:  Family History   Problem Relation Age of Onset    Other Mother         COPD, dementia    Heart Disease Mother     Stroke Mother     Heart Disease Father     Stroke Father     Diabetes Brother     Diabetes Brother     High Blood Pressure Brother     High Cholesterol Brother     Colon Polyps Brother     Colon Cancer Neg Hx     Liver Cancer Neg Hx     Liver Disease Neg Hx     Esophageal Cancer Neg Hx     Rectal Cancer Neg Hx     Stomach Cancer Neg Hx          ALLERGIES:  Allergies   Allergen Reactions    Norco [Hydrocodone-Acetaminophen] Palpitations        PRIOR TO ADMISSION MEDS:  Not in a hospital admission.      REVIEW OF SYSTEMS:  Constitutional:  No fevers, chills, nausea, vomiting, + tiredness & fatigue   Head:  No head injury, facial trauma   Eyes:  No acute visual changes, exudate, trauma   Ears:  No acute hearing loss, earaches   Nose: No nasal discharge, epistaxis   Neck: No new hoarseness, voice change, or new masses   Lungs:   No hemoptysis, pleurisy   Heart:  No chest pressure with exertion, palpitations,    Abdomen:   No new masses, no bright red blood per rectum   Extremities: No acute pain while ambulating, no new lesions   Skin: No new changes in skin color, no rashes or lesions   Neurologic: No new motor or sensory changes     14 point review of systems addressed with patient which is essentially negative except as specifically addressed above:    PHYSICAL EXAM:  /75   Pulse 62   Temp 98.5 °F (36.9 °C)   Resp 17   Ht 5' 8\" (1.727 m)   Wt 151 lb (68.5 kg)   SpO2 93%   BMI 22.96 kg/m²   No intake/output data recorded. PHYSICAL EXAMINATION:    Vital Signs: Please see the chart   CASSANDRA:  Awake, alert, oriented x 3, patient appears tired and fatigued   Head/Eyes:  Normocephalic, atraumatic, EOMI and PERRLA bilaterally   ENT: Moist mucous membranes, nasal passages clear   Neck: Supple, full range of motion, no carotid bruit, trachea midline   Respiratory:   Bilateral fair air entry in both lung fields, mild B/L crackles, symmetric expansion of chest   Cardiovascular:  Regular rate and rhythm, S1+S2+0, no murmurs/rubs   Urology: No bilateral CVA tenderness, no suprapubic tenderness   Abdomen:   Soft, non-tender, bowel sounds +ve, no organomegaly   Muscle/Joints: Moves all, full range of motion, no muscle spasms   Extremities: No clubbing, no cyanosis, no calf tenderness, no edema   Pulses: 2+ bilaterally, symmetrical   Skin: Warm, dry, no pallor/cyanosis/jaundice, no rashes/lesions   Neurologic: Awake, alert, oriented x 3, cranial nerves II-XII intact, no focal neurological deficits, sensory system intact   Psychiatric: Normal mood, non-suicidal         LABORATORY DATA:    CBC:  Recent Labs     01/08/22  1039   WBC 8.6   HGB 12.2   HCT 40.4        BMP:  Recent Labs     01/08/22  1039      K 4.8      CO2 26   BUN 14   CREATININE 0.5   CALCIUM 8.8     Recent Labs     01/08/22  1039   AST 15   ALT 14   BILITOT 0.4   ALKPHOS 92     Coag Panel: No results for input(s): INR, PROTIME, APTT in the last 72 hours.   Cardiac Enzymes:   Recent Labs     01/08/22  1039   TROPONINI <0.01     ABGs:No results found for: PHART, PO2ART, RIO1VRR  Urinalysis:  Lab Results   Component Value Date    NITRU Negative 01/08/2022    WBCUA 2 05/17/2021    BACTERIA NEGATIVE 05/17/2021    RBCUA 1 05/17/2021    BLOODU Negative 01/08/2022    SPECGRAV 1.009 01/08/2022    GLUCOSEU Negative 01/08/2022     A1C: No results for input(s): LABA1C in the last 72 hours. ABG:No results for input(s): PHART, HZT3DLP, PO2ART, RHP7HKO, BEART, HGBAE, W0JZOPYB, CARBOXHGBART in the last 72 hours. EKG:   Please see chart      IMAGING:  CT HEAD WO CONTRAST    Result Date: 1/8/2022  Mild cerebral and cerebellar volume loss with chronic microvascular disease but no evidence of acute intracranial process. 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, 2018 Signed by Dr Kaylee Mina and Plan: Active Problems:    Altered mental status   Patient is on 72-hour hold   Sitter at bedside   Fall precaution   [employer]   Psych consult   Serial lab   Resume her Synthroid   Nursing to reconcile home medication   Hold any steroids at this point  Malnutrition    consult to evaluate the home   Patient states she does not have food all the time and she is hungry       Resolved Problems:    * No resolved hospital problems. *           Repeat labs in a.m. Electrolyte replacement as per protocol. Patient will be monitored very closely on the floor. Further recommendations as per the hospital course. Patient's management will be taken over by our [facility] Hospitalist Team in am.    Patient  is on DVT prophylaxis  Current medications reviewed  Lab work reviewed  Radiology/Chest x-ray films reviewed  Discussed with the nurse and addressed all questions/concerns  Discussed with Patient and/or Family at the bedside in detail . .. they verbalize understanding and agree with the management plan. Attestation:  Inpatient status is used for patients with an expected LOS extending past two midnights due to medical therapy and/or critical care needs  . .. all other patients are placed under OBServation status. JORDYN Duckworth - CNP  3:58 PM 1/8/2022      DISCLAIMER: This note was created with electronic voice recognition which does have occasional errors. If you have any questions regarding the content within the note please do not hesitate to contact me. .. Thanks.

## 2022-01-08 NOTE — CARE COORDINATION
Spoke with pt at bedside after she called this SW work cell phone. Pt is aggravated that she is not able to leave at this time. Discussed with attending. Pt is altered and not capable of signing herself out AMA.

## 2022-01-08 NOTE — ED NOTES
Pt states she is now agreeable to have her CT done, CT called     Toño Gold, JEFFERSON  01/08/22 7652

## 2022-01-09 VITALS
SYSTOLIC BLOOD PRESSURE: 101 MMHG | HEIGHT: 68 IN | RESPIRATION RATE: 16 BRPM | OXYGEN SATURATION: 97 % | HEART RATE: 66 BPM | BODY MASS INDEX: 22.88 KG/M2 | DIASTOLIC BLOOD PRESSURE: 75 MMHG | WEIGHT: 151 LBS | TEMPERATURE: 97.8 F

## 2022-01-09 LAB
ANION GAP SERPL CALCULATED.3IONS-SCNC: 10 MMOL/L (ref 7–19)
BASOPHILS ABSOLUTE: 0.1 K/UL (ref 0–0.2)
BASOPHILS RELATIVE PERCENT: 0.6 % (ref 0–1)
BUN BLDV-MCNC: 19 MG/DL (ref 8–23)
CALCIUM SERPL-MCNC: 9 MG/DL (ref 8.8–10.2)
CHLORIDE BLD-SCNC: 105 MMOL/L (ref 98–111)
CO2: 24 MMOL/L (ref 22–29)
CREAT SERPL-MCNC: 0.9 MG/DL (ref 0.5–0.9)
EOSINOPHILS ABSOLUTE: 0.1 K/UL (ref 0–0.6)
EOSINOPHILS RELATIVE PERCENT: 0.9 % (ref 0–5)
GFR AFRICAN AMERICAN: >59
GFR NON-AFRICAN AMERICAN: >60
GLUCOSE BLD-MCNC: 96 MG/DL (ref 74–109)
HCT VFR BLD CALC: 41.4 % (ref 37–47)
HEMOGLOBIN: 12.6 G/DL (ref 12–16)
IMMATURE GRANULOCYTES #: 0 K/UL
IRON SATURATION: 16 % (ref 14–50)
IRON: 37 UG/DL (ref 37–145)
LYMPHOCYTES ABSOLUTE: 2.2 K/UL (ref 1.1–4.5)
LYMPHOCYTES RELATIVE PERCENT: 27.1 % (ref 20–40)
MCH RBC QN AUTO: 26 PG (ref 27–31)
MCHC RBC AUTO-ENTMCNC: 30.4 G/DL (ref 33–37)
MCV RBC AUTO: 85.4 FL (ref 81–99)
MONOCYTES ABSOLUTE: 0.7 K/UL (ref 0–0.9)
MONOCYTES RELATIVE PERCENT: 8.6 % (ref 0–10)
NEUTROPHILS ABSOLUTE: 5 K/UL (ref 1.5–7.5)
NEUTROPHILS RELATIVE PERCENT: 62.4 % (ref 50–65)
PDW BLD-RTO: 17 % (ref 11.5–14.5)
PLATELET # BLD: 208 K/UL (ref 130–400)
PMV BLD AUTO: 10.7 FL (ref 9.4–12.3)
POTASSIUM REFLEX MAGNESIUM: 4.7 MMOL/L (ref 3.5–5)
RBC # BLD: 4.85 M/UL (ref 4.2–5.4)
SODIUM BLD-SCNC: 139 MMOL/L (ref 136–145)
TOTAL IRON BINDING CAPACITY: 232 UG/DL (ref 250–400)
WBC # BLD: 8.1 K/UL (ref 4.8–10.8)

## 2022-01-09 PROCEDURE — 36415 COLL VENOUS BLD VENIPUNCTURE: CPT

## 2022-01-09 PROCEDURE — 83550 IRON BINDING TEST: CPT

## 2022-01-09 PROCEDURE — 99231 SBSQ HOSP IP/OBS SF/LOW 25: CPT | Performed by: PSYCHIATRY & NEUROLOGY

## 2022-01-09 PROCEDURE — G0378 HOSPITAL OBSERVATION PER HR: HCPCS

## 2022-01-09 PROCEDURE — 83540 ASSAY OF IRON: CPT

## 2022-01-09 PROCEDURE — 80048 BASIC METABOLIC PNL TOTAL CA: CPT

## 2022-01-09 PROCEDURE — 87040 BLOOD CULTURE FOR BACTERIA: CPT

## 2022-01-09 PROCEDURE — 85025 COMPLETE CBC W/AUTO DIFF WBC: CPT

## 2022-01-09 RX ORDER — ACETAMINOPHEN 325 MG/1
650 TABLET ORAL EVERY 6 HOURS PRN
Status: DISCONTINUED | OUTPATIENT
Start: 2022-01-09 | End: 2022-01-09 | Stop reason: HOSPADM

## 2022-01-09 NOTE — CONSULTS
[facility]  Psychiatry Consult    Reason for Consult:  AMS    80 y.o. white female with history of of hyperlipidemia, COPD, seizures, and hypothyroidism, brought to the ER by her son for altered mental status over the last 4 days. Patient was seen by PCP on 12/28 with upper respiratory infection. She was given a steroid shot and a Medrol Dosepak, along with Mucinex and cough medicine. She became confused, paranoid and agitated about 4 days ago. She was agitated in the ER. No acute changes per CT head. No evidence of UTI. UDS negative. Her mental status improved by the time she got to the medical floor. She is now medically cleared. Patient is observed using the bathroom this morning. Her son is in the room. States patient is back to her baseline. Patient is alert and oriented x 3. She denies paranoia and hallucinations. She denies suicidal ideation. She denies depression and anxiety. She is somewhat upset about still being at the hospital.  Denies previous episodes of psychosis. States she had a pacemaker placed several years ago after which she started having more health issues. Denies physical symptoms. Her son feels that patient is safe to go home at this time. Psychiatric History:    Diagnoses: Denies  Suicide attempts/gestures: Denies   Prior hospitalizations: Denies   Medication trials: Denies   Mental health contact: None   Head trauma: Denies    Substance Use History:  Denies alcohol and illicits. Smokes cigarettes.      Allergies:  Norco [hydrocodone-acetaminophen]    Medical History:  Past Medical History:   Diagnosis Date    Cerebral artery occlusion with cerebral infarction ([facility] [address]     TIA    Diverticular disease of colon     Hyperlipidemia     Hypothyroidism     Pneumonia     Psychiatric problem     Seizures ([facility] [address]     Tobacco abuse     Vitamin B12 deficiency        Family History:  Family History   Problem Relation Age of Onset    Other Mother COPD, dementia    Heart Disease Mother     Stroke Mother     Heart Disease Father     Stroke Father     Diabetes Brother     Diabetes Brother     High Blood Pressure Brother     High Cholesterol Brother     Colon Polyps Brother     Colon Cancer Neg Hx     Liver Cancer Neg Hx     Liver Disease Neg Hx     Esophageal Cancer Neg Hx     Rectal Cancer Neg Hx     Stomach Cancer Neg Hx      No h/o mental illness or SAs. Social History:  Lives with son. No access to guns. Review of Systems: 14 point review of systems negative except as described above    Vitals:    01/09/22 1145   BP: 101/75   Pulse: 66   Resp: 16   Temp: 97.8 °F (36.6 °C)   SpO2: 97%       Mental Status  Appearance: Disheveled, missing teeth and in hospital attire. Made good eye contact. Gait stable. No abnormal movements or tremor. Behavior: Cooperative, irritable. Speech: Nonpressured  Mood: \"I'm fine! I want to go home\"   Affect: Constricted  Thought Process: Appears linear, logical and goal oriented. Causality appears intact. Thought Content: Denies active suicidal and homicidal ideation. No overt delusions or paranoia appreciated. Perceptions: Denies auditory or visual hallucinations at present time. Not responding to internal stimuli. Concentration: Intact. Orientation: to person, place, date, and situation. Language: Intact. Fund of information: Intact. Memory: Recent and remote appear intact. Impulsivity: Limited. Neurovegitative: Fair appetite and sleep. Insight: Improved. Judgment: Improved. Assessment  DSM-5 DIAGNOSIS:  Impression   Mood disorder unspecified  Psychosis unspecified, resolved  Tobacco use disorder  Recent URI  COPD  Hypothyroidism  H/o seizures    No evidence of acute suicidality, homicidality or psychosis observed today. Potentially, steroid-induced psychosis which has resolved. At baseline per son. May discharge home once medically cleared.      Thank you for consulting our service. Please call us with questions.       Ammy Miller MD

## 2022-01-09 NOTE — PLAN OF CARE
Problem: Non-Violent Restraints  Goal: Removal from restraints as soon as assessed to be safe  1/9/2022 0003 by Jung Vazquez RN  Outcome: Ongoing  1/8/2022 1740 by Oriana Cline RN  Outcome: Ongoing  Goal: No harm/injury to patient while restraints in use  1/9/2022 0003 by Jung Vazquez RN  Outcome: Ongoing  1/8/2022 1740 by Oriana Cline RN  Outcome: Ongoing  Goal: Patient's dignity will be maintained  1/9/2022 0003 by Jung Vazquez RN  Outcome: Ongoing  1/8/2022 1740 by Oriana Cline RN  Outcome: Ongoing     Problem: SAFETY  Goal: Free from accidental physical injury  1/9/2022 0003 by Jung Vazquez RN  Outcome: Ongoing  1/8/2022 1740 by Oriana Cline RN  Outcome: Ongoing  Goal: Free from intentional harm  1/9/2022 0003 by Jung Vazquez RN  Outcome: Ongoing  1/8/2022 1740 by Oriana Cline RN  Outcome: Ongoing     Problem: DAILY CARE  Goal: Daily care needs are met  1/9/2022 0003 by Jung Vazquez RN  Outcome: Ongoing  1/8/2022 1740 by Oriana Cline RN  Outcome: Ongoing     Problem: PAIN  Goal: Patient's pain/discomfort is manageable  1/9/2022 0003 by Jung Vazquez RN  Outcome: Ongoing  1/8/2022 1740 by Oriana Cline RN  Outcome: Ongoing     Problem: SKIN INTEGRITY  Goal: Skin integrity is maintained or improved  1/9/2022 0003 by Jung Vazquez RN  Outcome: Ongoing  1/8/2022 1740 by Oriana Cline RN  Outcome: Ongoing     Problem: KNOWLEDGE DEFICIT  Goal: Patient/S.O. demonstrates understanding of disease process, treatment plan, medications, and discharge instructions.   1/9/2022 0003 by Jung Vazquez RN  Outcome: Ongoing  1/8/2022 1740 by Oriana Cline RN  Outcome: Ongoing     Problem: DISCHARGE BARRIERS  Goal: Patient's continuum of care needs are met  1/9/2022 0003 by Jung Vazquez RN  Outcome: Ongoing  1/8/2022 1740 by Oriana Cline RN  Outcome: Ongoing     Problem: Discharge Planning:  Goal: Discharged to appropriate level of care  Description: Discharged to appropriate level of

## 2022-01-09 NOTE — DISCHARGE SUMMARY
[facility] [place] [address]    DEPARTMENT OF HOSPITALIST MEDICINE      DISCHARGE SUMMARY:      PATIENT NAME:  Diane Pro  :  1950  MRN:  [id number]    Admission Date:   2022 10:33 AM Attending: Peg Drummond MD   Discharge Date:   2022              PCP: Raine Swain MD  Length of Stay: 1 days     Chief Complaint on Admission:   Chief Complaint   Patient presents with    Altered Mental Status      3-4 days per family, pt  with stuttery speech in ER but not altered       Consultants:     IP CONSULT TO PSYCHIATRY  IP CONSULT TO SOCIAL WORK       Discharge Problem List:   Active Problems:    Altered mental status  Resolved Problems:    * No resolved hospital problems. *         Last dated Assessment and Plan . .. 2022      CUMULATIVE  HOSPITAL  COURSE  AND  TREATMENT:  Diane Pro is an 80 y.o. female carries a history of of hyperlipidemia, COPD, tobacco abuse, she is fully vaccinated and booster, psychiatric history, seizure disorder, and hypothyroidism. Patient son brought patient and complaining of altered mental status over the last 4 days. Patient was seen by PCP on [phone] with upper respiratory infection . She was given a steroid shot and a Medrol Dosepak, along with Mucinex and cough medicine. Family reports 4 days ago she became more more confused paranoid and agitated. She is most definitely agitated in the room she is angry that the ER doctor put her on a 72-hour hold. She wants to give me a rundown all that is wrong with the emergency room. When I reach handle her to her health history she states she is fine and wants to go home. Questioned about her nutritional status and she began to cry. States she does not have food to eat at home all the time. Her son who is at bedside did not deny that. She must be redirected often due to her escalating anger. . I did agree to get her something to eat and drink which seemed to calm her down.   She will be admitted for rule out some sort of infectious process causing her agitation and confusion versus steroid psychosis. He is placed on 72-hour hold by the ER doctor. Psych consult completed and ok'd to dc  She is calm and agreeable today. No confusion noted. Labs reviewed and are normal.    OBJECTIVE:  /75   Pulse 66   Temp 97.8 °F (36.6 °C)   Resp 16   Ht 5' 8\" (1.727 m)   Wt 151 lb (68.5 kg)   SpO2 97%   BMI 22.96 kg/m²       Heart: RRR   Lungs: Bilateral fair air entry   Abdomen: Soft, non-tender   Extremities: No edema   Neurologic: Alert and oriented   Skin: Warm and dry          Laboratory Data:  Recent Labs     01/08/22  1039 01/09/22  0259   WBC 8.6 8.1   HGB 12.2 12.6    208     Recent Labs     01/08/22  1039 01/09/22  0259    139   K 4.8 4.7    105   CO2 26 24   BUN 14 19   CREATININE 0.5 0.9   GLUCOSE 97 96     Recent Labs     01/08/22  1039   AST 15   ALT 14   BILITOT 0.4   ALKPHOS 92     Troponin T:   Recent Labs     01/08/22  1039   TROPONINI <0.01     Pro-BNP: No results for input(s): BNP in the last 72 hours. INR: No results for input(s): INR in the last 72 hours. UA:  Recent Labs     01/08/22  1130   COLORU YELLOW   PHUR 8.0   CLARITYU Clear   SPECGRAV 1.009   LEUKOCYTESUR Negative   UROBILINOGEN 0.2   BILIRUBINUR Negative   BLOODU Negative   GLUCOSEU Negative     A1C: No results for input(s): LABA1C in the last 72 hours. ABG:No results for input(s): PHART, ACF2ZSQ, PO2ART, VTN1BMV, BEART, HGBAE, T9OLGEAV, CARBOXHGBART in the last 72 hours. Impressions of imaging performed in 48 hours before discharge:    CT HEAD WO CONTRAST    Result Date: 1/8/2022  Mild cerebral and cerebellar volume loss with chronic microvascular disease but no evidence of acute intracranial process.  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, 2018 Signed by Dr Gavi Gonzalez taking these medications    albuterol sulfate  (90 Base) MCG/ACT inhaler  Commonly known as: ProAir HFA  Inhale 2 puffs into the lungs every 6 hours as needed for Wheezing     atorvastatin 40 MG tablet  Commonly known as: LIPITOR  Take 1 tablet by mouth nightly     budesonide-formoterol 160-4.5 MCG/ACT Aero  Commonly known as: Symbicort  Inhale 2 puffs into the lungs 2 times daily     buffered aspirin 325 MG Tabs  Take 1 tablet by mouth 2 times daily     Euthyrox 88 MCG tablet  Generic drug: levothyroxine  Take 1 tablet by mouth once daily     guaiFENesin 600 MG extended release tablet  Commonly known as: MUCINEX  Take 1 tablet by mouth 2 times daily for 15 days     risperiDONE 0.5 MG tablet  Commonly known as: RISPERDAL  Take 1 tablet by mouth every evening        STOP taking these medications    predniSONE 10 MG tablet  Commonly known as: DELTASONE              ISSUES TO BE ADDRESSED AT HOSPITAL FOLLOW-UP VISIT:    Advised patient to follow-up closely with PCP upon discharge for management of chronic medical issues  Please see the detailed discharge directions delivered from earlier today! Condition on Discharge: stable  Discharge Disposition: Home    Recommended Follow Up:  Milton Whitfield MD  [address] [place] [address]  [phone]    In 1 week      Followup Appointments Scheduled at Time of Discharge:  Future Appointments   Date Time Provider Zohaib Perez   3/28/2022 11:00 AM Milton Whitfield MD [facility]        Discharge Instructions:   Please see the discharge paperwork. Patient was seen at bedside today, and the examination shows improvement since yesterday. Detailed discharge directions delivered to the patient by myself and our nursing staff, who verbalizes understanding and is very happy and satisfied with the plan. Patient has been advised to continue all medications as prescribed and advised, and f/u with PCP within 1 week.  Patient is stable from medical standpoint to be discharged. Total time spent during patient evaluation and assessment, discussion with the nurse/family, addressing discharge medications/scripts and coordination of care for safe discharge was in excess of 35 minutes.       Signed Electronically:    JORDYN Farias CNP  12:22 PM 1/9/2022

## 2022-01-10 ENCOUNTER — CARE COORDINATION (OUTPATIENT)
Dept: CASE MANAGEMENT | Age: 72
End: 2022-01-10

## 2022-01-10 DIAGNOSIS — R41.82 ALTERED MENTAL STATUS, UNSPECIFIED ALTERED MENTAL STATUS TYPE: Primary | ICD-10-CM

## 2022-01-10 LAB
EKG P AXIS: 4 DEGREES
EKG P-R INTERVAL: 212 MS
EKG Q-T INTERVAL: 414 MS
EKG QRS DURATION: 92 MS
EKG QTC CALCULATION (BAZETT): 421 MS
EKG T AXIS: 65 DEGREES

## 2022-01-10 PROCEDURE — 93010 ELECTROCARDIOGRAM REPORT: CPT | Performed by: INTERNAL MEDICINE

## 2022-01-10 NOTE — CARE COORDINATION
[facility] Transitions Initial Follow Up Call    Call within 2 business days of discharge: Yes    Patient: Natividad Sargent Patient : 1950   MRN: [id number]  Reason for Admission:AMS  Discharge Date: 22 RARS: Readmission Risk Score: 12.6 ( )      Last Discharge [facility]       Complaint Diagnosis Description Type Department Provider    22 Altered Mental Status Altered mental status, unspecified altered mental status type . .. ED to Hosp-Admission (Discharged) (ADMITTED) L MED SURG Terence Romo MD; Lissette Portillo . .. Spoke with: [address]: [facility]      Non-face-to-face services provided:  Reviewed encounter information for continuity of care prior to follow up Care Transitions phone call - chart notes, consults, progress notes, test results, med list, appointments, AVS, other information. Care Transitions 24 Hour Call    Schedule Follow Up Appointment with PCP: Completed  Do you have any ongoing symptoms?: No  Do you have a copy of your discharge instructions?: Yes  Do you have all of your prescriptions and are they filled?: Yes  Have you been contacted by a [address]?: No  Have you scheduled your follow up appointment?: Yes  How are you going to get to your appointment?: Car - family or friend to transport  Were you discharged with any Home Care or Post Acute Services: No  Post Acute Services: [facility] [address] you feel like you have everything you need to keep you well at home?: Yes  Care Transitions Interventions       Placed a call to the number listed for patient for an initial follow up call for Care Transitions Coordination following discharge from the hospital. Spoke with patient regarding hospitalization, discharge and status thus far. She was very pleasant, appreciative of call. She said she is doing much better. She said that she found out that her nutritional status was off and her thyroid levels were as well.   She denied any complaints. Reported that she is doing much better now. She said she is eating better, drinking well, sleeping better. She has all of her medications and is taking them as noted. She said the earliest she could get in for a hospital follow up is . She is ok with that, but knows to call PCP or CTN if she has any issues before that. She denied any complaints. She is aware of COVID 19 risk, infection control, vaccines, etc.  Informed/ reminded of CTC process, purpose, future calls, etc and is agreeable with appropriate follow up. Ensured to have CTN contact information to be used as needed. Encouraged to call as needed for new issues, questions, etc.  Given the opportunity to ask questions and answered appropriately. CTN to follow up as indicated. Transitions of Care Initial Call    Was this an external facility discharge? No    Challenges to be reviewed by the provider   Additional needs identified to be addressed with provider: No       Method of communication with provider : none    Advance Care Planning:   Does patient have an Advance Directive: reviewed and current. Advance Care Planning   Healthcare Decision Maker:    [facility] Decision [facility] [phone]    Secondary Decision Maker: Dominic Jonah Brother/Sister - [phone]    Was this a readmission? No  Patients top risk factors for readmission: functional physical ability  medical condition-AMS, Seizures, COPD, et al    Care Transition Nurse (CTN) contacted the patient by telephone to perform post hospital discharge assessment. Verified name and  with patient as identifiers. Provided introduction to self, and explanation of the CTN role. CTN reviewed discharge instructions, medical action plan and red flags with patient who verbalized understanding. Patient given an opportunity to ask questions and does not have any further questions or concerns at this time.  Were discharge instructions available to patient? Yes. Reviewed appropriate site of care based on symptoms and resources available to patient including: PCP, Specialist, Urgent care clinics, [facility], Home health, When to call 911 and Jerrod Marroquin. The patient agrees to contact the PCP office for questions related to their healthcare. Medication reconciliation was performed with patient, who verbalizes understanding of administration of home medications. Advised obtaining a 90-day supply of all daily and as-needed medications. Covid Risk Education     Educated patient about risk for severe COVID-19 due to risk factors according to CDC guidelines. CTN reviewed discharge instructions, medical action plan and red flag symptoms with the patient who verbalized understanding. Discussed COVID vaccination status: Yes. Education provided on COVID-19 vaccination as appropriate. Discussed exposure protocols and quarantine with CDC Guidelines. Patient was given an opportunity to verbalize any questions and concerns and agrees to contact CTN or health care provider for questions related to their healthcare. CTN provided contact information. Plan for follow-up call in 5-7 days based on severity of symptoms and risk factors.   Plan for next call: - Plan for next call - assess overall status, multiple body systems status, pain, appetite, sleep patterns, abnormal signs and symptoms, availability and effectiveness of medications, activity level and tolerance, falls/other unexpected events, findings from follow up appointments, medication/treatment changes, any additional teaching needs, etc.  Education regarding self care mgmt - disease process mgmt, symptom mgmt, diet/hydration, pain control needs, medication mgmt, activity level, home safety needs, infection control, fall precautions, seeking medical attention, who/when to call prn any needs, etc.    Follow Up  Future Appointments   Date Time Provider Zohaib Perez   1/25/2022 11:15 AM Lisa MORALES [facility]   3/28/2022 11:00 AM Jordyn Vanessa MD [facility]       Taz Bahena RN

## 2022-01-13 ENCOUNTER — TELEPHONE (OUTPATIENT)
Dept: PRIMARY CARE CLINIC | Age: 72
End: 2022-01-13

## 2022-01-13 DIAGNOSIS — F51.01 PRIMARY INSOMNIA: ICD-10-CM

## 2022-01-13 DIAGNOSIS — R45.851 SUICIDAL THOUGHTS: Primary | ICD-10-CM

## 2022-01-13 NOTE — TELEPHONE ENCOUNTER
Pt called stating she was having Suicide thoughts. I spoke with Pt and she states she is not sleeping. Pt states Her Son lives with her. I spoke with Him and he states it is not neccessary to call 911 or go to the [id number] was recently in the [facility] and has a follow up visit with us on Jan 25th but I moved up to the 17th. Pt Son states he is watching Pt and will call 911 or take her to the Hospital if needed.

## 2022-01-14 LAB
BLOOD CULTURE, ROUTINE: NORMAL
CULTURE, BLOOD 2: NORMAL

## 2022-01-15 NOTE — PROGRESS NOTES
Discussed discharge paperwork with patient and son, who is at bedside. Patient and son discussed understanding.
Physician Progress Note      PATIENT:               Wm Denton  CSN #:                  [id number]  :                       1950  ADMIT DATE:       2022 10:33 AM  DISCH DATE:        2022 1:14 PM  RESPONDING  PROVIDER #:        Matt William MD          QUERY TEXT:    Pt admitted with altered mental status. Pt noted to have agitation and   confusion. if possible, please document in the progress notes and discharge   summary if you are evaluating and / or treating any of the following: The medical record reflects the following:  Risk Factors: Recent URI, use of steroids  Clinical Indicators: agitated and confused on arrival to ED, stated she did   not know why she was here, psych documents potentially steroid induced   psychosis which has resolved  Treatment: Steroids stopped, Ativan 0.5 mg IV x1 resumed home meds at   discharge except steroids  Thanks, Carissa Mix, BSN  [phone]  Options provided:  -- Altered mental status probably related to steroid psychosis. -- Altered mental status due to recent URI. -- Altered mental status due to unknown reason. -- Other - I will add my own diagnosis  -- Disagree - Not applicable / Not valid  -- Disagree - Clinically unable to determine / Unknown  -- Refer to Clinical Documentation Reviewer    PROVIDER RESPONSE TEXT:    This patient has altered mental status probably related to steroid psychosis.     Query created by: Inés Avalos on 2022 2:49 PM      Electronically signed by:  Matt William MD 2022 7:31 PM
63.5

## 2022-01-17 ENCOUNTER — OFFICE VISIT (OUTPATIENT)
Dept: PRIMARY CARE CLINIC | Age: 72
End: 2022-01-17
Payer: MEDICARE

## 2022-01-17 ENCOUNTER — CARE COORDINATION (OUTPATIENT)
Dept: CASE MANAGEMENT | Age: 72
End: 2022-01-17

## 2022-01-17 VITALS
HEART RATE: 60 BPM | HEIGHT: 68 IN | BODY MASS INDEX: 22.01 KG/M2 | OXYGEN SATURATION: 100 % | DIASTOLIC BLOOD PRESSURE: 70 MMHG | RESPIRATION RATE: 16 BRPM | WEIGHT: 145.2 LBS | SYSTOLIC BLOOD PRESSURE: 122 MMHG | TEMPERATURE: 97.3 F

## 2022-01-17 DIAGNOSIS — R45.851 SUICIDAL THOUGHTS: ICD-10-CM

## 2022-01-17 DIAGNOSIS — F32.1 CURRENT MODERATE EPISODE OF MAJOR DEPRESSIVE DISORDER WITHOUT PRIOR EPISODE (HCC): Primary | ICD-10-CM

## 2022-01-17 DIAGNOSIS — R41.82 ALTERED MENTAL STATUS, UNSPECIFIED ALTERED MENTAL STATUS TYPE: ICD-10-CM

## 2022-01-17 PROCEDURE — 99495 TRANSJ CARE MGMT MOD F2F 14D: CPT | Performed by: FAMILY MEDICINE

## 2022-01-17 PROCEDURE — 1111F DSCHRG MED/CURRENT MED MERGE: CPT | Performed by: FAMILY MEDICINE

## 2022-01-17 RX ORDER — QUETIAPINE FUMARATE 25 MG/1
25 TABLET, FILM COATED ORAL NIGHTLY
Qty: 30 TABLET | Refills: 0 | Status: SHIPPED | OUTPATIENT
Start: 2022-01-17 | End: 2022-02-17 | Stop reason: SDUPTHER

## 2022-01-17 ASSESSMENT — ENCOUNTER SYMPTOMS
WHEEZING: 0
COUGH: 0
BACK PAIN: 0
ABDOMINAL PAIN: 0
COLOR CHANGE: 0
VOMITING: 0
DIARRHEA: 0
NAUSEA: 0
EYE DISCHARGE: 0
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
## 2022-01-17 NOTE — CARE COORDINATION
[facility] Transitions Follow Up Call    2022    Patient: Daksha Malik  Patient : 1950   MRN: [id number]    Discharge Date: 22 RARS: Readmission Risk Score: 12.6 ( )         Spoke with: [address] Transitions Subsequent and Final Call    Subsequent and Final Calls  Are you currently active with any services?: Home Health  Care Transitions Interventions  Other Interventions:         Spoke with patient for a follow up call. She reported that she is doing well. She said that she is feeling much better overall. She did say that she is having some issues with appetite, sleeping. She said she saw her PCP today and she gave her some new meds. She said that she is hoping that they will have her to rest..  She is tolerating activity well she said. Denies any other needs. Will follow up again in a few days and sign off if stable.       Follow Up  Future Appointments   Date Time Provider Zohaib Perez   2022  8:45 AM MD BAR Irvin STANISLAVKY   3/28/2022 11:00 AM MD BAR Irvin [facility]       Annette Ibrahim RN

## 2022-01-18 ENCOUNTER — TELEPHONE (OUTPATIENT)
Dept: PRIMARY CARE CLINIC | Age: 72
End: 2022-01-18

## 2022-01-18 NOTE — TELEPHONE ENCOUNTER
Is she taking it at bedtime? She should be taking it at bedtime and it should be worn off by the time she gets up in the mornings. She just started this yesterday.

## 2022-01-18 NOTE — TELEPHONE ENCOUNTER
Pt states she does take at bedtime and has just took it once.  She states she will try again tonight and let us know if symptoms persist.

## 2022-01-18 NOTE — TELEPHONE ENCOUNTER
----- Message from Amarilys Canseco sent at 1/18/2022  9:33 AM CST -----  Subject: Message to Provider    QUESTIONS  Information for Provider? patient said that she is having some side   affects from the medication that she is taking. She is feeling like it is   giving her a severe headache, and causing her to be dizzy. QUEtiapine   (SEROQUEL) 25 MG tablet  ---------------------------------------------------------------------------  --------------  CALL BACK INFO  What is the best way for the office to contact you? OK to leave message on   voicemail  Preferred Call Back Phone Number? [phone]  ---------------------------------------------------------------------------  --------------  SCRIPT ANSWERS  Relationship to Patient?  Self

## 2022-01-26 ENCOUNTER — CARE COORDINATION (OUTPATIENT)
Dept: CASE MANAGEMENT | Age: 72
End: 2022-01-26

## 2022-01-26 NOTE — CARE COORDINATION
[facility] Transitions Follow Up Call    2022    Patient: Blanca Baltazar  Patient : 1950   MRN: [id number]    Discharge Date: 22 RARS: Readmission Risk Score: 12.6 ( )         Spoke with: N/A    Care Transitions Subsequent and Final Call    Subsequent and Final Calls  Are you currently active with any services?: Home Health  Care Transitions Interventions  Other Interventions:         Attempted to make contact with patient/caregiver for a routine follow up call without success. Left a HIPAA compliant message regarding intent of call and call back information. Will try again at a later time.       Follow Up  Future Appointments   Date Time Provider Zohaib Perez   2022  8:45 AM MD BAR Guerra   3/28/2022 11:00 AM MD BAR [facility] [place]       Indra Gabriel RN

## 2022-02-01 ENCOUNTER — TELEPHONE (OUTPATIENT)
Dept: PSYCHIATRY | Age: 72
End: 2022-02-01

## 2022-02-01 ENCOUNTER — CARE COORDINATION (OUTPATIENT)
Dept: CASE MANAGEMENT | Age: 72
End: 2022-02-01

## 2022-02-01 NOTE — CARE COORDINATION
ALESSIA yan Hi, [place] notified     Wellington Cook  12/26/21 [id number] [facility] Transitions Follow Up Call    2022    Patient: Emma Jay  Patient : 1950   MRN: [id number]    Discharge Date: 22 RARS: Readmission Risk Score: 12.6 ( )         Spoke with: N/A    Care Transitions Subsequent and Final Call    Subsequent and Final Calls  Are you currently active with any services?: Home Health  Care Transitions Interventions  Other Interventions:         Attempted to make contact with patient/caregiver for a routine follow up call without success. Left a HIPAA compliant message regarding intent of call and call back information. As this repeated attempt to make contact was unsuccessful, will disengage at this time.         Follow Up  Future Appointments   Date Time Provider Zohaib Preez   2022  8:45 AM Ayan Tapia MD [facility] [place]   3/28/2022 11:00 AM Ayan Tapia MD [facility]       Vivien Dance, RN

## 2022-02-01 NOTE — TELEPHONE ENCOUNTER
Called pt to schedule an appt from a referral    No answer and left a message    Electronically signed by Esvin Garnica MA on 2/1/2022 at 2:06 PM

## 2022-02-08 ENCOUNTER — NURSE ONLY (OUTPATIENT)
Dept: PRIMARY CARE CLINIC | Age: 72
End: 2022-02-08
Payer: MEDICARE

## 2022-02-08 DIAGNOSIS — Z11.1 SCREENING-PULMONARY TB: Primary | ICD-10-CM

## 2022-02-08 PROCEDURE — 86580 TB INTRADERMAL TEST: CPT | Performed by: FAMILY MEDICINE

## 2022-02-08 RX ORDER — ATORVASTATIN CALCIUM 40 MG/1
40 TABLET, FILM COATED ORAL NIGHTLY
Qty: 90 TABLET | Refills: 3 | Status: SHIPPED | OUTPATIENT
Start: 2022-02-08

## 2022-02-11 ENCOUNTER — NURSE ONLY (OUTPATIENT)
Dept: PRIMARY CARE CLINIC | Age: 72
End: 2022-02-11

## 2022-02-11 DIAGNOSIS — Z11.1 SCREENING FOR TUBERCULOSIS: Primary | ICD-10-CM

## 2022-02-14 RX ORDER — ASPIRIN/CALCIUM/MAG/ALUMINUM 325 MG
1 TABLET ORAL 2 TIMES DAILY
Qty: 60 TABLET | Refills: 1 | Status: SHIPPED | OUTPATIENT
Start: 2022-02-14 | End: 2022-04-25 | Stop reason: SDUPTHER

## 2022-02-17 RX ORDER — QUETIAPINE FUMARATE 25 MG/1
25 TABLET, FILM COATED ORAL NIGHTLY
Qty: 30 TABLET | Refills: 1 | Status: SHIPPED | OUTPATIENT
Start: 2022-02-17 | End: 2022-04-22

## 2022-03-10 DIAGNOSIS — Z95.0 PACEMAKER: Primary | ICD-10-CM

## 2022-03-10 DIAGNOSIS — R00.1 BRADYCARDIA: ICD-10-CM

## 2022-03-10 PROCEDURE — 93294 REM INTERROG EVL PM/LDLS PM: CPT | Performed by: NURSE PRACTITIONER

## 2022-03-10 PROCEDURE — 93296 REM INTERROG EVL PM/IDS: CPT | Performed by: NURSE PRACTITIONER

## 2022-03-21 RX ORDER — LEVOTHYROXINE SODIUM 88 UG/1
TABLET ORAL
Qty: 30 TABLET | Refills: 0 | Status: SHIPPED | OUTPATIENT
Start: 2022-03-21 | End: 2022-04-25 | Stop reason: SDUPTHER

## 2022-03-29 ENCOUNTER — OFFICE VISIT (OUTPATIENT)
Dept: PRIMARY CARE CLINIC | Age: 72
End: 2022-03-29
Payer: MEDICARE

## 2022-03-29 VITALS
BODY MASS INDEX: 21.98 KG/M2 | DIASTOLIC BLOOD PRESSURE: 70 MMHG | OXYGEN SATURATION: 98 % | HEIGHT: 68 IN | HEART RATE: 82 BPM | TEMPERATURE: 97 F | WEIGHT: 145 LBS | SYSTOLIC BLOOD PRESSURE: 110 MMHG

## 2022-03-29 DIAGNOSIS — F32.1 CURRENT MODERATE EPISODE OF MAJOR DEPRESSIVE DISORDER WITHOUT PRIOR EPISODE (HCC): ICD-10-CM

## 2022-03-29 DIAGNOSIS — E03.9 ACQUIRED HYPOTHYROIDISM: Primary | ICD-10-CM

## 2022-03-29 DIAGNOSIS — Z72.0 TOBACCO ABUSE: ICD-10-CM

## 2022-03-29 DIAGNOSIS — J42 CHRONIC BRONCHITIS, UNSPECIFIED CHRONIC BRONCHITIS TYPE (HCC): ICD-10-CM

## 2022-03-29 DIAGNOSIS — E78.2 MIXED HYPERLIPIDEMIA: ICD-10-CM

## 2022-03-29 PROCEDURE — 99214 OFFICE O/P EST MOD 30 MIN: CPT | Performed by: FAMILY MEDICINE

## 2022-04-05 ASSESSMENT — ENCOUNTER SYMPTOMS
COUGH: 0
ABDOMINAL PAIN: 0
COLOR CHANGE: 0
WHEEZING: 0
BACK PAIN: 0
DIARRHEA: 0
NAUSEA: 0
VOMITING: 0
EYE DISCHARGE: 0

## 2022-04-05 NOTE — PROGRESS NOTES
SUBJECTIVE:    Patient ID: Ashtyn Matson is a 80 y.o. female. HPI:   Patient is seen today for 6-month follow-up. She has a history of tobacco abuse and COPD. She states that she is using her albuterol inhaler as needed. She states that she is tolerating this well. She is using her Symbicort as well. She feels like this is keeping her well controlled. She does have a history of depression. She feels like this is currently stable. She denies any recent suicidal thoughts or ideation. She states that she is taking her Seroquel. She is on Synthroid 88 mcg. She is tolerating this well. She has not had any change in the dosage of the frequency of this recently. She does have a history of hyperlipidemia. She states that she is tolerating her medications well. She denies any change in the dosage of the frequency of her Lipitor recently. Past Medical History:   Diagnosis Date    Cerebral artery occlusion with cerebral infarction ([facility] [address]     TIA    Diverticular disease of colon     Hyperlipidemia     Hypothyroidism     Pneumonia     Psychiatric problem     [facility]ures ([facility]     Tobacco abuse     Vitamin B12 deficiency       Current Outpatient Medications on File Prior to Visit   Medication Sig Dispense Refill    EUTHYROX 88 MCG tablet Take 1 tablet by mouth once daily 30 tablet 0    QUEtiapine (SEROQUEL) 25 MG tablet Take 1 tablet by mouth nightly 30 tablet 1    Aspirin Buf,XuBya-TpBov-EdRih, (BUFFERED ASPIRIN) 325 MG TABS Take 1 tablet by mouth 2 times daily 60 tablet 1    atorvastatin (LIPITOR) 40 MG tablet Take 1 tablet by mouth nightly 90 tablet 3    albuterol sulfate HFA (PROAIR HFA) 108 (90 Base) MCG/ACT inhaler Inhale 2 puffs into the lungs every 6 hours as needed for Wheezing 1 Inhaler 3    budesonide-formoterol (SYMBICORT) 160-4.5 MCG/ACT AERO Inhale 2 puffs into the lungs 2 times daily 1 Inhaler 5     No current facility-administered medications on file prior to visit. Allergies   Allergen Reactions    Norco [Hydrocodone-Acetaminophen] Palpitations       Review of Systems   Constitutional: Negative for activity change, appetite change and fever. HENT: Negative for congestion and nosebleeds. Eyes: Negative for discharge. Respiratory: Negative for cough and wheezing. Cardiovascular: Negative for chest pain and leg swelling. Gastrointestinal: Negative for abdominal pain, diarrhea, nausea and vomiting. Genitourinary: Negative for difficulty urinating, frequency and urgency. Musculoskeletal: Negative for back pain and gait problem. Skin: Negative for color change and rash. Neurological: Negative for dizziness and headaches. Hematological: Does not bruise/bleed easily. Psychiatric/Behavioral: Negative for sleep disturbance and suicidal ideas. OBJECTIVE:    Physical Exam  Vitals reviewed. Constitutional:       General: She is not in acute distress. Appearance: Normal appearance. She is well-developed. She is not diaphoretic. HENT:      Head: Normocephalic and atraumatic. Right Ear: External ear normal.      Left Ear: External ear normal.   Cardiovascular:      Rate and Rhythm: Normal rate and regular rhythm. Pulses: Normal pulses. Heart sounds: Normal heart sounds. No murmur heard. Pulmonary:      Effort: Pulmonary effort is normal. No respiratory distress. Breath sounds: Normal breath sounds. Musculoskeletal:      Cervical back: Normal range of motion and neck supple. Skin:     General: Skin is warm and dry. Neurological:      General: No focal deficit present. Mental Status: She is alert and oriented to person, place, and time. Mental status is at baseline. Psychiatric:         Mood and Affect: Mood normal.         Behavior: Behavior normal.         Thought Content:  Thought content normal.         Judgment: Judgment normal.        /70   Pulse 82   Temp 97 °F (36.1 °C)   Ht 5' 8\" (1.727 m)   Wt 145 lb (65.8 kg)   LMP 03/29/1970   SpO2 98%   BMI 22.05 kg/m²      ASSESSMENT/PLAN:    1. Acquired hypothyroidism  -     TSH  -     T4, Free  2. Current moderate episode of major depressive disorder without prior episode ([facility] [address]  3. Chronic bronchitis, unspecified chronic bronchitis type ([place] [address]  4. Mixed hyperlipidemia  5. Tobacco abuse           Check thyroid levels due to history of hypothyroidism. Continue Synthroid at current dose. Continue Seroquel for depression. Continue Lipitor for hyperlipidemia. Continue Symbicort albuterol for COPD as this is keeping her well controlled. Follow-up with us in 6 months for checkup unless needed sooner. PDMP Monitoring:    Last PDMP Andreina Mirza as Reviewed [facility]):  Review User Review Instant Review Result            Urine Drug Screenings (1 yr)     Urine Drug Screen  Collected: 1/8/2022 11:30 AM (Final result)    Complete Results          Urine Drug Screen  Collected: 7/26/2019  1:05 PM (Final result)    Complete Results              Medication Contract and Consent for Opioid Use Documents Filed     Patient Documents     Type of Document Status Date Received Received By Description    Medication Contract Signed 8/29/2014  9:33 AM Arlyn Cooney/transcription disclaimer:  Much of this encounter note is electronic transcription/translation of spoken language toprinted texts. The electronic translation of spoken language may be erroneous, or at times, nonsensical words or phrases may be inadvertently transcribed.   Although I have reviewed the note for such errors, some may stillexist.

## 2022-04-22 RX ORDER — QUETIAPINE FUMARATE 25 MG/1
25 TABLET, FILM COATED ORAL NIGHTLY
Qty: 30 TABLET | Refills: 0 | Status: SHIPPED | OUTPATIENT
Start: 2022-04-22 | End: 2022-04-25 | Stop reason: SDUPTHER

## 2022-04-25 RX ORDER — LEVOTHYROXINE SODIUM 88 UG/1
88 TABLET ORAL DAILY
Qty: 30 TABLET | Refills: 3 | Status: SHIPPED | OUTPATIENT
Start: 2022-04-25 | End: 2022-08-15

## 2022-04-25 RX ORDER — QUETIAPINE FUMARATE 25 MG/1
25 TABLET, FILM COATED ORAL NIGHTLY
Qty: 30 TABLET | Refills: 1 | Status: SHIPPED | OUTPATIENT
Start: 2022-04-25 | End: 2022-06-08

## 2022-04-25 RX ORDER — ASPIRIN/CALCIUM/MAG/ALUMINUM 325 MG
1 TABLET ORAL 2 TIMES DAILY
Qty: 60 TABLET | Refills: 1 | Status: SHIPPED | OUTPATIENT
Start: 2022-04-25 | End: 2022-06-29

## 2022-06-07 ENCOUNTER — OFFICE VISIT (OUTPATIENT)
Dept: PRIMARY CARE CLINIC | Age: 72
End: 2022-06-07
Payer: MEDICARE

## 2022-06-07 VITALS
BODY MASS INDEX: 21.89 KG/M2 | HEIGHT: 68 IN | SYSTOLIC BLOOD PRESSURE: 118 MMHG | OXYGEN SATURATION: 95 % | DIASTOLIC BLOOD PRESSURE: 62 MMHG | HEART RATE: 90 BPM | WEIGHT: 144.4 LBS | TEMPERATURE: 98.8 F

## 2022-06-07 DIAGNOSIS — R05.9 COUGH: Primary | ICD-10-CM

## 2022-06-07 DIAGNOSIS — R06.02 SHORTNESS OF BREATH: ICD-10-CM

## 2022-06-07 LAB
INFLUENZA A ANTIBODY: NORMAL
INFLUENZA B ANTIBODY: NORMAL
SARS-COV-2, PCR: DETECTED

## 2022-06-07 PROCEDURE — 99213 OFFICE O/P EST LOW 20 MIN: CPT | Performed by: NURSE PRACTITIONER

## 2022-06-07 PROCEDURE — 87804 INFLUENZA ASSAY W/OPTIC: CPT | Performed by: NURSE PRACTITIONER

## 2022-06-07 PROCEDURE — 1123F ACP DISCUSS/DSCN MKR DOCD: CPT | Performed by: NURSE PRACTITIONER

## 2022-06-07 RX ORDER — AZITHROMYCIN 250 MG/1
TABLET, FILM COATED ORAL
Qty: 1 PACKET | Refills: 0 | Status: SHIPPED | OUTPATIENT
Start: 2022-06-07 | End: 2022-06-17

## 2022-06-07 RX ORDER — ASPIRIN 81 MG/1
81 TABLET ORAL 2 TIMES DAILY
COMMUNITY

## 2022-06-07 RX ORDER — BENZONATATE 200 MG/1
200 CAPSULE ORAL 3 TIMES DAILY PRN
Qty: 30 CAPSULE | Refills: 0 | Status: SHIPPED | OUTPATIENT
Start: 2022-06-07 | End: 2022-06-14

## 2022-06-07 ASSESSMENT — PATIENT HEALTH QUESTIONNAIRE - PHQ9
8. MOVING OR SPEAKING SO SLOWLY THAT OTHER PEOPLE COULD HAVE NOTICED. OR THE OPPOSITE, BEING SO FIGETY OR RESTLESS THAT YOU HAVE BEEN MOVING AROUND A LOT MORE THAN USUAL: 0
7. TROUBLE CONCENTRATING ON THINGS, SUCH AS READING THE NEWSPAPER OR WATCHING TELEVISION: 0
6. FEELING BAD ABOUT YOURSELF - OR THAT YOU ARE A FAILURE OR HAVE LET YOURSELF OR YOUR FAMILY DOWN: 0
SUM OF ALL RESPONSES TO PHQ QUESTIONS 1-9: 5
SUM OF ALL RESPONSES TO PHQ QUESTIONS 1-9: 5
9. THOUGHTS THAT YOU WOULD BE BETTER OFF DEAD, OR OF HURTING YOURSELF: 0
SUM OF ALL RESPONSES TO PHQ9 QUESTIONS 1 & 2: 2
5. POOR APPETITE OR OVEREATING: 0
3. TROUBLE FALLING OR STAYING ASLEEP: 2
SUM OF ALL RESPONSES TO PHQ QUESTIONS 1-9: 5
4. FEELING TIRED OR HAVING LITTLE ENERGY: 1
10. IF YOU CHECKED OFF ANY PROBLEMS, HOW DIFFICULT HAVE THESE PROBLEMS MADE IT FOR YOU TO DO YOUR WORK, TAKE CARE OF THINGS AT HOME, OR GET ALONG WITH OTHER PEOPLE: 0
1. LITTLE INTEREST OR PLEASURE IN DOING THINGS: 1
SUM OF ALL RESPONSES TO PHQ QUESTIONS 1-9: 5
2. FEELING DOWN, DEPRESSED OR HOPELESS: 1

## 2022-06-07 ASSESSMENT — ENCOUNTER SYMPTOMS
RHINORRHEA: 1
ALLERGIC/IMMUNOLOGIC NEGATIVE: 1
GASTROINTESTINAL NEGATIVE: 1
EYES NEGATIVE: 1
SHORTNESS OF BREATH: 1
COUGH: 1

## 2022-06-07 NOTE — PATIENT INSTRUCTIONS
Covid Supportive Treatments    Zinc 250 mg daily for 5 days and then decrease to 50 mg a day. Vitamin C 1000 mg a day. Vitamin D 3478-1112 mcg a day. Aspirin 325 mg a day. Daily antihistamine of choice ( Claritin, Allegra, or Zyrtec)  Pepcid daily. Tylenol for aches, pain and fever. Your provider may also send in prescriptions for symptom specific treatment  our provider may also send in prescriptions for symptom specific treatment. Covid is a viral pneumonia and some antibiotics will not help this. Mucinex or delsym for chest congestion. Check oxygen saturation regularly and if below 90% go to the ER. You may qualify for home oxygen and breathing treatments if your oxygen is staying around 90%. According to current [place] guidelines if you have tested positive for COVID and have symptoms you are to isolate for 10 days from the day the symptoms began. If you tested positive for COVID and have never had symptoms you must isolate for 5 days from the day of your test.  If you are not fully vaccinated or booster eligible but not yet boostered and have been in close contact with someone diagnosed with COVID quarantine from 10 days from the exposure. This may be shortened to 5 days if you have no symptoms and tested negative for COVID on day 5    Viral syndrome   Many illnesses are caused by viruses. These conditions usually run their course in 7-14 days. Antibiotics do not help fight viral infections and are not needed at this time. Viral syndromes are treated with symptomatic support. You may take tylenol or ibuprofen for fever or aches and pains. Stay hydrated by taking sips of water or non caffeinated, noncarbonated, and nonalcoholic beverages throughout the day. For sore throat, you may gargle with warm salt water, use lozenges or sprays. Using a daily antihistamine such as Claritin, Zyrtec or Allegra can help with upper respiratory symptoms.  Benadryl can be sedating but is helpful at drying secretions and may be taken at night. Call if you have a fever greater than 102 F or if symptoms do not improve. Your provider may also send you in prescription medications depending on your symptoms and their severity. Take all medications as directed on package unless specifically told otherwise.

## 2022-06-08 RX ORDER — QUETIAPINE FUMARATE 25 MG/1
25 TABLET, FILM COATED ORAL NIGHTLY
Qty: 30 TABLET | Refills: 3 | Status: SHIPPED | OUTPATIENT
Start: 2022-06-08 | End: 2022-09-12 | Stop reason: SDUPTHER

## 2022-06-09 DIAGNOSIS — Z95.0 PACEMAKER: Primary | ICD-10-CM

## 2022-06-09 DIAGNOSIS — R00.1 BRADYCARDIA: ICD-10-CM

## 2022-06-09 PROCEDURE — 93294 REM INTERROG EVL PM/LDLS PM: CPT | Performed by: CLINICAL NURSE SPECIALIST

## 2022-06-09 PROCEDURE — 93296 REM INTERROG EVL PM/IDS: CPT | Performed by: CLINICAL NURSE SPECIALIST

## 2022-06-09 NOTE — RESULT ENCOUNTER NOTE
Left message for Arsen Left to return call regarding results. Recommend patient scheduled office follow up in near future. She has not been seen in office since 2020.

## 2022-06-29 ENCOUNTER — OFFICE VISIT (OUTPATIENT)
Dept: PRIMARY CARE CLINIC | Age: 72
End: 2022-06-29
Payer: MEDICARE

## 2022-06-29 VITALS
DIASTOLIC BLOOD PRESSURE: 70 MMHG | TEMPERATURE: 96.9 F | SYSTOLIC BLOOD PRESSURE: 120 MMHG | BODY MASS INDEX: 22.13 KG/M2 | HEIGHT: 68 IN | HEART RATE: 82 BPM | WEIGHT: 146 LBS | OXYGEN SATURATION: 98 %

## 2022-06-29 DIAGNOSIS — E03.9 ACQUIRED HYPOTHYROIDISM: Primary | ICD-10-CM

## 2022-06-29 DIAGNOSIS — E78.2 MIXED HYPERLIPIDEMIA: ICD-10-CM

## 2022-06-29 DIAGNOSIS — J42 CHRONIC BRONCHITIS, UNSPECIFIED CHRONIC BRONCHITIS TYPE (HCC): ICD-10-CM

## 2022-06-29 DIAGNOSIS — Z12.31 SCREENING MAMMOGRAM FOR BREAST CANCER: ICD-10-CM

## 2022-06-29 DIAGNOSIS — F32.1 CURRENT MODERATE EPISODE OF MAJOR DEPRESSIVE DISORDER WITHOUT PRIOR EPISODE (HCC): ICD-10-CM

## 2022-06-29 PROCEDURE — 1123F ACP DISCUSS/DSCN MKR DOCD: CPT | Performed by: FAMILY MEDICINE

## 2022-06-29 PROCEDURE — 99212 OFFICE O/P EST SF 10 MIN: CPT | Performed by: FAMILY MEDICINE

## 2022-06-29 ASSESSMENT — PATIENT HEALTH QUESTIONNAIRE - PHQ9
1. LITTLE INTEREST OR PLEASURE IN DOING THINGS: 0
2. FEELING DOWN, DEPRESSED OR HOPELESS: 0
SUM OF ALL RESPONSES TO PHQ QUESTIONS 1-9: 0
SUM OF ALL RESPONSES TO PHQ9 QUESTIONS 1 & 2: 0
SUM OF ALL RESPONSES TO PHQ QUESTIONS 1-9: 0

## 2022-06-29 ASSESSMENT — ENCOUNTER SYMPTOMS
BACK PAIN: 0
COLOR CHANGE: 0
NAUSEA: 0
EYE DISCHARGE: 0
VOMITING: 0
ABDOMINAL PAIN: 0
WHEEZING: 0
DIARRHEA: 0
COUGH: 0

## 2022-06-29 NOTE — PROGRESS NOTES
SUBJECTIVE:    Patient ID: Bree Plata is a 80 y.o. female. HPI:   Patient is seen today for 3-month follow-up. She has a history of COPD. She has not any flareups of her breathing recently. She is using her inhalers regularly. She has not had any productive cough. She states that her depression and anxiety are currently well controlled. She is currently on Seroquel. She feels like this is working very well for her. She states that she is taking her thyroid medication regularly. She is on 88 mcg of Synthroid. She denies any new problems today. She is okay if we get her set up for mammogram which she is due for. Past Medical History:   Diagnosis Date    Cerebral artery occlusion with cerebral infarction ([facility]     TIA    Diverticular disease of colon     Hyperlipidemia     Hypothyroidism     Pneumonia     Psychiatric problem     Seizures ([facility]     Tobacco abuse     Vitamin B12 deficiency       Current Outpatient Medications on File Prior to Visit   Medication Sig Dispense Refill    QUEtiapine (SEROQUEL) 25 MG tablet Take 1 tablet by mouth nightly 30 tablet 3    aspirin 81 MG EC tablet Take 162 mg by mouth daily      levothyroxine (EUTHYROX) 88 MCG tablet Take 1 tablet by mouth Daily 30 tablet 3    atorvastatin (LIPITOR) 40 MG tablet Take 1 tablet by mouth nightly 90 tablet 3    albuterol sulfate HFA (PROAIR HFA) 108 (90 Base) MCG/ACT inhaler Inhale 2 puffs into the lungs every 6 hours as needed for Wheezing 1 Inhaler 3    budesonide-formoterol (SYMBICORT) 160-4.5 MCG/ACT AERO Inhale 2 puffs into the lungs 2 times daily 1 Inhaler 5     No current facility-administered medications on file prior to visit. Allergies   Allergen Reactions    Medrol [Methylprednisolone]     Norco [Hydrocodone-Acetaminophen] Palpitations       Review of Systems   Constitutional: Negative for activity change, appetite change and fever. HENT: Negative for congestion and nosebleeds.     Eyes: Negative for discharge. Respiratory: Negative for cough and wheezing. Cardiovascular: Negative for chest pain and leg swelling. Gastrointestinal: Negative for abdominal pain, diarrhea, nausea and vomiting. Genitourinary: Negative for difficulty urinating, frequency and urgency. Musculoskeletal: Negative for back pain and gait problem. Skin: Negative for color change and rash. Neurological: Negative for dizziness and headaches. Hematological: Does not bruise/bleed easily. Psychiatric/Behavioral: Negative for sleep disturbance and suicidal ideas. OBJECTIVE:    Physical Exam  Vitals reviewed. Constitutional:       General: She is not in acute distress. Appearance: Normal appearance. She is well-developed. She is not diaphoretic. HENT:      Head: Normocephalic and atraumatic. Right Ear: External ear normal.      Left Ear: External ear normal.   Cardiovascular:      Rate and Rhythm: Normal rate and regular rhythm. Pulses: Normal pulses. Heart sounds: Normal heart sounds. No murmur heard. Pulmonary:      Effort: Pulmonary effort is normal. No respiratory distress. Breath sounds: Normal breath sounds. Abdominal:      General: Abdomen is flat. Bowel sounds are normal.      Palpations: Abdomen is soft. Tenderness: There is no abdominal tenderness. Musculoskeletal:      Cervical back: Normal range of motion and neck supple. Skin:     General: Skin is warm and dry. Neurological:      General: No focal deficit present. Mental Status: She is alert and oriented to person, place, and time. Mental status is at baseline. Psychiatric:         Mood and Affect: Mood normal.         Behavior: Behavior normal.         Thought Content: Thought content normal.         Judgment: Judgment normal.        /70   Pulse 82   Temp 96.9 °F (36.1 °C)   Ht 5' 8\" (1.727 m)   Wt 146 lb (66.2 kg)   LMP 03/29/1970   SpO2 98%   BMI 22.20 kg/m²      ASSESSMENT/PLAN:    1. Acquired hypothyroidism  2. Screening mammogram for breast cancer  -     [facility] DIGITAL SCREEN W OR WO CAD BILATERAL; Future  3. Current moderate episode of major depressive disorder without prior episode ([address]  4. Chronic bronchitis, unspecified chronic bronchitis type ([address]  5. Mixed hyperlipidemia       Continue Seroquel for anxiety and depression. Continue levothyroxine for hypothyroidism. Continue Lipitor for hyperlipidemia. Continue albuterol and Symbicort for chronic bronchitis. Follow-up with us in 3 months for checkup unless needed sooner. PDMP Monitoring:    Last PDMP Giovanny Cottrell as Reviewed [facility]):  Review User Review Instant Review Result            Urine Drug Screenings (1 yr)     Urine Drug Screen  Collected: 1/8/2022 11:30 AM (Final result)    Complete Results          Urine Drug Screen  Collected: 7/26/2019  1:05 PM (Final result)    Complete Results              Medication Contract and Consent for Opioid Use Documents Filed     Patient Documents     Type of Document Status Date Received Received By Description    Medication Contract Signed 8/29/2014  9:33 AM Jazzmine JOHNSON Dragon/transcription disclaimer:  Much of this encounter note is electronic transcription/translation of spoken language toprinted texts. The electronic translation of spoken language may be erroneous, or at times, nonsensical words or phrases may be inadvertently transcribed.   Although I have reviewed the note for such errors, some may stillexist.

## 2022-07-12 ENCOUNTER — TELEPHONE (OUTPATIENT)
Dept: CARDIOLOGY CLINIC | Age: 72
End: 2022-07-12

## 2022-07-12 NOTE — TELEPHONE ENCOUNTER
Patient returned call to review her carelink pacemaker results from 6/9/22. She stated she lost her phone and she now has a new phone. carelink results reviewed. Recommended she schedule an office follow up. She scheduled OV for 7/26/22.

## 2022-07-14 ENCOUNTER — HOSPITAL ENCOUNTER (OUTPATIENT)
Dept: WOMENS IMAGING | Age: 72
Discharge: HOME | End: 2022-07-14
Payer: MEDICARE

## 2022-07-14 DIAGNOSIS — Z12.31 SCREENING MAMMOGRAM FOR BREAST CANCER: ICD-10-CM

## 2022-07-14 PROCEDURE — 77067 SCR MAMMO BI INCL CAD: CPT | Performed by: RADIOLOGY

## 2022-07-14 PROCEDURE — 77063 BREAST TOMOSYNTHESIS BI: CPT

## 2022-07-25 ENCOUNTER — TELEPHONE (OUTPATIENT)
Dept: CARDIOLOGY CLINIC | Age: 72
End: 2022-07-25

## 2022-08-02 ENCOUNTER — OFFICE VISIT (OUTPATIENT)
Dept: CARDIOLOGY CLINIC | Age: 72
End: 2022-08-02
Payer: MEDICARE

## 2022-08-02 VITALS
HEIGHT: 69 IN | BODY MASS INDEX: 22.07 KG/M2 | DIASTOLIC BLOOD PRESSURE: 64 MMHG | HEART RATE: 81 BPM | SYSTOLIC BLOOD PRESSURE: 100 MMHG | WEIGHT: 149 LBS

## 2022-08-02 DIAGNOSIS — R60.0 EDEMA OF BOTH LOWER EXTREMITIES: ICD-10-CM

## 2022-08-02 DIAGNOSIS — R00.1 BRADYCARDIA: Primary | ICD-10-CM

## 2022-08-02 DIAGNOSIS — Z95.0 PACEMAKER: ICD-10-CM

## 2022-08-02 PROCEDURE — 1123F ACP DISCUSS/DSCN MKR DOCD: CPT | Performed by: CLINICAL NURSE SPECIALIST

## 2022-08-02 PROCEDURE — 99213 OFFICE O/P EST LOW 20 MIN: CPT | Performed by: CLINICAL NURSE SPECIALIST

## 2022-08-02 PROCEDURE — 93280 PM DEVICE PROGR EVAL DUAL: CPT | Performed by: CLINICAL NURSE SPECIALIST

## 2022-08-02 ASSESSMENT — ENCOUNTER SYMPTOMS
COUGH: 0
VOMITING: 0
FACIAL SWELLING: 0
CHEST TIGHTNESS: 0
ABDOMINAL PAIN: 0
SHORTNESS OF BREATH: 0
WHEEZING: 0
NAUSEA: 0
EYE REDNESS: 0

## 2022-08-02 NOTE — PROGRESS NOTES
[facility], [address], [address]  [address]  Phone: [phone]  Fax: [phone]    OFFICE VISIT:  2022    Audrey Cunha - : 1950    Reason For Visit:  Shyanne Avalos is a 80 y.o. female who is here for Follow-up and Loss of Consciousness       Diagnosis Orders   1. Bradycardia        2. Pacemaker        3. Edema of both lower extremities              HPI  Patient follows with office with a history of symptomatic bradycardia and a pacemaker that was placed in     She denies any cardiac symptoms such as chest pain, unusual dyspnea, orthopnea, PND, palpitations. She has some mild lower extremity edema, no sudden weight gain     Obdulio Valentine MD is PCP.   Audrey Cunha has the following history as recorded in [facility]:    Patient Active Problem List    Diagnosis Date Noted    Syncope 10/16/2016    TIA (transient ischemic attack) 10/16/2016    Bradycardia 10/16/2016    Tobacco abuse     Hyperlipidemia     Hypothyroidism 2015    Altered mental status 2022    Hip pain 2021    Gait abnormality 2021    Closed right hip fracture, initial encounter ([address] 05/15/2021    Chronic bronchitis ([address] 05/15/2021    Pacemaker 2019    Convulsion ([address] 2019    Current moderate episode of major depressive disorder without prior episode ([address] 2019    Nonintractable headache     Left-sided weakness     Symptomatic bradycardia     Abnormal PET scan of colon 2017    Family history of polyps in the colon 2017     Past Medical History:   Diagnosis Date    Cerebral artery occlusion with cerebral infarction ([address]     TIA    Diverticular disease of colon     Hyperlipidemia     Hypothyroidism     Pneumonia     Psychiatric problem     Seizures ([address]     Tobacco abuse     Vitamin B12 deficiency      Past Surgical History:   Procedure Laterality Date    CARDIAC PACEMAKER PLACEMENT      COLONOSCOPY      HIP SURGERY Right 5/15/2021    HIP HEMIARTHROPLASTY performed by Charmaine Gong MD at [address]      collapsed lung    WI COLONOSCOPY W/BIOPSY SINGLE/MULTIPLE N/A 2017    Dr Boone iLm and large mouthed diverticular disease throughout the right colon-Tubular AP (villiform) (-) dysplasia x 1, tubular AP (-) dysplasia x 1--5 yr recall    TUBAL LIGATION       Family History   Problem Relation Age of Onset    Other Mother         COPD, dementia    Heart Disease Mother     Stroke Mother     Heart Disease Father     Stroke Father     Diabetes Brother     Diabetes Brother     High Blood Pressure Brother     High Cholesterol Brother     Colon Polyps Brother     Colon Cancer Neg Hx     Liver Cancer Neg Hx     Liver Disease Neg Hx     Esophageal Cancer Neg Hx     Rectal Cancer Neg Hx     Stomach Cancer Neg Hx      Social History     Tobacco Use    Smoking status: Former     Packs/day: 0.50     Years: 30.00     Pack years: 15.00     Types: Cigarettes     Start date: 1970     Quit date: 5/15/2021     Years since quittin.2    Smokeless tobacco: Never    Tobacco comments:     1 pack of cigaretts last 1 1/2 days while at work   Substance Use Topics    Alcohol use: No      Current Outpatient Medications   Medication Sig Dispense Refill    QUEtiapine (SEROQUEL) 25 MG tablet Take 1 tablet by mouth nightly 30 tablet 3    aspirin 81 MG EC tablet Take 81 mg by mouth in the morning and at bedtime      levothyroxine (EUTHYROX) 88 MCG tablet Take 1 tablet by mouth Daily 30 tablet 3    atorvastatin (LIPITOR) 40 MG tablet Take 1 tablet by mouth nightly 90 tablet 3    albuterol sulfate HFA (PROAIR HFA) 108 (90 Base) MCG/ACT inhaler Inhale 2 puffs into the lungs every 6 hours as needed for Wheezing 1 Inhaler 3    budesonide-formoterol (SYMBICORT) 160-4.5 MCG/ACT AERO Inhale 2 puffs into the lungs 2 times daily 1 Inhaler 5     No current facility-administered medications for this visit.      Allergies: Medrol [methylprednisolone] and Norco [hydrocodone-acetaminophen]    Review of Systems  Review of Systems   Constitutional:  Negative for activity change, diaphoresis, fatigue, fever and unexpected weight change. HENT:  Negative for facial swelling and nosebleeds. Eyes:  Negative for redness and visual disturbance. Respiratory:  Negative for cough, chest tightness, shortness of breath and wheezing. Cardiovascular:  Positive for leg swelling. Negative for chest pain and palpitations. Gastrointestinal:  Negative for abdominal pain, nausea and vomiting. Endocrine: Negative for cold intolerance and heat intolerance. Genitourinary:  Negative for dysuria and hematuria. Musculoskeletal:  Negative for arthralgias and myalgias. Skin:  Negative for pallor and rash. Neurological:  Negative for dizziness, seizures, syncope, weakness and light-headedness. Hematological:  Does not bruise/bleed easily. Psychiatric/Behavioral:  Negative for agitation. The patient is not nervous/anxious. Objective  Vital Signs - /64   Pulse 81   Ht 5' 9\" (1.753 m)   Wt 149 lb (67.6 kg)   LMP 03/29/1970   BMI 22.00 kg/m²   Wt Readings from Last 3 Encounters:   08/02/22 149 lb (67.6 kg)   06/29/22 146 lb (66.2 kg)   06/07/22 144 lb 6.4 oz (65.5 kg)       Physical Exam  Vitals and nursing note reviewed. Constitutional:       General: She is not in acute distress. Appearance: She is well-developed. She is not diaphoretic. HENT:      Head: Normocephalic and atraumatic. Right Ear: Hearing and external ear normal.      Left Ear: Hearing and external ear normal.      Nose: Nose normal.   Eyes:      General:         Right eye: No discharge. Left eye: No discharge. Pupils: Pupils are equal, round, and reactive to light. Neck:      Thyroid: No thyromegaly. Vascular: No carotid bruit or JVD. Trachea: No tracheal deviation.    Cardiovascular:      Rate and Rhythm: Normal rate and regular rhythm. Heart sounds: Normal heart sounds. No murmur heard. No friction rub. No gallop. Pulmonary:      Effort: Pulmonary effort is normal. No respiratory distress. Breath sounds: Normal breath sounds. No wheezing or rales. Abdominal:      Palpations: Abdomen is soft. Tenderness: There is no abdominal tenderness. Musculoskeletal:         General: No swelling or deformity. Cervical back: Neck supple. No muscular tenderness. Right lower leg: Edema (1+) present. Left lower leg: Edema (1+) present. Skin:     General: Skin is warm and dry. Findings: No rash. Neurological:      General: No focal deficit present. Mental Status: She is alert and oriented to person, place, and time. Cranial Nerves: No cranial nerve deficit.    Psychiatric:         Mood and Affect: Mood normal.         Behavior: Behavior normal.         Judgment: Judgment normal.       Data:  Lab Results   Component Value Date/Time    WBC 8.1 01/09/2022 02:59 AM    RBC 4.85 01/09/2022 02:59 AM    HGB 12.6 01/09/2022 02:59 AM    HCT 41.4 01/09/2022 02:59 AM    HCT 36.6 07/04/2011 08:55 PM     01/09/2022 02:59 AM     07/04/2011 08:55 PM      Lab Results   Component Value Date/Time    CHOL 132 09/29/2021 09:43 AM    TRIG 107 09/29/2021 09:43 AM    HDL 46 09/29/2021 09:43 AM    LDLCALC 65 09/29/2021 09:43 AM     Lab Results   Component Value Date/Time     01/09/2022 02:59 AM     07/04/2011 09:32 PM    K 4.7 01/09/2022 02:59 AM    K 4.5 07/04/2011 09:32 PM     01/09/2022 02:59 AM     07/04/2011 09:32 PM    CO2 24 01/09/2022 02:59 AM    GLUCOSE 96 01/09/2022 02:59 AM    BUN 19 01/09/2022 02:59 AM    CREATININE 0.9 01/09/2022 02:59 AM    CREATININE 0.6 07/04/2011 09:32 PM    CALCIUM 9.0 01/09/2022 02:59 AM    ALT 14 01/08/2022 10:39 AM    AST 15 01/08/2022 10:39 AM     Lab Results   Component Value Date/Time    TSH 1.600 09/29/2021 09:43 AM       Assessment:     Diagnosis Orders   1. Bradycardia        2. Pacemaker        3. Edema of both lower extremities            Bradycardia/pacemaker  Pacemaker check showed adequate battery status @11.4 years estimated. Mode: AAIR-DDDR. Lead impedances are stable  Pacing: AP 61.5%,  0.1%. Appropriate diagnostics and safety margins noted. Sustained arrythmia: Brief SVT, brief NSVT 1 sec or less. Reprogramming done for sensitivity and threshold testing. Please see the scanned interrogation report    Edema extremities-does not appear volume overloaded. Try elevation of legs when sitting, low-sodium diet, compression stockings. She is to drink more fluids throughout the day to support her blood pressure     Stable cardiovascular status. No evidence of overt heart failure,angina or dysrhythmia. Plan        Return in about 6 months (around 2/2/2023) for APRN. Low sodium diet, e;evate feet when sitting, compression stocking  Drink more fluids- 8 cups caffeine free beverages  Send Carelink 11/2/22    Call with any questionsor concerns  Follow up with Ace Galeana MD for non cardiac problems  Report any new problems  Cardiovascular Fitness-Exercise as tolerated. Strive for 15 minutes of exercise most days of the week. Cardiac / HealthyDiet  Continue current medications as directed  Continue plan of treatment  It is always recommended that you bring your medicationsbottles with you to each visit - this is for your safety!        JORDYN Ruiz

## 2022-08-02 NOTE — PATIENT INSTRUCTIONS
Return in about 6 months (around 2/2/2023) for APRN.   Low sodium diet, e;evate feet when sitting, compression stocking  Drink more fluids- 8 cups caffeine free beverages  Send Carelink 11/2/22

## 2022-08-15 RX ORDER — LEVOTHYROXINE SODIUM 88 UG/1
TABLET ORAL
Qty: 13 TABLET | Refills: 0 | Status: SHIPPED | OUTPATIENT
Start: 2022-08-15 | End: 2022-10-03

## 2022-09-12 RX ORDER — QUETIAPINE FUMARATE 25 MG/1
25 TABLET, FILM COATED ORAL NIGHTLY
Qty: 30 TABLET | Refills: 3 | Status: SHIPPED | OUTPATIENT
Start: 2022-09-12 | End: 2022-09-15 | Stop reason: SDUPTHER

## 2022-09-12 NOTE — TELEPHONE ENCOUNTER
Pt is asking for a refill on depression medication. She stated she didn't know the name and couldn't spell it.

## 2022-09-13 ENCOUNTER — OFFICE VISIT (OUTPATIENT)
Dept: PRIMARY CARE CLINIC | Age: 72
End: 2022-09-13
Payer: MEDICARE

## 2022-09-13 ENCOUNTER — ANCILLARY PROCEDURE (OUTPATIENT)
Dept: PRIMARY CARE CLINIC | Age: 72
End: 2022-09-13
Payer: MEDICARE

## 2022-09-13 VITALS
TEMPERATURE: 97.1 F | OXYGEN SATURATION: 97 % | BODY MASS INDEX: 21.77 KG/M2 | SYSTOLIC BLOOD PRESSURE: 136 MMHG | WEIGHT: 147 LBS | DIASTOLIC BLOOD PRESSURE: 80 MMHG | HEART RATE: 70 BPM | HEIGHT: 69 IN

## 2022-09-13 DIAGNOSIS — R35.0 URINARY FREQUENCY: ICD-10-CM

## 2022-09-13 DIAGNOSIS — I73.9 CLAUDICATION (HCC): Primary | ICD-10-CM

## 2022-09-13 DIAGNOSIS — K21.9 GASTROESOPHAGEAL REFLUX DISEASE, UNSPECIFIED WHETHER ESOPHAGITIS PRESENT: ICD-10-CM

## 2022-09-13 DIAGNOSIS — R11.2 NAUSEA AND VOMITING, INTRACTABILITY OF VOMITING NOT SPECIFIED, UNSPECIFIED VOMITING TYPE: ICD-10-CM

## 2022-09-13 DIAGNOSIS — M25.551 RIGHT HIP PAIN: ICD-10-CM

## 2022-09-13 DIAGNOSIS — S72.001A CLOSED RIGHT HIP FRACTURE, INITIAL ENCOUNTER (HCC): ICD-10-CM

## 2022-09-13 LAB
APPEARANCE FLUID: CLEAR
BILIRUBIN, POC: NORMAL
BLOOD URINE, POC: NORMAL
CLARITY, POC: CLEAR
COLOR, POC: YELLOW
GLUCOSE URINE, POC: NORMAL
KETONES, POC: NORMAL
LEUKOCYTE EST, POC: NORMAL
NITRITE, POC: NORMAL
PH, POC: 6.5
PROTEIN, POC: NORMAL
SPECIFIC GRAVITY, POC: 1.01
UROBILINOGEN, POC: NORMAL

## 2022-09-13 PROCEDURE — 73502 X-RAY EXAM HIP UNI 2-3 VIEWS: CPT | Performed by: FAMILY MEDICINE

## 2022-09-13 PROCEDURE — 1123F ACP DISCUSS/DSCN MKR DOCD: CPT | Performed by: FAMILY MEDICINE

## 2022-09-13 PROCEDURE — 99214 OFFICE O/P EST MOD 30 MIN: CPT | Performed by: FAMILY MEDICINE

## 2022-09-13 PROCEDURE — 81002 URINALYSIS NONAUTO W/O SCOPE: CPT | Performed by: FAMILY MEDICINE

## 2022-09-13 RX ORDER — ONDANSETRON 4 MG/1
4 TABLET, ORALLY DISINTEGRATING ORAL 3 TIMES DAILY PRN
Qty: 21 TABLET | Refills: 0 | Status: SHIPPED | OUTPATIENT
Start: 2022-09-13

## 2022-09-13 RX ORDER — OMEPRAZOLE 40 MG/1
40 CAPSULE, DELAYED RELEASE ORAL
Qty: 30 CAPSULE | Refills: 5 | Status: SHIPPED | OUTPATIENT
Start: 2022-09-13

## 2022-09-13 SDOH — ECONOMIC STABILITY: FOOD INSECURITY: WITHIN THE PAST 12 MONTHS, YOU WORRIED THAT YOUR FOOD WOULD RUN OUT BEFORE YOU GOT MONEY TO BUY MORE.: SOMETIMES TRUE

## 2022-09-13 SDOH — ECONOMIC STABILITY: FOOD INSECURITY: WITHIN THE PAST 12 MONTHS, THE FOOD YOU BOUGHT JUST DIDN'T LAST AND YOU DIDN'T HAVE MONEY TO GET MORE.: OFTEN TRUE

## 2022-09-13 ASSESSMENT — SOCIAL DETERMINANTS OF HEALTH (SDOH): HOW HARD IS IT FOR YOU TO PAY FOR THE VERY BASICS LIKE FOOD, HOUSING, MEDICAL CARE, AND HEATING?: HARD

## 2022-09-15 LAB — URINE CULTURE, ROUTINE: NORMAL

## 2022-09-15 RX ORDER — ALBUTEROL SULFATE 90 UG/1
2 AEROSOL, METERED RESPIRATORY (INHALATION) EVERY 6 HOURS PRN
Qty: 1 EACH | Refills: 3 | Status: SHIPPED | OUTPATIENT
Start: 2022-09-15

## 2022-09-15 RX ORDER — QUETIAPINE FUMARATE 50 MG/1
50 TABLET, FILM COATED ORAL NIGHTLY
Qty: 30 TABLET | Refills: 5 | Status: SHIPPED | OUTPATIENT
Start: 2022-09-15

## 2022-09-25 ASSESSMENT — ENCOUNTER SYMPTOMS
DIARRHEA: 1
NAUSEA: 1
WHEEZING: 0
BACK PAIN: 0
EYE DISCHARGE: 0
COUGH: 0
VOMITING: 1
ABDOMINAL PAIN: 1
COLOR CHANGE: 0

## 2022-09-25 NOTE — PROGRESS NOTES
SUBJECTIVE:    Patient ID: Laureano Whitley is a 80 y.o. female. HPI:   Patient is seen today for complaints of GERD symptoms. These been going on for the last week. She states that she has not had any chest pain or tightness. She states that she just feels like she has a lot of reflux. She states she had nausea that started about 3 days ago. She has not had any vomiting. She does has had some diarrhea that started just yesterday. She denies any known sick exposure. She denies any fevers or chills. She does have some chronic right hip pain. He does have a history of being broken in the past.  She states that it just hurts her and aches a lot. She has not had any work-up done for this recently. She states that she has also had some leg pain and cramping that started last week in both of her legs. Is located in the posterior legs and does go all the way down to her feet. It is worse if she is up on her feet moving around much. She does have a history of tobacco abuse. She has not had any recent changes in her medications. She denies any color changes of her feet. Past Medical History:   Diagnosis Date    Cerebral artery occlusion with cerebral infarction [facility])     TIA    Diverticular disease of colon     Hyperlipidemia     Hypothyroidism     Pneumonia     Psychiatric problem     Seizures ([facility]     Tobacco abuse     Vitamin B12 deficiency       Current Outpatient Medications on File Prior to Visit   Medication Sig Dispense Refill    levothyroxine (SYNTHROID) 88 MCG tablet Take 1 tablet by mouth once daily 13 tablet 0    aspirin 81 MG EC tablet Take 81 mg by mouth in the morning and at bedtime      atorvastatin (LIPITOR) 40 MG tablet Take 1 tablet by mouth nightly 90 tablet 3    budesonide-formoterol (SYMBICORT) 160-4.5 MCG/ACT AERO Inhale 2 puffs into the lungs 2 times daily 1 Inhaler 5     No current facility-administered medications on file prior to visit.      Allergies   Allergen Reactions Medrol [Methylprednisolone]     Norco [Hydrocodone-Acetaminophen] Palpitations       Review of Systems   Constitutional:  Negative for activity change, appetite change and fever. HENT:  Negative for congestion and nosebleeds. Eyes:  Negative for discharge. Respiratory:  Negative for cough and wheezing. Cardiovascular:  Negative for chest pain and leg swelling. Gastrointestinal:  Positive for abdominal pain, diarrhea, nausea and vomiting. Genitourinary:  Negative for difficulty urinating, frequency and urgency. Musculoskeletal:  Positive for arthralgias and myalgias. Negative for back pain and gait problem. Skin:  Negative for color change and rash. Neurological:  Negative for dizziness and headaches. Hematological:  Does not bruise/bleed easily. Psychiatric/Behavioral:  Negative for sleep disturbance and suicidal ideas. OBJECTIVE:    Physical Exam  Vitals reviewed. Constitutional:       General: She is not in acute distress. Appearance: Normal appearance. She is well-developed. She is not diaphoretic. HENT:      Head: Normocephalic and atraumatic. Right Ear: External ear normal.      Left Ear: External ear normal.   Cardiovascular:      Rate and Rhythm: Normal rate and regular rhythm. Pulses: Normal pulses. Heart sounds: Normal heart sounds. No murmur heard. Pulmonary:      Effort: Pulmonary effort is normal. No respiratory distress. Breath sounds: Normal breath sounds. Musculoskeletal:      Cervical back: Normal range of motion and neck supple. Skin:     General: Skin is warm and dry. Neurological:      General: No focal deficit present. Mental Status: She is alert and oriented to person, place, and time. Mental status is at baseline. Psychiatric:         Mood and Affect: Mood normal.         Behavior: Behavior normal.         Thought Content:  Thought content normal.         Judgment: Judgment normal.      /80   Pulse 70   Temp 97.1 °F (36.2 °C)   Ht 5' 9\" (1.753 m)   Wt 147 lb (66.7 kg)   LMP 03/29/1970   SpO2 97%   BMI 21.71 kg/m²      ASSESSMENT/PLAN:    1. Claudication ([address]  -     VL LOWER EXTREMITY ARTERIAL SEGMENTAL PRESSURES W PPG; Future  2. Right hip pain  -     XR HIP 2-3 VW W PELVIS RIGHT  3. Urinary frequency  -     POCT Urinalysis no Micro  -     Urine culture  4. Gastroesophageal reflux disease, unspecified whether esophagitis present  5. Nausea and vomiting, intractability of vomiting not specified, unspecified vomiting type  6. Closed right hip fracture, initial encounter [facility])       We are going to get a arterial work-up due to her claudication symptoms. We are going to check a urine on her due to her urinary frequency. I am going to send medication over for the heartburn to see if this helps with her symptoms. We are going to get an updated x-ray of her right hip due to the current continued pain. Discussed we may need to get a referral for her back in to see her orthopedist.  Continue Synthroid 88 mcg for hypothyroidism. Follow up in 4 weeks unless symptoms worsening sooner. PDMP Monitoring:    Last PDMP Vera Puckett as Reviewed [facility]):  Review User Review Instant Review Result            Urine Drug Screenings (1 yr)       Urine Drug Screen  Collected: 1/8/2022 11:30 AM (Final result)              Urine Drug Screen  Collected: 7/26/2019  1:05 PM (Final result)                  Medication Contract and Consent for Opioid Use Documents Filed       Patient Documents       Type of Document Status Date Received Received By Description    Medication Contract Signed 8/29/2014  9:33 AM Drake JOHNSON Dragon/transcription disclaimer:  Much of this encounter note is electronic transcription/translation of spoken language toprinted texts. The electronic translation of spoken language may be erroneous, or at times, nonsensical words or phrases may be inadvertently transcribed.   Although I have reviewed the note for such errors, some may stillexist.

## 2022-09-30 ENCOUNTER — HOSPITAL ENCOUNTER (OUTPATIENT)
Dept: VASCULAR LAB | Age: 72
Discharge: HOME | End: 2022-09-30
Payer: MEDICARE

## 2022-09-30 DIAGNOSIS — I73.9 CLAUDICATION (HCC): ICD-10-CM

## 2022-09-30 PROCEDURE — 93923 UPR/LXTR ART STDY 3+ LVLS: CPT

## 2022-10-03 RX ORDER — LEVOTHYROXINE SODIUM 88 UG/1
TABLET ORAL
Qty: 30 TABLET | Refills: 0 | Status: SHIPPED | OUTPATIENT
Start: 2022-10-03

## 2022-10-04 RX ORDER — LEVOTHYROXINE SODIUM 88 UG/1
TABLET ORAL
Qty: 13 TABLET | Refills: 0 | OUTPATIENT
Start: 2022-10-04

## 2022-10-05 RX ORDER — BUDESONIDE AND FORMOTEROL FUMARATE DIHYDRATE 160; 4.5 UG/1; UG/1
2 AEROSOL RESPIRATORY (INHALATION) 2 TIMES DAILY
Qty: 1 EACH | Refills: 5 | Status: SHIPPED | OUTPATIENT
Start: 2022-10-05

## 2022-10-12 ENCOUNTER — OFFICE VISIT (OUTPATIENT)
Dept: PRIMARY CARE CLINIC | Age: 72
End: 2022-10-12
Payer: MEDICARE

## 2022-10-12 VITALS
HEART RATE: 109 BPM | HEIGHT: 69 IN | BODY MASS INDEX: 21.18 KG/M2 | SYSTOLIC BLOOD PRESSURE: 126 MMHG | DIASTOLIC BLOOD PRESSURE: 80 MMHG | WEIGHT: 143 LBS | TEMPERATURE: 97.5 F | OXYGEN SATURATION: 98 %

## 2022-10-12 DIAGNOSIS — R45.4 IRRITABILITY AND ANGER: ICD-10-CM

## 2022-10-12 DIAGNOSIS — E03.9 ACQUIRED HYPOTHYROIDISM: ICD-10-CM

## 2022-10-12 DIAGNOSIS — Z00.00 MEDICARE ANNUAL WELLNESS VISIT, SUBSEQUENT: Primary | ICD-10-CM

## 2022-10-12 DIAGNOSIS — Z13.31 POSITIVE DEPRESSION SCREENING: ICD-10-CM

## 2022-10-12 DIAGNOSIS — L60.8 TOENAIL DEFORMITY: ICD-10-CM

## 2022-10-12 DIAGNOSIS — E78.2 MIXED HYPERLIPIDEMIA: ICD-10-CM

## 2022-10-12 PROCEDURE — G0439 PPPS, SUBSEQ VISIT: HCPCS | Performed by: FAMILY MEDICINE

## 2022-10-12 PROCEDURE — 99213 OFFICE O/P EST LOW 20 MIN: CPT | Performed by: FAMILY MEDICINE

## 2022-10-12 PROCEDURE — 1123F ACP DISCUSS/DSCN MKR DOCD: CPT | Performed by: FAMILY MEDICINE

## 2022-10-12 ASSESSMENT — LIFESTYLE VARIABLES
HOW MANY STANDARD DRINKS CONTAINING ALCOHOL DO YOU HAVE ON A TYPICAL DAY: PATIENT DOES NOT DRINK
HOW OFTEN DO YOU HAVE A DRINK CONTAINING ALCOHOL: NEVER

## 2022-10-12 ASSESSMENT — PATIENT HEALTH QUESTIONNAIRE - PHQ9
8. MOVING OR SPEAKING SO SLOWLY THAT OTHER PEOPLE COULD HAVE NOTICED. OR THE OPPOSITE, BEING SO FIGETY OR RESTLESS THAT YOU HAVE BEEN MOVING AROUND A LOT MORE THAN USUAL: 0
SUM OF ALL RESPONSES TO PHQ QUESTIONS 1-9: 12
2. FEELING DOWN, DEPRESSED OR HOPELESS: 3
4. FEELING TIRED OR HAVING LITTLE ENERGY: 3
SUM OF ALL RESPONSES TO PHQ QUESTIONS 1-9: 12
7. TROUBLE CONCENTRATING ON THINGS, SUCH AS READING THE NEWSPAPER OR WATCHING TELEVISION: 0
SUM OF ALL RESPONSES TO PHQ QUESTIONS 1-9: 12
SUM OF ALL RESPONSES TO PHQ9 QUESTIONS 1 & 2: 4
6. FEELING BAD ABOUT YOURSELF - OR THAT YOU ARE A FAILURE OR HAVE LET YOURSELF OR YOUR FAMILY DOWN: 2
1. LITTLE INTEREST OR PLEASURE IN DOING THINGS: 1
3. TROUBLE FALLING OR STAYING ASLEEP: 0
SUM OF ALL RESPONSES TO PHQ QUESTIONS 1-9: 12
9. THOUGHTS THAT YOU WOULD BE BETTER OFF DEAD, OR OF HURTING YOURSELF: 0
5. POOR APPETITE OR OVEREATING: 3
10. IF YOU CHECKED OFF ANY PROBLEMS, HOW DIFFICULT HAVE THESE PROBLEMS MADE IT FOR YOU TO DO YOUR WORK, TAKE CARE OF THINGS AT HOME, OR GET ALONG WITH OTHER PEOPLE: 2

## 2022-10-12 NOTE — PROGRESS NOTES
Medicare Annual Wellness Visit    Viral Stark is here for Medicare AWV (Pt voices that she does not get Pap Smears any longer but she just got a Mammogram and it was normal at [facility]) and Toe Pain (Pt voices that it is bilateral and both are growing over toward the outside of the foot.)    Assessment & Plan   Medicare annual wellness visit, subsequent  Toenail deformity  -     External Referral To Podiatry  -     CBC with Auto Differential; Future  -     Comprehensive Metabolic Panel; Future  -     Lipid Panel; Future  -     TSH; Future  -     T4, Free; Future  Irritability and anger  -     External Referral To Behavioral Health  -     CBC with Auto Differential; Future  -     Comprehensive Metabolic Panel; Future  -     Lipid Panel; Future  -     TSH; Future  -     T4, Free; Future  Acquired hypothyroidism  -     CBC with Auto Differential; Future  -     Comprehensive Metabolic Panel; Future  -     Lipid Panel; Future  -     TSH; Future  -     T4, Free; Future  Mixed hyperlipidemia  -     CBC with Auto Differential; Future  -     Comprehensive Metabolic Panel; Future  -     Lipid Panel; Future  -     TSH; Future  -     T4, Free; Future  Positive depression screening    Recommendations for Preventive Services Due: see orders and patient instructions/AVS.  Recommended screening schedule for the next 5-10 years is provided to the patient in written form: see Patient Instructions/AVS.    I put a referral in for behavioral health for further evaluation of the and management of the anger and irritability. I do feel that she would be benefited from doing some counseling. I am putting a referral in for podiatry for further evaluation and management of her toenails. Fasting labs are ordered today and we will notify her of the results of those we receive them back. Return for Medicare Annual Wellness Visit in 1 year.      Subjective   The following acute and/or chronic problems were also addressed today:  Patient is seen today for Medicare annual wellness. She is also having trouble with her big toenails. She states that both go over towards the smaller toes made her very thick. She has difficulty trimming them because they are so thick. She states that she has not had any sores but it does, rub on her toe and does make it a little irritated. She is wanting a referral to podiatry for further evaluation and management of her toenails. She does have a history of hypothyroidism and does take her Synthroid regularly. She is not fasting today for labs and will return to have blood work done. Patient's complete Health Risk Assessment and screening values have been reviewed and are found in Flowsheets. The following problems were reviewed today and where indicated follow up appointments were made and/or referrals ordered.     Positive Risk Factor Screenings with Interventions:    Fall Risk:  Do you feel unsteady or are you worried about falling? : no  2 or more falls in past year?: no  Fall with injury in past year?: (!) yes   Fall Risk Interventions:    Home safety tips provided     Depression:  PHQ-2 Score: 4  PHQ-9 Total Score: 12    Severity:1-4 = minimal depression, 5-9 = mild depression, 10-14 = moderate depression, 15-19 = moderately severe depression, 20-27 = severe depression  Depression Interventions:  Patient declines any further evaluation/treatment for this issue          General Health and ACP:  General  In general, how would you say your health is?: Good  In the past 7 days, have you experienced any of the following: New or Increased Pain, New or Increased Fatigue, Loneliness, Social Isolation, Stress or Anger?: (!) Yes  Select all that apply: (!) Stress, Anger  Do you get the social and emotional support that you need?: Yes  Do you have a Living Will?: Yes    Advance Directives       Power of  Living Will ACP-Advance Directive ACP-Power of     Not on File Filed on 06/03/21 Filed Not on File        General Health Risk Interventions:  Stress: patient declines any further evaluation/treatment for this issue  Anger: patient declines any further evaluation/treatment for this issue    Health Habits/Nutrition:  Physical Activity: Inactive    Days of Exercise per Week: 0 days    Minutes of Exercise per Session: 0 min     Have you lost any weight without trying in the past 3 months?: (!) Yes  Body mass index: 21.12  Have you seen the dentist within the past year?: Yes  Health Habits/Nutrition Interventions:  Nutritional issues:  educational materials for healthy, well-balanced diet provided    Hearing/Vision:  Do you or your family notice any trouble with your hearing that hasn't been managed with hearing aids?: No  Do you have difficulty driving, watching TV, or doing any of your daily activities because of your eyesight?: (!) Yes  Have you had an eye exam within the past year?: (!) No  No results found.   Hearing/Vision Interventions:  Vision concerns:  patient encouraged to make appointment with his/her eye specialist    Safety:  Do you have working smoke detectors?: (!) No  Do you have any tripping hazards - loose or unsecured carpets or rugs?: No  Do you have any tripping hazards - clutter in doorways, halls, or stairs?: No  Do you have either shower bars, grab bars, non-slip mats or non-slip surfaces in your shower or bathtub?: (!) No  Do all of your stairways have a railing or banister?: (!) No  Do you always fasten your seatbelt when you are in a car?: Yes  Safety Interventions:  Home safety tips provided    ADLs:  In the past 7 days, did you need help from others to perform any of the following everyday activities: Eating, dressing, grooming, bathing, toileting, or walking/balance?: No  In the past 7 days, did you need help from others to take care of any of the following: Laundry, housekeeping, banking/finances, shopping, telephone use, food preparation, transportation, or taking medications?: (!) Yes  Select all that apply: (!) Telephone Use  ADL Interventions:  Patient declines any further evaluation/treatment for this issue          Objective   Vitals:    10/12/22 1509   BP: 126/80   Pulse: (!) 109   Temp: 97.5 °F (36.4 °C)   SpO2: 98%   Weight: 143 lb (64.9 kg)   Height: 5' 9\" (1.753 m)      Body mass index is 21.12 kg/m². General Appearance: alert and oriented to person, place and time, well-developed and well-nourished, in no acute distress  Skin: warm and dry, no rash or erythema  Head: normocephalic and atraumatic  Neck: neck supple and non tender without mass, no thyromegaly or thyroid nodules, no cervical lymphadenopathy   Pulmonary/Chest: clear to auscultation bilaterally- no wheezes, rales or rhonchi, normal air movement, no respiratory distress  Cardiovascular: normal rate, normal S1 and S2, no gallops, intact distal pulses, and no carotid bruits  Extremities: no cyanosis and no clubbing  Musculoskeletal: normal range of motion, no joint swelling, deformity or tenderness  Neurologic: gait and coordination normal and speech normal       Allergies   Allergen Reactions    Medrol [Methylprednisolone]     Norco [Hydrocodone-Acetaminophen] Palpitations     Prior to Visit Medications    Medication Sig Taking?  Authorizing Provider   budesonide-formoterol (SYMBICORT) 160-4.5 MCG/ACT AERO Inhale 2 puffs into the lungs 2 times daily Yes Elizabet Brambila MD   levothyroxine (SYNTHROID) 88 MCG tablet Take 1 tablet by mouth once daily Yes Elizabet Brambila MD   albuterol sulfate HFA (PROAIR HFA) 108 (90 Base) MCG/ACT inhaler Inhale 2 puffs into the lungs every 6 hours as needed for Wheezing Yes Lisa Isaac MD   QUEtiapine (SEROQUEL) 50 MG tablet Take 1 tablet by mouth nightly Yes Yesenia Mercer MD   omeprazole (PRILOSEC) 40 MG delayed release capsule Take 1 capsule by mouth every morning (before breakfast) Yes Lisa Isaac MD   ondansetron (ZOFRAN-ODT) 4 MG disintegrating tablet Take 1 tablet by mouth 3 times daily as needed for Nausea or Vomiting Yes Ambrocio Callahan MD   aspirin 81 MG EC tablet Take 81 mg by mouth in the morning and at bedtime Yes Historical Provider, MD   atorvastatin (LIPITOR) 40 MG tablet Take 1 tablet by mouth nightly Yes Ambrocio Callahan MD       CareTeam (Including outside providers/suppliers regularly involved in providing care):   Patient Care Team:  Ambrocio Callahan MD as PCP - General (Family Medicine)  Ambrocio Callahan MD as PCP - [facility] Empaneled Provider  JORDYN Arredondo Ala as Advanced Practice Nurse (Neurology)  JORDYN Vazquez as Nurse Practitioner (Certified Clinical Nurse Specialist)     Reviewed and updated this visit:  Tobacco  Allergies  Meds  Med Hx  Surg Hx  Soc Hx  Fam Hx             PHQ-9 score today: (PHQ-9 Total Score: 12), additional evaluation and assessment performed, follow-up plan includes but not limited to: Medication management and Referral to /Specialist  for evaluation and management.

## 2022-10-12 NOTE — PATIENT INSTRUCTIONS
Personalized Preventive Plan for Shelia Nicholson - 10/12/2022  Medicare offers a range of preventive health benefits. Some of the tests and screenings are paid in full while other may be subject to a deductible, co-insurance, and/or copay. Some of these benefits include a comprehensive review of your medical history including lifestyle, illnesses that may run in your family, and various assessments and screenings as appropriate. After reviewing your medical record and screening and assessments performed today your provider may have ordered immunizations, labs, imaging, and/or referrals for you. A list of these orders (if applicable) as well as your Preventive Care list are included within your After Visit Summary for your review. Other Preventive Recommendations:    A preventive eye exam performed by an eye specialist is recommended every 1-2 years to screen for glaucoma; cataracts, macular degeneration, and other eye disorders. A preventive dental visit is recommended every 6 months. Try to get at least 150 minutes of exercise per week or 10,000 steps per day on a pedometer . Order or download the FREE \"Exercise & Physical Activity: Your Everyday Guide\" from [employer]. Call 0-895.644.5975 or search [employer] online. You need 0682-2164 mg of calcium and 0707-3975 IU of vitamin D per day. It is possible to meet your calcium requirement with diet alone, but a vitamin D supplement is usually necessary to meet this goal.  When exposed to the sun, use a sunscreen that protects against both UVA and UVB radiation with an SPF of 30 or greater. Reapply every 2 to 3 hours or after sweating, drying off with a towel, or swimming. Always wear a seat belt when traveling in a car. Always wear a helmet when riding a bicycle or motorcycle.

## 2022-10-17 DIAGNOSIS — E78.2 MIXED HYPERLIPIDEMIA: ICD-10-CM

## 2022-10-17 DIAGNOSIS — R45.4 IRRITABILITY AND ANGER: ICD-10-CM

## 2022-10-17 DIAGNOSIS — L60.8 TOENAIL DEFORMITY: ICD-10-CM

## 2022-10-17 DIAGNOSIS — E03.9 ACQUIRED HYPOTHYROIDISM: ICD-10-CM

## 2022-10-17 LAB
ALBUMIN SERPL-MCNC: 4.1 G/DL (ref 3.5–5.2)
ALP BLD-CCNC: 80 U/L (ref 35–104)
ALT SERPL-CCNC: 12 U/L (ref 5–33)
ANION GAP SERPL CALCULATED.3IONS-SCNC: 9 MMOL/L (ref 7–19)
AST SERPL-CCNC: 17 U/L (ref 5–32)
BASOPHILS ABSOLUTE: 0.1 K/UL (ref 0–0.2)
BASOPHILS RELATIVE PERCENT: 0.8 % (ref 0–1)
BILIRUB SERPL-MCNC: 0.5 MG/DL (ref 0.2–1.2)
BUN BLDV-MCNC: 14 MG/DL (ref 8–23)
CALCIUM SERPL-MCNC: 9.8 MG/DL (ref 8.8–10.2)
CHLORIDE BLD-SCNC: 107 MMOL/L (ref 98–111)
CHOLESTEROL, TOTAL: 154 MG/DL (ref 160–199)
CO2: 28 MMOL/L (ref 22–29)
CREAT SERPL-MCNC: 0.6 MG/DL (ref 0.5–0.9)
EOSINOPHILS ABSOLUTE: 0.2 K/UL (ref 0–0.6)
EOSINOPHILS RELATIVE PERCENT: 2.8 % (ref 0–5)
GFR AFRICAN AMERICAN: >59
GFR NON-AFRICAN AMERICAN: >60
GLUCOSE BLD-MCNC: 101 MG/DL (ref 74–109)
HCT VFR BLD CALC: 41.9 % (ref 37–47)
HDLC SERPL-MCNC: 51 MG/DL (ref 65–121)
HEMOGLOBIN: 13.1 G/DL (ref 12–16)
IMMATURE GRANULOCYTES #: 0 K/UL
LDL CHOLESTEROL CALCULATED: 82 MG/DL
LYMPHOCYTES ABSOLUTE: 1.7 K/UL (ref 1.1–4.5)
LYMPHOCYTES RELATIVE PERCENT: 28.4 % (ref 20–40)
MCH RBC QN AUTO: 28.4 PG (ref 27–31)
MCHC RBC AUTO-ENTMCNC: 31.3 G/DL (ref 33–37)
MCV RBC AUTO: 90.7 FL (ref 81–99)
MONOCYTES ABSOLUTE: 0.5 K/UL (ref 0–0.9)
MONOCYTES RELATIVE PERCENT: 7.7 % (ref 0–10)
NEUTROPHILS ABSOLUTE: 3.7 K/UL (ref 1.5–7.5)
NEUTROPHILS RELATIVE PERCENT: 60 % (ref 50–65)
PDW BLD-RTO: 13.9 % (ref 11.5–14.5)
PLATELET # BLD: 139 K/UL (ref 130–400)
PMV BLD AUTO: 12.5 FL (ref 9.4–12.3)
POTASSIUM SERPL-SCNC: 4.3 MMOL/L (ref 3.5–5)
RBC # BLD: 4.62 M/UL (ref 4.2–5.4)
SODIUM BLD-SCNC: 144 MMOL/L (ref 136–145)
T4 FREE: 1.11 NG/DL (ref 0.93–1.7)
TOTAL PROTEIN: 6.4 G/DL (ref 6.6–8.7)
TRIGL SERPL-MCNC: 105 MG/DL (ref 0–149)
TSH SERPL DL<=0.05 MIU/L-ACNC: 5.56 UIU/ML (ref 0.27–4.2)
WBC # BLD: 6.1 K/UL (ref 4.8–10.8)

## 2022-11-09 ENCOUNTER — TELEPHONE (OUTPATIENT)
Dept: CARDIOLOGY CLINIC | Age: 72
End: 2022-11-09

## 2022-11-15 RX ORDER — LEVOTHYROXINE SODIUM 88 UG/1
TABLET ORAL
Qty: 30 TABLET | Refills: 0 | Status: SHIPPED | OUTPATIENT
Start: 2022-11-15

## 2022-11-28 RX ORDER — LEVOTHYROXINE SODIUM 88 UG/1
TABLET ORAL
Qty: 30 TABLET | Refills: 0 | OUTPATIENT
Start: 2022-11-28

## 2022-12-08 NOTE — PROGRESS NOTES
[facility] - PODIATRY    Today's Date: 12/14/2022     Patient Name: Petty Cortés  MRN: [id number]  CSN: [id number]  PCP: Marilia Reza MD  Referring Provider: Marilia Reza*    SUBJECTIVE     Chief Complaint   Patient presents with   • Establish Care     Marilia Reza MD -10/12/2022-NEW PATIENT- Toenail deformity ( BILATERAL )-  pt states great nails both grow towards outside of feet at an angle- pt denies pain     HPI: Petty Cortés, a 71 y.o.female, comes to clinic as a(n) new patient complaining of painful toenails and complaining of nail dystrophy of hallux nails bilateral. Patient has h/o collapsed lung, hypothyroid. Patient presents with complaints of pain in both great toes due to thickness and irregular growth of nails with nails deviating laterally and pushing into 2nd toes. States that due to the thickness and shape of the nails she has been unable to cut them. Denies other pedal issues. Patient reports pain in great toenails and 2nd toes due to pressure being applied from lateral nail growth of hallux nail. Denies previous treatment. Denies any constitutional symptoms. No other pedal complaints at this time.    Past Medical History:   Diagnosis Date   • Collapsed lung      Past Surgical History:   Procedure Laterality Date   • CHEST TUBE INSERTION     • PACEMAKER IMPLANTATION     • TUBAL ABDOMINAL LIGATION       Family History   Problem Relation Age of Onset   • Stroke Mother    • Diabetes Mother    • Stroke Father    • Diabetes Brother    • Diabetes Child      Social History     Socioeconomic History   • Marital status:    Tobacco Use   • Smoking status: Every Day     Packs/day: 1.00     Years: 47.00     Pack years: 47.00     Types: Cigarettes   • Smokeless tobacco: Never   • Tobacco comments:     Thinking about it due to pulmonary issues.    Vaping Use   • Vaping Use: Never used   Substance and Sexual Activity   • Alcohol use: No    • Drug use: No   • Sexual activity: Yes     Partners: Male     No Known Allergies  Current Outpatient Medications   Medication Sig Dispense Refill   • albuterol sulfate  (90 Base) MCG/ACT inhaler Inhale 2 puffs 4 (Four) Times a Day.     • atorvastatin (LIPITOR) 40 MG tablet Take 40 mg by mouth Every Night.     • budesonide-formoterol (SYMBICORT) 160-4.5 MCG/ACT inhaler Inhale 2 puffs Daily.     • budesonide-formoterol (SYMBICORT) 160-4.5 MCG/ACT inhaler Inhale 2 puffs 2 (Two) Times a Day.     • diclofenac (VOLTAREN) 1 % gel gel Apply 4 g topically to the appropriate area as directed 4 (Four) Times a Day As Needed (right knee effusion). 100 g 0   • fluticasone (Flonase) 50 MCG/ACT nasal spray 2 sprays into the nostril(s) as directed by provider Daily. 2 puffs each nostril 1 bottle 0   • ipratropium (ATROVENT) 0.06 % nasal spray 2 sprays into the nostril(s) as directed by provider 4 (Four) Times a Day. 1 each 0   • levothyroxine (SYNTHROID, LEVOTHROID) 88 MCG tablet Take 88 mcg by mouth Daily.     • loratadine-pseudoephedrine (Claritin-D 12 Hour) 5-120 MG per 12 hr tablet Take 1 tablet by mouth 2 (Two) Times a Day for 5 days. 10 tablet 0     No current facility-administered medications for this visit.     Review of Systems   Constitutional: Negative for chills and fever.   HENT: Negative for congestion.    Respiratory: Negative for shortness of breath.    Cardiovascular: Negative for chest pain and leg swelling.   Gastrointestinal: Negative for constipation, diarrhea, nausea and vomiting.   Musculoskeletal: Positive for arthralgias. Negative for myalgias.   Skin: Negative for wound.        Thickened, irregular toenails   Neurological: Negative for numbness.       OBJECTIVE     Vitals:    12/14/22 1304   BP: 120/72   Pulse: 67   SpO2: 97%       PHYSICAL EXAM  GEN:   Accompanied by none.     Foot/Ankle Exam:       General:   Appearance: appears stated age and healthy    Orientation: AAOx3    Affect: appropriate     Gait: unimpaired    Assistance: independent    Shoe Gear:  Casual shoes    VASCULAR      Right Foot Vascularity   Dorsalis pedis:  2+  Posterior tibial:  2+  Skin Temperature: warm    Edema Grading:  Non-pitting and trace  CFT:  3  Pedal Hair Growth:  Present  Varicosities: moderate varicosities       Left Foot Vascularity   Dorsalis pedis:  2+  Posterior tibial:  2+  Skin Temperature: warm    Edema Grading:  Trace and non-pitting  CFT:  3  Pedal Hair Growth:  Present  Varicosities: moderate varicosities        NEUROLOGIC     Right Foot Neurologic   Normal sensation    Light touch sensation:  Normal  Vibratory sensation:  Normal  Hot/Cold sensation: normal    Protective Sensation using [place]-Kevin Monofilament:  10     Left Foot Neurologic   Normal sensation    Light touch sensation:  Normal  Vibratory sensation:  Normal  Hot/cold sensation: normal    Protective Sensation using [place]-Kevin Monofilament:  10     MUSCULOSKELETAL      Right Foot Musculoskeletal   Ecchymosis:  None  Tenderness: toenails, toe 1 and toe 2    Arch:  Normal  Hallux valgus: No       Left Foot Musculoskeletal   Ecchymosis:  None  Tenderness: toenails, toe 1 and toe 2    Arch:  Normal  Hallux valgus: No       MUSCLE STRENGTH     Right Foot Muscle Strength   Foot dorsiflexion:  5  Foot plantar flexion:  5  Foot inversion:  5  Foot eversion:  5     Left Foot Muscle Strength   Foot dorsiflexion:  5  Foot plantar flexion:  5  Foot inversion:  5  Foot eversion:  5     RANGE OF MOTION      Right Foot Range of Motion   Foot and ankle ROM within normal limits       Left Foot Range of Motion   Foot and ankle ROM within normal limits       DERMATOLOGIC     Right Foot Dermatologic   Skin: skin intact    Nails: onychomycosis, abnormally thick, subungual debris and dystrophic nails    Nails comment:  Hallux - gryphotic     Left Foot Dermatologic   Skin: skin intact    Nails: onychomycosis, abnormally thick, subungual debris and dystrophic  nails    Nails comment:  Hallux - gryphotic      RADIOLOGY/NUCLEAR:  No results found.    LABORATORY/CULTURE RESULTS:      PATHOLOGY RESULTS:       ASSESSMENT/PLAN     Diagnoses and all orders for this visit:    1. Onychogryphosis (Primary)    2. Onychomycosis    3. Nail dystrophy      Comprehensive lower extremity examination and evaluation was performed.  Discussed findings and treatment plan including risks, benefits, and treatment options with patient in detail. Patient agreed with treatment plan.  After verbal consent obtained, nail(s) x2 debrided of length and thickness with nail nipper without incidence  Patient may maintain nails and calluses at home utilizing emery board or pumice stone between visits as needed   Advised to keep nails filed back between trimming at home.   Discussed that routine nail services would not be covered by insurance due to lack of qualifying conditions.    An After Visit Summary was printed and given to the patient at discharge, including (if requested) any available informative/educational handouts regarding diagnosis, treatment, or medications. All questions were answered to patient/family satisfaction. Should symptoms fail to improve or worsen they agree to call or return to clinic or to go to the Emergency Department. Discussed the importance of following up with any needed screening tests/labs/specialist appointments and any requested follow-up recommended by me today. Importance of maintaining follow-up discussed and patient accepts that missed appointments can delay diagnosis and potentially lead to worsening of conditions.  Return if symptoms worsen or fail to improve., or sooner if acute issues arise.        This document has been electronically signed by RODRICK Moore on December 14, 2022 13:26 CST

## 2022-12-13 ENCOUNTER — TELEPHONE (OUTPATIENT)
Dept: PODIATRY | Facility: CLINIC | Age: 72
End: 2022-12-13

## 2022-12-14 ENCOUNTER — OFFICE VISIT (OUTPATIENT)
Dept: PODIATRY | Facility: CLINIC | Age: 72
End: 2022-12-14

## 2022-12-14 VITALS
SYSTOLIC BLOOD PRESSURE: 120 MMHG | DIASTOLIC BLOOD PRESSURE: 72 MMHG | WEIGHT: 143 LBS | BODY MASS INDEX: 21.18 KG/M2 | HEIGHT: 69 IN | OXYGEN SATURATION: 97 % | HEART RATE: 67 BPM

## 2022-12-14 DIAGNOSIS — L60.2 ONYCHOGRYPHOSIS: Primary | ICD-10-CM

## 2022-12-14 DIAGNOSIS — L60.3 NAIL DYSTROPHY: ICD-10-CM

## 2022-12-14 DIAGNOSIS — B35.1 ONYCHOMYCOSIS: ICD-10-CM

## 2022-12-14 PROCEDURE — 99213 OFFICE O/P EST LOW 20 MIN: CPT | Performed by: NURSE PRACTITIONER

## 2022-12-14 PROCEDURE — 11721 DEBRIDE NAIL 6 OR MORE: CPT | Performed by: NURSE PRACTITIONER

## 2022-12-15 ENCOUNTER — PATIENT ROUNDING (BHMG ONLY) (OUTPATIENT)
Dept: PODIATRY | Facility: CLINIC | Age: 72
End: 2022-12-15

## 2022-12-15 NOTE — PROGRESS NOTES
December 15, 2022    Hello, may I speak with Petty Cortés?    My name is Lindsey      I am  with [facility] [place]  [address], [address]  [place] [address] [phone].    Before we get started may I verify your date of birth? 1950    I am calling to officially welcome you to our practice and ask about your recent visit. Is this a good time to talk? yes    Tell me about your visit with us. What things went well?  Edgar was so polite and nice. Wonderful.        We're always looking for ways to make our patients' experiences even better. Do you have recommendations on ways we may improve? no everything was great.     Overall were you satisfied with your first visit to our practice? yes       I appreciate you taking the time to speak with me today. Is there anything else I can do for you? yes      Thank you, and have a great day.

## 2022-12-19 RX ORDER — LEVOTHYROXINE SODIUM 88 UG/1
TABLET ORAL
Qty: 30 TABLET | Refills: 0 | Status: SHIPPED | OUTPATIENT
Start: 2022-12-19

## 2022-12-21 PROCEDURE — 93294 REM INTERROG EVL PM/LDLS PM: CPT | Performed by: CLINICAL NURSE SPECIALIST

## 2022-12-21 PROCEDURE — 93296 REM INTERROG EVL PM/IDS: CPT | Performed by: CLINICAL NURSE SPECIALIST

## 2022-12-22 DIAGNOSIS — Z95.0 PACEMAKER: Primary | ICD-10-CM

## 2022-12-22 DIAGNOSIS — R00.1 BRADYCARDIA: ICD-10-CM

## 2023-01-12 ENCOUNTER — OFFICE VISIT (OUTPATIENT)
Dept: PRIMARY CARE CLINIC | Age: 73
End: 2023-01-12
Payer: MEDICARE

## 2023-01-12 VITALS
HEIGHT: 69 IN | WEIGHT: 145 LBS | BODY MASS INDEX: 21.48 KG/M2 | HEART RATE: 88 BPM | SYSTOLIC BLOOD PRESSURE: 134 MMHG | OXYGEN SATURATION: 98 % | DIASTOLIC BLOOD PRESSURE: 80 MMHG | TEMPERATURE: 97.4 F

## 2023-01-12 DIAGNOSIS — E03.9 ACQUIRED HYPOTHYROIDISM: ICD-10-CM

## 2023-01-12 DIAGNOSIS — R41.3 MEMORY LOSS: Primary | ICD-10-CM

## 2023-01-12 DIAGNOSIS — J44.9 CHRONIC OBSTRUCTIVE PULMONARY DISEASE, UNSPECIFIED COPD TYPE (HCC): ICD-10-CM

## 2023-01-12 DIAGNOSIS — R20.0 BILATERAL HAND NUMBNESS: ICD-10-CM

## 2023-01-12 LAB
ALBUMIN SERPL-MCNC: 3.9 G/DL (ref 3.5–5.2)
ALP BLD-CCNC: 78 U/L (ref 35–104)
ALT SERPL-CCNC: 13 U/L (ref 5–33)
ANION GAP SERPL CALCULATED.3IONS-SCNC: 9 MMOL/L (ref 7–19)
AST SERPL-CCNC: 17 U/L (ref 5–32)
BASOPHILS ABSOLUTE: 0.1 K/UL (ref 0–0.2)
BASOPHILS RELATIVE PERCENT: 0.9 % (ref 0–1)
BILIRUB SERPL-MCNC: 0.3 MG/DL (ref 0.2–1.2)
BUN BLDV-MCNC: 13 MG/DL (ref 8–23)
CALCIUM SERPL-MCNC: 9.5 MG/DL (ref 8.8–10.2)
CHLORIDE BLD-SCNC: 106 MMOL/L (ref 98–111)
CO2: 27 MMOL/L (ref 22–29)
CREAT SERPL-MCNC: 0.6 MG/DL (ref 0.5–0.9)
EOSINOPHILS ABSOLUTE: 0.1 K/UL (ref 0–0.6)
EOSINOPHILS RELATIVE PERCENT: 2.4 % (ref 0–5)
GFR SERPL CREATININE-BSD FRML MDRD: >60 ML/MIN/{1.73_M2}
GLUCOSE BLD-MCNC: 112 MG/DL (ref 74–109)
HCT VFR BLD CALC: 37.6 % (ref 37–47)
HEMOGLOBIN: 11.8 G/DL (ref 12–16)
IMMATURE GRANULOCYTES #: 0 K/UL
LYMPHOCYTES ABSOLUTE: 1.9 K/UL (ref 1.1–4.5)
LYMPHOCYTES RELATIVE PERCENT: 34.9 % (ref 20–40)
MCH RBC QN AUTO: 27.9 PG (ref 27–31)
MCHC RBC AUTO-ENTMCNC: 31.4 G/DL (ref 33–37)
MCV RBC AUTO: 88.9 FL (ref 81–99)
MONOCYTES ABSOLUTE: 0.5 K/UL (ref 0–0.9)
MONOCYTES RELATIVE PERCENT: 8.6 % (ref 0–10)
NEUTROPHILS ABSOLUTE: 2.9 K/UL (ref 1.5–7.5)
NEUTROPHILS RELATIVE PERCENT: 53 % (ref 50–65)
PDW BLD-RTO: 14.4 % (ref 11.5–14.5)
PLATELET # BLD: 154 K/UL (ref 130–400)
PMV BLD AUTO: 12.4 FL (ref 9.4–12.3)
POTASSIUM SERPL-SCNC: 4.2 MMOL/L (ref 3.5–5)
RBC # BLD: 4.23 M/UL (ref 4.2–5.4)
SODIUM BLD-SCNC: 142 MMOL/L (ref 136–145)
T4 FREE: 1.25 NG/DL (ref 0.93–1.7)
TOTAL PROTEIN: 6.5 G/DL (ref 6.6–8.7)
TSH SERPL DL<=0.05 MIU/L-ACNC: 8.66 UIU/ML (ref 0.27–4.2)
WBC # BLD: 5.4 K/UL (ref 4.8–10.8)

## 2023-01-12 PROCEDURE — 99213 OFFICE O/P EST LOW 20 MIN: CPT | Performed by: FAMILY MEDICINE

## 2023-01-12 PROCEDURE — 1123F ACP DISCUSS/DSCN MKR DOCD: CPT | Performed by: FAMILY MEDICINE

## 2023-01-12 RX ORDER — BUDESONIDE AND FORMOTEROL FUMARATE DIHYDRATE 160; 4.5 UG/1; UG/1
2 AEROSOL RESPIRATORY (INHALATION) 2 TIMES DAILY
Qty: 1 EACH | Refills: 5 | Status: SHIPPED | OUTPATIENT
Start: 2023-01-12

## 2023-01-12 RX ORDER — ALBUTEROL SULFATE 90 UG/1
2 AEROSOL, METERED RESPIRATORY (INHALATION) EVERY 6 HOURS PRN
Qty: 1 EACH | Refills: 3 | Status: SHIPPED | OUTPATIENT
Start: 2023-01-12

## 2023-01-12 ASSESSMENT — ENCOUNTER SYMPTOMS
COUGH: 0
VOMITING: 0
WHEEZING: 0
COLOR CHANGE: 0
ABDOMINAL PAIN: 0
NAUSEA: 0
DIARRHEA: 0
BACK PAIN: 0
EYE DISCHARGE: 0

## 2023-01-12 ASSESSMENT — PATIENT HEALTH QUESTIONNAIRE - PHQ9
3. TROUBLE FALLING OR STAYING ASLEEP: 0
SUM OF ALL RESPONSES TO PHQ QUESTIONS 1-9: 0
2. FEELING DOWN, DEPRESSED OR HOPELESS: 0
1. LITTLE INTEREST OR PLEASURE IN DOING THINGS: 0
SUM OF ALL RESPONSES TO PHQ QUESTIONS 1-9: 0
SUM OF ALL RESPONSES TO PHQ QUESTIONS 1-9: 0
10. IF YOU CHECKED OFF ANY PROBLEMS, HOW DIFFICULT HAVE THESE PROBLEMS MADE IT FOR YOU TO DO YOUR WORK, TAKE CARE OF THINGS AT HOME, OR GET ALONG WITH OTHER PEOPLE: 0
SUM OF ALL RESPONSES TO PHQ QUESTIONS 1-9: 0
SUM OF ALL RESPONSES TO PHQ QUESTIONS 1-9: 0
9. THOUGHTS THAT YOU WOULD BE BETTER OFF DEAD, OR OF HURTING YOURSELF: 0
SUM OF ALL RESPONSES TO PHQ QUESTIONS 1-9: 0
SUM OF ALL RESPONSES TO PHQ9 QUESTIONS 1 & 2: 0
SUM OF ALL RESPONSES TO PHQ QUESTIONS 1-9: 0
SUM OF ALL RESPONSES TO PHQ QUESTIONS 1-9: 0
2. FEELING DOWN, DEPRESSED OR HOPELESS: 0
4. FEELING TIRED OR HAVING LITTLE ENERGY: 0
6. FEELING BAD ABOUT YOURSELF - OR THAT YOU ARE A FAILURE OR HAVE LET YOURSELF OR YOUR FAMILY DOWN: 0
8. MOVING OR SPEAKING SO SLOWLY THAT OTHER PEOPLE COULD HAVE NOTICED. OR THE OPPOSITE, BEING SO FIGETY OR RESTLESS THAT YOU HAVE BEEN MOVING AROUND A LOT MORE THAN USUAL: 0
5. POOR APPETITE OR OVEREATING: 0
SUM OF ALL RESPONSES TO PHQ9 QUESTIONS 1 & 2: 0
1. LITTLE INTEREST OR PLEASURE IN DOING THINGS: 0
7. TROUBLE CONCENTRATING ON THINGS, SUCH AS READING THE NEWSPAPER OR WATCHING TELEVISION: 0

## 2023-01-12 NOTE — PROGRESS NOTES
SUBJECTIVE:    Patient ID: Yahaira Lomeli is a 71 y.o. female. HPI:   Patient is seen today for 3-month follow-up. She has a history of COPD and is currently on ProAir and Symbicort. She is tolerating these well and is using them regularly. She states that she is also had problems with memory loss. She states that she is forgetting things and will lose things. She states that she does repeat herself and asked lots of questions over again. She has not had any work-up done for this to this point. She does have a history of hyperlipidemia. She does also have a history of seizures. She does not have a neurologist that she sees regularly. She does have a history of hypothyroidism and is on Synthroid 88 mcg. She has not any recent change in the dosage of this. She does complain today of some bilateral hand numbness and tingling. She states that she has a little bit of atrophy in the thenar eminence of the right hand compared to the left. She states that she does get numbness and tingling of the tips of her fingers. She has not had any injury to her wrist or hands.     Past Medical History:   Diagnosis Date    Cerebral artery occlusion with cerebral infarction ([address]     TIA    Diverticular disease of colon     Hyperlipidemia     Hypothyroidism     Pneumonia     Psychiatric problem     Seizures ([facility] [address]     Tobacco abuse     Vitamin B12 deficiency       Current Outpatient Medications on File Prior to Visit   Medication Sig Dispense Refill    levothyroxine (SYNTHROID) 88 MCG tablet Take 1 tablet by mouth once daily 30 tablet 0    QUEtiapine (SEROQUEL) 50 MG tablet Take 1 tablet by mouth nightly 30 tablet 5    omeprazole (PRILOSEC) 40 MG delayed release capsule Take 1 capsule by mouth every morning (before breakfast) 30 capsule 5    ondansetron (ZOFRAN-ODT) 4 MG disintegrating tablet Take 1 tablet by mouth 3 times daily as needed for Nausea or Vomiting 21 tablet 0    aspirin 81 MG EC tablet Take 81 mg by mouth in the morning and at bedtime      atorvastatin (LIPITOR) 40 MG tablet Take 1 tablet by mouth nightly 90 tablet 3     No current facility-administered medications on file prior to visit. Allergies   Allergen Reactions    Medrol [Methylprednisolone]     Norco [Hydrocodone-Acetaminophen] Palpitations       Review of Systems   Constitutional:  Negative for activity change, appetite change and fever. HENT:  Negative for congestion and nosebleeds. Eyes:  Negative for discharge. Respiratory:  Negative for cough and wheezing. Cardiovascular:  Negative for chest pain and leg swelling. Gastrointestinal:  Negative for abdominal pain, diarrhea, nausea and vomiting. Genitourinary:  Negative for difficulty urinating, frequency and urgency. Musculoskeletal:  Negative for back pain and gait problem. Skin:  Negative for color change and rash. Neurological:  Negative for dizziness and headaches. Hematological:  Does not bruise/bleed easily. Psychiatric/Behavioral:  Negative for sleep disturbance and suicidal ideas. OBJECTIVE:    Physical Exam  Vitals reviewed. Constitutional:       General: She is not in acute distress. Appearance: Normal appearance. She is well-developed. She is not diaphoretic. HENT:      Head: Normocephalic and atraumatic. Right Ear: External ear normal.      Left Ear: External ear normal.   Cardiovascular:      Rate and Rhythm: Normal rate and regular rhythm. Pulses: Normal pulses. Heart sounds: Normal heart sounds. No murmur heard. Pulmonary:      Effort: Pulmonary effort is normal. No respiratory distress. Breath sounds: Normal breath sounds. Musculoskeletal:      Cervical back: Normal range of motion and neck supple. Skin:     General: Skin is warm and dry. Neurological:      General: No focal deficit present. Mental Status: She is alert and oriented to person, place, and time. Mental status is at baseline.    Psychiatric: Mood and Affect: Mood normal.         Behavior: Behavior normal.         Thought Content: Thought content normal.         Judgment: Judgment normal.      /80   Pulse 88   Temp 97.4 °F (36.3 °C)   Ht 5' 9\" (1.753 m)   Wt 145 lb (65.8 kg)   LMP 03/29/1970   SpO2 98%   BMI 21.41 kg/m²      ASSESSMENT/PLAN:    1. Memory loss  -     MRI BRAIN WO CONTRAST; Future  -     CBC with Auto Differential  -     Comprehensive Metabolic Panel  -     TSH  -     T4, Free  2. Bilateral hand numbness  -     Nerve conduction test; Future  -     EMG; Future  -     CBC with Auto Differential  -     Comprehensive Metabolic Panel  -     TSH  -     T4, Free  3. Acquired hypothyroidism  -     CBC with Auto Differential  -     Comprehensive Metabolic Panel  -     TSH  -     T4, Free  4. Chronic obstructive pulmonary disease, unspecified COPD type ([facility] [address]     See lab orders. Will notify results. We are going to get an MRI of the brain. Will notify results. Discussed that if this comes back normal we will put a referral in for neurology for further evaluation. Continue Symbicort and albuterol for COPD. Follow-up with us in 6 weeks for recheck to see how she is doing unless needed sooner. PDMP Monitoring:    Last PDMP [facility]):  Review User Review Instant Review Result            Urine Drug Screenings (1 yr)       Urine Drug Screen  Collected: 1/8/2022 11:30 AM (Final result)              Urine Drug Screen  Collected: 7/26/2019  1:05 PM (Final result)                  Medication Contract and Consent for Opioid Use Documents Filed       Patient Documents       Type of Document Status Date Received Received By Description    Medication Contract Signed 8/29/2014  9:33 AM Mahogany JOHNSON Dragon/transcription disclaimer:  Much of this encounter note is electronic transcription/translation of spoken language toprinted texts.   The electronic translation of spoken language may be erroneous, or at times, nonsensical words or phrases may be inadvertently transcribed.   Although I have reviewed the note for such errors, some may stillexist.

## 2023-01-27 RX ORDER — LEVOTHYROXINE SODIUM 88 UG/1
TABLET ORAL
Qty: 30 TABLET | Refills: 0 | Status: SHIPPED | OUTPATIENT
Start: 2023-01-27

## 2023-01-27 RX ORDER — ATORVASTATIN CALCIUM 40 MG/1
40 TABLET, FILM COATED ORAL NIGHTLY
Qty: 90 TABLET | Refills: 0 | Status: SHIPPED | OUTPATIENT
Start: 2023-01-27

## 2023-02-03 ENCOUNTER — HOSPITAL ENCOUNTER (OUTPATIENT)
Dept: MRI IMAGING | Age: 73
Discharge: HOME | End: 2023-02-03
Payer: MEDICARE

## 2023-02-03 ENCOUNTER — HOSPITAL ENCOUNTER (OUTPATIENT)
Dept: NEUROLOGY | Age: 73
Discharge: HOME | End: 2023-02-03
Payer: MEDICARE

## 2023-02-03 DIAGNOSIS — R41.3 MEMORY LOSS: ICD-10-CM

## 2023-02-03 DIAGNOSIS — R20.0 BILATERAL HAND NUMBNESS: ICD-10-CM

## 2023-02-03 PROCEDURE — 70551 MRI BRAIN STEM W/O DYE: CPT

## 2023-02-21 ENCOUNTER — OFFICE VISIT (OUTPATIENT)
Dept: PRIMARY CARE CLINIC | Age: 73
End: 2023-02-21
Payer: MEDICARE

## 2023-02-21 VITALS
TEMPERATURE: 97.1 F | OXYGEN SATURATION: 98 % | SYSTOLIC BLOOD PRESSURE: 120 MMHG | HEART RATE: 74 BPM | BODY MASS INDEX: 21.48 KG/M2 | WEIGHT: 145 LBS | HEIGHT: 69 IN | DIASTOLIC BLOOD PRESSURE: 80 MMHG

## 2023-02-21 DIAGNOSIS — R41.3 MEMORY LOSS: ICD-10-CM

## 2023-02-21 DIAGNOSIS — J44.9 CHRONIC OBSTRUCTIVE PULMONARY DISEASE, UNSPECIFIED COPD TYPE (HCC): ICD-10-CM

## 2023-02-21 DIAGNOSIS — M79.89 LEFT LEG SWELLING: Primary | ICD-10-CM

## 2023-02-21 PROCEDURE — 1123F ACP DISCUSS/DSCN MKR DOCD: CPT | Performed by: FAMILY MEDICINE

## 2023-02-21 PROCEDURE — 99213 OFFICE O/P EST LOW 20 MIN: CPT | Performed by: FAMILY MEDICINE

## 2023-02-21 RX ORDER — QUETIAPINE FUMARATE 25 MG/1
TABLET, FILM COATED ORAL
COMMUNITY
Start: 2023-01-24

## 2023-02-21 SDOH — ECONOMIC STABILITY: HOUSING INSECURITY
IN THE LAST 12 MONTHS, WAS THERE A TIME WHEN YOU DID NOT HAVE A STEADY PLACE TO SLEEP OR SLEPT IN A SHELTER (INCLUDING NOW)?: NO

## 2023-02-21 SDOH — ECONOMIC STABILITY: FOOD INSECURITY: WITHIN THE PAST 12 MONTHS, YOU WORRIED THAT YOUR FOOD WOULD RUN OUT BEFORE YOU GOT MONEY TO BUY MORE.: NEVER TRUE

## 2023-02-21 SDOH — ECONOMIC STABILITY: FOOD INSECURITY: WITHIN THE PAST 12 MONTHS, THE FOOD YOU BOUGHT JUST DIDN'T LAST AND YOU DIDN'T HAVE MONEY TO GET MORE.: NEVER TRUE

## 2023-02-21 SDOH — ECONOMIC STABILITY: INCOME INSECURITY: HOW HARD IS IT FOR YOU TO PAY FOR THE VERY BASICS LIKE FOOD, HOUSING, MEDICAL CARE, AND HEATING?: NOT HARD AT ALL

## 2023-02-21 ASSESSMENT — PATIENT HEALTH QUESTIONNAIRE - PHQ9
7. TROUBLE CONCENTRATING ON THINGS, SUCH AS READING THE NEWSPAPER OR WATCHING TELEVISION: 0
9. THOUGHTS THAT YOU WOULD BE BETTER OFF DEAD, OR OF HURTING YOURSELF: 0
SUM OF ALL RESPONSES TO PHQ9 QUESTIONS 1 & 2: 0
SUM OF ALL RESPONSES TO PHQ QUESTIONS 1-9: 0
6. FEELING BAD ABOUT YOURSELF - OR THAT YOU ARE A FAILURE OR HAVE LET YOURSELF OR YOUR FAMILY DOWN: 0
SUM OF ALL RESPONSES TO PHQ QUESTIONS 1-9: 0
2. FEELING DOWN, DEPRESSED OR HOPELESS: 0
5. POOR APPETITE OR OVEREATING: 0
SUM OF ALL RESPONSES TO PHQ QUESTIONS 1-9: 0
10. IF YOU CHECKED OFF ANY PROBLEMS, HOW DIFFICULT HAVE THESE PROBLEMS MADE IT FOR YOU TO DO YOUR WORK, TAKE CARE OF THINGS AT HOME, OR GET ALONG WITH OTHER PEOPLE: 0
SUM OF ALL RESPONSES TO PHQ QUESTIONS 1-9: 0
SUM OF ALL RESPONSES TO PHQ QUESTIONS 1-9: 0
4. FEELING TIRED OR HAVING LITTLE ENERGY: 0
3. TROUBLE FALLING OR STAYING ASLEEP: 0
SUM OF ALL RESPONSES TO PHQ QUESTIONS 1-9: 0
1. LITTLE INTEREST OR PLEASURE IN DOING THINGS: 0
SUM OF ALL RESPONSES TO PHQ9 QUESTIONS 1 & 2: 0
8. MOVING OR SPEAKING SO SLOWLY THAT OTHER PEOPLE COULD HAVE NOTICED. OR THE OPPOSITE, BEING SO FIGETY OR RESTLESS THAT YOU HAVE BEEN MOVING AROUND A LOT MORE THAN USUAL: 0
1. LITTLE INTEREST OR PLEASURE IN DOING THINGS: 0
2. FEELING DOWN, DEPRESSED OR HOPELESS: 0

## 2023-02-22 ASSESSMENT — ENCOUNTER SYMPTOMS
COLOR CHANGE: 0
COUGH: 0
BACK PAIN: 0
WHEEZING: 0
EYE DISCHARGE: 0
VOMITING: 0
NAUSEA: 0
ABDOMINAL PAIN: 0
DIARRHEA: 0

## 2023-02-22 NOTE — PROGRESS NOTES
SUBJECTIVE:    Patient ID: Blessing Morrell is a 71 y.o. female. HPI:   Patient is seen today for 6-week follow-up. She was struggling with memory loss and confusion. She had an MRI performed recently and states that she has not had any further episodes of being confused. She does not want to see neurology at this time and wants to hold off. We did discuss that this would be the next step as far as cognitive testing is concerned. She has had some lower left leg swelling which is worse than the right. This been going on for a while according to her. She states that it may be getting worse with increased pressure. She states that it is tender. She states that she does try to elevate her leg. She states that she has not tried doing anything for the swelling other than elevation to this point.     Past Medical History:   Diagnosis Date    Cerebral artery occlusion with cerebral infarction ([facility] [address]     TIA    Diverticular disease of colon     Hyperlipidemia     Hypothyroidism     Pneumonia     Psychiatric problem     Seizures ([facility] [address]     Tobacco abuse     Vitamin B12 deficiency       Current Outpatient Medications on File Prior to Visit   Medication Sig Dispense Refill    QUEtiapine (SEROQUEL) 25 MG tablet TAKE 1 TABLET BY MOUTH NIGHTLY      atorvastatin (LIPITOR) 40 MG tablet Take 1 tablet by mouth nightly 90 tablet 0    levothyroxine (SYNTHROID) 88 MCG tablet Take 1 tablet by mouth once daily 30 tablet 0    albuterol sulfate HFA (PROAIR HFA) 108 (90 Base) MCG/ACT inhaler Inhale 2 puffs into the lungs every 6 hours as needed for Wheezing 1 each 3    budesonide-formoterol (SYMBICORT) 160-4.5 MCG/ACT AERO Inhale 2 puffs into the lungs 2 times daily 1 each 5    omeprazole (PRILOSEC) 40 MG delayed release capsule Take 1 capsule by mouth every morning (before breakfast) 30 capsule 5    ondansetron (ZOFRAN-ODT) 4 MG disintegrating tablet Take 1 tablet by mouth 3 times daily as needed for Nausea or Vomiting 21 tablet 0 aspirin 81 MG EC tablet Take 81 mg by mouth in the morning and at bedtime       No current facility-administered medications on file prior to visit. Allergies   Allergen Reactions    Medrol [Methylprednisolone]     Norco [Hydrocodone-Acetaminophen] Palpitations       Review of Systems   Constitutional:  Negative for activity change, appetite change and fever. HENT:  Negative for congestion and nosebleeds. Eyes:  Negative for discharge. Respiratory:  Negative for cough and wheezing. Cardiovascular:  Negative for chest pain and leg swelling. Gastrointestinal:  Negative for abdominal pain, diarrhea, nausea and vomiting. Genitourinary:  Negative for difficulty urinating, frequency and urgency. Musculoskeletal:  Negative for back pain and gait problem. Skin:  Negative for color change and rash. Neurological:  Negative for dizziness and headaches. Hematological:  Does not bruise/bleed easily. Psychiatric/Behavioral:  Negative for sleep disturbance and suicidal ideas. OBJECTIVE:    Physical Exam  Vitals reviewed. Constitutional:       General: She is not in acute distress. Appearance: Normal appearance. She is well-developed. She is not diaphoretic. HENT:      Head: Normocephalic and atraumatic. Right Ear: External ear normal.      Left Ear: External ear normal.   Cardiovascular:      Rate and Rhythm: Normal rate and regular rhythm. Pulses: Normal pulses. Heart sounds: Normal heart sounds. No murmur heard. Pulmonary:      Effort: Pulmonary effort is normal. No respiratory distress. Breath sounds: Normal breath sounds. Musculoskeletal:      Cervical back: Normal range of motion and neck supple. Skin:     General: Skin is warm and dry. Neurological:      General: No focal deficit present. Mental Status: She is alert and oriented to person, place, and time. Mental status is at baseline.    Psychiatric:         Mood and Affect: Mood normal. Behavior: Behavior normal.         Thought Content: Thought content normal.         Judgment: Judgment normal.      /80   Pulse 74   Temp 97.1 °F (36.2 °C)   Ht 5' 9\" (1.753 m)   Wt 145 lb (65.8 kg)   LMP 03/29/1970   SpO2 98%   BMI 21.41 kg/m²      ASSESSMENT/PLAN:    1. Left leg swelling  -     US DUP LOWER EXTREMITY LEFT ANDREW; Future  2. Memory loss  3. Chronic obstructive pulmonary disease, unspecified COPD type ([address]       We are going to get a venous duplex to the unilateral leg swelling. We will notify her of the results of this. Discussed the neck steps as far as the memory loss is concerned via cognitive testing done through neurology. She wants to hold off on that at this time and feels that she is stable. Her COPD is stable as well. She denies any new problems today. Follow-up with us in 3 months for checkup unless needed sooner. PDMP Monitoring:    Last PDMP Serjio Cavazos as Reviewed [facility]):  Review User Review Instant Review Result            Urine Drug Screenings (1 yr)       Urine Drug Screen  Collected: 1/8/2022 11:30 AM (Final result)              Urine Drug Screen  Collected: 7/26/2019  1:05 PM (Final result)                  Medication Contract and Consent for Opioid Use Documents Filed       Patient Documents       Type of Document Status Date Received Received By Description    Medication Contract Signed 8/29/2014  9:33 AM Jorge JOHNSON Dragon/transcription disclaimer:  Much of this encounter note is electronic transcription/translation of spoken language toprinted texts. The electronic translation of spoken language may be erroneous, or at times, nonsensical words or phrases may be inadvertently transcribed.   Although I have reviewed the note for such errors, some may stillexist.

## 2023-02-24 ENCOUNTER — HOSPITAL ENCOUNTER (OUTPATIENT)
Dept: VASCULAR LAB | Age: 73
Discharge: HOME | End: 2023-02-24
Payer: MEDICARE

## 2023-02-24 DIAGNOSIS — M79.89 LEFT LEG SWELLING: ICD-10-CM

## 2023-02-24 PROCEDURE — 93971 EXTREMITY STUDY: CPT

## 2023-03-02 RX ORDER — LEVOTHYROXINE SODIUM 88 UG/1
TABLET ORAL
Qty: 30 TABLET | Refills: 0 | Status: SHIPPED | OUTPATIENT
Start: 2023-03-02

## 2023-03-26 PROCEDURE — 93294 REM INTERROG EVL PM/LDLS PM: CPT | Performed by: NURSE PRACTITIONER

## 2023-03-26 PROCEDURE — 93296 REM INTERROG EVL PM/IDS: CPT | Performed by: NURSE PRACTITIONER

## 2023-03-27 DIAGNOSIS — Z95.0 PACEMAKER: Primary | ICD-10-CM

## 2023-03-27 DIAGNOSIS — R00.1 BRADYCARDIA: ICD-10-CM

## 2023-04-04 ENCOUNTER — TELEPHONE (OUTPATIENT)
Dept: CARDIOLOGY CLINIC | Age: 73
End: 2023-04-04

## 2023-04-19 RX ORDER — OMEPRAZOLE 40 MG/1
CAPSULE, DELAYED RELEASE ORAL
Qty: 30 CAPSULE | Refills: 5 | Status: SHIPPED | OUTPATIENT
Start: 2023-04-19

## 2023-05-11 ENCOUNTER — OFFICE VISIT (OUTPATIENT)
Dept: PRIMARY CARE CLINIC | Age: 73
End: 2023-05-11
Payer: MEDICARE

## 2023-05-11 ENCOUNTER — ANCILLARY PROCEDURE (OUTPATIENT)
Dept: PRIMARY CARE CLINIC | Age: 73
End: 2023-05-11
Payer: MEDICARE

## 2023-05-11 ENCOUNTER — TELEPHONE (OUTPATIENT)
Dept: PRIMARY CARE CLINIC | Age: 73
End: 2023-05-11

## 2023-05-11 ENCOUNTER — HOSPITAL ENCOUNTER (OUTPATIENT)
Dept: VASCULAR LAB | Age: 73
Discharge: HOME | End: 2023-05-11
Payer: MEDICARE

## 2023-05-11 VITALS
BODY MASS INDEX: 21.37 KG/M2 | HEART RATE: 74 BPM | RESPIRATION RATE: 16 BRPM | DIASTOLIC BLOOD PRESSURE: 70 MMHG | WEIGHT: 141 LBS | SYSTOLIC BLOOD PRESSURE: 108 MMHG | HEIGHT: 68 IN | TEMPERATURE: 96.1 F | OXYGEN SATURATION: 100 %

## 2023-05-11 DIAGNOSIS — M79.604 RIGHT LEG PAIN: Primary | ICD-10-CM

## 2023-05-11 DIAGNOSIS — M79.604 RIGHT LEG PAIN: ICD-10-CM

## 2023-05-11 DIAGNOSIS — M25.461 SWELLING OF RIGHT KNEE JOINT: ICD-10-CM

## 2023-05-11 PROCEDURE — 99213 OFFICE O/P EST LOW 20 MIN: CPT | Performed by: FAMILY MEDICINE

## 2023-05-11 PROCEDURE — 93971 EXTREMITY STUDY: CPT

## 2023-05-11 PROCEDURE — 73562 X-RAY EXAM OF KNEE 3: CPT | Performed by: RADIOLOGY

## 2023-05-11 PROCEDURE — 1123F ACP DISCUSS/DSCN MKR DOCD: CPT | Performed by: FAMILY MEDICINE

## 2023-05-11 RX ORDER — FERROUS SULFATE 325(65) MG
325 TABLET ORAL
COMMUNITY

## 2023-05-11 ASSESSMENT — ENCOUNTER SYMPTOMS
VOMITING: 0
BACK PAIN: 0
ABDOMINAL PAIN: 0
EYE DISCHARGE: 0
DIARRHEA: 0
COLOR CHANGE: 0
NAUSEA: 0
COUGH: 0
WHEEZING: 0

## 2023-05-11 NOTE — PROGRESS NOTES
Skin:     General: Skin is warm and dry. Neurological:      General: No focal deficit present. Mental Status: She is alert and oriented to person, place, and time. Mental status is at baseline. Psychiatric:         Mood and Affect: Mood normal.         Behavior: Behavior normal.         Thought Content: Thought content normal.         Judgment: Judgment normal.      /70   Pulse 74   Temp (!) 96.1 °F (35.6 °C) (Temporal)   Resp 16   Ht 5' 8\" (1.727 m)   Wt 141 lb (64 kg)   LMP 03/29/1970   SpO2 100%   BMI 21.44 kg/m²      ASSESSMENT/PLAN:    1. Right leg pain  -     XR KNEE RIGHT (3 VIEWS)  -     VL DUP LOWER EXTREMITY VENOUS RIGHT; Future  2. Swelling of right knee joint       We are going to get an x-ray of the right knee due to the swelling. We are going to get a venous duplex due to the right lower extremity pain. I encouraged her to do a compression brace as well as elevate her knee and ice it. If symptoms or not getting better and the x-ray comes back normal discussed we could put a referral in for orthopedics. PDMP Monitoring:    Last PDMP Halie Javier as Reviewed [facility]):  Review User Review Instant Review Result            Urine Drug Screenings (1 yr)       Urine Drug Screen  Collected: 1/8/2022 11:30 AM (Final result)              Urine Drug Screen  Collected: 7/26/2019  1:05 PM (Final result)                  Medication Contract and Consent for Opioid Use Documents Filed       Patient Documents       Type of Document Status Date Received Received By Description    Medication Contract Signed 8/29/2014  9:33 AM Renetta JOHNSON Dragon/transcription disclaimer:  Much of this encounter note is electronic transcription/translation of spoken language toprinted texts. The electronic translation of spoken language may be erroneous, or at times, nonsensical words or phrases may be inadvertently transcribed.   Although I have reviewed the note for such errors, some may

## 2023-05-11 NOTE — TELEPHONE ENCOUNTER
Per Yahaira Tolbert,   Vascular called and said scan that patient had today ([place] VL UP EXT ARTER BILAT) was normal

## 2023-06-28 ENCOUNTER — OFFICE VISIT (OUTPATIENT)
Dept: PRIMARY CARE CLINIC | Age: 73
End: 2023-06-28
Payer: MEDICARE

## 2023-06-28 VITALS
BODY MASS INDEX: 21.13 KG/M2 | WEIGHT: 139.4 LBS | TEMPERATURE: 97.8 F | HEIGHT: 68 IN | SYSTOLIC BLOOD PRESSURE: 110 MMHG | HEART RATE: 72 BPM | DIASTOLIC BLOOD PRESSURE: 72 MMHG | RESPIRATION RATE: 18 BRPM | OXYGEN SATURATION: 98 %

## 2023-06-28 DIAGNOSIS — R00.1 BRADYCARDIA: ICD-10-CM

## 2023-06-28 DIAGNOSIS — M79.89 LEFT LEG SWELLING: Primary | ICD-10-CM

## 2023-06-28 DIAGNOSIS — F32.1 CURRENT MODERATE EPISODE OF MAJOR DEPRESSIVE DISORDER WITHOUT PRIOR EPISODE (HCC): ICD-10-CM

## 2023-06-28 DIAGNOSIS — Z95.0 PACEMAKER: Primary | ICD-10-CM

## 2023-06-28 PROCEDURE — 93296 REM INTERROG EVL PM/IDS: CPT | Performed by: NURSE PRACTITIONER

## 2023-06-28 PROCEDURE — 93294 REM INTERROG EVL PM/LDLS PM: CPT | Performed by: NURSE PRACTITIONER

## 2023-06-28 PROCEDURE — 1123F ACP DISCUSS/DSCN MKR DOCD: CPT | Performed by: FAMILY MEDICINE

## 2023-06-28 PROCEDURE — 99214 OFFICE O/P EST MOD 30 MIN: CPT | Performed by: FAMILY MEDICINE

## 2023-06-28 ASSESSMENT — ENCOUNTER SYMPTOMS
NAUSEA: 0
EYE DISCHARGE: 0
COLOR CHANGE: 0
DIARRHEA: 0
ABDOMINAL PAIN: 0
WHEEZING: 0
VOMITING: 0
COUGH: 0
BACK PAIN: 0

## 2023-07-05 ENCOUNTER — TELEPHONE (OUTPATIENT)
Dept: CARDIOLOGY CLINIC | Age: 73
End: 2023-07-05

## 2023-07-11 ENCOUNTER — HOSPITAL ENCOUNTER (EMERGENCY)
Age: 73
Discharge: HOME | End: 2023-07-11
Attending: EMERGENCY MEDICINE
Payer: MEDICARE

## 2023-07-11 ENCOUNTER — APPOINTMENT (OUTPATIENT)
Dept: GENERAL RADIOLOGY | Age: 73
End: 2023-07-11
Payer: MEDICARE

## 2023-07-11 VITALS
TEMPERATURE: 97.8 F | HEART RATE: 66 BPM | RESPIRATION RATE: 20 BRPM | SYSTOLIC BLOOD PRESSURE: 126 MMHG | HEIGHT: 69 IN | OXYGEN SATURATION: 97 % | BODY MASS INDEX: 20.59 KG/M2 | DIASTOLIC BLOOD PRESSURE: 69 MMHG | WEIGHT: 139 LBS

## 2023-07-11 DIAGNOSIS — R07.9 CHEST PAIN, UNSPECIFIED TYPE: Primary | ICD-10-CM

## 2023-07-11 LAB
ALBUMIN SERPL-MCNC: 3.9 G/DL (ref 3.5–5.2)
ALP SERPL-CCNC: 90 U/L (ref 35–104)
ALT SERPL-CCNC: 19 U/L (ref 5–33)
ANION GAP SERPL CALCULATED.3IONS-SCNC: 10 MMOL/L (ref 7–19)
AST SERPL-CCNC: 23 U/L (ref 5–32)
BASOPHILS # BLD: 0 K/UL (ref 0–0.2)
BASOPHILS NFR BLD: 0.5 % (ref 0–1)
BILIRUB SERPL-MCNC: <0.2 MG/DL (ref 0.2–1.2)
BNP BLD-MCNC: 76 PG/ML (ref 0–124)
BUN SERPL-MCNC: 18 MG/DL (ref 8–23)
CALCIUM SERPL-MCNC: 9.3 MG/DL (ref 8.8–10.2)
CHLORIDE SERPL-SCNC: 104 MMOL/L (ref 98–111)
CO2 SERPL-SCNC: 24 MMOL/L (ref 22–29)
CREAT SERPL-MCNC: 0.7 MG/DL (ref 0.5–0.9)
EOSINOPHIL # BLD: 0.1 K/UL (ref 0–0.6)
EOSINOPHIL NFR BLD: 1.8 % (ref 0–5)
ERYTHROCYTE [DISTWIDTH] IN BLOOD BY AUTOMATED COUNT: 14.4 % (ref 11.5–14.5)
GLUCOSE SERPL-MCNC: 121 MG/DL (ref 74–109)
HCT VFR BLD AUTO: 41.6 % (ref 37–47)
HGB BLD-MCNC: 12.7 G/DL (ref 12–16)
IMM GRANULOCYTES # BLD: 0 K/UL
LYMPHOCYTES # BLD: 2.6 K/UL (ref 1.1–4.5)
LYMPHOCYTES NFR BLD: 32.7 % (ref 20–40)
MCH RBC QN AUTO: 28 PG (ref 27–31)
MCHC RBC AUTO-ENTMCNC: 30.5 G/DL (ref 33–37)
MCV RBC AUTO: 91.6 FL (ref 81–99)
MONOCYTES # BLD: 0.5 K/UL (ref 0–0.9)
MONOCYTES NFR BLD: 6.9 % (ref 0–10)
NEUTROPHILS # BLD: 4.5 K/UL (ref 1.5–7.5)
NEUTS SEG NFR BLD: 57.8 % (ref 50–65)
PLATELET # BLD AUTO: 119 K/UL (ref 130–400)
PMV BLD AUTO: 11.7 FL (ref 9.4–12.3)
POTASSIUM SERPL-SCNC: 4.1 MMOL/L (ref 3.5–5)
PROT SERPL-MCNC: 6.3 G/DL (ref 6.6–8.7)
RBC # BLD AUTO: 4.54 M/UL (ref 4.2–5.4)
SODIUM SERPL-SCNC: 138 MMOL/L (ref 136–145)
TROPONIN T SERPL-MCNC: <0.01 NG/ML (ref 0–0.03)
WBC # BLD AUTO: 7.8 K/UL (ref 4.8–10.8)

## 2023-07-11 PROCEDURE — 71045 X-RAY EXAM CHEST 1 VIEW: CPT

## 2023-07-11 PROCEDURE — 36415 COLL VENOUS BLD VENIPUNCTURE: CPT

## 2023-07-11 PROCEDURE — 84484 ASSAY OF TROPONIN QUANT: CPT

## 2023-07-11 PROCEDURE — 83880 ASSAY OF NATRIURETIC PEPTIDE: CPT

## 2023-07-11 PROCEDURE — 99284 EMERGENCY DEPT VISIT MOD MDM: CPT

## 2023-07-11 PROCEDURE — 85025 COMPLETE CBC W/AUTO DIFF WBC: CPT

## 2023-07-11 PROCEDURE — 80053 COMPREHEN METABOLIC PANEL: CPT

## 2023-07-11 RX ORDER — LEVOTHYROXINE SODIUM 88 UG/1
TABLET ORAL
Qty: 90 TABLET | Refills: 3 | Status: SHIPPED | OUTPATIENT
Start: 2023-07-11

## 2023-07-11 ASSESSMENT — ENCOUNTER SYMPTOMS
VOMITING: 0
DIARRHEA: 0
SHORTNESS OF BREATH: 0
SINUS PRESSURE: 0
NAUSEA: 0
ABDOMINAL PAIN: 0
RHINORRHEA: 0
SORE THROAT: 0

## 2023-07-11 NOTE — DISCHARGE INSTRUCTIONS
You have refused admission at this time and your heart score would dictate that you would require further observation. Have a low threshold for returning at any time for any new or worsening symptoms. Follow-up with your primary care doctor to arrange for a stress test.  The initial evaluation was indeterminate.

## 2023-07-11 NOTE — ED PROVIDER NOTES
[address] [facility] EMERGENCY DEPT  eMERGENCY dEPARTMENT eNCOUnter      Pt Name: Paul Lunsford  MRN: [id number]  [id number] [facility] 1950  Date of evaluation: 7/11/2023  Provider: Noelle Bolanos MD    CHIEF COMPLAINT       Chief Complaint   Patient presents with    Chest Pain     Pt c/o chest pain, onset approx 1345. Pt had 324 ASA and 1 nitro en route. Pain went from 9/10 to 5/10 after meds. HISTORY OF PRESENT ILLNESS   (Location/Symptom, Timing/Onset,Context/Setting, Quality, Duration, Modifying Factors, Severity)  Note limiting factors. Paul Lunsford is a 71 y.o. female who presents to the emergency department chest pain. 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 She denies shortness of breath; diaphoresis; nausea or vomiting; the pain did not radiate. EMS was called she got aspirin and 1 nitro and her chest pain went from 9 out of 10 to 5 out of 10. It subsequently resolved completely. NursingNotes were reviewed. REVIEW OF SYSTEMS    (2-9 systems for level 4, 10 or more for level 5)     Review of Systems   Constitutional:  Negative for chills, diaphoresis, fatigue and fever. HENT:  Negative for rhinorrhea, sinus pressure and sore throat. Eyes:  Negative for visual disturbance. Respiratory:  Negative for shortness of breath. Cardiovascular:  Positive for chest pain. Gastrointestinal:  Negative for abdominal pain, diarrhea, nausea and vomiting. Genitourinary:  Negative for difficulty urinating and dysuria. Musculoskeletal:  Negative for arthralgias and myalgias. Skin:  Negative for rash. Neurological:  Negative for weakness.    Psychiatric/Behavioral:  Negative

## 2023-07-13 ENCOUNTER — OFFICE VISIT (OUTPATIENT)
Dept: PRIMARY CARE CLINIC | Age: 73
End: 2023-07-13
Payer: MEDICARE

## 2023-07-13 VITALS
SYSTOLIC BLOOD PRESSURE: 120 MMHG | TEMPERATURE: 96.5 F | BODY MASS INDEX: 20.88 KG/M2 | OXYGEN SATURATION: 98 % | DIASTOLIC BLOOD PRESSURE: 80 MMHG | HEIGHT: 69 IN | RESPIRATION RATE: 18 BRPM | WEIGHT: 141 LBS | HEART RATE: 72 BPM

## 2023-07-13 DIAGNOSIS — R07.9 CHEST PAIN, UNSPECIFIED TYPE: ICD-10-CM

## 2023-07-13 DIAGNOSIS — J44.9 CHRONIC OBSTRUCTIVE PULMONARY DISEASE, UNSPECIFIED COPD TYPE (HCC): Primary | ICD-10-CM

## 2023-07-13 DIAGNOSIS — K21.9 GASTROESOPHAGEAL REFLUX DISEASE, UNSPECIFIED WHETHER ESOPHAGITIS PRESENT: ICD-10-CM

## 2023-07-13 PROCEDURE — 1123F ACP DISCUSS/DSCN MKR DOCD: CPT | Performed by: FAMILY MEDICINE

## 2023-07-13 PROCEDURE — 99214 OFFICE O/P EST MOD 30 MIN: CPT | Performed by: FAMILY MEDICINE

## 2023-07-13 RX ORDER — FAMOTIDINE 20 MG/1
20 TABLET, FILM COATED ORAL 2 TIMES DAILY
Qty: 60 TABLET | Refills: 3 | Status: SHIPPED | OUTPATIENT
Start: 2023-07-13

## 2023-07-13 ASSESSMENT — PATIENT HEALTH QUESTIONNAIRE - PHQ9
9. THOUGHTS THAT YOU WOULD BE BETTER OFF DEAD, OR OF HURTING YOURSELF: 0
6. FEELING BAD ABOUT YOURSELF - OR THAT YOU ARE A FAILURE OR HAVE LET YOURSELF OR YOUR FAMILY DOWN: 0
5. POOR APPETITE OR OVEREATING: 0
4. FEELING TIRED OR HAVING LITTLE ENERGY: 0
7. TROUBLE CONCENTRATING ON THINGS, SUCH AS READING THE NEWSPAPER OR WATCHING TELEVISION: 0
3. TROUBLE FALLING OR STAYING ASLEEP: 0
10. IF YOU CHECKED OFF ANY PROBLEMS, HOW DIFFICULT HAVE THESE PROBLEMS MADE IT FOR YOU TO DO YOUR WORK, TAKE CARE OF THINGS AT HOME, OR GET ALONG WITH OTHER PEOPLE: 0
8. MOVING OR SPEAKING SO SLOWLY THAT OTHER PEOPLE COULD HAVE NOTICED. OR THE OPPOSITE, BEING SO FIGETY OR RESTLESS THAT YOU HAVE BEEN MOVING AROUND A LOT MORE THAN USUAL: 0
SUM OF ALL RESPONSES TO PHQ QUESTIONS 1-9: 0
1. LITTLE INTEREST OR PLEASURE IN DOING THINGS: 0
SUM OF ALL RESPONSES TO PHQ QUESTIONS 1-9: 0
2. FEELING DOWN, DEPRESSED OR HOPELESS: 0
SUM OF ALL RESPONSES TO PHQ QUESTIONS 1-9: 0
SUM OF ALL RESPONSES TO PHQ9 QUESTIONS 1 & 2: 0
SUM OF ALL RESPONSES TO PHQ QUESTIONS 1-9: 0

## 2023-07-13 ASSESSMENT — ENCOUNTER SYMPTOMS
CHEST TIGHTNESS: 0
TROUBLE SWALLOWING: 0
COUGH: 0
ABDOMINAL PAIN: 0
SHORTNESS OF BREATH: 0

## 2023-07-13 NOTE — PROGRESS NOTES
Take 1 tablet by mouth every morning (before breakfast) 30 tablet 5    calcium carbonate (TUMS) 500 MG chewable tablet Take 1 tablet by mouth daily      ferrous sulfate (IRON 325) 325 (65 Fe) MG tablet Take 1 tablet by mouth daily (with breakfast)      atorvastatin (LIPITOR) 40 MG tablet Take 1 tablet by mouth nightly 90 tablet 0    albuterol sulfate HFA (PROAIR HFA) 108 (90 Base) MCG/ACT inhaler Inhale 2 puffs into the lungs every 6 hours as needed for Wheezing 1 each 3    budesonide-formoterol (SYMBICORT) 160-4.5 MCG/ACT AERO Inhale 2 puffs into the lungs 2 times daily 1 each 5    ondansetron (ZOFRAN-ODT) 4 MG disintegrating tablet Take 1 tablet by mouth 3 times daily as needed for Nausea or Vomiting (Patient not taking: Reported on 6/28/2023) 21 tablet 0    aspirin 81 MG EC tablet Take 1 tablet by mouth in the morning and at bedtime (Patient not taking: Reported on 6/28/2023)       No current facility-administered medications on file prior to visit. Allergies   Allergen Reactions    Medrol [Methylprednisolone]     Norco [Hydrocodone-Acetaminophen] Palpitations       Review of Systems   Constitutional:  Negative for chills, diaphoresis and fever. HENT:  Negative for trouble swallowing. Respiratory:  Negative for cough, chest tightness and shortness of breath. Cardiovascular:  Negative for chest pain and palpitations. Gastrointestinal:  Negative for abdominal pain. OBJECTIVE:    Physical Exam  Constitutional:       Appearance: Normal appearance. HENT:      Head: Normocephalic and atraumatic. Cardiovascular:      Rate and Rhythm: Normal rate and regular rhythm. Pulses: Normal pulses. Heart sounds: Normal heart sounds. Pulmonary:      Effort: Pulmonary effort is normal.      Breath sounds: Decreased breath sounds (generalized and coarse which is baseline for her) present. No wheezing, rhonchi or rales. Skin:     General: Skin is warm and dry.    Neurological:      General: No focal

## 2023-08-07 RX ORDER — ATORVASTATIN CALCIUM 40 MG/1
40 TABLET, FILM COATED ORAL NIGHTLY
Qty: 90 TABLET | Refills: 0 | Status: SHIPPED | OUTPATIENT
Start: 2023-08-07

## 2023-08-08 ENCOUNTER — TELEPHONE (OUTPATIENT)
Dept: PRIMARY CARE CLINIC | Age: 73
End: 2023-08-08

## 2023-08-08 NOTE — TELEPHONE ENCOUNTER
I called [facility] and talked to Anna and she said she would call patient to get her scheduled and get signed up to receive text reminders for appointments.

## 2023-08-08 NOTE — TELEPHONE ENCOUNTER
----- Message from Allison Segura MD sent at 7/13/2023 11:28 AM CDT -----  Can you get her a follow up appointment scheduled for cardiology and can you make sure they give her a reminder text or call for her appointment?

## 2023-08-10 ENCOUNTER — OFFICE VISIT (OUTPATIENT)
Dept: PRIMARY CARE CLINIC | Age: 73
End: 2023-08-10
Payer: MEDICARE

## 2023-08-10 ENCOUNTER — OFFICE VISIT (OUTPATIENT)
Dept: CARDIOLOGY CLINIC | Age: 73
End: 2023-08-10
Payer: MEDICARE

## 2023-08-10 VITALS
OXYGEN SATURATION: 96 % | DIASTOLIC BLOOD PRESSURE: 60 MMHG | HEIGHT: 69 IN | HEART RATE: 65 BPM | WEIGHT: 137 LBS | SYSTOLIC BLOOD PRESSURE: 104 MMHG | BODY MASS INDEX: 20.29 KG/M2

## 2023-08-10 VITALS
BODY MASS INDEX: 20.35 KG/M2 | WEIGHT: 137.4 LBS | DIASTOLIC BLOOD PRESSURE: 70 MMHG | HEIGHT: 69 IN | TEMPERATURE: 98.3 F | RESPIRATION RATE: 16 BRPM | OXYGEN SATURATION: 97 % | SYSTOLIC BLOOD PRESSURE: 102 MMHG | HEART RATE: 68 BPM

## 2023-08-10 DIAGNOSIS — G47.00 INSOMNIA, UNSPECIFIED TYPE: Primary | ICD-10-CM

## 2023-08-10 DIAGNOSIS — Z95.0 PACEMAKER: ICD-10-CM

## 2023-08-10 DIAGNOSIS — E78.2 MIXED HYPERLIPIDEMIA: ICD-10-CM

## 2023-08-10 DIAGNOSIS — R00.1 BRADYCARDIA: Primary | ICD-10-CM

## 2023-08-10 PROCEDURE — 99213 OFFICE O/P EST LOW 20 MIN: CPT | Performed by: NURSE PRACTITIONER

## 2023-08-10 PROCEDURE — 99213 OFFICE O/P EST LOW 20 MIN: CPT | Performed by: FAMILY MEDICINE

## 2023-08-10 PROCEDURE — 1123F ACP DISCUSS/DSCN MKR DOCD: CPT | Performed by: FAMILY MEDICINE

## 2023-08-10 PROCEDURE — 93280 PM DEVICE PROGR EVAL DUAL: CPT | Performed by: NURSE PRACTITIONER

## 2023-08-10 PROCEDURE — 1123F ACP DISCUSS/DSCN MKR DOCD: CPT | Performed by: NURSE PRACTITIONER

## 2023-08-10 RX ORDER — TRAZODONE HYDROCHLORIDE 50 MG/1
50 TABLET ORAL NIGHTLY
Qty: 30 TABLET | Refills: 5 | Status: SHIPPED | OUTPATIENT
Start: 2023-08-10

## 2023-08-10 ASSESSMENT — ENCOUNTER SYMPTOMS
COUGH: 0
NAUSEA: 0
ABDOMINAL PAIN: 0
WHEEZING: 0
EYE DISCHARGE: 0
COLOR CHANGE: 0
BACK PAIN: 0
DIARRHEA: 0
VOMITING: 0

## 2023-08-10 NOTE — PROGRESS NOTES
SUBJECTIVE:    Patient ID: Sathish Alonso is a 71 y.o. female. HPI:   Patient is seen today for problems with sleep. She is currently doing Vraylar for mood stability and states that she is tolerating this well but states that she has been on an internal alarm clock the last few days and states that she keeps waking up in the middle the night and has difficulty falling back asleep. She is not on anything for sleep currently. She has not tried anything over-the-counter. She does not drink much caffeine. She states that she has tried doing some things at home to help with sleep but she states that she is just having difficulty at this point.     Past Medical History:   Diagnosis Date    Cerebral artery occlusion with cerebral infarction ([address])     TIA    Diverticular disease of colon     Hyperlipidemia     Hypothyroidism     Pneumonia     Psychiatric problem     Seizures ([address])     Tobacco abuse     Vitamin B12 deficiency       Current Outpatient Medications on File Prior to Visit   Medication Sig Dispense Refill    atorvastatin (LIPITOR) 40 MG tablet Take 1 tablet by mouth nightly 90 tablet 0    famotidine (PEPCID) 20 MG tablet Take 1 tablet by mouth 2 times daily 60 tablet 3    levothyroxine (SYNTHROID) 88 MCG tablet Take 1 tablet by mouth once daily 90 tablet 3    cariprazine hcl (VRAYLAR) 1.5 MG capsule Take 1 capsule by mouth daily 30 capsule 5    pantoprazole (PROTONIX) 40 MG tablet Take 1 tablet by mouth every morning (before breakfast) 30 tablet 5    ferrous sulfate (IRON 325) 325 (65 Fe) MG tablet Take 1 tablet by mouth daily (with breakfast)      albuterol sulfate HFA (PROAIR HFA) 108 (90 Base) MCG/ACT inhaler Inhale 2 puffs into the lungs every 6 hours as needed for Wheezing 1 each 3    budesonide-formoterol (SYMBICORT) 160-4.5 MCG/ACT AERO Inhale 2 puffs into the lungs 2 times daily 1 each 5    aspirin 81 MG EC tablet Take 1 tablet by mouth in the morning and at bedtime       No current

## 2023-08-10 NOTE — PROGRESS NOTES
appointments. Educational included in patient instructions. Heart health.       Griffin Lechuga, JORDYN

## 2023-08-10 NOTE — PATIENT INSTRUCTIONS
New instructions for today:      Patient Instructions:  Continue current medications as prescribed. Always keep a current medication list. Bring your medications to every office visit. Continue to follow up with primary care provider for non cardiac medical problems. Call the office with any problems, questions or concerns at [phone]. If you have been asked to keep a blood pressure log, do so for 2 weeks. Call the office to report readings to the triage nurse at [phone]. Follow up with cardiologist as scheduled. The following educational material has been included in this after visit summary for your review: Life simple 7. Heart health. Life simple 7  1) Manage blood pressure - high blood pressure is a major risk factor for heart disease and stroke. Keeping blood pressure in health range reduces strain on your heart, arteries and kidneys. Blood pressure goal is less than 130/80. 2) Control cholesterol - contributes to plaque, which can clog arteries and lead to heart disease and stroke. When you control your cholesterol you are giving your arteries their best chance to remain clear. It is recommended that you get cholesterol lab work done once a year. 3) Reduce blood sugar - most of the food we eat is turning into glucose or blood sugar that our body uses for energy. Over time, high levels of blood sugar can damage your heart, kidneys, eyes and nerves. 4) Get active - living an active life is one of the most rewarding gifts you can give yourself and those you love. Simply put, daily physical activity increases your length and quality of life. Strive to exercise 15 minutes most days of the week. 5)  Eat better - A healthy diet is one of your best weapons for fighting cardiovascular disease. When you eat a heart healthy diet, you improve your chances for feeling good and staying healthy for life.   6)  Lose weight - when you shed extra fat an unnecessary pounds, you reduce the burden on

## 2023-08-13 ENCOUNTER — HOSPITAL ENCOUNTER (EMERGENCY)
Age: 73
Discharge: HOME | End: 2023-08-13
Attending: EMERGENCY MEDICINE
Payer: MEDICARE

## 2023-08-13 VITALS
OXYGEN SATURATION: 98 % | SYSTOLIC BLOOD PRESSURE: 117 MMHG | TEMPERATURE: 98.2 F | RESPIRATION RATE: 18 BRPM | WEIGHT: 137 LBS | BODY MASS INDEX: 20.23 KG/M2 | DIASTOLIC BLOOD PRESSURE: 75 MMHG | HEART RATE: 69 BPM

## 2023-08-13 DIAGNOSIS — N30.00 ACUTE CYSTITIS WITHOUT HEMATURIA: ICD-10-CM

## 2023-08-13 DIAGNOSIS — E03.9 HYPOTHYROIDISM, UNSPECIFIED TYPE: ICD-10-CM

## 2023-08-13 DIAGNOSIS — G47.00 INSOMNIA, UNSPECIFIED TYPE: Primary | ICD-10-CM

## 2023-08-13 LAB
ALBUMIN SERPL-MCNC: 4.4 G/DL (ref 3.5–5.2)
ALP SERPL-CCNC: 85 U/L (ref 35–104)
ALT SERPL-CCNC: <5 U/L (ref 5–33)
ANION GAP SERPL CALCULATED.3IONS-SCNC: 8 MMOL/L (ref 7–19)
AST SERPL-CCNC: 16 U/L (ref 5–32)
BACTERIA URNS QL MICRO: ABNORMAL /HPF
BASOPHILS # BLD: 0.1 K/UL (ref 0–0.2)
BASOPHILS NFR BLD: 0.8 % (ref 0–1)
BILIRUB SERPL-MCNC: 0.7 MG/DL (ref 0.2–1.2)
BILIRUB UR QL STRIP: NEGATIVE
BUN SERPL-MCNC: 15 MG/DL (ref 8–23)
CALCIUM SERPL-MCNC: 10.2 MG/DL (ref 8.8–10.2)
CHLORIDE SERPL-SCNC: 104 MMOL/L (ref 98–111)
CLARITY UR: ABNORMAL
CO2 SERPL-SCNC: 30 MMOL/L (ref 22–29)
COLOR UR: YELLOW
CREAT SERPL-MCNC: 0.8 MG/DL (ref 0.5–0.9)
CRYSTALS URNS MICRO: ABNORMAL /HPF
EOSINOPHIL # BLD: 0.1 K/UL (ref 0–0.6)
EOSINOPHIL NFR BLD: 1 % (ref 0–5)
EPI CELLS #/AREA URNS AUTO: 4 /HPF (ref 0–5)
ERYTHROCYTE [DISTWIDTH] IN BLOOD BY AUTOMATED COUNT: 13.8 % (ref 11.5–14.5)
GLUCOSE SERPL-MCNC: 123 MG/DL (ref 74–109)
GLUCOSE UR STRIP.AUTO-MCNC: NEGATIVE MG/DL
HCT VFR BLD AUTO: 46.6 % (ref 37–47)
HGB BLD-MCNC: 14.6 G/DL (ref 12–16)
HGB UR STRIP.AUTO-MCNC: ABNORMAL MG/L
HYALINE CASTS #/AREA URNS AUTO: 6 /HPF (ref 0–8)
IMM GRANULOCYTES # BLD: 0 K/UL
KETONES UR STRIP.AUTO-MCNC: NEGATIVE MG/DL
LEUKOCYTE ESTERASE UR QL STRIP.AUTO: ABNORMAL
LYMPHOCYTES # BLD: 1.9 K/UL (ref 1.1–4.5)
LYMPHOCYTES NFR BLD: 25.1 % (ref 20–40)
MCH RBC QN AUTO: 27.6 PG (ref 27–31)
MCHC RBC AUTO-ENTMCNC: 31.3 G/DL (ref 33–37)
MCV RBC AUTO: 88.1 FL (ref 81–99)
MONOCYTES # BLD: 0.4 K/UL (ref 0–0.9)
MONOCYTES NFR BLD: 5.2 % (ref 0–10)
NEUTROPHILS # BLD: 5.2 K/UL (ref 1.5–7.5)
NEUTS SEG NFR BLD: 67.6 % (ref 50–65)
NITRITE UR QL STRIP.AUTO: POSITIVE
PH UR STRIP.AUTO: 6 [PH] (ref 5–8)
PLATELET # BLD AUTO: 135 K/UL (ref 130–400)
PMV BLD AUTO: 10.8 FL (ref 9.4–12.3)
POTASSIUM SERPL-SCNC: 4 MMOL/L (ref 3.5–5)
PROT SERPL-MCNC: 7.2 G/DL (ref 6.6–8.7)
PROT UR STRIP.AUTO-MCNC: NEGATIVE MG/DL
RBC # BLD AUTO: 5.29 M/UL (ref 4.2–5.4)
RBC #/AREA URNS AUTO: 3 /HPF (ref 0–4)
SODIUM SERPL-SCNC: 142 MMOL/L (ref 136–145)
SP GR UR STRIP.AUTO: 1.01 (ref 1–1.03)
T4 FREE SERPL-MCNC: 1.24 NG/DL (ref 0.93–1.7)
TSH SERPL DL<=0.005 MIU/L-ACNC: 18.35 UIU/ML (ref 0.35–5.5)
UROBILINOGEN UR STRIP.AUTO-MCNC: 1 E.U./DL
WBC # BLD AUTO: 7.7 K/UL (ref 4.8–10.8)
WBC #/AREA URNS AUTO: 35 /HPF (ref 0–5)

## 2023-08-13 PROCEDURE — 81001 URINALYSIS AUTO W/SCOPE: CPT

## 2023-08-13 PROCEDURE — 80053 COMPREHEN METABOLIC PANEL: CPT

## 2023-08-13 PROCEDURE — 84439 ASSAY OF FREE THYROXINE: CPT

## 2023-08-13 PROCEDURE — 6360000002 HC RX W HCPCS: Performed by: EMERGENCY MEDICINE

## 2023-08-13 PROCEDURE — 36415 COLL VENOUS BLD VENIPUNCTURE: CPT

## 2023-08-13 PROCEDURE — 2500000003 HC RX 250 WO HCPCS: Performed by: EMERGENCY MEDICINE

## 2023-08-13 PROCEDURE — 96372 THER/PROPH/DIAG INJ SC/IM: CPT

## 2023-08-13 PROCEDURE — 85025 COMPLETE CBC W/AUTO DIFF WBC: CPT

## 2023-08-13 PROCEDURE — 99284 EMERGENCY DEPT VISIT MOD MDM: CPT

## 2023-08-13 PROCEDURE — 84443 ASSAY THYROID STIM HORMONE: CPT

## 2023-08-13 RX ORDER — LANOLIN ALCOHOL/MO/W.PET/CERES
3 CREAM (GRAM) TOPICAL NIGHTLY PRN
Qty: 30 TABLET | Refills: 0 | Status: SHIPPED | OUTPATIENT
Start: 2023-08-13

## 2023-08-13 RX ORDER — CEFTRIAXONE 1 G/1
1000 INJECTION, POWDER, FOR SOLUTION INTRAMUSCULAR; INTRAVENOUS ONCE
Status: DISCONTINUED | OUTPATIENT
Start: 2023-08-13 | End: 2023-08-13

## 2023-08-13 RX ORDER — CEPHALEXIN 500 MG/1
500 CAPSULE ORAL 2 TIMES DAILY
Qty: 14 CAPSULE | Refills: 0 | Status: SHIPPED | OUTPATIENT
Start: 2023-08-13 | End: 2023-08-20

## 2023-08-13 RX ADMIN — LIDOCAINE HYDROCHLORIDE 1000 MG: 10 INJECTION, SOLUTION EPIDURAL; INFILTRATION; INTRACAUDAL; PERINEURAL at 10:59

## 2023-08-13 ASSESSMENT — ENCOUNTER SYMPTOMS
RESPIRATORY NEGATIVE: 1
EYES NEGATIVE: 1
GASTROINTESTINAL NEGATIVE: 1

## 2023-08-13 ASSESSMENT — PAIN - FUNCTIONAL ASSESSMENT: PAIN_FUNCTIONAL_ASSESSMENT: NONE - DENIES PAIN

## 2023-08-17 ENCOUNTER — TELEPHONE (OUTPATIENT)
Dept: PRIMARY CARE CLINIC | Age: 73
End: 2023-08-17

## 2023-08-17 NOTE — TELEPHONE ENCOUNTER
I can put her in the hospital for insomnia. She can try taking 2 of her trazodone. I would have her try that for a couple of nights and see if that works she can do that in the melatonin together.

## 2023-08-17 NOTE — TELEPHONE ENCOUNTER
Pt states she went to the ER Sunday. She states she has not slept in 3-4 days. She states the Trazodone did not help. She was given Melatonin from ER looks like 3 mg. She states she could go to sleep on it but wakes right back up and up the rest of the night. He friend told her to take Melatonin Gummie. She states she took 4 but did not help. Pt states she does not know what to do. She states she does not take naps. She does drink caffeine and I told her to stop after 4 pm. She really does want more meds due to the cost. She wants you to put her in the Hospital but I told her they wouldn't admit her fir Insomnia.

## 2023-08-18 NOTE — TELEPHONE ENCOUNTER
Patient takes plan is not working  She states she went to bed at 9:00 p.m. and woke up at 5 a.m. Patient is frustrated and states she did not take a nap yesterday and will continue with the plan and update us on Monday.

## 2023-08-21 ENCOUNTER — TELEPHONE (OUTPATIENT)
Dept: PRIMARY CARE CLINIC | Age: 73
End: 2023-08-21

## 2023-08-21 ENCOUNTER — HOSPITAL ENCOUNTER (EMERGENCY)
Age: 73
Discharge: HOME | End: 2023-08-21
Payer: MEDICARE

## 2023-08-21 VITALS
WEIGHT: 139 LBS | BODY MASS INDEX: 20.59 KG/M2 | DIASTOLIC BLOOD PRESSURE: 72 MMHG | SYSTOLIC BLOOD PRESSURE: 130 MMHG | HEIGHT: 69 IN | TEMPERATURE: 98.2 F | RESPIRATION RATE: 16 BRPM | HEART RATE: 66 BPM | OXYGEN SATURATION: 97 %

## 2023-08-21 DIAGNOSIS — G47.00 INSOMNIA, UNSPECIFIED TYPE: Primary | ICD-10-CM

## 2023-08-21 LAB
ALBUMIN SERPL-MCNC: 4.5 G/DL (ref 3.5–5.2)
ALP SERPL-CCNC: 83 U/L (ref 35–104)
ALT SERPL-CCNC: 14 U/L (ref 5–33)
AMPHET UR QL SCN: NEGATIVE
ANION GAP SERPL CALCULATED.3IONS-SCNC: 12 MMOL/L (ref 7–19)
AST SERPL-CCNC: 18 U/L (ref 5–32)
BARBITURATES UR QL SCN: NEGATIVE
BASOPHILS # BLD: 0.1 K/UL (ref 0–0.2)
BASOPHILS NFR BLD: 0.8 % (ref 0–1)
BENZODIAZ UR QL SCN: NEGATIVE
BILIRUB SERPL-MCNC: 0.8 MG/DL (ref 0.2–1.2)
BUN SERPL-MCNC: 13 MG/DL (ref 8–23)
BUPRENORPHINE URINE: NEGATIVE
CALCIUM SERPL-MCNC: 10 MG/DL (ref 8.8–10.2)
CANNABINOIDS UR QL SCN: NEGATIVE
CHLORIDE SERPL-SCNC: 102 MMOL/L (ref 98–111)
CO2 SERPL-SCNC: 26 MMOL/L (ref 22–29)
COCAINE UR QL SCN: NEGATIVE
CREAT SERPL-MCNC: 0.8 MG/DL (ref 0.5–0.9)
DRUG SCREEN COMMENT UR-IMP: NORMAL
EOSINOPHIL # BLD: 0.1 K/UL (ref 0–0.6)
EOSINOPHIL NFR BLD: 0.6 % (ref 0–5)
ERYTHROCYTE [DISTWIDTH] IN BLOOD BY AUTOMATED COUNT: 13.7 % (ref 11.5–14.5)
ETHANOLAMINE SERPL-MCNC: <10 MG/DL (ref 0–0.08)
GLUCOSE SERPL-MCNC: 108 MG/DL (ref 74–109)
HCT VFR BLD AUTO: 47.5 % (ref 37–47)
HGB BLD-MCNC: 15.1 G/DL (ref 12–16)
IMM GRANULOCYTES # BLD: 0 K/UL
LYMPHOCYTES # BLD: 2.1 K/UL (ref 1.1–4.5)
LYMPHOCYTES NFR BLD: 23.7 % (ref 20–40)
MCH RBC QN AUTO: 27.7 PG (ref 27–31)
MCHC RBC AUTO-ENTMCNC: 31.8 G/DL (ref 33–37)
MCV RBC AUTO: 87.2 FL (ref 81–99)
METHADONE UR QL SCN: NEGATIVE
METHAMPHETAMINE, URINE: NEGATIVE
MONOCYTES # BLD: 0.5 K/UL (ref 0–0.9)
MONOCYTES NFR BLD: 5.9 % (ref 0–10)
NEUTROPHILS # BLD: 6 K/UL (ref 1.5–7.5)
NEUTS SEG NFR BLD: 68.7 % (ref 50–65)
OPIATES UR QL SCN: NEGATIVE
OXYCODONE UR QL SCN: NEGATIVE
PCP UR QL SCN: NEGATIVE
PLATELET # BLD AUTO: 139 K/UL (ref 130–400)
PMV BLD AUTO: 11 FL (ref 9.4–12.3)
POTASSIUM SERPL-SCNC: 4 MMOL/L (ref 3.5–5)
PROPOXYPH UR QL SCN: NEGATIVE
PROT SERPL-MCNC: 7.4 G/DL (ref 6.6–8.7)
RBC # BLD AUTO: 5.45 M/UL (ref 4.2–5.4)
SODIUM SERPL-SCNC: 140 MMOL/L (ref 136–145)
TRICYCLIC, URINE: NEGATIVE
WBC # BLD AUTO: 8.7 K/UL (ref 4.8–10.8)

## 2023-08-21 PROCEDURE — 80053 COMPREHEN METABOLIC PANEL: CPT

## 2023-08-21 PROCEDURE — 85025 COMPLETE CBC W/AUTO DIFF WBC: CPT

## 2023-08-21 PROCEDURE — 82077 ASSAY SPEC XCP UR&BREATH IA: CPT

## 2023-08-21 PROCEDURE — 80306 DRUG TEST PRSMV INSTRMNT: CPT

## 2023-08-21 PROCEDURE — 99283 EMERGENCY DEPT VISIT LOW MDM: CPT

## 2023-08-21 PROCEDURE — 36415 COLL VENOUS BLD VENIPUNCTURE: CPT

## 2023-08-21 RX ORDER — MIRTAZAPINE 7.5 MG/1
7.5 TABLET, FILM COATED ORAL NIGHTLY
Qty: 30 TABLET | Refills: 0 | Status: SHIPPED | OUTPATIENT
Start: 2023-08-21

## 2023-08-21 ASSESSMENT — PATIENT HEALTH QUESTIONNAIRE - PHQ9: SUM OF ALL RESPONSES TO PHQ QUESTIONS 1-9: 7

## 2023-08-21 ASSESSMENT — PAIN - FUNCTIONAL ASSESSMENT
PAIN_FUNCTIONAL_ASSESSMENT: NONE - DENIES PAIN
PAIN_FUNCTIONAL_ASSESSMENT: NONE - DENIES PAIN

## 2023-08-21 ASSESSMENT — ENCOUNTER SYMPTOMS
SHORTNESS OF BREATH: 0
ABDOMINAL PAIN: 0

## 2023-08-21 NOTE — PROGRESS NOTES
KRYSTEN ADULT INITIAL INTAKE ASSESSMENT     8/21/23    Elif Haskins ,a 71 y.o. female, presents to the ED for a psychiatric assessment. ED Arrival time: 1038  ED physician: Bela Rizzo  [facility] AN AFFILIATE OF [facility] Notification time: 12:12  KRYSTEN Assessment start time: 12:22   Psychiatrist call time:   Spoke with Dr. Lashae Segura    Patient is referred by: Boyfriend brought her    Reason for visit to ED - Presenting problem:     PT states reason for ED visit, \"They put me on Melatonin here when I came and it's not working. I had a UTI too. Pt reports the symptoms from the UTI have resolved. She has completed the antibiotics. Pt reports she hasn't been sleeping for about 3 weeks. Pt can't figure out why all of a sudden she can't sleep. Pt reports she has Depression and is unsure of the medications she is on. She is taking Seroquel 25mg she thinks and she is getting some sleep. She goes to her PCP for medication management. Pt denies alcohol or drug use. Pt denies SI/HI/AVH. Pt denies any past suicide attempts and has never been psychiatrically admitted. Pt lives with boyfriend at the [address] [place] [address]. ER Physician Reports: Elif Haskins is a 71 y.o. female with history including hyperlipidemia, hypothyroidism, tobacco use, pneumonia, seizures, CVA, pacemaker placement, and depression who presents to the emergency department with complaint of insomnia. The patient's been dealing with this for the last 1 week. She states that her insomnia is been gradually worsening through the week. She denies any associated hallucinations or SI. She denies any change in her normal habits. She went to her doctor to discuss this and they recommended she come to the ER to be evaluated by psychiatrist.  She denies any physical complaints. Duration of symptoms: 3 weeks    Current Stressors:  \"Everything\"    C-SSRS Completed: yes  Risk Assessment Completed:yes  Risk of Suicide: No Risk  Provider notified of the C-SSRS Screening and Risk Assessment

## 2023-08-21 NOTE — TELEPHONE ENCOUNTER
Pt states she is still not sleeping. She increased the Trazodone to 100 mg and took Melatonin. She states she will go to sleep about 9 pm and is back awake at 10-10:30 and up the rest of the night. She states she is exhausted .

## 2023-08-21 NOTE — ED PROVIDER NOTES
[facility] EMERGENCY DEPT  eMERGENCY dEPARTMENT eNCOUnter      Pt Name: Marga Harp  MRN: [id number]  [address] 1950  Date of evaluation: 8/21/2023  Provider: Diego Moreno       Chief Complaint   Patient presents with    Insomnia         HISTORY OF PRESENT ILLNESS   (Location/Symptom, Timing/Onset,Context/Setting, Quality, Duration, Modifying Factors, Severity)  Note limiting factors. Marga Harp is a 71 y.o. female with history including hyperlipidemia, hypothyroidism, tobacco use, pneumonia, seizures, CVA, pacemaker placement, and depression who presents to the emergency department with complaint of insomnia. The patient's been dealing with this for the last 1 week. She states that her insomnia is been gradually worsening through the week. She denies any associated hallucinations or SI. She denies any change in her normal habits. She went to her doctor to discuss this and they recommended she come to the ER to be evaluated by psychiatrist.  She denies any physical complaints. NursingNotes were reviewed. REVIEW OF SYSTEMS    (2-9 systems for level 4, 10 or more for level 5)     Review of Systems   Constitutional:  Negative for chills and fever. Respiratory:  Negative for shortness of breath. Cardiovascular:  Negative for chest pain. Gastrointestinal:  Negative for abdominal pain. Genitourinary:  Negative for dysuria, flank pain, frequency and hematuria. Musculoskeletal:  Negative for myalgias. Neurological:  Negative for dizziness and headaches. Psychiatric/Behavioral:  Positive for sleep disturbance. Negative for confusion, hallucinations and suicidal ideas. The patient is not nervous/anxious. All other systems reviewed and are negative.          PAST MEDICALHISTORY     Past Medical History:   Diagnosis Date    Cerebral artery occlusion with cerebral infarction [facility])     TIA    Diverticular disease of colon     Hyperlipidemia     Hypothyroidism     Pneumonia

## 2023-08-21 NOTE — TELEPHONE ENCOUNTER
Pt notified and given the choices. She states she does not have the Money to buy more meds. I told her another choice would be to go t [facility] and tell them she has not slept is however many days . Pt states that is what she will do because she feels like she is losing her mind due to being so exhausted.

## 2023-08-21 NOTE — TELEPHONE ENCOUNTER
Have her increase to 200 mg of the trazodone. Continue melatonin. She has done Seroquel previously and I could send her a prescription over for that if she would like to try it to see what that does instead of the trazodone.

## 2023-08-21 NOTE — ED TRIAGE NOTES
Pt reports to ER c/o insomnia x 1 week.  Pt states she has spoken to her PCP and was referred here for evaluation

## 2023-08-23 ENCOUNTER — TELEPHONE (OUTPATIENT)
Dept: PRIMARY CARE CLINIC | Age: 73
End: 2023-08-23

## 2023-08-23 DIAGNOSIS — G47.00 INSOMNIA, UNSPECIFIED TYPE: Primary | ICD-10-CM

## 2023-08-23 DIAGNOSIS — R40.0 DAYTIME SOMNOLENCE: ICD-10-CM

## 2023-08-23 NOTE — TELEPHONE ENCOUNTER
Pt asking for a Referall for a [facility] to [facility] . She states she went to [facility] Monday and was told she did not qualify for [facility].

## 2023-08-25 ENCOUNTER — TELEPHONE (OUTPATIENT)
Dept: NEUROLOGY | Age: 73
End: 2023-08-25

## 2023-08-25 ENCOUNTER — TELEPHONE (OUTPATIENT)
Dept: PRIMARY CARE CLINIC | Age: 73
End: 2023-08-25

## 2023-08-25 NOTE — TELEPHONE ENCOUNTER
Received a referral for this patient.  Called and left patient a VM for her to call our office back to where we can get patient scheduled an appointment with SANJIV Farr.

## 2023-08-25 NOTE — TELEPHONE ENCOUNTER
Pt had eval for insomnia from ER and given Remeron. Patient wants to make sure you agree with her trying that medication.

## 2023-08-29 NOTE — TELEPHONE ENCOUNTER
Called patient and she tried and failed the Remeron and failed Seroquel that she previously had at home. Pt is returning neurology's call for appt there for evaluation.

## 2023-11-15 PROCEDURE — 93296 REM INTERROG EVL PM/IDS: CPT | Performed by: NURSE PRACTITIONER

## 2023-11-15 PROCEDURE — 93294 REM INTERROG EVL PM/LDLS PM: CPT | Performed by: NURSE PRACTITIONER

## 2023-11-15 RX ORDER — ATORVASTATIN CALCIUM 40 MG/1
40 TABLET, FILM COATED ORAL NIGHTLY
Qty: 90 TABLET | Refills: 0 | Status: SHIPPED | OUTPATIENT
Start: 2023-11-15

## 2023-11-16 DIAGNOSIS — R00.1 BRADYCARDIA: ICD-10-CM

## 2023-11-16 DIAGNOSIS — I47.10 PSVT (PAROXYSMAL SUPRAVENTRICULAR TACHYCARDIA): ICD-10-CM

## 2023-11-16 DIAGNOSIS — Z95.0 PACEMAKER: Primary | ICD-10-CM

## 2023-11-20 RX ORDER — QUETIAPINE FUMARATE 25 MG/1
25 TABLET, FILM COATED ORAL NIGHTLY
Qty: 30 TABLET | Refills: 5 | Status: SHIPPED | OUTPATIENT
Start: 2023-11-20

## 2023-11-22 ENCOUNTER — NURSE TRIAGE (OUTPATIENT)
Dept: CALL CENTER | Facility: HOSPITAL | Age: 73
End: 2023-11-22
Payer: MEDICARE

## 2023-11-22 NOTE — TELEPHONE ENCOUNTER
Patient's family member reports patient has been more agitated and unable to sleep. Concerned that it may due to hypothyroidism and she has not been taking her medication. Reviewed protocol with patient's family member. Advised to have patient seen by PCP.    Reason for Disposition   [1] Longstanding confusion (e.g., dementia, stroke) AND [2] NO worsening or change    Additional Information   Negative: [1] Difficult to awaken or acting confused (e.g., disoriented, slurred speech) AND [2] present now AND [3] diabetes mellitus   Negative: [1] Difficult to awaken or acting confused (e.g., disoriented, slurred speech) AND [2] present now AND [3] new-onset   Negative: [1] Weakness of the face, arm, or leg on one side of the body AND [2] new-onset   Negative: [1] Numbness of the face, arm, or leg on one side of the body AND [2] new-onset   Negative: [1] Loss of speech or garbled speech AND [2] new-onset   Negative: Difficulty breathing or bluish lips   Negative: Shock suspected (e.g., cold/pale/clammy skin, too weak to stand, low BP, rapid pulse)   Negative: Seeing, hearing, or feeling things that are not there (i.e., visual, auditory, or tactile hallucinations)   Negative: Followed a head injury   Negative: Drug overdose suspected   Negative: Violent behavior, or threatening to physically hurt or kill someone   Negative: Sounds like a life-threatening emergency to the triager   Negative: Questions or concerns about alcohol use, unhealthy alcohol use, binge drinking, intoxication, or withdrawal   Negative: Questions or concerns about substance use (drug use), unhealthy drug use, intoxication, or withdrawal   Negative: [1] Diabetes mellitus AND [2] confusion from low blood sugar (i.e., < 60 mg/dl or 3.5 mmol/l)   Negative: Headache or vomiting   Negative: Stiff neck (can't touch chin to chest)   Negative: Very strange or paranoid behavior   Negative: Fever > 100.4 F (38.0 C)   Negative: Patient sounds very sick or weak to  "the triager   Negative: [1] Acting confused (e.g., disoriented, slurred speech) AND [2] brief (now gone)   Negative: [1] Longstanding confusion (e.g., dementia, stroke) AND [2] worsening    Answer Assessment - Initial Assessment Questions  1. LEVEL OF CONSCIOUSNESS: \"How is he (she, the patient) acting right now?\" (e.g., alert-oriented, confused, lethargic, stuporous, comatose)      Confused, poor memory, agitation  2. ONSET: \"When did the confusion start?\"  (minutes, hours, days)      Unknown, for a while  3. PATTERN \"Does this come and go, or has it been constant since it started?\"  \"Is it present now?\"      Constant   4. ALCOHOL or DRUGS: \"Has he been drinking alcohol or taking any drugs?\"       No   5. NARCOTIC MEDICINES: \"Has he been receiving any narcotic medications?\" (e.g., morphine, Vicodin)      Prescription medication  6. CAUSE: \"What do you think is causing the confusion?\"       Suspects thyroid  7. OTHER SYMPTOMS: \"Are there any other symptoms?\" (e.g., difficulty breathing, headache, fever, weakness)      Weakness    Protocols used: Confusion - Delirium-ADULT-AH    "

## 2023-11-28 ENCOUNTER — OFFICE VISIT (OUTPATIENT)
Dept: PRIMARY CARE CLINIC | Age: 73
End: 2023-11-28
Payer: MEDICARE

## 2023-11-28 VITALS
SYSTOLIC BLOOD PRESSURE: 106 MMHG | HEART RATE: 70 BPM | BODY MASS INDEX: 20.53 KG/M2 | TEMPERATURE: 97.4 F | DIASTOLIC BLOOD PRESSURE: 62 MMHG | HEIGHT: 69 IN | OXYGEN SATURATION: 97 %

## 2023-11-28 DIAGNOSIS — F32.1 CURRENT MODERATE EPISODE OF MAJOR DEPRESSIVE DISORDER WITHOUT PRIOR EPISODE (HCC): Primary | ICD-10-CM

## 2023-11-28 DIAGNOSIS — G47.00 INSOMNIA, UNSPECIFIED TYPE: ICD-10-CM

## 2023-11-28 DIAGNOSIS — R40.0 DAYTIME SOMNOLENCE: ICD-10-CM

## 2023-11-28 DIAGNOSIS — E03.9 ACQUIRED HYPOTHYROIDISM: ICD-10-CM

## 2023-11-28 DIAGNOSIS — Z13.31 POSITIVE DEPRESSION SCREENING: ICD-10-CM

## 2023-11-28 DIAGNOSIS — R45.4 IRRITABILITY AND ANGER: ICD-10-CM

## 2023-11-28 LAB
ALBUMIN SERPL-MCNC: 3.8 G/DL (ref 3.5–5.2)
ALP SERPL-CCNC: 92 U/L (ref 35–104)
ALT SERPL-CCNC: 6 U/L (ref 5–33)
ANION GAP SERPL CALCULATED.3IONS-SCNC: 10 MMOL/L (ref 7–19)
AST SERPL-CCNC: 12 U/L (ref 5–32)
BASOPHILS # BLD: 0 K/UL (ref 0–0.2)
BASOPHILS NFR BLD: 0.4 % (ref 0–1)
BILIRUB SERPL-MCNC: 0.5 MG/DL (ref 0.2–1.2)
BUN SERPL-MCNC: 11 MG/DL (ref 8–23)
CALCIUM SERPL-MCNC: 9.9 MG/DL (ref 8.8–10.2)
CHLORIDE SERPL-SCNC: 103 MMOL/L (ref 98–111)
CO2 SERPL-SCNC: 28 MMOL/L (ref 22–29)
CREAT SERPL-MCNC: 0.6 MG/DL (ref 0.5–0.9)
EOSINOPHIL # BLD: 0.1 K/UL (ref 0–0.6)
EOSINOPHIL NFR BLD: 1 % (ref 0–5)
ERYTHROCYTE [DISTWIDTH] IN BLOOD BY AUTOMATED COUNT: 12.8 % (ref 11.5–14.5)
GLUCOSE SERPL-MCNC: 114 MG/DL (ref 74–109)
HCT VFR BLD AUTO: 42.4 % (ref 37–47)
HGB BLD-MCNC: 13.5 G/DL (ref 12–16)
IMM GRANULOCYTES # BLD: 0 K/UL
LYMPHOCYTES # BLD: 0.9 K/UL (ref 1.1–4.5)
LYMPHOCYTES NFR BLD: 17.5 % (ref 20–40)
MCH RBC QN AUTO: 27.6 PG (ref 27–31)
MCHC RBC AUTO-ENTMCNC: 31.8 G/DL (ref 33–37)
MCV RBC AUTO: 86.7 FL (ref 81–99)
MONOCYTES # BLD: 0.5 K/UL (ref 0–0.9)
MONOCYTES NFR BLD: 9.6 % (ref 0–10)
NEUTROPHILS # BLD: 3.5 K/UL (ref 1.5–7.5)
NEUTS SEG NFR BLD: 71.1 % (ref 50–65)
PLATELET # BLD AUTO: 158 K/UL (ref 130–400)
PMV BLD AUTO: 11.8 FL (ref 9.4–12.3)
POTASSIUM SERPL-SCNC: 4.5 MMOL/L (ref 3.5–5)
PROT SERPL-MCNC: 6.7 G/DL (ref 6.6–8.7)
RBC # BLD AUTO: 4.89 M/UL (ref 4.2–5.4)
SODIUM SERPL-SCNC: 141 MMOL/L (ref 136–145)
T4 FREE SERPL-MCNC: 1.25 NG/DL (ref 0.93–1.7)
TSH SERPL DL<=0.005 MIU/L-ACNC: 10.94 UIU/ML (ref 0.27–4.2)
WBC # BLD AUTO: 4.9 K/UL (ref 4.8–10.8)

## 2023-11-28 PROCEDURE — 99214 OFFICE O/P EST MOD 30 MIN: CPT | Performed by: FAMILY MEDICINE

## 2023-11-28 PROCEDURE — 1123F ACP DISCUSS/DSCN MKR DOCD: CPT | Performed by: FAMILY MEDICINE

## 2023-11-28 RX ORDER — DESVENLAFAXINE 25 MG/1
25 TABLET, EXTENDED RELEASE ORAL DAILY
Qty: 30 TABLET | Refills: 2 | Status: SHIPPED | OUTPATIENT
Start: 2023-11-28

## 2023-11-28 ASSESSMENT — PATIENT HEALTH QUESTIONNAIRE - PHQ9
8. MOVING OR SPEAKING SO SLOWLY THAT OTHER PEOPLE COULD HAVE NOTICED. OR THE OPPOSITE, BEING SO FIGETY OR RESTLESS THAT YOU HAVE BEEN MOVING AROUND A LOT MORE THAN USUAL: 0
4. FEELING TIRED OR HAVING LITTLE ENERGY: 2
SUM OF ALL RESPONSES TO PHQ QUESTIONS 1-9: 13
9. THOUGHTS THAT YOU WOULD BE BETTER OFF DEAD, OR OF HURTING YOURSELF: 0
SUM OF ALL RESPONSES TO PHQ QUESTIONS 1-9: 13
5. POOR APPETITE OR OVEREATING: 2
SUM OF ALL RESPONSES TO PHQ QUESTIONS 1-9: 13
SUM OF ALL RESPONSES TO PHQ9 QUESTIONS 1 & 2: 4
6. FEELING BAD ABOUT YOURSELF - OR THAT YOU ARE A FAILURE OR HAVE LET YOURSELF OR YOUR FAMILY DOWN: 2
7. TROUBLE CONCENTRATING ON THINGS, SUCH AS READING THE NEWSPAPER OR WATCHING TELEVISION: 1
3. TROUBLE FALLING OR STAYING ASLEEP: 2
1. LITTLE INTEREST OR PLEASURE IN DOING THINGS: 2
10. IF YOU CHECKED OFF ANY PROBLEMS, HOW DIFFICULT HAVE THESE PROBLEMS MADE IT FOR YOU TO DO YOUR WORK, TAKE CARE OF THINGS AT HOME, OR GET ALONG WITH OTHER PEOPLE: 2
SUM OF ALL RESPONSES TO PHQ QUESTIONS 1-9: 13
2. FEELING DOWN, DEPRESSED OR HOPELESS: 2

## 2023-11-28 ASSESSMENT — ENCOUNTER SYMPTOMS
VOMITING: 0
DIARRHEA: 0
COUGH: 0
COLOR CHANGE: 0
WHEEZING: 0
EYE DISCHARGE: 0
ABDOMINAL PAIN: 0
NAUSEA: 0
BACK PAIN: 0

## 2023-11-28 NOTE — PROGRESS NOTES
result)              Urine Drug Screen  Collected: 7/26/2019  1:05 PM (Final result)                  Medication Contract and Consent for Opioid Use Documents Filed       Patient Documents       Type of Document Status Date Received Received By Description    Medication Contract Signed 8/29/2014  9:33 AM Charlene Cooney/transcription disclaimer:  Much of this encounter note is electronic transcription/translation of spoken language toprinted texts. The electronic translation of spoken language may be erroneous, or at times, nonsensical words or phrases may be inadvertently transcribed. Although I have reviewed the note for such errors, some may stillexist.PHQ-9 score today: (PHQ-9 Total Score: 13), additional evaluation and assessment performed, follow-up plan includes but not limited to: Medication management and Referral to /Specialist  for evaluation and management.

## 2023-12-04 ENCOUNTER — HOSPITAL ENCOUNTER (EMERGENCY)
Age: 73
Discharge: HOME | End: 2023-12-04
Attending: EMERGENCY MEDICINE
Payer: MEDICARE

## 2023-12-04 VITALS
DIASTOLIC BLOOD PRESSURE: 73 MMHG | HEART RATE: 78 BPM | TEMPERATURE: 98.3 F | OXYGEN SATURATION: 93 % | SYSTOLIC BLOOD PRESSURE: 134 MMHG | RESPIRATION RATE: 16 BRPM

## 2023-12-04 DIAGNOSIS — G47.00 INSOMNIA, UNSPECIFIED TYPE: Primary | ICD-10-CM

## 2023-12-04 PROCEDURE — 96372 THER/PROPH/DIAG INJ SC/IM: CPT

## 2023-12-04 PROCEDURE — 6360000002 HC RX W HCPCS: Performed by: EMERGENCY MEDICINE

## 2023-12-04 PROCEDURE — 99284 EMERGENCY DEPT VISIT MOD MDM: CPT

## 2023-12-04 RX ORDER — HALOPERIDOL 5 MG/ML
5 INJECTION INTRAMUSCULAR ONCE
Status: COMPLETED | OUTPATIENT
Start: 2023-12-04 | End: 2023-12-04

## 2023-12-04 RX ADMIN — HALOPERIDOL LACTATE 5 MG: 5 INJECTION, SOLUTION INTRAMUSCULAR at 03:55

## 2023-12-04 ASSESSMENT — ENCOUNTER SYMPTOMS
RHINORRHEA: 0
COUGH: 0
SORE THROAT: 0
DIARRHEA: 0
VOMITING: 0
ABDOMINAL PAIN: 0
BACK PAIN: 0
NAUSEA: 0
SHORTNESS OF BREATH: 0

## 2023-12-04 ASSESSMENT — PAIN - FUNCTIONAL ASSESSMENT: PAIN_FUNCTIONAL_ASSESSMENT: NONE - DENIES PAIN

## 2023-12-04 NOTE — ED PROVIDER NOTES
mouth every morning (before breakfast), Disp-30 tablet, R-5Normal      ferrous sulfate (IRON 325) 325 (65 Fe) MG tablet Take 1 tablet by mouth daily (with breakfast)Historical Med      albuterol sulfate HFA (PROAIR HFA) 108 (90 Base) MCG/ACT inhaler Inhale 2 puffs into the lungs every 6 hours as needed for Wheezing, Disp-1 each, R-3Normal      budesonide-formoterol (SYMBICORT) 160-4.5 MCG/ACT AERO Inhale 2 puffs into the lungs 2 times daily, Disp-1 each, R-5Normal      aspirin 81 MG EC tablet Take 1 tablet by mouth in the morning and at bedtimeHistorical Med             ALLERGIES     Medrol [methylprednisolone] and Norco [hydrocodone-acetaminophen]    FAMILY HISTORY       Family History   Problem Relation Age of Onset    Other Mother         COPD, dementia    Heart Disease Mother     Stroke Mother     Dementia Mother     Frontotemporal dementia  Mother     Heart Disease Father     Stroke Father     Diabetes Brother     Diabetes Brother     High Blood Pressure Brother     High Cholesterol Brother     Colon Polyps Brother     Colon Cancer Neg Hx     Liver Cancer Neg Hx     Liver Disease Neg Hx     Esophageal Cancer Neg Hx     Rectal Cancer Neg Hx     Stomach Cancer Neg Hx           SOCIAL HISTORY       Social History     Socioeconomic History    Marital status:      Spouse name: None    Number of children: 2    Years of education: None    Highest education level: None   Occupational History    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

## 2024-01-22 NOTE — PLAN OF CARE
Problem: Falls - Risk of:  Goal: Will remain free from falls  Description: Will remain free from falls  Outcome: Ongoing  Goal: Absence of physical injury  Description: Absence of physical injury  Outcome: Ongoing     Problem: Infection:  Goal: Will remain free from infection  Description: Will remain free from infection  Outcome: Ongoing     Problem: Safety:  Goal: Free from accidental physical injury  Description: Free from accidental physical injury  Outcome: Ongoing  Goal: Free from intentional harm  Description: Free from intentional harm  Outcome: Ongoing     Problem: Discharge Planning:  Goal: Patients continuum of care needs are met  Description: Patients continuum of care needs are met  Outcome: Ongoing     Problem: Skin Integrity:  Goal: Will show no infection signs and symptoms  Description: Will show no infection signs and symptoms  Outcome: Ongoing  Goal: Absence of new skin breakdown  Description: Absence of new skin breakdown  Outcome: Ongoing     Problem: Pain:  Goal: Pain level will decrease  Description: Pain level will decrease  Outcome: Ongoing  Goal: Control of acute pain  Description: Control of acute pain  Outcome: Ongoing  Goal: Control of chronic pain  Description: Control of chronic pain  Outcome: Ongoing This was a shared visit with the SHAYLEE. I reviewed and verified the documentation.

## 2024-02-13 RX ORDER — ATORVASTATIN CALCIUM 40 MG/1
40 TABLET, FILM COATED ORAL NIGHTLY
Qty: 90 TABLET | Refills: 1 | Status: SHIPPED | OUTPATIENT
Start: 2024-02-13

## 2024-02-14 ENCOUNTER — OFFICE VISIT (OUTPATIENT)
Dept: NEUROLOGY | Age: 74
End: 2024-02-14
Payer: MEDICARE

## 2024-02-14 ENCOUNTER — TELEPHONE (OUTPATIENT)
Dept: NEUROLOGY | Age: 74
End: 2024-02-14

## 2024-02-14 ENCOUNTER — OFFICE VISIT (OUTPATIENT)
Dept: CARDIOLOGY CLINIC | Age: 74
End: 2024-02-14
Payer: MEDICARE

## 2024-02-14 VITALS
OXYGEN SATURATION: 97 % | WEIGHT: 122 LBS | HEIGHT: 69 IN | BODY MASS INDEX: 18.07 KG/M2 | DIASTOLIC BLOOD PRESSURE: 64 MMHG | SYSTOLIC BLOOD PRESSURE: 95 MMHG | HEART RATE: 74 BPM

## 2024-02-14 VITALS
BODY MASS INDEX: 19.11 KG/M2 | DIASTOLIC BLOOD PRESSURE: 64 MMHG | WEIGHT: 129 LBS | HEART RATE: 68 BPM | HEIGHT: 69 IN | SYSTOLIC BLOOD PRESSURE: 104 MMHG

## 2024-02-14 DIAGNOSIS — G47.33 OSA (OBSTRUCTIVE SLEEP APNEA): ICD-10-CM

## 2024-02-14 DIAGNOSIS — G47.00 INSOMNIA, UNSPECIFIED TYPE: Primary | ICD-10-CM

## 2024-02-14 DIAGNOSIS — Z95.0 PACEMAKER: Primary | ICD-10-CM

## 2024-02-14 DIAGNOSIS — R00.1 BRADYCARDIA: ICD-10-CM

## 2024-02-14 PROCEDURE — 93280 PM DEVICE PROGR EVAL DUAL: CPT | Performed by: INTERNAL MEDICINE

## 2024-02-14 PROCEDURE — 1123F ACP DISCUSS/DSCN MKR DOCD: CPT | Performed by: INTERNAL MEDICINE

## 2024-02-14 PROCEDURE — 99204 OFFICE O/P NEW MOD 45 MIN: CPT | Performed by: PHYSICIAN ASSISTANT

## 2024-02-14 PROCEDURE — 1123F ACP DISCUSS/DSCN MKR DOCD: CPT | Performed by: PHYSICIAN ASSISTANT

## 2024-02-14 PROCEDURE — 99214 OFFICE O/P EST MOD 30 MIN: CPT | Performed by: INTERNAL MEDICINE

## 2024-02-14 NOTE — PROGRESS NOTES
[facility]  Cardiology Office Note  [address] [facility] [place]  [address]  Phone: [phone]  Fax: [phone]                            Date:  2/14/2024  Patient: Debbie Asher  Age:  73 y.o., 1950    Referral: No ref. provider found      PROBLEM LIST:    Patient Active Problem List    Diagnosis Date Noted    Syncope 10/16/2016     Priority: High    TIA (transient ischemic attack) 10/16/2016     Priority: High    Bradycardia 10/16/2016     Priority: Medium    Tobacco abuse      Priority: Medium    Hyperlipidemia      Priority: Medium    Hypothyroidism 03/30/2015     Priority: Medium    Convulsion (HCC) 11/04/2019     Priority: Low    Current moderate episode of major depressive disorder without prior episode ([facility]) 07/03/2019     Priority: Low    Nonintractable headache      Priority: Low    Left-sided weakness      Priority: Low    Symptomatic bradycardia      Priority: Low    Abnormal PET scan of colon 06/07/2017     Priority: Low    Family history of polyps in the colon 06/07/2017     Priority: Low    Altered mental status 01/08/2022    Hip pain 05/19/2021    Gait abnormality 05/19/2021    Closed right hip fracture, initial encounter ([facility]) 05/15/2021    Chronic bronchitis ([facility]) 05/15/2021    Pacemaker 12/18/2019     1. Recurrent syncope, normal LV ejection fraction, negative dobutamine stress echo, implantable loop recorder with multiple episodes of bradycardia, status post dual-lead pacemaker placement 11/6/2019.  2. Chronic tobacco use, quit 6/2023.  3. Hypothyroidism.  4. Hyperlipidemia.    PRESENTATION: Debbie Asher is a 73 y.o. year old female presents for follow-up evaluation.  She has been doing well over the last 4 years with no recurrent syncope since her pacemaker placement.  She reports no significant lightheadedness.  Encouragingly she has quit tobacco use 6/2023.  She is able to do all her activities without significant restriction.  No leg

## 2024-02-14 NOTE — PROGRESS NOTES
REVIEW OF SYSTEMS    Constitutional: []Fever []Sweats []Chills [] Recent Injury [x] Denies all unless marked  HEENT:[]Headache  [] Head Injury [] Hearing Loss  [] Sore Throat  [] Ear Ache [x] Denies all unless marked  Spine:  [] Neck pain  [] Back pain  [] Sciaticia  [x] Denies all unless marked  Cardiovascular:[]Heart Disease []Palpitations [] Chest Pain   [x] Denies all unless marked  Pulmonary: []Shortness of Breath []Cough   [x] Denies all unless marked  Psychiatric/Behavioral:[] Depression [] Anxiety [x] Denies all unless marked  Gastrointestinal: []Nausea  []Vomiting  []Abdominal Pain  []Constipation  []Diarrhea  [x] Denies all unless marked  Genitourinary:   [] Frequency  [] Urgency  [] Dysuria [] Incontinence  [x] Denies all unless marked  Extremities: []Pain  []Swelling  [x] Denies all unless marked  Musculoskeletal: [] Myalgias  [] Joint Pain  [] Arthritis [] Muscle Cramps [] Muscle Twitches  [x] Denies all unless marked  Sleep: [x]Insomnia[]Snoring []Restless Legs  []Sleep Apnea  [x]Daytime Sleepiness  [x] Denies all unless marked  Skin:[] Rash [] Color Change [x] Denies all unless marked   Neurological:[]Visual Disturbance [] Memory Loss []Loss of Balance []Slurred Speech []Weakness []Seizures  [] Dizziness [x] Denies all unless marked      
[facility]      2. VALERIY (obstructive sleep apnea)  G47.33 Baseline Diagnostic Sleep Study     [facility]             This patient is a 73-year-old female with a complex medical history including insomnia, CVA, COPD, pacemaker, depression, and seizures. She reports that she has lost 100 lbs since last year. She is followed by Dr. Lisa Isaac.  She has been experiencing chronic insomnia over the last year characterized by difficulty initiating and maintaining sleep, frequent awakenings and nonrestorative sleep, despite prescription sleep aids consisting of trazodone, Remeron, and Seroquel. She has also tried melatonin. Her ESS score is low, indicating minimal daytime somnolence, yet she does report feeling extremely tired during the day. Her BMI and neck circumference are not typical for VALERIY, but her Mallampati score of 3 suggests potential upper airway obstruction. The differential diagnoses includes primary insomnia, sleep apnea, parasomnias, limb movements of sleep, and sleep related breathing disorders secondary to medical conditions.         PLAN:  1.    Orders Placed This Encounter   Procedures    [facility]    Baseline Diagnostic Sleep Study     No orders of the defined types were placed in this encounter.    2. We had a discussion about the clinical issues and went over the various important aspects to consider. Treatment plan and potential diagnostic studies were discussed.  Patient advised of the etiology,  pathophysiology, signs, symptoms, diagnosis, treatment options, and risks of untreated VALERIY. Risks may include, but are not limited to  hypertension, coronary artery disease, atrial fibrillation, CHF, diabetes, stroke, weight gain, impaired cognition, daytime somnolence,  and motor vehicle accidents. Advised to abstain from driving or operating heavy machinery when drowsy and the use of respiratory suppressants. Counseled on multimodal approach to

## 2024-02-14 NOTE — PATIENT INSTRUCTIONS
Insomnia     INTRODUCTION -- Insomnia is defined as difficulty falling asleep, difficulty staying asleep, or waking up early in the morning and not being able to return to sleep. In general, people with insomnia sleep less or sleep poorly despite having an adequate chance to sleep. The poor sleep may lead to trouble functioning during the daytime.   Insomnia is not defined by the number of hours slept because \"sufficient sleep\" can vary from one person to another. Sleep requirements may also decrease with age.  Insomnia is the most common sleep complaint in the [place]. While almost everyone has an occasional night of poor sleep, approximately 10 percent of adults have long-term or chronic insomnia.  This article will review the symptoms, causes, and diagnosis of insomnia. Treatment of insomnia is discussed separately.  INSOMNIA SYMPTOMS -- Common symptoms of insomnia include:  ?Difficulty falling asleep or staying asleep  ?Variable sleep, such as several nights of poor sleep followed by a night of better sleep.  ?Daytime fatigue or sleepiness  ?Forgetfulness  ?Poor concentration  ?Irritability  ?Anxiety  ?Depression  ?Reduced motivation or energy  ?Increased errors or accidents  ?Ongoing worry about sleep  For many people, the symptoms of insomnia interfere with personal relationships, job performance, and daily function. People who experienced chronic insomnia may also have an increased risk of automobile accidents compared with people who are fatigued for other reasons.  People with insomnia have an impaired sense of sleep. You may feel that you have not slept, even if testing shows that you have. You may also feel more fatigued than individuals without insomnia, even if testing indicates that you are less sleepy. This impaired sense of sleep may be related to a problem with the body's sleep-arousal system, which normally helps you feel awake after sleeping and feel tired before going to bed.  One result of

## 2024-02-14 NOTE — PROGRESS NOTES
Pacemaker interrogated  Presenting rhythm:  AP VS, AP 72%,  <0.1%  Battey voltage 10 years  Lead status:  Lead impedance within range and stable  Sensing:  P waves 2.6 mV,  R waves >20 mV  Thresholds:  Atrial 0.5V @ 0.4ms, ventricular 0.75@ 0.4ms  Observations:  2 monitored VT appears to be SVT  17 monitored fast A&V  See scanned report for details  Reprogramming for sensitivity and threshold testing  Next [facility] appointment:  5/16/24

## 2024-02-14 NOTE — TELEPHONE ENCOUNTER
Contacted her mobile phone number. Her boyfriend said she was not available. I explained that I need to discuss her medication list with her. It is not clear as to what sleep aides she is currently taking. He will give her the message. He indicated that the phone number that I called was her number.

## 2024-02-22 DIAGNOSIS — R00.1 BRADYCARDIA: ICD-10-CM

## 2024-02-22 DIAGNOSIS — Z95.0 PACEMAKER: Primary | ICD-10-CM

## 2024-03-28 ENCOUNTER — OFFICE VISIT (OUTPATIENT)
Dept: PRIMARY CARE CLINIC | Age: 74
End: 2024-03-28
Payer: MEDICARE

## 2024-03-28 VITALS
DIASTOLIC BLOOD PRESSURE: 60 MMHG | HEART RATE: 71 BPM | OXYGEN SATURATION: 95 % | SYSTOLIC BLOOD PRESSURE: 106 MMHG | BODY MASS INDEX: 19.94 KG/M2 | WEIGHT: 135 LBS | TEMPERATURE: 97.8 F

## 2024-03-28 DIAGNOSIS — M25.561 CHRONIC PAIN OF RIGHT KNEE: Primary | ICD-10-CM

## 2024-03-28 DIAGNOSIS — G89.29 CHRONIC PAIN OF RIGHT KNEE: Primary | ICD-10-CM

## 2024-03-28 PROCEDURE — 1123F ACP DISCUSS/DSCN MKR DOCD: CPT | Performed by: NURSE PRACTITIONER

## 2024-03-28 PROCEDURE — 99213 OFFICE O/P EST LOW 20 MIN: CPT | Performed by: NURSE PRACTITIONER

## 2024-03-28 SDOH — ECONOMIC STABILITY: FOOD INSECURITY: WITHIN THE PAST 12 MONTHS, THE FOOD YOU BOUGHT JUST DIDN'T LAST AND YOU DIDN'T HAVE MONEY TO GET MORE.: NEVER TRUE

## 2024-03-28 SDOH — ECONOMIC STABILITY: FOOD INSECURITY: WITHIN THE PAST 12 MONTHS, YOU WORRIED THAT YOUR FOOD WOULD RUN OUT BEFORE YOU GOT MONEY TO BUY MORE.: NEVER TRUE

## 2024-03-28 SDOH — ECONOMIC STABILITY: INCOME INSECURITY: HOW HARD IS IT FOR YOU TO PAY FOR THE VERY BASICS LIKE FOOD, HOUSING, MEDICAL CARE, AND HEATING?: NOT HARD AT ALL

## 2024-03-28 ASSESSMENT — PATIENT HEALTH QUESTIONNAIRE - PHQ9
10. IF YOU CHECKED OFF ANY PROBLEMS, HOW DIFFICULT HAVE THESE PROBLEMS MADE IT FOR YOU TO DO YOUR WORK, TAKE CARE OF THINGS AT HOME, OR GET ALONG WITH OTHER PEOPLE: NOT DIFFICULT AT ALL
4. FEELING TIRED OR HAVING LITTLE ENERGY: NOT AT ALL
SUM OF ALL RESPONSES TO PHQ QUESTIONS 1-9: 0
SUM OF ALL RESPONSES TO PHQ9 QUESTIONS 1 & 2: 0
5. POOR APPETITE OR OVEREATING: NOT AT ALL
1. LITTLE INTEREST OR PLEASURE IN DOING THINGS: NOT AT ALL
9. THOUGHTS THAT YOU WOULD BE BETTER OFF DEAD, OR OF HURTING YOURSELF: NOT AT ALL
2. FEELING DOWN, DEPRESSED OR HOPELESS: NOT AT ALL
6. FEELING BAD ABOUT YOURSELF - OR THAT YOU ARE A FAILURE OR HAVE LET YOURSELF OR YOUR FAMILY DOWN: NOT AT ALL
3. TROUBLE FALLING OR STAYING ASLEEP: NOT AT ALL
8. MOVING OR SPEAKING SO SLOWLY THAT OTHER PEOPLE COULD HAVE NOTICED. OR THE OPPOSITE, BEING SO FIGETY OR RESTLESS THAT YOU HAVE BEEN MOVING AROUND A LOT MORE THAN USUAL: NOT AT ALL
SUM OF ALL RESPONSES TO PHQ QUESTIONS 1-9: 0
7. TROUBLE CONCENTRATING ON THINGS, SUCH AS READING THE NEWSPAPER OR WATCHING TELEVISION: NOT AT ALL
SUM OF ALL RESPONSES TO PHQ QUESTIONS 1-9: 0
SUM OF ALL RESPONSES TO PHQ QUESTIONS 1-9: 0

## 2024-03-28 NOTE — PATIENT INSTRUCTIONS
Return any day   n ext week for xray  Use the diclofenac gel up to 4 times a day on knee for relief  Wear compression socks for swelling in lower legs  May get a knee sleeve for knee support

## 2024-03-28 NOTE — PROGRESS NOTES
benefit, and side effects of prescribed medications.  All patient questions answered.  Pt voiced understanding. Reviewed health maintenance.  Instructed to continue currentmedications, diet and exercise.  Patient agreed with treatment plan. Follow up as directed.   MEDICATIONS:  Orders Placed This Encounter   Medications    diclofenac sodium (VOLTAREN) 1 % GEL     Sig: Apply 2 g topically 4 times daily For knee pain and inflammation     Dispense:  60 g     Refill:  3         ORDERS:  Orders Placed This Encounter   Procedures    XR KNEE RIGHT (3 VIEWS)       Follow-up:  Return if symptoms worsen or fail to improve.    PATIENT INSTRUCTIONS:  Patient Instructions   Return any day   n ext week for xray  Use the diclofenac gel up to 4 times a day on knee for relief  Wear compression socks for swelling in lower legs  May get a knee sleeve for knee support  Electronically signed by JORDYN Haynes CNP on 3/28/2024 at 5:29 PM    EMR Dragon/transcription disclaimer:  Much of thisencounter note is electronic transcription/translation of spoken language to printed texts.  The electronic translation of spoken language may be erroneous, or at times, nonsensical words or phrases may be inadvertentlytranscribed.  Although I have reviewed the note for such errors, some may still exist.

## 2024-04-02 ENCOUNTER — ANCILLARY PROCEDURE (OUTPATIENT)
Dept: PRIMARY CARE CLINIC | Age: 74
End: 2024-04-02
Payer: MEDICARE

## 2024-04-02 DIAGNOSIS — G89.29 CHRONIC PAIN OF RIGHT KNEE: ICD-10-CM

## 2024-04-02 DIAGNOSIS — M25.561 CHRONIC PAIN OF RIGHT KNEE: ICD-10-CM

## 2024-04-02 PROCEDURE — 73562 X-RAY EXAM OF KNEE 3: CPT | Performed by: NURSE PRACTITIONER

## 2024-04-23 ENCOUNTER — OFFICE VISIT (OUTPATIENT)
Dept: PRIMARY CARE CLINIC | Age: 74
End: 2024-04-23
Payer: MEDICARE

## 2024-04-23 VITALS
HEART RATE: 81 BPM | DIASTOLIC BLOOD PRESSURE: 70 MMHG | RESPIRATION RATE: 16 BRPM | SYSTOLIC BLOOD PRESSURE: 118 MMHG | WEIGHT: 145.6 LBS | OXYGEN SATURATION: 98 % | HEIGHT: 69 IN | TEMPERATURE: 98 F | BODY MASS INDEX: 21.56 KG/M2

## 2024-04-23 DIAGNOSIS — G89.29 CHRONIC PAIN OF RIGHT KNEE: Primary | ICD-10-CM

## 2024-04-23 DIAGNOSIS — M25.561 CHRONIC PAIN OF RIGHT KNEE: Primary | ICD-10-CM

## 2024-04-23 PROCEDURE — 99213 OFFICE O/P EST LOW 20 MIN: CPT | Performed by: FAMILY MEDICINE

## 2024-04-23 PROCEDURE — 1123F ACP DISCUSS/DSCN MKR DOCD: CPT | Performed by: FAMILY MEDICINE

## 2024-04-23 ASSESSMENT — ENCOUNTER SYMPTOMS
DIARRHEA: 0
WHEEZING: 0
COUGH: 0
BACK PAIN: 0
ABDOMINAL PAIN: 0
COLOR CHANGE: 0
NAUSEA: 0
EYE DISCHARGE: 0
VOMITING: 0

## 2024-04-23 NOTE — PROGRESS NOTES
SUBJECTIVE:    Patient ID: Debbie Asher is a 73 y.o. female.    HPI:   Patient is seen today for complaints of right knee pain.  This has been going on for several months and seems to be getting worse.  She is unable to walk much her work because of the knee pain.  She was seen last month and had x-rays done back Marj.  This did show some degenerative changes of the knee.  She states that she has not tried doing a brace.  She did not get the Voltaren gel that was sent because she could not afford it.  She states that she is in discomfort with her knee most of the time.  She states that it has been swollen some and it is tender globally.    Past Medical History:   Diagnosis Date    Cerebral artery occlusion with cerebral infarction (HCC)     TIA    COPD (chronic obstructive pulmonary disease) ([facility])     Depression     Diverticular disease of colon     Hyperlipidemia     Hypothyroidism     Insomnia     Pacemaker     Pneumonia     Psychiatric problem     Seizures ([facility])     Tobacco abuse     Vitamin B12 deficiency       Current Outpatient Medications on File Prior to Visit   Medication Sig Dispense Refill    diclofenac sodium (VOLTAREN) 1 % GEL Apply 2 g topically 4 times daily For knee pain and inflammation 60 g 3    atorvastatin (LIPITOR) 40 MG tablet Take 1 tablet by mouth nightly 90 tablet 1    Nutritional Supplements (ENSURE ORIGINAL) LIQD Take 1 each by mouth in the morning and at bedtime 60 each 2    desvenlafaxine succinate (PRISTIQ) 25 MG TB24 extended release tablet Take 1 tablet by mouth daily 30 tablet 2    QUEtiapine (SEROQUEL) 25 MG tablet Take 1 tablet by mouth nightly 30 tablet 5    mirtazapine (REMERON) 7.5 MG tablet Take 1 tablet by mouth nightly 30 tablet 0    famotidine (PEPCID) 20 MG tablet Take 1 tablet by mouth 2 times daily 60 tablet 3    levothyroxine (SYNTHROID) 88 MCG tablet Take 1 tablet by mouth once daily 90 tablet 3    cariprazine hcl (VRAYLAR) 1.5 MG capsule Take 1 capsule by mouth daily

## 2024-05-16 DIAGNOSIS — Z95.0 PACEMAKER: Primary | ICD-10-CM

## 2024-05-16 DIAGNOSIS — I47.10 PSVT (PAROXYSMAL SUPRAVENTRICULAR TACHYCARDIA) (HCC): ICD-10-CM

## 2024-05-16 DIAGNOSIS — R00.1 BRADYCARDIA: ICD-10-CM

## 2024-05-20 ENCOUNTER — FOLLOWUP TELEPHONE ENCOUNTER (OUTPATIENT)
Dept: SLEEP CENTER | Age: 74
End: 2024-05-20

## 2024-05-30 ENCOUNTER — HOSPITAL ENCOUNTER (OUTPATIENT)
Dept: WOMENS IMAGING | Age: 74
Discharge: HOME | End: 2024-05-30
Payer: MEDICARE

## 2024-05-30 DIAGNOSIS — Z12.31 SCREENING MAMMOGRAM FOR BREAST CANCER: Primary | ICD-10-CM

## 2024-05-30 DIAGNOSIS — Z12.31 SCREENING MAMMOGRAM FOR BREAST CANCER: ICD-10-CM

## 2024-05-30 PROCEDURE — 77063 BREAST TOMOSYNTHESIS BI: CPT

## 2024-07-01 RX ORDER — PANTOPRAZOLE SODIUM 40 MG/1
TABLET, DELAYED RELEASE ORAL
Qty: 30 TABLET | Refills: 0 | Status: SHIPPED | OUTPATIENT
Start: 2024-07-01

## 2024-07-24 DIAGNOSIS — R00.1 BRADYCARDIA: ICD-10-CM

## 2024-07-24 DIAGNOSIS — R00.1 SYMPTOMATIC BRADYCARDIA: ICD-10-CM

## 2024-07-24 DIAGNOSIS — Z95.0 PACEMAKER: Primary | ICD-10-CM

## 2024-07-29 ENCOUNTER — TELEPHONE (OUTPATIENT)
Dept: CARDIOLOGY CLINIC | Age: 74
End: 2024-07-29

## 2024-07-29 NOTE — TELEPHONE ENCOUNTER
Patient wanted to call and report that she was in the car [place] her fiance and he crossed [address] and she had a sharp pain on top of her pacemaker that lasted 3-4 seconds. She denies any other symptoms and the pain has not returned. I asked that she send in carelink to see if it shows anything. She will do that once she gets home.

## 2024-07-30 RX ORDER — TRAZODONE HYDROCHLORIDE 50 MG/1
50 TABLET ORAL NIGHTLY
COMMUNITY
Start: 2024-06-10 | End: 2024-07-30 | Stop reason: SDUPTHER

## 2024-07-30 RX ORDER — TRAZODONE HYDROCHLORIDE 50 MG/1
50 TABLET ORAL NIGHTLY
Qty: 30 TABLET | Refills: 0 | Status: SHIPPED | OUTPATIENT
Start: 2024-07-30 | End: 2024-08-01 | Stop reason: SDUPTHER

## 2024-07-30 RX ORDER — PANTOPRAZOLE SODIUM 40 MG/1
TABLET, DELAYED RELEASE ORAL
Qty: 30 TABLET | Refills: 0 | Status: SHIPPED | OUTPATIENT
Start: 2024-07-30

## 2024-08-01 ENCOUNTER — OFFICE VISIT (OUTPATIENT)
Dept: PRIMARY CARE CLINIC | Age: 74
End: 2024-08-01
Payer: MEDICARE

## 2024-08-01 VITALS
TEMPERATURE: 98.4 F | OXYGEN SATURATION: 98 % | WEIGHT: 148 LBS | HEART RATE: 79 BPM | BODY MASS INDEX: 21.92 KG/M2 | HEIGHT: 69 IN | RESPIRATION RATE: 16 BRPM | SYSTOLIC BLOOD PRESSURE: 122 MMHG | DIASTOLIC BLOOD PRESSURE: 80 MMHG

## 2024-08-01 DIAGNOSIS — R30.0 DYSURIA: ICD-10-CM

## 2024-08-01 DIAGNOSIS — G47.00 INSOMNIA, UNSPECIFIED TYPE: ICD-10-CM

## 2024-08-01 DIAGNOSIS — E78.2 MIXED HYPERLIPIDEMIA: ICD-10-CM

## 2024-08-01 DIAGNOSIS — E03.9 ACQUIRED HYPOTHYROIDISM: ICD-10-CM

## 2024-08-01 DIAGNOSIS — N39.0 ACUTE UTI: Primary | ICD-10-CM

## 2024-08-01 LAB
ANION GAP SERPL CALCULATED.3IONS-SCNC: 12 MMOL/L (ref 7–19)
APPEARANCE FLUID: CLEAR
BILIRUBIN, POC: NORMAL
BLOOD URINE, POC: NORMAL
BUN SERPL-MCNC: 14 MG/DL (ref 8–23)
CALCIUM SERPL-MCNC: 9.5 MG/DL (ref 8.8–10.2)
CHLORIDE SERPL-SCNC: 105 MMOL/L (ref 98–111)
CLARITY, POC: CLEAR
CO2 SERPL-SCNC: 25 MMOL/L (ref 22–29)
COLOR, POC: YELLOW
CREAT SERPL-MCNC: 0.8 MG/DL (ref 0.5–0.9)
GLUCOSE SERPL-MCNC: 94 MG/DL (ref 74–109)
GLUCOSE URINE, POC: NORMAL
KETONES, POC: NORMAL
LEUKOCYTE EST, POC: NORMAL
NITRITE, POC: NORMAL
PH, POC: 7
POTASSIUM SERPL-SCNC: 4.6 MMOL/L (ref 3.5–5)
PROTEIN, POC: NORMAL
SODIUM SERPL-SCNC: 142 MMOL/L (ref 136–145)
SPECIFIC GRAVITY, POC: 1.01
T4 FREE SERPL-MCNC: 1.16 NG/DL (ref 0.93–1.7)
TSH SERPL DL<=0.005 MIU/L-ACNC: 8.62 UIU/ML (ref 0.27–4.2)
UROBILINOGEN, POC: NORMAL

## 2024-08-01 PROCEDURE — 99214 OFFICE O/P EST MOD 30 MIN: CPT | Performed by: FAMILY MEDICINE

## 2024-08-01 PROCEDURE — 81002 URINALYSIS NONAUTO W/O SCOPE: CPT | Performed by: FAMILY MEDICINE

## 2024-08-01 PROCEDURE — 1123F ACP DISCUSS/DSCN MKR DOCD: CPT | Performed by: FAMILY MEDICINE

## 2024-08-01 RX ORDER — CIPROFLOXACIN 250 MG/1
250 TABLET, FILM COATED ORAL 2 TIMES DAILY
Qty: 14 TABLET | Refills: 0 | Status: SHIPPED | OUTPATIENT
Start: 2024-08-01 | End: 2024-08-08

## 2024-08-01 RX ORDER — TRAZODONE HYDROCHLORIDE 100 MG/1
100 TABLET ORAL NIGHTLY
Qty: 30 TABLET | Refills: 0 | Status: SHIPPED | OUTPATIENT
Start: 2024-08-01 | End: 2024-08-31

## 2024-08-01 RX ORDER — FAMOTIDINE 20 MG/1
20 TABLET, FILM COATED ORAL 2 TIMES DAILY
Qty: 60 TABLET | Refills: 3 | Status: SHIPPED | OUTPATIENT
Start: 2024-08-01

## 2024-08-01 ASSESSMENT — ENCOUNTER SYMPTOMS
BACK PAIN: 1
VOMITING: 0
COLOR CHANGE: 0
DIARRHEA: 0
EYE DISCHARGE: 0
ABDOMINAL PAIN: 0
NAUSEA: 0
WHEEZING: 0
COUGH: 0

## 2024-08-01 NOTE — PROGRESS NOTES
SUBJECTIVE:    Patient ID: Debbie Asher is a 73 y.o. female.    HPI:   Patient is seen today for 3-month follow-up.  She states that she is taking her Synthroid regularly.  She does need refills on this today.  She states she would be okay with getting some lab work done to assess her thyroid function.  She states that she has not had any recent changes in the dosage of her Synthroid.    She is having some burning urgency frequency lower back pain and vaginal itching.  She states that this has been going on for the last few days.  She has not been on any antibiotics recently.  She has not had any blood in her urine that she has seen.  She denies any fever.  She did leave a urine specimen today.    She also needs a refill on her Vraylar which is working well for her depression.  She feels like she is doing very well from that standpoint.  She actually has gained a little bit of her weight back that she had lost because she feels better and feels like she is eating better.    Past Medical History:   Diagnosis Date    Cerebral artery occlusion with cerebral infarction (HCC)     TIA    COPD (chronic obstructive pulmonary disease) (HCC)     Depression     Diverticular disease of colon     Hyperlipidemia     Hypothyroidism     Insomnia     Pacemaker     Pneumonia     Psychiatric problem     Seizures (HCC)     Tobacco abuse     Vitamin B12 deficiency       Current Outpatient Medications on File Prior to Visit   Medication Sig Dispense Refill    pantoprazole (PROTONIX) 40 MG tablet TAKE 1 TABLET BY MOUTH ONCE DAILY IN THE MORNING BEFORE BREAKFAST 30 tablet 0    diclofenac sodium (VOLTAREN) 1 % GEL Apply 2 g topically 4 times daily For knee pain and inflammation 60 g 3    atorvastatin (LIPITOR) 40 MG tablet Take 1 tablet by mouth nightly 90 tablet 1    Nutritional Supplements (ENSURE ORIGINAL) LIQD Take 1 each by mouth in the morning and at bedtime 60 each 2    desvenlafaxine succinate (PRISTIQ) 25 MG TB24 extended release

## 2024-08-03 LAB
BACTERIA UR CULT: ABNORMAL
BACTERIA UR CULT: ABNORMAL
ORGANISM: ABNORMAL

## 2024-08-05 RX ORDER — LEVOTHYROXINE SODIUM 0.1 MG/1
100 TABLET ORAL DAILY
Qty: 90 TABLET | Refills: 3 | Status: SHIPPED | OUTPATIENT
Start: 2024-08-05

## 2024-08-12 RX ORDER — LEVOTHYROXINE SODIUM 88 UG/1
TABLET ORAL
Qty: 90 TABLET | Refills: 0 | OUTPATIENT
Start: 2024-08-12

## 2024-08-19 DIAGNOSIS — R00.1 BRADYCARDIA: ICD-10-CM

## 2024-08-19 DIAGNOSIS — Z95.0 PACEMAKER: Primary | ICD-10-CM

## 2024-08-19 PROCEDURE — 93294 REM INTERROG EVL PM/LDLS PM: CPT | Performed by: NURSE PRACTITIONER

## 2024-08-19 PROCEDURE — 93296 REM INTERROG EVL PM/IDS: CPT | Performed by: NURSE PRACTITIONER

## 2024-08-26 RX ORDER — TRAZODONE HYDROCHLORIDE 100 MG/1
100 TABLET ORAL NIGHTLY
Qty: 30 TABLET | Refills: 0 | Status: SHIPPED | OUTPATIENT
Start: 2024-08-26 | End: 2024-09-25

## 2024-09-11 RX ORDER — PANTOPRAZOLE SODIUM 40 MG/1
TABLET, DELAYED RELEASE ORAL
Qty: 30 TABLET | Refills: 0 | Status: SHIPPED | OUTPATIENT
Start: 2024-09-11

## 2024-09-19 ENCOUNTER — APPOINTMENT (OUTPATIENT)
Dept: GENERAL RADIOLOGY | Age: 74
End: 2024-09-19
Payer: MEDICARE

## 2024-09-19 ENCOUNTER — HOSPITAL ENCOUNTER (OUTPATIENT)
Age: 74
Setting detail: OBSERVATION
Discharge: HOME | End: 2024-09-20
Attending: STUDENT IN AN ORGANIZED HEALTH CARE EDUCATION/TRAINING PROGRAM | Admitting: STUDENT IN AN ORGANIZED HEALTH CARE EDUCATION/TRAINING PROGRAM
Payer: MEDICARE

## 2024-09-19 DIAGNOSIS — R42 DIZZINESS: Primary | ICD-10-CM

## 2024-09-19 DIAGNOSIS — I95.1 ORTHOSTATIC HYPOTENSION: ICD-10-CM

## 2024-09-19 PROBLEM — E03.9 HYPOTHYROID: Status: ACTIVE | Noted: 2024-09-19

## 2024-09-19 PROBLEM — D50.9 IRON DEFICIENCY ANEMIA: Status: ACTIVE | Noted: 2024-09-19

## 2024-09-19 PROBLEM — R12 HEARTBURN: Status: ACTIVE | Noted: 2024-09-19

## 2024-09-19 LAB
ALBUMIN SERPL-MCNC: 3.9 G/DL (ref 3.5–5.2)
ALP SERPL-CCNC: 98 U/L (ref 35–104)
ALT SERPL-CCNC: 11 U/L (ref 5–33)
ANION GAP SERPL CALCULATED.3IONS-SCNC: 9 MMOL/L (ref 7–19)
AST SERPL-CCNC: 18 U/L (ref 5–32)
BACTERIA URNS QL MICRO: NEGATIVE /HPF
BASOPHILS # BLD: 0.1 K/UL (ref 0–0.2)
BASOPHILS NFR BLD: 0.8 % (ref 0–1)
BILIRUB SERPL-MCNC: 0.4 MG/DL (ref 0.2–1.2)
BILIRUB UR QL STRIP: NEGATIVE
BUN SERPL-MCNC: 20 MG/DL (ref 8–23)
CALCIUM SERPL-MCNC: 10.1 MG/DL (ref 8.8–10.2)
CHLORIDE SERPL-SCNC: 105 MMOL/L (ref 98–111)
CLARITY UR: CLEAR
CO2 SERPL-SCNC: 26 MMOL/L (ref 22–29)
COLOR UR: YELLOW
CREAT SERPL-MCNC: 0.8 MG/DL (ref 0.5–0.9)
CRYSTALS URNS MICRO: NORMAL /HPF
EOSINOPHIL # BLD: 0.1 K/UL (ref 0–0.6)
EOSINOPHIL NFR BLD: 1 % (ref 0–5)
EPI CELLS #/AREA URNS AUTO: 1 /HPF (ref 0–5)
ERYTHROCYTE [DISTWIDTH] IN BLOOD BY AUTOMATED COUNT: 13.8 % (ref 11.5–14.5)
GLUCOSE SERPL-MCNC: 151 MG/DL (ref 70–99)
GLUCOSE UR STRIP.AUTO-MCNC: NEGATIVE MG/DL
HCT VFR BLD AUTO: 42.3 % (ref 37–47)
HGB BLD-MCNC: 13.1 G/DL (ref 12–16)
HGB UR STRIP.AUTO-MCNC: NEGATIVE MG/L
HYALINE CASTS #/AREA URNS AUTO: 0 /HPF (ref 0–8)
IMM GRANULOCYTES # BLD: 0 K/UL
KETONES UR STRIP.AUTO-MCNC: NEGATIVE MG/DL
LEUKOCYTE ESTERASE UR QL STRIP.AUTO: ABNORMAL
LYMPHOCYTES # BLD: 1.5 K/UL (ref 1.1–4.5)
LYMPHOCYTES NFR BLD: 21.7 % (ref 20–40)
MCH RBC QN AUTO: 27 PG (ref 27–31)
MCHC RBC AUTO-ENTMCNC: 31 G/DL (ref 33–37)
MCV RBC AUTO: 87 FL (ref 81–99)
MONOCYTES # BLD: 0.4 K/UL (ref 0–0.9)
MONOCYTES NFR BLD: 5.9 % (ref 0–10)
NEUTROPHILS # BLD: 5 K/UL (ref 1.5–7.5)
NEUTS SEG NFR BLD: 70.3 % (ref 50–65)
NITRITE UR QL STRIP.AUTO: NEGATIVE
PH UR STRIP.AUTO: 7 [PH] (ref 5–8)
PLATELET # BLD AUTO: 115 K/UL (ref 130–400)
PMV BLD AUTO: 11.3 FL (ref 9.4–12.3)
POTASSIUM SERPL-SCNC: 4.2 MMOL/L (ref 3.5–5)
PROT SERPL-MCNC: 6.8 G/DL (ref 6.4–8.3)
PROT UR STRIP.AUTO-MCNC: NEGATIVE MG/DL
RBC # BLD AUTO: 4.86 M/UL (ref 4.2–5.4)
RBC #/AREA URNS AUTO: 0 /HPF (ref 0–4)
SODIUM SERPL-SCNC: 140 MMOL/L (ref 136–145)
SP GR UR STRIP.AUTO: 1.01 (ref 1–1.03)
TROPONIN, HIGH SENSITIVITY: <6 NG/L (ref 0–14)
UROBILINOGEN UR STRIP.AUTO-MCNC: 0.2 E.U./DL
WBC # BLD AUTO: 7.1 K/UL (ref 4.8–10.8)
WBC #/AREA URNS AUTO: 3 /HPF (ref 0–5)

## 2024-09-19 PROCEDURE — 71045 X-RAY EXAM CHEST 1 VIEW: CPT

## 2024-09-19 PROCEDURE — G0378 HOSPITAL OBSERVATION PER HR: HCPCS

## 2024-09-19 PROCEDURE — 96360 HYDRATION IV INFUSION INIT: CPT

## 2024-09-19 PROCEDURE — 85025 COMPLETE CBC W/AUTO DIFF WBC: CPT

## 2024-09-19 PROCEDURE — 80053 COMPREHEN METABOLIC PANEL: CPT

## 2024-09-19 PROCEDURE — 6360000002 HC RX W HCPCS

## 2024-09-19 PROCEDURE — 96361 HYDRATE IV INFUSION ADD-ON: CPT

## 2024-09-19 PROCEDURE — 93005 ELECTROCARDIOGRAM TRACING: CPT | Performed by: EMERGENCY MEDICINE

## 2024-09-19 PROCEDURE — 2580000003 HC RX 258: Performed by: PHYSICIAN ASSISTANT

## 2024-09-19 PROCEDURE — 6370000000 HC RX 637 (ALT 250 FOR IP)

## 2024-09-19 PROCEDURE — 81001 URINALYSIS AUTO W/SCOPE: CPT

## 2024-09-19 PROCEDURE — 36415 COLL VENOUS BLD VENIPUNCTURE: CPT

## 2024-09-19 PROCEDURE — 2580000003 HC RX 258

## 2024-09-19 PROCEDURE — 99285 EMERGENCY DEPT VISIT HI MDM: CPT

## 2024-09-19 PROCEDURE — 84484 ASSAY OF TROPONIN QUANT: CPT

## 2024-09-19 PROCEDURE — 96372 THER/PROPH/DIAG INJ SC/IM: CPT

## 2024-09-19 PROCEDURE — 94640 AIRWAY INHALATION TREATMENT: CPT

## 2024-09-19 RX ORDER — SODIUM CHLORIDE 0.9 % (FLUSH) 0.9 %
5-40 SYRINGE (ML) INJECTION PRN
Status: DISCONTINUED | OUTPATIENT
Start: 2024-09-19 | End: 2024-09-20 | Stop reason: HOSPADM

## 2024-09-19 RX ORDER — MAGNESIUM SULFATE IN WATER 40 MG/ML
2000 INJECTION, SOLUTION INTRAVENOUS PRN
Status: DISCONTINUED | OUTPATIENT
Start: 2024-09-19 | End: 2024-09-20 | Stop reason: HOSPADM

## 2024-09-19 RX ORDER — ASPIRIN 81 MG/1
81 TABLET ORAL 2 TIMES DAILY
Status: DISCONTINUED | OUTPATIENT
Start: 2024-09-19 | End: 2024-09-20 | Stop reason: HOSPADM

## 2024-09-19 RX ORDER — TRAZODONE HYDROCHLORIDE 100 MG/1
100 TABLET ORAL NIGHTLY
Status: DISCONTINUED | OUTPATIENT
Start: 2024-09-19 | End: 2024-09-20 | Stop reason: HOSPADM

## 2024-09-19 RX ORDER — ONDANSETRON 4 MG/1
4 TABLET, ORALLY DISINTEGRATING ORAL EVERY 8 HOURS PRN
Status: DISCONTINUED | OUTPATIENT
Start: 2024-09-19 | End: 2024-09-20 | Stop reason: HOSPADM

## 2024-09-19 RX ORDER — FAMOTIDINE 20 MG/1
20 TABLET, FILM COATED ORAL 2 TIMES DAILY
Status: DISCONTINUED | OUTPATIENT
Start: 2024-09-19 | End: 2024-09-20 | Stop reason: HOSPADM

## 2024-09-19 RX ORDER — FERROUS SULFATE 325(65) MG
325 TABLET ORAL
Status: DISCONTINUED | OUTPATIENT
Start: 2024-09-20 | End: 2024-09-20 | Stop reason: HOSPADM

## 2024-09-19 RX ORDER — POLYETHYLENE GLYCOL 3350 17 G/17G
17 POWDER, FOR SOLUTION ORAL DAILY PRN
Status: DISCONTINUED | OUTPATIENT
Start: 2024-09-19 | End: 2024-09-20 | Stop reason: HOSPADM

## 2024-09-19 RX ORDER — SODIUM CHLORIDE 0.9 % (FLUSH) 0.9 %
5-40 SYRINGE (ML) INJECTION EVERY 12 HOURS SCHEDULED
Status: DISCONTINUED | OUTPATIENT
Start: 2024-09-19 | End: 2024-09-20 | Stop reason: HOSPADM

## 2024-09-19 RX ORDER — POTASSIUM CHLORIDE 1500 MG/1
40 TABLET, EXTENDED RELEASE ORAL PRN
Status: DISCONTINUED | OUTPATIENT
Start: 2024-09-19 | End: 2024-09-20 | Stop reason: HOSPADM

## 2024-09-19 RX ORDER — PANTOPRAZOLE SODIUM 40 MG/1
40 TABLET, DELAYED RELEASE ORAL
Status: DISCONTINUED | OUTPATIENT
Start: 2024-09-20 | End: 2024-09-20 | Stop reason: HOSPADM

## 2024-09-19 RX ORDER — ATORVASTATIN CALCIUM 40 MG/1
40 TABLET, FILM COATED ORAL NIGHTLY
Status: DISCONTINUED | OUTPATIENT
Start: 2024-09-19 | End: 2024-09-20 | Stop reason: HOSPADM

## 2024-09-19 RX ORDER — POTASSIUM CHLORIDE 7.45 MG/ML
10 INJECTION INTRAVENOUS PRN
Status: DISCONTINUED | OUTPATIENT
Start: 2024-09-19 | End: 2024-09-20 | Stop reason: HOSPADM

## 2024-09-19 RX ORDER — ALBUTEROL SULFATE 90 UG/1
2 INHALANT RESPIRATORY (INHALATION) EVERY 6 HOURS PRN
Status: DISCONTINUED | OUTPATIENT
Start: 2024-09-19 | End: 2024-09-20 | Stop reason: HOSPADM

## 2024-09-19 RX ORDER — ONDANSETRON 2 MG/ML
4 INJECTION INTRAMUSCULAR; INTRAVENOUS EVERY 6 HOURS PRN
Status: DISCONTINUED | OUTPATIENT
Start: 2024-09-19 | End: 2024-09-20 | Stop reason: HOSPADM

## 2024-09-19 RX ORDER — 0.9 % SODIUM CHLORIDE 0.9 %
1000 INTRAVENOUS SOLUTION INTRAVENOUS ONCE
Status: COMPLETED | OUTPATIENT
Start: 2024-09-19 | End: 2024-09-19

## 2024-09-19 RX ORDER — QUETIAPINE FUMARATE 25 MG/1
25 TABLET, FILM COATED ORAL NIGHTLY
Status: DISCONTINUED | OUTPATIENT
Start: 2024-09-19 | End: 2024-09-20 | Stop reason: HOSPADM

## 2024-09-19 RX ORDER — BUDESONIDE AND FORMOTEROL FUMARATE DIHYDRATE 160; 4.5 UG/1; UG/1
2 AEROSOL RESPIRATORY (INHALATION) 2 TIMES DAILY
Status: DISCONTINUED | OUTPATIENT
Start: 2024-09-19 | End: 2024-09-20 | Stop reason: HOSPADM

## 2024-09-19 RX ORDER — ACETAMINOPHEN 325 MG/1
650 TABLET ORAL EVERY 6 HOURS PRN
Status: DISCONTINUED | OUTPATIENT
Start: 2024-09-19 | End: 2024-09-20 | Stop reason: HOSPADM

## 2024-09-19 RX ORDER — ACETAMINOPHEN 650 MG/1
650 SUPPOSITORY RECTAL EVERY 6 HOURS PRN
Status: DISCONTINUED | OUTPATIENT
Start: 2024-09-19 | End: 2024-09-20 | Stop reason: HOSPADM

## 2024-09-19 RX ORDER — VENLAFAXINE HYDROCHLORIDE 75 MG/1
75 CAPSULE, EXTENDED RELEASE ORAL
Status: DISCONTINUED | OUTPATIENT
Start: 2024-09-20 | End: 2024-09-20 | Stop reason: HOSPADM

## 2024-09-19 RX ORDER — LEVOTHYROXINE SODIUM 100 UG/1
100 TABLET ORAL DAILY
Status: DISCONTINUED | OUTPATIENT
Start: 2024-09-19 | End: 2024-09-20 | Stop reason: HOSPADM

## 2024-09-19 RX ORDER — ENOXAPARIN SODIUM 100 MG/ML
40 INJECTION SUBCUTANEOUS DAILY
Status: DISCONTINUED | OUTPATIENT
Start: 2024-09-19 | End: 2024-09-20 | Stop reason: HOSPADM

## 2024-09-19 RX ORDER — SODIUM CHLORIDE 9 MG/ML
INJECTION, SOLUTION INTRAVENOUS PRN
Status: DISCONTINUED | OUTPATIENT
Start: 2024-09-19 | End: 2024-09-20 | Stop reason: HOSPADM

## 2024-09-19 RX ADMIN — LEVOTHYROXINE SODIUM 100 MCG: 100 TABLET ORAL at 17:04

## 2024-09-19 RX ADMIN — CARIPRAZINE 1.5 MG: 1.5 CAPSULE, GELATIN COATED ORAL at 17:04

## 2024-09-19 RX ADMIN — ATORVASTATIN CALCIUM 40 MG: 40 TABLET, FILM COATED ORAL at 21:44

## 2024-09-19 RX ADMIN — SODIUM CHLORIDE 1000 ML: 9 INJECTION, SOLUTION INTRAVENOUS at 09:25

## 2024-09-19 RX ADMIN — ENOXAPARIN SODIUM 40 MG: 100 INJECTION SUBCUTANEOUS at 17:04

## 2024-09-19 RX ADMIN — SODIUM CHLORIDE 1000 ML: 9 INJECTION, SOLUTION INTRAVENOUS at 14:48

## 2024-09-19 RX ADMIN — FAMOTIDINE 20 MG: 20 TABLET ORAL at 21:44

## 2024-09-19 RX ADMIN — BUDESONIDE AND FORMOTEROL FUMARATE DIHYDRATE 2 PUFF: 160; 4.5 AEROSOL RESPIRATORY (INHALATION) at 19:01

## 2024-09-19 RX ADMIN — SODIUM CHLORIDE, PRESERVATIVE FREE 10 ML: 5 INJECTION INTRAVENOUS at 23:22

## 2024-09-19 RX ADMIN — ASPIRIN 81 MG: 81 TABLET, COATED ORAL at 21:44

## 2024-09-19 ASSESSMENT — ENCOUNTER SYMPTOMS
ABDOMINAL PAIN: 0
VOMITING: 0
BACK PAIN: 0
CHEST TIGHTNESS: 0
COLOR CHANGE: 0
COUGH: 0
DIARRHEA: 0
WHEEZING: 0
NAUSEA: 0
CONSTIPATION: 0
SHORTNESS OF BREATH: 0

## 2024-09-20 VITALS
HEIGHT: 68 IN | SYSTOLIC BLOOD PRESSURE: 124 MMHG | WEIGHT: 150 LBS | RESPIRATION RATE: 18 BRPM | HEART RATE: 61 BPM | TEMPERATURE: 97.7 F | OXYGEN SATURATION: 97 % | DIASTOLIC BLOOD PRESSURE: 63 MMHG | BODY MASS INDEX: 22.73 KG/M2

## 2024-09-20 LAB
ANION GAP SERPL CALCULATED.3IONS-SCNC: 7 MMOL/L (ref 7–19)
BASOPHILS # BLD: 0 K/UL (ref 0–0.2)
BASOPHILS NFR BLD: 0.9 % (ref 0–1)
BUN SERPL-MCNC: 12 MG/DL (ref 8–23)
CALCIUM SERPL-MCNC: 8.9 MG/DL (ref 8.8–10.2)
CHLORIDE SERPL-SCNC: 111 MMOL/L (ref 98–111)
CO2 SERPL-SCNC: 26 MMOL/L (ref 22–29)
CREAT SERPL-MCNC: 0.7 MG/DL (ref 0.5–0.9)
EKG P AXIS: 61 DEGREES
EKG P-R INTERVAL: 140 MS
EKG Q-T INTERVAL: 440 MS
EKG QRS DURATION: 116 MS
EKG QTC CALCULATION (BAZETT): 447 MS
EKG T AXIS: 39 DEGREES
EOSINOPHIL # BLD: 0.1 K/UL (ref 0–0.6)
EOSINOPHIL NFR BLD: 3.1 % (ref 0–5)
ERYTHROCYTE [DISTWIDTH] IN BLOOD BY AUTOMATED COUNT: 13.8 % (ref 11.5–14.5)
GLUCOSE SERPL-MCNC: 113 MG/DL (ref 70–99)
HCT VFR BLD AUTO: 36.2 % (ref 37–47)
HGB BLD-MCNC: 11.4 G/DL (ref 12–16)
IMM GRANULOCYTES # BLD: 0 K/UL
LYMPHOCYTES # BLD: 1.7 K/UL (ref 1.1–4.5)
LYMPHOCYTES NFR BLD: 37.1 % (ref 20–40)
MCH RBC QN AUTO: 27.2 PG (ref 27–31)
MCHC RBC AUTO-ENTMCNC: 31.5 G/DL (ref 33–37)
MCV RBC AUTO: 86.4 FL (ref 81–99)
MONOCYTES # BLD: 0.3 K/UL (ref 0–0.9)
MONOCYTES NFR BLD: 7.6 % (ref 0–10)
NEUTROPHILS # BLD: 2.3 K/UL (ref 1.5–7.5)
NEUTS SEG NFR BLD: 51.1 % (ref 50–65)
PLATELET # BLD AUTO: 94 K/UL (ref 130–400)
PMV BLD AUTO: 11.5 FL (ref 9.4–12.3)
POTASSIUM SERPL-SCNC: 4.2 MMOL/L (ref 3.5–5)
RBC # BLD AUTO: 4.19 M/UL (ref 4.2–5.4)
SODIUM SERPL-SCNC: 144 MMOL/L (ref 136–145)
WBC # BLD AUTO: 4.5 K/UL (ref 4.8–10.8)

## 2024-09-20 PROCEDURE — 6370000000 HC RX 637 (ALT 250 FOR IP)

## 2024-09-20 PROCEDURE — G0378 HOSPITAL OBSERVATION PER HR: HCPCS

## 2024-09-20 PROCEDURE — 6370000000 HC RX 637 (ALT 250 FOR IP): Performed by: NURSE PRACTITIONER

## 2024-09-20 PROCEDURE — 94640 AIRWAY INHALATION TREATMENT: CPT

## 2024-09-20 PROCEDURE — 94760 N-INVAS EAR/PLS OXIMETRY 1: CPT

## 2024-09-20 PROCEDURE — 85025 COMPLETE CBC W/AUTO DIFF WBC: CPT

## 2024-09-20 PROCEDURE — 80048 BASIC METABOLIC PNL TOTAL CA: CPT

## 2024-09-20 PROCEDURE — 2580000003 HC RX 258

## 2024-09-20 PROCEDURE — 93010 ELECTROCARDIOGRAM REPORT: CPT | Performed by: INTERNAL MEDICINE

## 2024-09-20 PROCEDURE — 36415 COLL VENOUS BLD VENIPUNCTURE: CPT

## 2024-09-20 RX ORDER — MIDODRINE HYDROCHLORIDE 2.5 MG/1
2.5 TABLET ORAL
Status: DISCONTINUED | OUTPATIENT
Start: 2024-09-20 | End: 2024-09-20 | Stop reason: HOSPADM

## 2024-09-20 RX ORDER — MIDODRINE HYDROCHLORIDE 2.5 MG/1
2.5 TABLET ORAL
Qty: 90 TABLET | Refills: 0 | Status: SHIPPED | OUTPATIENT
Start: 2024-09-20

## 2024-09-20 RX ADMIN — CARIPRAZINE 1.5 MG: 1.5 CAPSULE, GELATIN COATED ORAL at 08:05

## 2024-09-20 RX ADMIN — FERROUS SULFATE TAB 325 MG (65 MG ELEMENTAL FE) 325 MG: 325 (65 FE) TAB at 08:04

## 2024-09-20 RX ADMIN — FAMOTIDINE 20 MG: 20 TABLET ORAL at 08:05

## 2024-09-20 RX ADMIN — MIDODRINE HYDROCHLORIDE 2.5 MG: 2.5 TABLET ORAL at 13:24

## 2024-09-20 RX ADMIN — LEVOTHYROXINE SODIUM 100 MCG: 100 TABLET ORAL at 08:05

## 2024-09-20 RX ADMIN — PANTOPRAZOLE SODIUM 40 MG: 40 TABLET, DELAYED RELEASE ORAL at 05:35

## 2024-09-20 RX ADMIN — VENLAFAXINE HYDROCHLORIDE 75 MG: 75 CAPSULE, EXTENDED RELEASE ORAL at 08:05

## 2024-09-20 RX ADMIN — SODIUM CHLORIDE, PRESERVATIVE FREE 10 ML: 5 INJECTION INTRAVENOUS at 08:05

## 2024-09-20 RX ADMIN — BUDESONIDE AND FORMOTEROL FUMARATE DIHYDRATE 2 PUFF: 160; 4.5 AEROSOL RESPIRATORY (INHALATION) at 06:53

## 2024-09-20 RX ADMIN — ASPIRIN 81 MG: 81 TABLET, COATED ORAL at 08:05

## 2024-09-23 ENCOUNTER — TELEPHONE (OUTPATIENT)
Dept: PRIMARY CARE CLINIC | Age: 74
End: 2024-09-23

## 2024-09-26 ENCOUNTER — OFFICE VISIT (OUTPATIENT)
Dept: PRIMARY CARE CLINIC | Age: 74
End: 2024-09-26

## 2024-09-26 VITALS
SYSTOLIC BLOOD PRESSURE: 104 MMHG | WEIGHT: 146 LBS | HEART RATE: 92 BPM | TEMPERATURE: 97.1 F | OXYGEN SATURATION: 97 % | HEIGHT: 68 IN | BODY MASS INDEX: 22.13 KG/M2 | DIASTOLIC BLOOD PRESSURE: 64 MMHG | RESPIRATION RATE: 16 BRPM

## 2024-09-26 DIAGNOSIS — Z09 HOSPITAL DISCHARGE FOLLOW-UP: ICD-10-CM

## 2024-09-26 DIAGNOSIS — R42 DIZZINESS: ICD-10-CM

## 2024-09-26 DIAGNOSIS — I95.1 ORTHOSTATIC HYPOTENSION: ICD-10-CM

## 2024-09-26 DIAGNOSIS — Z23 NEED FOR INFLUENZA VACCINATION: ICD-10-CM

## 2024-09-26 DIAGNOSIS — I95.9 HYPOTENSION, UNSPECIFIED HYPOTENSION TYPE: Primary | ICD-10-CM

## 2024-09-26 SDOH — ECONOMIC STABILITY: FOOD INSECURITY: WITHIN THE PAST 12 MONTHS, YOU WORRIED THAT YOUR FOOD WOULD RUN OUT BEFORE YOU GOT MONEY TO BUY MORE.: NEVER TRUE

## 2024-09-26 SDOH — ECONOMIC STABILITY: INCOME INSECURITY: HOW HARD IS IT FOR YOU TO PAY FOR THE VERY BASICS LIKE FOOD, HOUSING, MEDICAL CARE, AND HEATING?: NOT HARD AT ALL

## 2024-09-26 SDOH — ECONOMIC STABILITY: FOOD INSECURITY: WITHIN THE PAST 12 MONTHS, THE FOOD YOU BOUGHT JUST DIDN'T LAST AND YOU DIDN'T HAVE MONEY TO GET MORE.: NEVER TRUE

## 2024-09-26 ASSESSMENT — PATIENT HEALTH QUESTIONNAIRE - PHQ9
5. POOR APPETITE OR OVEREATING: NOT AT ALL
3. TROUBLE FALLING OR STAYING ASLEEP: NOT AT ALL
7. TROUBLE CONCENTRATING ON THINGS, SUCH AS READING THE NEWSPAPER OR WATCHING TELEVISION: NOT AT ALL
4. FEELING TIRED OR HAVING LITTLE ENERGY: NOT AT ALL
8. MOVING OR SPEAKING SO SLOWLY THAT OTHER PEOPLE COULD HAVE NOTICED. OR THE OPPOSITE, BEING SO FIGETY OR RESTLESS THAT YOU HAVE BEEN MOVING AROUND A LOT MORE THAN USUAL: NOT AT ALL
SUM OF ALL RESPONSES TO PHQ QUESTIONS 1-9: 0
SUM OF ALL RESPONSES TO PHQ QUESTIONS 1-9: 0
6. FEELING BAD ABOUT YOURSELF - OR THAT YOU ARE A FAILURE OR HAVE LET YOURSELF OR YOUR FAMILY DOWN: NOT AT ALL
1. LITTLE INTEREST OR PLEASURE IN DOING THINGS: NOT AT ALL
2. FEELING DOWN, DEPRESSED OR HOPELESS: NOT AT ALL
9. THOUGHTS THAT YOU WOULD BE BETTER OFF DEAD, OR OF HURTING YOURSELF: NOT AT ALL
SUM OF ALL RESPONSES TO PHQ QUESTIONS 1-9: 0
SUM OF ALL RESPONSES TO PHQ QUESTIONS 1-9: 0
10. IF YOU CHECKED OFF ANY PROBLEMS, HOW DIFFICULT HAVE THESE PROBLEMS MADE IT FOR YOU TO DO YOUR WORK, TAKE CARE OF THINGS AT HOME, OR GET ALONG WITH OTHER PEOPLE: NOT DIFFICULT AT ALL
SUM OF ALL RESPONSES TO PHQ9 QUESTIONS 1 & 2: 0

## 2024-09-28 ENCOUNTER — HOSPITAL ENCOUNTER (EMERGENCY)
Facility: HOSPITAL | Age: 74
Discharge: HOME | End: 2024-09-28
Attending: EMERGENCY MEDICINE
Payer: MEDICARE

## 2024-09-28 VITALS
SYSTOLIC BLOOD PRESSURE: 127 MMHG | HEART RATE: 66 BPM | WEIGHT: 149 LBS | DIASTOLIC BLOOD PRESSURE: 72 MMHG | OXYGEN SATURATION: 98 % | BODY MASS INDEX: 22.58 KG/M2 | RESPIRATION RATE: 18 BRPM | TEMPERATURE: 98.5 F | HEIGHT: 68 IN

## 2024-09-28 DIAGNOSIS — R53.1 WEAKNESS: Primary | ICD-10-CM

## 2024-09-28 LAB
ALBUMIN SERPL-MCNC: 3.8 G/DL (ref 3.5–5.2)
ALBUMIN/GLOB SERPL: 1.5 G/DL
ALP SERPL-CCNC: 102 U/L (ref 39–117)
ALT SERPL W P-5'-P-CCNC: 13 U/L (ref 1–33)
ANION GAP SERPL CALCULATED.3IONS-SCNC: 7 MMOL/L (ref 5–15)
AST SERPL-CCNC: 18 U/L (ref 1–32)
BASOPHILS # BLD AUTO: 0.05 10*3/MM3 (ref 0–0.2)
BASOPHILS NFR BLD AUTO: 0.7 % (ref 0–1.5)
BILIRUB SERPL-MCNC: 0.3 MG/DL (ref 0–1.2)
BILIRUB UR QL STRIP: NEGATIVE
BUN SERPL-MCNC: 19 MG/DL (ref 8–23)
BUN/CREAT SERPL: 25 (ref 7–25)
CALCIUM SPEC-SCNC: 9.3 MG/DL (ref 8.6–10.5)
CHLORIDE SERPL-SCNC: 107 MMOL/L (ref 98–107)
CK SERPL-CCNC: 35 U/L (ref 20–180)
CLARITY UR: CLEAR
CO2 SERPL-SCNC: 28 MMOL/L (ref 22–29)
COLOR UR: YELLOW
CREAT SERPL-MCNC: 0.76 MG/DL (ref 0.57–1)
DEPRECATED RDW RBC AUTO: 43.1 FL (ref 37–54)
EGFRCR SERPLBLD CKD-EPI 2021: 83.4 ML/MIN/1.73
EOSINOPHIL # BLD AUTO: 0.11 10*3/MM3 (ref 0–0.4)
EOSINOPHIL NFR BLD AUTO: 1.6 % (ref 0.3–6.2)
ERYTHROCYTE [DISTWIDTH] IN BLOOD BY AUTOMATED COUNT: 13.9 % (ref 12.3–15.4)
GLOBULIN UR ELPH-MCNC: 2.5 GM/DL
GLUCOSE SERPL-MCNC: 105 MG/DL (ref 65–99)
GLUCOSE UR STRIP-MCNC: NEGATIVE MG/DL
HCT VFR BLD AUTO: 39 % (ref 34–46.6)
HGB BLD-MCNC: 12.6 G/DL (ref 12–15.9)
HGB UR QL STRIP.AUTO: NEGATIVE
IMM GRANULOCYTES # BLD AUTO: 0.02 10*3/MM3 (ref 0–0.05)
IMM GRANULOCYTES NFR BLD AUTO: 0.3 % (ref 0–0.5)
KETONES UR QL STRIP: NEGATIVE
LEUKOCYTE ESTERASE UR QL STRIP.AUTO: NEGATIVE
LYMPHOCYTES # BLD AUTO: 1.89 10*3/MM3 (ref 0.7–3.1)
LYMPHOCYTES NFR BLD AUTO: 26.7 % (ref 19.6–45.3)
MAGNESIUM SERPL-MCNC: 1.9 MG/DL (ref 1.6–2.4)
MCH RBC QN AUTO: 27.8 PG (ref 26.6–33)
MCHC RBC AUTO-ENTMCNC: 32.3 G/DL (ref 31.5–35.7)
MCV RBC AUTO: 85.9 FL (ref 79–97)
MONOCYTES # BLD AUTO: 0.62 10*3/MM3 (ref 0.1–0.9)
MONOCYTES NFR BLD AUTO: 8.8 % (ref 5–12)
NEUTROPHILS NFR BLD AUTO: 4.38 10*3/MM3 (ref 1.7–7)
NEUTROPHILS NFR BLD AUTO: 61.9 % (ref 42.7–76)
NITRITE UR QL STRIP: NEGATIVE
PH UR STRIP.AUTO: 8 [PH] (ref 5–8)
PLATELET # BLD AUTO: 120 10*3/MM3 (ref 140–450)
PMV BLD AUTO: 11 FL (ref 6–12)
POTASSIUM SERPL-SCNC: 4.8 MMOL/L (ref 3.5–5.2)
PROT SERPL-MCNC: 6.3 G/DL (ref 6–8.5)
PROT UR QL STRIP: NEGATIVE
RBC # BLD AUTO: 4.54 10*6/MM3 (ref 3.77–5.28)
SODIUM SERPL-SCNC: 142 MMOL/L (ref 136–145)
SP GR UR STRIP: 1.01 (ref 1–1.03)
UROBILINOGEN UR QL STRIP: NORMAL
WBC NRBC COR # BLD AUTO: 7.07 10*3/MM3 (ref 3.4–10.8)

## 2024-09-28 PROCEDURE — 80053 COMPREHEN METABOLIC PANEL: CPT | Performed by: EMERGENCY MEDICINE

## 2024-09-28 PROCEDURE — 82550 ASSAY OF CK (CPK): CPT | Performed by: EMERGENCY MEDICINE

## 2024-09-28 PROCEDURE — 83735 ASSAY OF MAGNESIUM: CPT | Performed by: EMERGENCY MEDICINE

## 2024-09-28 PROCEDURE — 93005 ELECTROCARDIOGRAM TRACING: CPT | Performed by: EMERGENCY MEDICINE

## 2024-09-28 PROCEDURE — 93010 ELECTROCARDIOGRAM REPORT: CPT | Performed by: HOSPITALIST

## 2024-09-28 PROCEDURE — 81003 URINALYSIS AUTO W/O SCOPE: CPT | Performed by: EMERGENCY MEDICINE

## 2024-09-28 PROCEDURE — 36415 COLL VENOUS BLD VENIPUNCTURE: CPT

## 2024-09-28 PROCEDURE — 99283 EMERGENCY DEPT VISIT LOW MDM: CPT

## 2024-09-28 PROCEDURE — 85025 COMPLETE CBC W/AUTO DIFF WBC: CPT | Performed by: EMERGENCY MEDICINE

## 2024-09-28 RX ORDER — MIDODRINE HYDROCHLORIDE 2.5 MG/1
2.5 TABLET ORAL
COMMUNITY
Start: 2024-09-20

## 2024-09-28 RX ORDER — TRAZODONE HYDROCHLORIDE 100 MG/1
100 TABLET ORAL NIGHTLY
COMMUNITY
Start: 2024-09-06

## 2024-09-28 RX ORDER — FAMOTIDINE 20 MG/1
1 TABLET, FILM COATED ORAL EVERY 12 HOURS SCHEDULED
COMMUNITY
Start: 2024-08-01

## 2024-09-28 RX ORDER — PANTOPRAZOLE SODIUM 40 MG/1
40 TABLET, DELAYED RELEASE ORAL
COMMUNITY
Start: 2024-09-12

## 2024-09-28 NOTE — ED PROVIDER NOTES
"Subjective   History of Present Illness  Patient is a 73-year-old with nonspecific generalized weakness going on for the past couple of weeks episodically.  Came to the ED for evaluation there is no syncope nausea vomiting or shortness of breath associate with this.  No fever associate with this.    Weakness - Generalized  Severity:  Moderate  Onset quality:  Gradual  Duration:  2 weeks  Timing:  Constant  Progression:  Waxing and waning  Chronicity:  New  Context: not alcohol use, not allergies, not change in medication, not drug use, not increased activity, not stress and not urinary tract infection    Relieved by:  Nothing  Worsened by:  Nothing  Ineffective treatments:  None tried  Associated symptoms: no abdominal pain, no anorexia, no arthralgias, no ataxia, no chest pain, no diarrhea, no difficulty walking, no falls, no foul-smelling urine, no frequency, no hematochezia, no lethargy, no near-syncope, no sensory-motor deficit, no syncope, no urgency and no vision change    Risk factors: family hx of stroke    Risk factors: no anemia and no congestive heart failure        Review of Systems   Constitutional: Negative.    HENT: Negative.     Eyes: Negative.    Respiratory: Negative.     Cardiovascular: Negative.  Negative for chest pain, syncope and near-syncope.   Gastrointestinal: Negative.  Negative for abdominal pain, anorexia, diarrhea and hematochezia.   Genitourinary:  Negative for frequency and urgency.   Musculoskeletal: Negative.  Negative for arthralgias, back pain, falls and neck pain.   Skin: Negative.    All other systems reviewed and are negative.      Past Medical History:   Diagnosis Date    Collapsed lung        Allergies   Allergen Reactions    Lortab [Hydrocodone-Acetaminophen] Other (See Comments)     \"Drops BP really low.\"    Penicillins Rash       Past Surgical History:   Procedure Laterality Date    CHEST TUBE INSERTION      PACEMAKER IMPLANTATION      TUBAL ABDOMINAL LIGATION   "       Family History   Problem Relation Age of Onset    Stroke Mother     Diabetes Mother     Stroke Father     Diabetes Brother     Diabetes Child        Social History     Socioeconomic History    Marital status:    Tobacco Use    Smoking status: Every Day     Current packs/day: 1.00     Average packs/day: 1 pack/day for 47.0 years (47.0 ttl pk-yrs)     Types: Cigarettes    Smokeless tobacco: Never    Tobacco comments:     Thinking about it due to pulmonary issues.    Vaping Use    Vaping status: Never Used   Substance and Sexual Activity    Alcohol use: No    Drug use: No    Sexual activity: Yes     Partners: Male           Objective   Physical Exam  Vitals and nursing note reviewed. Exam conducted with a chaperone present.   Constitutional:       General: She is awake. She is not in acute distress.     Appearance: Normal appearance. She is well-developed. She is not toxic-appearing or diaphoretic.   HENT:      Head: Normocephalic and atraumatic.      Mouth/Throat:      Mouth: Mucous membranes are moist.      Pharynx: Oropharynx is clear.   Eyes:      General: Lids are normal. Lids are everted, no foreign bodies appreciated.      Pupils: Pupils are equal, round, and reactive to light.   Neck:      Thyroid: No thyromegaly.      Vascular: Normal carotid pulses. No carotid bruit or JVD.      Trachea: Trachea and phonation normal. No tracheal tenderness or tracheal deviation.      Meningeal: Brudzinski's sign and Kernig's sign absent.   Cardiovascular:      Rate and Rhythm: Normal rate and regular rhythm.      Pulses: Normal pulses.      Heart sounds: Normal heart sounds.   Pulmonary:      Effort: Pulmonary effort is normal. No tachypnea or respiratory distress.      Breath sounds: Normal breath sounds. No stridor.   Abdominal:      General: Abdomen is flat. Bowel sounds are normal. There is no distension.      Palpations: Abdomen is soft. There is no mass.      Tenderness: There is no abdominal tenderness.  There is no guarding.   Musculoskeletal:         General: Normal range of motion.      Cervical back: Full passive range of motion without pain, normal range of motion and neck supple. No rigidity.   Skin:     General: Skin is warm and dry.      Capillary Refill: Capillary refill takes less than 2 seconds.      Coloration: Skin is not pale.      Nails: There is no clubbing.   Neurological:      General: No focal deficit present.      Mental Status: She is alert and oriented to person, place, and time. Mental status is at baseline. She is not disoriented.      GCS: GCS eye subscore is 4. GCS verbal subscore is 5. GCS motor subscore is 6.      Cranial Nerves: No cranial nerve deficit.      Sensory: Sensation is intact. No sensory deficit.      Motor: Motor function is intact. No abnormal muscle tone.      Coordination: Coordination is intact. Coordination normal.      Deep Tendon Reflexes: Reflexes are normal and symmetric. Reflexes normal. Babinski sign absent on the right side. Babinski sign absent on the left side.      Reflex Scores:       Bicep reflexes are 2+ on the right side and 2+ on the left side.       Patellar reflexes are 2+ on the right side and 2+ on the left side.  Psychiatric:         Behavior: Behavior is cooperative.         Procedures           ED Course  ED Course as of 09/28/24 1936   Sat Sep 28, 2024   1934 Case were discussed with the patient at length she is awake alert oriented x 3 in no distress on her cell phone.  Will be discharged home with a follow-up with the primary MD. [TS]      ED Course User Index  [TS] Kareem Blunt MD                                             Medical Decision Making  Generalized weakness differential could be neuromuscular weakness which could be upper motor neuron versus lower motor neuron including CVAs strokes spinal cord problems spinal cord diseases like infection trauma inflammation etc.  Other things that can cause generalized weakness are  peripheral  nerve disease with toxins ,neuropathies, and endocrine abnormalities.  Muscle diseases like dermatomyositis rhabdomyolysis and alcoholic myopathy can give rise to weakness  Non  neuromuscular diseases like coronary syndromes, arrhythmias, infection, hypoglycemia, thyroid disease and respiratory failure can cause generalized weakness also hypokalemia, hypomagnesemia, hypo or hypernatremia ,polypharmacy ,hypothyroidism and malignancies can cause generalized weakness      Amount and/or Complexity of Data Reviewed  Labs: ordered.     Details: Labs reviewed  ECG/medicine tests: ordered.    Risk  Risk Details: This patient came to the ED with history of generalized weakness/fatigue hemodynamically stable with no lateralizing neurological deficits or focal motor or sensory deficits.  There is low suspicion for toxic-metabolic etiology, considering normal lab work-up no evidence of acidosis no history of any new medications and no exposure to environmental toxins .  There is low suspicion for hypoglycemia, hyperglycemia, diabetic ketoacidosis, drug overdose, ethanol intoxication, since the patient's chemistries are within normal limits and clinically the patient does not appear to be intoxicated.  Low clinical risk for thiamine deficiency with no nystagmus and no history of malnutrition or starvation or alcoholism.  Low risk for hypothermia, hyponatremia, hypernatremia, organ failure, liver failure, kidney failure, as a cause of generalized weakness especially since the chemistries are within normal limits there is no clinical evidence of endorgan damage and the vitals are stable .  Low clinical suspicion of thyroid failure, adrenal failure, with no clinical or physical findings attributable to the syndromes.  There is no clinical evidence of hypoxia, hypercarbia,.  Low clinical suspicion of ischemic stroke, intracranial bleed, subarachnoid hemorrhage, closed head injury, subdural hematoma, with a normal neuro exam with no  headaches .  Low suspicion of seizure activity, syncopal episode, since there is no clinical evidence or history consistent with this. Low suspicion for ACS as the patient does not have any associated chest pain or shortness of breath and has a nonsuspicious history of present illness.  Low suspicion of a neurological etiology since there is no gait disturbance no diplopia no dysarthria or dysphagia on exam and there is no hypo or hyperreflexia or sensory deficits and no recent history of viral infections and. NOT associated with  central dizziness or ataxia or vomiting There is low suspicion for meningitis encephalitis as there is no fever no nuchal rigidity and no confusion and negative inflammatory markers and normal lab work-up. Low suspicion of infectious etiology, hypertensive encephalopathy, vasculitis, thrombotic thrombocytopenic purpura and disseminated intravascular coagulation as a possible cause of generalized weakness in this patien With normal or easily controllable blood pressure no history of vasculitis normal CBC count and a normal platelet count and or normal inflammatory markers    There is no clinical evidence of any focal neurological deficit there is no hypo or hyperreflexia no sensory deficits and no paresthesias  .         Final diagnoses:   Weakness       ED Disposition  ED Disposition       ED Disposition   Discharge    Condition   Stable    Comment   --               Marilia Reza MD  [address] [facility] [address]  [phone]    Schedule an appointment as soon as possible for a visit            Medication List      No changes were made to your prescriptions during this visit.            Kareem Blunt MD  09/28/24 9480       Kareem Blunt MD  09/28/24 1939

## 2024-09-28 NOTE — ED NOTES
Went to assist patient to bedside commode, patient already on bedside commode and had used restroom. Unable to collect urine specimen. Hat placed in bedside commode for next time she uses restroom. Nurse aware.

## 2024-09-29 LAB
QT INTERVAL: 416 MS
QTC INTERVAL: 429 MS

## 2024-09-29 NOTE — DISCHARGE INSTRUCTIONS
It was very nice to meet you,Petty . Thank you for allowing us to take care of you today at [facility].     Today you were seen in the emergency department for your symptoms. Please understand that an ER evaluation is just the start of your evaluation. We do the best we can, but we are often unable to fully find what is causing your symptoms from one evaluation.  Because of this, the goal is to determine whether you need to be evaluated in the hospital or if it is safe for you to go home and see other doctors provided such as primary care physicians or specialist on an outpatient basis.      Like we discussed, I strongly urge that you follow up with your primary care doctor. Please call their office to set up an appointment as soon as possible so that you can be re-evaluated for improvement in your symptoms or for any other questions.  I have provided the information needed, including phone number, to call to set up an appointment below in these discharge papers.      Educational material has also been provided in the following pages regarding what we have discussed today.      Please return to the emergency room within 12-48 hours if you experience symptoms such as the following:   Fever, chills, chest pain or shortness of breath, pain with inspiration/expiration, pain that travels to your arms, neck or back, nausea, vomiting, severe headache, tearing pain in your chest, dizziness, feel as though you are about to pass out, OR if you have any worsening symptoms, or any other concerns.  .

## 2024-10-01 ENCOUNTER — OFFICE VISIT (OUTPATIENT)
Dept: PRIMARY CARE CLINIC | Age: 74
End: 2024-10-01
Payer: MEDICARE

## 2024-10-01 VITALS
OXYGEN SATURATION: 99 % | DIASTOLIC BLOOD PRESSURE: 60 MMHG | TEMPERATURE: 97.7 F | HEART RATE: 75 BPM | SYSTOLIC BLOOD PRESSURE: 110 MMHG | RESPIRATION RATE: 16 BRPM

## 2024-10-01 DIAGNOSIS — Z91.81 AT MODERATE RISK FOR FALL: ICD-10-CM

## 2024-10-01 DIAGNOSIS — I95.9 HYPOTENSION, UNSPECIFIED HYPOTENSION TYPE: ICD-10-CM

## 2024-10-01 DIAGNOSIS — I95.1 ORTHOSTATIC HYPOTENSION: ICD-10-CM

## 2024-10-01 DIAGNOSIS — R42 DIZZINESS: Primary | ICD-10-CM

## 2024-10-01 LAB
CHP ED QC CHECK: NORMAL
GLUCOSE BLD-MCNC: 95 MG/DL

## 2024-10-01 PROCEDURE — 99213 OFFICE O/P EST LOW 20 MIN: CPT | Performed by: FAMILY MEDICINE

## 2024-10-01 PROCEDURE — 1123F ACP DISCUSS/DSCN MKR DOCD: CPT | Performed by: FAMILY MEDICINE

## 2024-10-01 PROCEDURE — 82962 GLUCOSE BLOOD TEST: CPT | Performed by: FAMILY MEDICINE

## 2024-10-01 RX ORDER — FAMOTIDINE 20 MG/1
20 TABLET, FILM COATED ORAL EVERY 12 HOURS SCHEDULED
COMMUNITY
Start: 2024-08-01

## 2024-10-02 ASSESSMENT — ENCOUNTER SYMPTOMS
VOMITING: 0
DIARRHEA: 0
COLOR CHANGE: 0
COUGH: 0
WHEEZING: 0
NAUSEA: 0
BACK PAIN: 0
EYE DISCHARGE: 0
ABDOMINAL PAIN: 0

## 2024-10-02 NOTE — PROGRESS NOTES
Pulmonary effort is normal. No respiratory distress.      Breath sounds: Normal breath sounds.   Musculoskeletal:      Cervical back: Normal range of motion and neck supple.   Skin:     General: Skin is warm and dry.   Neurological:      General: No focal deficit present.      Mental Status: She is alert and oriented to person, place, and time. Mental status is at baseline.   Psychiatric:         Mood and Affect: Mood normal.         Behavior: Behavior normal.         Thought Content: Thought content normal.         Judgment: Judgment normal.        /60   Pulse 75   Temp 97.7 °F (36.5 °C) (Temporal)   Resp 16   LMP 03/29/1970   SpO2 99%      ASSESSMENT/PLAN:    1. Dizziness  -     Vascular duplex carotid bilateral; Future  -     POCT Glucose  2. Hypotension, unspecified hypotension type  3. Orthostatic hypotension  4. At moderate risk for fall       We are going to get her set up for carotid vascular scan to further evaluate the dizziness and lightheadedness.  His sugar was obtained today in office and it was normal.  She does have echocardiogram scheduled as discussed today.  I would like for her to keep that appointment.  We will notify her of the above test and then determine further management.  Continue midodrine for now for orthostatic hypotension.  I did encourage her to make sure she is getting up slowly and to make sure she is not making sudden movements.  I did also discussed that it might be beneficial if she is so lightheaded for her to start using a walker to ambulate so she does not fall.    PDMP Monitoring:    Last PDMP [place] as Reviewed ([place]):  Review User Review Instant Review Result          Last Controlled Substance Monitoring Documentation      Flowsheet Row ED from 8/21/2023 in [facility]   Comments follow up with PCP in 3-5 days filed at 08/21/2023 1331          Urine Drug Screenings (1 yr)       Urine Drug Screen  Collected: 1/8/2022 11:30 AM (Final result)              Urine

## 2024-10-03 ENCOUNTER — HOSPITAL ENCOUNTER (OUTPATIENT)
Dept: VASCULAR LAB | Age: 74
End: 2024-10-03
Payer: MEDICARE

## 2024-10-03 ENCOUNTER — HOSPITAL ENCOUNTER (OUTPATIENT)
Age: 74
Discharge: HOME | End: 2024-10-03
Payer: MEDICARE

## 2024-10-03 VITALS
SYSTOLIC BLOOD PRESSURE: 127 MMHG | DIASTOLIC BLOOD PRESSURE: 69 MMHG | HEIGHT: 68 IN | BODY MASS INDEX: 22.73 KG/M2 | WEIGHT: 150 LBS

## 2024-10-03 DIAGNOSIS — I95.9 HYPOTENSION, UNSPECIFIED HYPOTENSION TYPE: ICD-10-CM

## 2024-10-03 DIAGNOSIS — R42 DIZZINESS: ICD-10-CM

## 2024-10-03 LAB
ECHO AO ASC DIAM: 2.9 CM
ECHO AO ASCENDING AORTA INDEX: 1.6 CM/M2
ECHO AO ROOT DIAM: 1.9 CM
ECHO AO ROOT INDEX: 1.05 CM/M2
ECHO AO SINUS VALSALVA DIAM: 3.1 CM
ECHO AO SINUS VALSALVA INDEX: 1.71 CM/M2
ECHO AO ST JNCT DIAM: 2.9 CM
ECHO AV AREA PEAK VELOCITY: 2 CM2
ECHO AV AREA VTI: 2.4 CM2
ECHO AV AREA/BSA PEAK VELOCITY: 1.1 CM2/M2
ECHO AV AREA/BSA VTI: 1.3 CM2/M2
ECHO AV MEAN GRADIENT: 4 MMHG
ECHO AV MEAN VELOCITY: 0.9 M/S
ECHO AV PEAK GRADIENT: 6 MMHG
ECHO AV PEAK VELOCITY: 1.3 M/S
ECHO AV VELOCITY RATIO: 0.62
ECHO AV VTI: 29.1 CM
ECHO BSA: 1.81 M2
ECHO EST RA PRESSURE: 3 MMHG
ECHO IVC PROX: 1.2 CM
ECHO LA AREA 2C: 14.5 CM2
ECHO LA AREA 4C: 15.3 CM2
ECHO LA DIAMETER INDEX: 1.33 CM/M2
ECHO LA DIAMETER: 2.4 CM
ECHO LA MAJOR AXIS: 5.2 CM
ECHO LA MINOR AXIS: 4.7 CM
ECHO LA TO AORTIC ROOT RATIO: 1.26
ECHO LA VOL BP: 36 ML (ref 22–52)
ECHO LA VOL MOD A2C: 35 ML (ref 22–52)
ECHO LA VOL MOD A4C: 36 ML (ref 22–52)
ECHO LA VOL/BSA BIPLANE: 20 ML/M2 (ref 16–34)
ECHO LA VOLUME INDEX MOD A2C: 19 ML/M2 (ref 16–34)
ECHO LA VOLUME INDEX MOD A4C: 20 ML/M2 (ref 16–34)
ECHO LV E' LATERAL VELOCITY: 6.9 CM/S
ECHO LV E' SEPTAL VELOCITY: 6.1 CM/S
ECHO LV EDV A2C: 102 ML
ECHO LV EDV A4C: 124 ML
ECHO LV EDV INDEX A4C: 69 ML/M2
ECHO LV EDV NDEX A2C: 56 ML/M2
ECHO LV EJECTION FRACTION A2C: 56 %
ECHO LV EJECTION FRACTION A4C: 55 %
ECHO LV EJECTION FRACTION BIPLANE: 55 % (ref 55–100)
ECHO LV ESV A2C: 45 ML
ECHO LV ESV A4C: 56 ML
ECHO LV ESV INDEX A2C: 25 ML/M2
ECHO LV ESV INDEX A4C: 31 ML/M2
ECHO LV FRACTIONAL SHORTENING: 31 % (ref 28–44)
ECHO LV INTERNAL DIMENSION DIASTOLE INDEX: 2.15 CM/M2
ECHO LV INTERNAL DIMENSION DIASTOLIC: 3.9 CM (ref 3.9–5.3)
ECHO LV INTERNAL DIMENSION SYSTOLIC INDEX: 1.49 CM/M2
ECHO LV INTERNAL DIMENSION SYSTOLIC: 2.7 CM
ECHO LV ISOVOLUMETRIC RELAXATION TIME (IVRT): 113 MS
ECHO LV IVSD: 1 CM (ref 0.6–0.9)
ECHO LV MASS 2D: 131 G (ref 67–162)
ECHO LV MASS INDEX 2D: 72.4 G/M2 (ref 43–95)
ECHO LV POSTERIOR WALL DIASTOLIC: 1.1 CM (ref 0.6–0.9)
ECHO LV RELATIVE WALL THICKNESS RATIO: 0.56
ECHO LVOT AREA: 3.1 CM2
ECHO LVOT AV VTI INDEX: 0.75
ECHO LVOT DIAM: 2 CM
ECHO LVOT MEAN GRADIENT: 2 MMHG
ECHO LVOT PEAK GRADIENT: 3 MMHG
ECHO LVOT PEAK VELOCITY: 0.8 M/S
ECHO LVOT STROKE VOLUME INDEX: 37.8 ML/M2
ECHO LVOT SV: 68.5 ML
ECHO LVOT VTI: 21.8 CM
ECHO MV A VELOCITY: 0.76 M/S
ECHO MV ANNULUS DIAMETER: 2.8 CM
ECHO MV AREA VTI: 3.1 CM2
ECHO MV E DECELERATION TIME (DT): 192 MS
ECHO MV E VELOCITY: 0.63 M/S
ECHO MV E/A RATIO: 0.83
ECHO MV E/E' LATERAL: 9.13
ECHO MV E/E' RATIO (AVERAGED): 9.73
ECHO MV E/E' SEPTAL: 10.33
ECHO MV LVOT VTI INDEX: 1
ECHO MV MAX VELOCITY: 0.7 M/S
ECHO MV MEAN GRADIENT: 1 MMHG
ECHO MV MEAN VELOCITY: 0.4 M/S
ECHO MV PEAK GRADIENT: 2 MMHG
ECHO MV VTI: 21.8 CM
ECHO RA AREA 4C: 10.3 CM2
ECHO RA END SYSTOLIC VOLUME APICAL 4 CHAMBER INDEX BSA: 14 ML/M2
ECHO RA MAJOR AXIS INDEX: 2.04 CM/M2
ECHO RA MAJOR AXIS: 3.7 CM
ECHO RA MINOR AXIS INDEX: 2.04 CM/M2
ECHO RA MINOR AXIS: 3.7 CM
ECHO RA VOLUME: 26 ML
ECHO RIGHT VENTRICULAR SYSTOLIC PRESSURE (RVSP): 24 MMHG
ECHO RV BASAL DIMENSION: 2.3 CM
ECHO RV INTERNAL DIMENSION: 2.8 CM
ECHO RV LONGITUDINAL DIMENSION: 7.9 CM
ECHO RV MID DIMENSION: 3.5 CM
ECHO RV TAPSE: 1.9 CM (ref 1.7–?)
ECHO TV REGURGITANT MAX VELOCITY: 2.31 M/S
ECHO TV REGURGITANT PEAK GRADIENT: 21 MMHG

## 2024-10-03 PROCEDURE — 93880 EXTRACRANIAL BILAT STUDY: CPT

## 2024-10-03 PROCEDURE — 6360000004 HC RX CONTRAST MEDICATION: Performed by: FAMILY MEDICINE

## 2024-10-03 PROCEDURE — 2580000003 HC RX 258: Performed by: FAMILY MEDICINE

## 2024-10-03 PROCEDURE — C8929 TTE W OR WO FOL WCON,DOPPLER: HCPCS

## 2024-10-03 RX ADMIN — SODIUM CHLORIDE, PRESERVATIVE FREE 1.5 ML: 5 INJECTION INTRAVENOUS at 11:51

## 2024-10-04 LAB
ECHO BSA: 1.81 M2
VAS LEFT CCA MID EDV: 18 CM/S
VAS LEFT CCA MID PSV: 106 CM/S
VAS LEFT CCA PROX EDV: 14.3 CM/S
VAS LEFT CCA PROX PSV: 105 CM/S
VAS LEFT ECA EDV: 7.06 CM/S
VAS LEFT ECA PSV: 53.7 CM/S
VAS LEFT ICA DIST EDV: 21.6 CM/S
VAS LEFT ICA DIST PSV: 69.4 CM/S
VAS LEFT ICA MID EDV: 19.6 CM/S
VAS LEFT ICA MID PSV: 63.1 CM/S
VAS LEFT ICA PROX EDV: 11 CM/S
VAS LEFT ICA PROX PSV: 42.7 CM/S
VAS LEFT VERTEBRAL EDV: 9.57 CM/S
VAS LEFT VERTEBRAL PSV: 36.6 CM/S
VAS RIGHT CCA MID EDV: 17.2 CM/S
VAS RIGHT CCA MID PSV: 77.2 CM/S
VAS RIGHT CCA PROX EDV: 14.7 CM/S
VAS RIGHT CCA PROX PSV: 71.8 CM/S
VAS RIGHT ECA EDV: 8.78 CM/S
VAS RIGHT ECA PSV: 55.8 CM/S
VAS RIGHT ICA DIST EDV: 24.6 CM/S
VAS RIGHT ICA DIST PSV: 73.8 CM/S
VAS RIGHT ICA MID EDV: 25 CM/S
VAS RIGHT ICA MID PSV: 74.6 CM/S
VAS RIGHT ICA PROX EDV: 13.7 CM/S
VAS RIGHT ICA PROX PSV: 47.4 CM/S
VAS RIGHT VERTEBRAL EDV: 9.41 CM/S
VAS RIGHT VERTEBRAL PSV: 54.9 CM/S

## 2024-10-07 DIAGNOSIS — I95.9 HYPOTENSION, UNSPECIFIED HYPOTENSION TYPE: ICD-10-CM

## 2024-10-07 DIAGNOSIS — R42 DIZZINESS: Primary | ICD-10-CM

## 2024-10-10 RX ORDER — PANTOPRAZOLE SODIUM 40 MG/1
TABLET, DELAYED RELEASE ORAL
Qty: 30 TABLET | Refills: 0 | Status: SHIPPED | OUTPATIENT
Start: 2024-10-10

## 2024-10-11 RX ORDER — PANTOPRAZOLE SODIUM 40 MG/1
TABLET, DELAYED RELEASE ORAL
Qty: 30 TABLET | Refills: 0 | OUTPATIENT
Start: 2024-10-11

## 2024-10-11 RX ORDER — TRAZODONE HYDROCHLORIDE 100 MG/1
100 TABLET ORAL NIGHTLY
Qty: 30 TABLET | Refills: 0 | Status: SHIPPED | OUTPATIENT
Start: 2024-10-11 | End: 2024-11-10

## 2024-10-14 RX ORDER — TRAZODONE HYDROCHLORIDE 100 MG/1
100 TABLET ORAL NIGHTLY
Qty: 30 TABLET | Refills: 0 | Status: SHIPPED | OUTPATIENT
Start: 2024-10-14 | End: 2024-11-13

## 2024-10-22 DIAGNOSIS — R00.1 BRADYCARDIA: ICD-10-CM

## 2024-10-22 DIAGNOSIS — Z95.0 PACEMAKER: Primary | ICD-10-CM

## 2024-11-06 ENCOUNTER — OFFICE VISIT (OUTPATIENT)
Dept: PRIMARY CARE CLINIC | Age: 74
End: 2024-11-06

## 2024-11-06 VITALS
DIASTOLIC BLOOD PRESSURE: 76 MMHG | RESPIRATION RATE: 16 BRPM | TEMPERATURE: 97.8 F | HEART RATE: 74 BPM | SYSTOLIC BLOOD PRESSURE: 124 MMHG | OXYGEN SATURATION: 99 %

## 2024-11-06 DIAGNOSIS — J44.9 CHRONIC OBSTRUCTIVE PULMONARY DISEASE, UNSPECIFIED COPD TYPE (HCC): ICD-10-CM

## 2024-11-06 DIAGNOSIS — F32.1 CURRENT MODERATE EPISODE OF MAJOR DEPRESSIVE DISORDER WITHOUT PRIOR EPISODE (HCC): ICD-10-CM

## 2024-11-06 DIAGNOSIS — R42 DIZZINESS: ICD-10-CM

## 2024-11-06 DIAGNOSIS — E78.2 MIXED HYPERLIPIDEMIA: ICD-10-CM

## 2024-11-06 DIAGNOSIS — E03.9 ACQUIRED HYPOTHYROIDISM: ICD-10-CM

## 2024-11-06 DIAGNOSIS — Z00.00 MEDICARE ANNUAL WELLNESS VISIT, SUBSEQUENT: Primary | ICD-10-CM

## 2024-11-06 LAB
ALBUMIN SERPL-MCNC: 4 G/DL (ref 3.5–5.2)
ALP SERPL-CCNC: 89 U/L (ref 35–104)
ALT SERPL-CCNC: 11 U/L (ref 5–33)
ANION GAP SERPL CALCULATED.3IONS-SCNC: 8 MMOL/L (ref 7–19)
AST SERPL-CCNC: 18 U/L (ref 5–32)
BASOPHILS # BLD: 0 K/UL (ref 0–0.2)
BASOPHILS NFR BLD: 0.7 % (ref 0–1)
BILIRUB SERPL-MCNC: 0.6 MG/DL (ref 0.2–1.2)
BUN SERPL-MCNC: 11 MG/DL (ref 8–23)
CALCIUM SERPL-MCNC: 9.7 MG/DL (ref 8.8–10.2)
CHLORIDE SERPL-SCNC: 105 MMOL/L (ref 98–111)
CHOLEST SERPL-MCNC: 174 MG/DL (ref 0–199)
CO2 SERPL-SCNC: 30 MMOL/L (ref 22–29)
CREAT SERPL-MCNC: 0.6 MG/DL (ref 0.5–0.9)
EOSINOPHIL # BLD: 0.1 K/UL (ref 0–0.6)
EOSINOPHIL NFR BLD: 2.3 % (ref 0–5)
ERYTHROCYTE [DISTWIDTH] IN BLOOD BY AUTOMATED COUNT: 13.5 % (ref 11.5–14.5)
GLUCOSE SERPL-MCNC: 102 MG/DL (ref 70–99)
HCT VFR BLD AUTO: 43.8 % (ref 37–47)
HDLC SERPL-MCNC: 53 MG/DL (ref 40–60)
HGB BLD-MCNC: 13.8 G/DL (ref 12–16)
IMM GRANULOCYTES # BLD: 0 K/UL
LDLC SERPL CALC-MCNC: 94 MG/DL
LYMPHOCYTES # BLD: 1.6 K/UL (ref 1.1–4.5)
LYMPHOCYTES NFR BLD: 25.3 % (ref 20–40)
MCH RBC QN AUTO: 28.1 PG (ref 27–31)
MCHC RBC AUTO-ENTMCNC: 31.5 G/DL (ref 33–37)
MCV RBC AUTO: 89.2 FL (ref 81–99)
MONOCYTES # BLD: 0.5 K/UL (ref 0–0.9)
MONOCYTES NFR BLD: 7.8 % (ref 0–10)
NEUTROPHILS # BLD: 3.9 K/UL (ref 1.5–7.5)
NEUTS SEG NFR BLD: 63.7 % (ref 50–65)
PLATELET # BLD AUTO: 120 K/UL (ref 130–400)
PMV BLD AUTO: 11.9 FL (ref 9.4–12.3)
POTASSIUM SERPL-SCNC: 4.9 MMOL/L (ref 3.5–5)
PROT SERPL-MCNC: 6.5 G/DL (ref 6.4–8.3)
RBC # BLD AUTO: 4.91 M/UL (ref 4.2–5.4)
SODIUM SERPL-SCNC: 143 MMOL/L (ref 136–145)
T4 FREE SERPL-MCNC: 1.16 NG/DL (ref 0.93–1.7)
TRIGL SERPL-MCNC: 135 MG/DL (ref 0–149)
TSH SERPL DL<=0.005 MIU/L-ACNC: 7.16 UIU/ML (ref 0.27–4.2)
WBC # BLD AUTO: 6.1 K/UL (ref 4.8–10.8)

## 2024-11-06 RX ORDER — MECLIZINE HYDROCHLORIDE 25 MG/1
25 TABLET ORAL 3 TIMES DAILY PRN
Qty: 30 TABLET | Refills: 0 | Status: SHIPPED | OUTPATIENT
Start: 2024-11-06 | End: 2024-12-06

## 2024-11-06 RX ORDER — TRAZODONE HYDROCHLORIDE 100 MG/1
100 TABLET ORAL NIGHTLY
Qty: 30 TABLET | Refills: 5 | Status: SHIPPED | OUTPATIENT
Start: 2024-11-06 | End: 2025-05-05

## 2024-11-06 ASSESSMENT — PATIENT HEALTH QUESTIONNAIRE - PHQ9
9. THOUGHTS THAT YOU WOULD BE BETTER OFF DEAD, OR OF HURTING YOURSELF: NOT AT ALL
SUM OF ALL RESPONSES TO PHQ QUESTIONS 1-9: 4
7. TROUBLE CONCENTRATING ON THINGS, SUCH AS READING THE NEWSPAPER OR WATCHING TELEVISION: NOT AT ALL
5. POOR APPETITE OR OVEREATING: NOT AT ALL
4. FEELING TIRED OR HAVING LITTLE ENERGY: SEVERAL DAYS
6. FEELING BAD ABOUT YOURSELF - OR THAT YOU ARE A FAILURE OR HAVE LET YOURSELF OR YOUR FAMILY DOWN: NOT AT ALL
3. TROUBLE FALLING OR STAYING ASLEEP: SEVERAL DAYS
2. FEELING DOWN, DEPRESSED OR HOPELESS: SEVERAL DAYS
SUM OF ALL RESPONSES TO PHQ9 QUESTIONS 1 & 2: 2
1. LITTLE INTEREST OR PLEASURE IN DOING THINGS: SEVERAL DAYS
8. MOVING OR SPEAKING SO SLOWLY THAT OTHER PEOPLE COULD HAVE NOTICED. OR THE OPPOSITE, BEING SO FIGETY OR RESTLESS THAT YOU HAVE BEEN MOVING AROUND A LOT MORE THAN USUAL: NOT AT ALL
10. IF YOU CHECKED OFF ANY PROBLEMS, HOW DIFFICULT HAVE THESE PROBLEMS MADE IT FOR YOU TO DO YOUR WORK, TAKE CARE OF THINGS AT HOME, OR GET ALONG WITH OTHER PEOPLE: SOMEWHAT DIFFICULT

## 2024-11-06 ASSESSMENT — LIFESTYLE VARIABLES
HOW OFTEN DO YOU HAVE A DRINK CONTAINING ALCOHOL: NEVER
HOW MANY STANDARD DRINKS CONTAINING ALCOHOL DO YOU HAVE ON A TYPICAL DAY: PATIENT DOES NOT DRINK

## 2024-11-07 RX ORDER — LEVOTHYROXINE SODIUM 112 UG/1
112 TABLET ORAL DAILY
Qty: 90 TABLET | Refills: 3 | Status: SHIPPED | OUTPATIENT
Start: 2024-11-07

## 2024-11-07 NOTE — PROGRESS NOTES
Medicare Annual Wellness Visit    Debbie Asher is here for Medicare AWV and Dizziness (Still having dizzy spells. She does not think it is related to anything in particular. Spouse thinks it is worse when she doesn't eat.)    Assessment & Plan   Medicare annual wellness visit, subsequent  -     CBC with Auto Differential  -     Comprehensive Metabolic Panel  -     Lipid Panel  -     TSH  -     T4, Free  Mixed hyperlipidemia  -     CBC with Auto Differential  -     Comprehensive Metabolic Panel  -     Lipid Panel  -     TSH  -     T4, Free  Acquired hypothyroidism  -     CBC with Auto Differential  -     Comprehensive Metabolic Panel  -     Lipid Panel  -     TSH  -     T4, Free  Dizziness  -     CT HEAD WO CONTRAST; Future  Chronic obstructive pulmonary disease, unspecified COPD type (HCC)  Current moderate episode of major depressive disorder without prior episode (HCC)    We are going to get fasting labs on her today.  Will notify her of the results of these.  We we will check her thyroid levels to see if we need to adjust her Synthroid.  I did order stat CT of her head due to the dizziness.  We will notify her of the results of that once we receive it back.  Keep follow-up appointment that scheduled with neurology regarding the dizziness as well.  I did encourage her to make sure that she is eating and drinking regularly and if she does not feel that she is getting a good meal into make sure she is doing an Ensure boost but that I also strongly encouraged her to make sure she was hydrating well throughout the day  Trazodone was refilled today for her insomnia.  I did refill her Antivert for the dizziness.  If she feels that this is not helping or if she has further problems she is to let us know  Recommendations for Preventive Services Due: see orders and patient instructions/AVS.  Recommended screening schedule for the next 5-10 years is provided to the patient in written form: see Patient Instructions/AVS.

## 2024-11-11 RX ORDER — PANTOPRAZOLE SODIUM 40 MG/1
TABLET, DELAYED RELEASE ORAL
Qty: 30 TABLET | Refills: 0 | Status: SHIPPED | OUTPATIENT
Start: 2024-11-11

## 2024-11-12 ENCOUNTER — OFFICE VISIT (OUTPATIENT)
Dept: NEUROLOGY | Age: 74
End: 2024-11-12

## 2024-11-12 VITALS
OXYGEN SATURATION: 96 % | DIASTOLIC BLOOD PRESSURE: 65 MMHG | WEIGHT: 150 LBS | HEART RATE: 67 BPM | SYSTOLIC BLOOD PRESSURE: 103 MMHG | HEIGHT: 68 IN | BODY MASS INDEX: 22.73 KG/M2

## 2024-11-12 DIAGNOSIS — F41.8 TEST ANXIETY: ICD-10-CM

## 2024-11-12 DIAGNOSIS — R42 DIZZINESS: Primary | ICD-10-CM

## 2024-11-12 RX ORDER — DIAZEPAM 5 MG/1
TABLET ORAL
Qty: 1 TABLET | Refills: 0 | Status: SHIPPED | OUTPATIENT
Start: 2024-11-12 | End: 2024-11-14

## 2024-11-12 NOTE — PROGRESS NOTES
[facility] Office Note      Patient:   Debbie Asher  MR#:    [id number]  Account Number:                         YOB: 1950  Date of Evaluation:  11/12/2024  Time of Note:                          12:16 PM  Primary/Referring Physician:  Lisa Isaac MD   Consulting Physician:  JORDYN Ley    NEW PATIENT CONSULTATION      Chief Complaint   Patient presents with    New Patient    Dizziness     C/O dizzy spells. Pt unsure when it started.     Fall     Pt states last fall was ~ 2 months.        HISTORY OF PRESENT ILLNESS    Debbie Asher is a 73 y.o. year old female here for dizziness that has been ongoing for sometime now. Here today alone, brought by son who is in [employer].  Notes she is having a dizzy spell while in the office. Was admitted inpatient September 19 for dizziness with positive orthostatic vital signs that were persistent despite fluid.  She was discharged on midodrine 2.5 mg 3 times daily with benefit.  Blood work, urinalysis, chest x-ray at this time was unremarkable.  She has completed outpatient echocardiogram and ultrasound carotid arteries with no significant abnormalities.  She has pacemaker which was interrogated 10/22/24 with stability. She notes that she is having dizzy spells that are not as frequent, cannot verbalize that they are even weekly. Did have an event this morning. States room was spinning. Denies nausea, cannot walk when these occur. Felt better with resting and closing her eyes. Used Meclizine with clear benefit. She has had benefit from Midodrine 2.5 mg TID.  She voiced she is still taking this, was marked and medicine list that she was not.. She has history of TIA years ago, no known CVA.  Limited historian.  She does confess to a degree of poor memory.     Past Medical History:   Diagnosis Date    Cerebral artery occlusion with cerebral infarction (HCC)     TIA    COPD (chronic obstructive pulmonary disease) (HCC)     Depression

## 2024-11-18 ENCOUNTER — HOSPITAL ENCOUNTER (EMERGENCY)
Age: 74
Discharge: HOME | End: 2024-11-18
Payer: MEDICARE

## 2024-11-18 ENCOUNTER — APPOINTMENT (OUTPATIENT)
Dept: GENERAL RADIOLOGY | Age: 74
End: 2024-11-18
Payer: MEDICARE

## 2024-11-18 VITALS
BODY MASS INDEX: 22.73 KG/M2 | RESPIRATION RATE: 18 BRPM | HEART RATE: 71 BPM | DIASTOLIC BLOOD PRESSURE: 73 MMHG | OXYGEN SATURATION: 97 % | SYSTOLIC BLOOD PRESSURE: 114 MMHG | HEIGHT: 68 IN | WEIGHT: 150 LBS | TEMPERATURE: 97.8 F
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
## 2024-11-18 DIAGNOSIS — Z96.641 HISTORY OF TOTAL RIGHT HIP REPLACEMENT: ICD-10-CM

## 2024-11-18 PROCEDURE — 73523 X-RAY EXAM HIPS BI 5/> VIEWS: CPT

## 2024-11-18 PROCEDURE — 99283 EMERGENCY DEPT VISIT LOW MDM: CPT

## 2024-11-18 ASSESSMENT — PAIN SCALES - GENERAL: PAINLEVEL_OUTOF10: 1

## 2024-11-18 ASSESSMENT — ENCOUNTER SYMPTOMS
RESPIRATORY NEGATIVE: 1
BACK PAIN: 0

## 2024-11-18 ASSESSMENT — PAIN DESCRIPTION - ORIENTATION: ORIENTATION: RIGHT

## 2024-11-18 ASSESSMENT — PAIN - FUNCTIONAL ASSESSMENT: PAIN_FUNCTIONAL_ASSESSMENT: 0-10

## 2024-11-18 ASSESSMENT — PAIN DESCRIPTION - LOCATION: LOCATION: HIP

## 2024-11-18 NOTE — DISCHARGE INSTRUCTIONS
Motrin or Tylenol as needed for pain.  Follow-up with your provider if you develop symptoms.  Be sure you are drinking plenty of water  Return to ER for any new, worsening, or change in condition.

## 2024-11-18 NOTE — ED PROVIDER NOTES
[facility] EMERGENCY DEPT  EMERGENCY DEPARTMENT ENCOUNTER      Pt Name: Debbie Asher  MRN: [id number]  Birthdate 1950  Date of evaluation: 11/18/2024  Provider: JORDYN Nicole NP    CHIEF COMPLAINT       Chief Complaint   Patient presents with    Fall     Denies LOC, denies hitting head     Hip Pain     RT          HISTORY OF PRESENT ILLNESS   (Location/Symptom, Timing/Onset,Context/Setting, Quality, Duration, Modifying Factors, Severity)  Note limiting factors.   Debbie Asher is a 73 y.o. female who presents to the emergency department after sustaining a fall at home.  She landed on her buttocks and did not strike her head.  Patient denies any pain and states that she came in to have her hip checked because she had a hip replacement.  She denies any dizziness or lightheadedness prior to the fall.  She states that initially she could not bear weight after the fall.        The history is provided by the patient.       NursingNotes were reviewed.    REVIEW OF SYSTEMS    (2-9 systems for level 4, 10 or more for level 5)     Review of Systems   Constitutional:         As per HPI   Respiratory: Negative.     Cardiovascular: Negative.    Genitourinary: Negative.    Musculoskeletal: Negative.  Negative for back pain and myalgias.   Neurological:  Negative for dizziness and light-headedness.   All other systems reviewed and are negative.      A complete review of systems was performed and is negative except as noted above in the HPI.       PAST MEDICAL HISTORY     Past Medical History:   Diagnosis Date    Cerebral artery occlusion with cerebral infarction (HCC)     TIA    COPD (chronic obstructive pulmonary disease) (HCC)     Depression     Diverticular disease of colon     Hyperlipidemia     Hypothyroidism     Insomnia     Pacemaker     Pneumonia     Psychiatric problem     Seizures (HCC)     Tobacco abuse     Vitamin B12 deficiency          SURGICAL HISTORY       Past Surgical History:   Procedure Laterality

## 2024-11-25 RX ORDER — ATORVASTATIN CALCIUM 40 MG/1
40 TABLET, FILM COATED ORAL NIGHTLY
Qty: 90 TABLET | Refills: 0 | Status: SHIPPED | OUTPATIENT
Start: 2024-11-25

## 2024-12-13 ENCOUNTER — HOSPITAL ENCOUNTER (OUTPATIENT)
Dept: CT IMAGING | Age: 74
Discharge: HOME | End: 2024-12-13
Payer: MEDICARE

## 2024-12-13 DIAGNOSIS — R42 DIZZINESS: ICD-10-CM

## 2024-12-13 LAB
CREAT SERPL-MCNC: 0.6 MG/DL (ref 0.3–1.3)
PERFORMED ON: NORMAL

## 2024-12-13 PROCEDURE — 70496 CT ANGIOGRAPHY HEAD: CPT

## 2024-12-13 PROCEDURE — 6360000004 HC RX CONTRAST MEDICATION: Performed by: NURSE PRACTITIONER

## 2024-12-13 PROCEDURE — 82565 ASSAY OF CREATININE: CPT

## 2024-12-13 RX ORDER — IOPAMIDOL 755 MG/ML
70 INJECTION, SOLUTION INTRAVASCULAR
Status: COMPLETED | OUTPATIENT
Start: 2024-12-13 | End: 2024-12-13

## 2024-12-13 RX ADMIN — IOPAMIDOL 70 ML: 755 INJECTION, SOLUTION INTRAVENOUS at 13:23

## 2024-12-16 RX ORDER — PANTOPRAZOLE SODIUM 40 MG/1
TABLET, DELAYED RELEASE ORAL
Qty: 30 TABLET | Refills: 2 | Status: SHIPPED | OUTPATIENT
Start: 2024-12-16

## 2024-12-20 ENCOUNTER — HOSPITAL ENCOUNTER (OUTPATIENT)
Dept: MRI IMAGING | Age: 74
Discharge: HOME | End: 2024-12-13
Payer: MEDICARE

## 2024-12-20 ENCOUNTER — HOSPITAL ENCOUNTER (OUTPATIENT)
Dept: MRI IMAGING | Age: 74
Discharge: HOME | End: 2024-12-20
Payer: MEDICARE

## 2024-12-20 DIAGNOSIS — R42 DIZZINESS: ICD-10-CM

## 2024-12-20 PROCEDURE — 70551 MRI BRAIN STEM W/O DYE: CPT

## 2025-01-09 RX ORDER — ALBUTEROL SULFATE 90 UG/1
2 INHALANT RESPIRATORY (INHALATION) EVERY 6 HOURS PRN
Qty: 1 EACH | Refills: 3 | Status: SHIPPED | OUTPATIENT
Start: 2025-01-09

## 2025-01-09 RX ORDER — BUDESONIDE AND FORMOTEROL FUMARATE DIHYDRATE 160; 4.5 UG/1; UG/1
2 AEROSOL RESPIRATORY (INHALATION) 2 TIMES DAILY
Qty: 1 EACH | Refills: 3 | Status: SHIPPED | OUTPATIENT
Start: 2025-01-09

## 2025-01-26 DIAGNOSIS — R00.1 BRADYCARDIA: ICD-10-CM

## 2025-01-26 DIAGNOSIS — Z95.0 PACEMAKER: Primary | ICD-10-CM

## 2025-01-29 ENCOUNTER — OFFICE VISIT (OUTPATIENT)
Dept: PRIMARY CARE CLINIC | Age: 75
End: 2025-01-29
Payer: MEDICARE

## 2025-01-29 VITALS
HEIGHT: 68 IN | SYSTOLIC BLOOD PRESSURE: 102 MMHG | RESPIRATION RATE: 16 BRPM | HEART RATE: 78 BPM | OXYGEN SATURATION: 97 % | DIASTOLIC BLOOD PRESSURE: 70 MMHG | WEIGHT: 149 LBS | TEMPERATURE: 98.1 F | BODY MASS INDEX: 22.58 KG/M2

## 2025-01-29 DIAGNOSIS — M21.70 LEG LENGTH DISCREPANCY: Primary | ICD-10-CM

## 2025-01-29 DIAGNOSIS — R26.89 BALANCE DISORDER: ICD-10-CM

## 2025-01-29 DIAGNOSIS — F32.1 CURRENT MODERATE EPISODE OF MAJOR DEPRESSIVE DISORDER WITHOUT PRIOR EPISODE (HCC): ICD-10-CM

## 2025-01-29 PROCEDURE — 1159F MED LIST DOCD IN RCRD: CPT | Performed by: FAMILY MEDICINE

## 2025-01-29 PROCEDURE — 99214 OFFICE O/P EST MOD 30 MIN: CPT | Performed by: FAMILY MEDICINE

## 2025-01-29 PROCEDURE — 1123F ACP DISCUSS/DSCN MKR DOCD: CPT | Performed by: FAMILY MEDICINE

## 2025-01-29 RX ORDER — SERTRALINE HYDROCHLORIDE 25 MG/1
25 TABLET, FILM COATED ORAL DAILY
Qty: 30 TABLET | Refills: 3 | Status: SHIPPED | OUTPATIENT
Start: 2025-01-29

## 2025-01-29 ASSESSMENT — ENCOUNTER SYMPTOMS
WHEEZING: 0
BACK PAIN: 0
NAUSEA: 0
ABDOMINAL PAIN: 0
COUGH: 0
COLOR CHANGE: 0
VOMITING: 0
EYE DISCHARGE: 0
DIARRHEA: 0

## 2025-01-29 ASSESSMENT — PATIENT HEALTH QUESTIONNAIRE - PHQ9
SUM OF ALL RESPONSES TO PHQ QUESTIONS 1-9: 5
2. FEELING DOWN, DEPRESSED OR HOPELESS: MORE THAN HALF THE DAYS
SUM OF ALL RESPONSES TO PHQ9 QUESTIONS 1 & 2: 3
10. IF YOU CHECKED OFF ANY PROBLEMS, HOW DIFFICULT HAVE THESE PROBLEMS MADE IT FOR YOU TO DO YOUR WORK, TAKE CARE OF THINGS AT HOME, OR GET ALONG WITH OTHER PEOPLE: SOMEWHAT DIFFICULT
1. LITTLE INTEREST OR PLEASURE IN DOING THINGS: SEVERAL DAYS
6. FEELING BAD ABOUT YOURSELF - OR THAT YOU ARE A FAILURE OR HAVE LET YOURSELF OR YOUR FAMILY DOWN: NOT AT ALL
3. TROUBLE FALLING OR STAYING ASLEEP: SEVERAL DAYS
SUM OF ALL RESPONSES TO PHQ QUESTIONS 1-9: 5
5. POOR APPETITE OR OVEREATING: NOT AT ALL
SUM OF ALL RESPONSES TO PHQ QUESTIONS 1-9: 5
SUM OF ALL RESPONSES TO PHQ QUESTIONS 1-9: 5
7. TROUBLE CONCENTRATING ON THINGS, SUCH AS READING THE NEWSPAPER OR WATCHING TELEVISION: NOT AT ALL
8. MOVING OR SPEAKING SO SLOWLY THAT OTHER PEOPLE COULD HAVE NOTICED. OR THE OPPOSITE, BEING SO FIGETY OR RESTLESS THAT YOU HAVE BEEN MOVING AROUND A LOT MORE THAN USUAL: NOT AT ALL
4. FEELING TIRED OR HAVING LITTLE ENERGY: SEVERAL DAYS
9. THOUGHTS THAT YOU WOULD BE BETTER OFF DEAD, OR OF HURTING YOURSELF: NOT AT ALL

## 2025-01-29 NOTE — PROGRESS NOTES
SUBJECTIVE:    Patient ID: Debbie Asher is a 74 y.o. female.    History of Present Illness  The patient presents for evaluation of depression and right leg length discrepancy.    She reports a general feeling of malaise, which she attributes to recent stressful events. Prior to these events, she was managing her depression well. She has not been adhering to her Vraylar regimen due to forgetfulness and is currently not on any other antidepressant medications. She does not possess a pillbox or pill planner at home. She is unable to recall the specific medication that was most effective for her in the past. She is not currently engaged in behavioral health counseling but has expressed interest in doing so, as recommended by her brother. She also reports memory issues, which she believes are related to her depression.    She has requested a referral to physical therapy for her right leg, which she perceives to be shorter than the left. She has had near-fall incidents due to this perceived leg length discrepancy.    Supplemental Information  She reports satisfactory respiratory function.    MEDICATIONS  Discontinued: Vraylar      Past Medical History:   Diagnosis Date    Cerebral artery occlusion with cerebral infarction ([facility])     TIA    COPD (chronic obstructive pulmonary disease) ([facility])     Depression     Diverticular disease of colon     Hyperlipidemia     Hypothyroidism     Insomnia     Pacemaker     Pneumonia     Psychiatric problem     Seizures ([facility])     Tobacco abuse     Vitamin B12 deficiency       Current Outpatient Medications on File Prior to Visit   Medication Sig Dispense Refill    albuterol sulfate HFA (PROAIR HFA) 108 (90 Base) MCG/ACT inhaler Inhale 2 puffs into the lungs every 6 hours as needed for Wheezing 1 each 3    budesonide-formoterol (SYMBICORT) 160-4.5 MCG/ACT AERO Inhale 2 puffs into the lungs 2 times daily 1 each 3    pantoprazole (PROTONIX) 40 MG tablet TAKE 1 TABLET BY MOUTH ONCE DAILY IN THE

## 2025-02-07 ENCOUNTER — HOSPITAL ENCOUNTER (OUTPATIENT)
Dept: PHYSICAL THERAPY | Age: 75
Setting detail: THERAPIES SERIES
Discharge: HOME | End: 2025-02-07
Payer: MEDICARE

## 2025-02-07 PROCEDURE — 97162 PT EVAL MOD COMPLEX 30 MIN: CPT

## 2025-02-07 NOTE — PROGRESS NOTES
Physical Therapy: Initial Evaluation    Patient: Debbie Asher (74 y.o. female)   Examination Date: 2025  Plan of Care Certification Period:2025 to        :  1950 ;    Confirmed: Yes MRN: [id number]  CSN: [id number]   Insurance: Payor: [employer] MEDICARE / Plan: HU[employer]NA CHOICE-PPO MEDICARE / Product Type: *No Product type* /   Insurance ID: [id number] - (Medicare Managed) Secondary Insurance (if applicable): [employer] [place]ICAID [place]   Referring Physician: Lisa Isaac MD Marissa Stewart-Janes, MD   PCP: Lisa Isaac MD Visits to Date/Visits Approved:   (8-12 visits anticipated for this diagnosis)    No Show/Cancelled Appts:   /       Medical Diagnosis: No admission diagnoses are documented for this encounter. leg length discrepancy (M21.70) and balance disorder (R26.89)  Treatment Diagnosis:       PERTINENT MEDICAL HISTORY           Medical History:      Past Medical History:   Diagnosis Date    Cerebral artery occlusion with cerebral infarction ([facility])     TIA    COPD (chronic obstructive pulmonary disease) ([facility])     Depression     Diverticular disease of colon     Hyperlipidemia     Hypothyroidism     Insomnia     Pacemaker     Pneumonia     Psychiatric problem     Seizures ([facility])     Tobacco abuse     Vitamin B12 deficiency      Surgical History:   Past Surgical History:   Procedure Laterality Date    CARDIAC PACEMAKER PLACEMENT      COLONOSCOPY      HIP SURGERY Right 5/15/2021    HIP HEMIARTHROPLASTY performed by Danial Pedroza MD at [facility] OR    LUNG SURGERY      collapsed lung    [place] COLONOSCOPY W/BIOPSY SINGLE/MULTIPLE N/A 2017    Dr XI West-extensive and large mouthed diverticular disease throughout the right colon-Tubular AP (villiform) (-) dysplasia x 1, tubular AP (-) dysplasia x 1--5 yr recall    TUBAL LIGATION         Medications:   Current Outpatient Medications:     cariprazine hcl (VRAYLAR) 1.5 MG capsule, Take 1 capsule by mouth daily, Disp: 30 capsule, Rfl: 5

## 2025-02-10 ENCOUNTER — HOSPITAL ENCOUNTER (OUTPATIENT)
Dept: PHYSICAL THERAPY | Age: 75
Setting detail: THERAPIES SERIES
End: 2025-02-10
Payer: MEDICARE

## 2025-02-14 ENCOUNTER — HOSPITAL ENCOUNTER (OUTPATIENT)
Dept: PHYSICAL THERAPY | Age: 75
Setting detail: THERAPIES SERIES
Discharge: HOME | End: 2025-02-14
Payer: MEDICARE

## 2025-02-14 PROCEDURE — 97110 THERAPEUTIC EXERCISES: CPT

## 2025-02-14 ASSESSMENT — PAIN DESCRIPTION - ORIENTATION: ORIENTATION: RIGHT

## 2025-02-14 ASSESSMENT — PAIN SCALES - GENERAL: PAINLEVEL_OUTOF10: 5

## 2025-02-14 ASSESSMENT — PAIN DESCRIPTION - LOCATION: LOCATION: HIP

## 2025-02-14 ASSESSMENT — PAIN DESCRIPTION - PAIN TYPE: TYPE: CHRONIC PAIN

## 2025-02-14 NOTE — PROGRESS NOTES
Daily Treatment Note  Date: 2025  Patient Name: Debbie Asher  MRN: [id number]     :   1950    Referring Physician: Lisa Isaac* Lisa Deleon MD   PCP: Lisa Isaac MD    Medical Diagnosis: Other abnormalities of gait and mobility [R26.89]  Unequal limb length (acquired), unspecified site [M21.70] leg length discrepancy (M21.70) and balance disorder (R26.89)  No data recorded    Insurance: Payor: [employer] MEDICARE / Plan: HUMANA CHOICE-PPO MEDICARE / Product Type: *No Product type* /   Insurance ID: [id number] - (Medicare Managed)    Subjective:  General  Diagnosis: leg length discrepancy (M21.70) and balance disorder (R26.89)  Referring Provider (secondary): Lisa Deleon MD  PT Insurance Information: [employer] [facility] (auth after eval through [employer]) Eval + 12 visits of [id number], [id number], [id number] approved 02/10- , [employer] Medicaid  Total # of Visits Approved: 13  Total # of Visits to Date: 2  Plan of Care/Certification Expiration Date: 25  Progress Note Due Date: 25  Referring Provider (secondary): Lisa Deleon MD  Subjective: My R hip is hurting but it's been worse  Patient Currently in Pain: Yes  Pain Level: 5  Pain Type: Chronic pain  Pain Location: Hip  Pain Orientation: Right       Treatment Activities:  Exercises:      Treatment Reasoning    Exercise 1: ++decreased balance due to leg leg length difference, moderate risk to fall (should be less with lift in right shoe), weakness right hip/knee flexors and knee extensors++  Exercise 2: 6 minute walk in [place], encourage increased arm swing  472'  with 2 seated rest (32\" & 34\")  Exercise 3: supine bridging with ball between knees--2/15 reps  1 x 10  stopped due to pain  Exercise 4: right leg SAQ's with yellow bolster (add weight as tolerance permits)--2/15 reps  Exercise 5: supine marching with legs in hook position--20 reps   2 x 10  Exercise 6: right side heel slides  2/10  Exercise 7: AA to AROM

## 2025-02-21 ENCOUNTER — HOSPITAL ENCOUNTER (OUTPATIENT)
Dept: PHYSICAL THERAPY | Age: 75
Setting detail: THERAPIES SERIES
End: 2025-02-21
Payer: MEDICARE

## 2025-02-24 RX ORDER — ATORVASTATIN CALCIUM 40 MG/1
40 TABLET, FILM COATED ORAL NIGHTLY
Qty: 90 TABLET | Refills: 0 | Status: SHIPPED | OUTPATIENT
Start: 2025-02-24

## 2025-02-25 ENCOUNTER — APPOINTMENT (OUTPATIENT)
Dept: PHYSICAL THERAPY | Age: 75
End: 2025-02-25
Payer: MEDICARE

## 2025-02-28 ENCOUNTER — APPOINTMENT (OUTPATIENT)
Dept: PHYSICAL THERAPY | Age: 75
End: 2025-02-28
Payer: MEDICARE

## 2025-03-10 ENCOUNTER — OFFICE VISIT (OUTPATIENT)
Dept: PRIMARY CARE CLINIC | Age: 75
End: 2025-03-10
Payer: MEDICARE

## 2025-03-10 VITALS
TEMPERATURE: 97.4 F | HEIGHT: 68 IN | SYSTOLIC BLOOD PRESSURE: 120 MMHG | RESPIRATION RATE: 16 BRPM | OXYGEN SATURATION: 97 % | BODY MASS INDEX: 21.98 KG/M2 | HEART RATE: 86 BPM | WEIGHT: 145 LBS | DIASTOLIC BLOOD PRESSURE: 80 MMHG

## 2025-03-10 DIAGNOSIS — J06.9 URI, ACUTE: Primary | ICD-10-CM

## 2025-03-10 DIAGNOSIS — R05.1 ACUTE COUGH: ICD-10-CM

## 2025-03-10 PROCEDURE — 4004F PT TOBACCO SCREEN RCVD TLK: CPT | Performed by: FAMILY MEDICINE

## 2025-03-10 PROCEDURE — 99212 OFFICE O/P EST SF 10 MIN: CPT | Performed by: FAMILY MEDICINE

## 2025-03-10 PROCEDURE — G8399 PT W/DXA RESULTS DOCUMENT: HCPCS | Performed by: FAMILY MEDICINE

## 2025-03-10 PROCEDURE — 1159F MED LIST DOCD IN RCRD: CPT | Performed by: FAMILY MEDICINE

## 2025-03-10 PROCEDURE — G8420 CALC BMI NORM PARAMETERS: HCPCS | Performed by: FAMILY MEDICINE

## 2025-03-10 PROCEDURE — 3017F COLORECTAL CA SCREEN DOC REV: CPT | Performed by: FAMILY MEDICINE

## 2025-03-10 PROCEDURE — 1090F PRES/ABSN URINE INCON ASSESS: CPT | Performed by: FAMILY MEDICINE

## 2025-03-10 PROCEDURE — 1123F ACP DISCUSS/DSCN MKR DOCD: CPT | Performed by: FAMILY MEDICINE

## 2025-03-10 PROCEDURE — G8427 DOCREV CUR MEDS BY ELIG CLIN: HCPCS | Performed by: FAMILY MEDICINE

## 2025-03-10 ASSESSMENT — ENCOUNTER SYMPTOMS
WHEEZING: 0
NAUSEA: 0
DIARRHEA: 0
COUGH: 1
EYE DISCHARGE: 0
VOMITING: 0
COLOR CHANGE: 0
ABDOMINAL PAIN: 0
BACK PAIN: 0

## 2025-03-10 NOTE — PROGRESS NOTES
SUBJECTIVE:    Patient ID: Debbie Asher is a 74 y.o. female.    History of Present Illness  The patient presents for evaluation of fatigue.    She began experiencing symptoms of rhinorrhea, postnasal drip, and cough approximately 1.5 weeks ago. Although these symptoms have since resolved, she reports persistent fatigue and weakness. She is not experiencing any fever, vomiting, or diarrhea. Initially, she was expectorating with her cough, but this has improved. She has not been in contact with any sick individuals. She is not currently taking any medications for her symptoms and admits to inadequate fluid intake, primarily consuming water when she does hydrate. She is not experiencing any shortness of breath but continues to feel weak and tired. She has been attempting to rest as much as possible and has been supplementing with a multivitamin.      Past Medical History:   Diagnosis Date    Cerebral artery occlusion with cerebral infarction ([facility])     TIA    COPD (chronic obstructive pulmonary disease) ([facility])     Depression     Diverticular disease of colon     Hyperlipidemia     Hypothyroidism     Insomnia     Pacemaker     Pneumonia     Psychiatric problem     Seizures ([facility])     Tobacco abuse     Vitamin B12 deficiency       Current Outpatient Medications on File Prior to Visit   Medication Sig Dispense Refill    atorvastatin (LIPITOR) 40 MG tablet Take 1 tablet by mouth nightly 90 tablet 0    cariprazine hcl (VRAYLAR) 1.5 MG capsule Take 1 capsule by mouth daily 30 capsule 5    sertraline (ZOLOFT) 25 MG tablet Take 1 tablet by mouth daily 30 tablet 3    albuterol sulfate HFA (PROAIR HFA) 108 (90 Base) MCG/ACT inhaler Inhale 2 puffs into the lungs every 6 hours as needed for Wheezing 1 each 3    budesonide-formoterol (SYMBICORT) 160-4.5 MCG/ACT AERO Inhale 2 puffs into the lungs 2 times daily 1 each 3    pantoprazole (PROTONIX) 40 MG tablet TAKE 1 TABLET BY MOUTH ONCE DAILY IN THE MORNING BEFORE BREAKFAST 30 tablet 2

## 2025-03-11 ENCOUNTER — RESULTS FOLLOW-UP (OUTPATIENT)
Dept: PRIMARY CARE CLINIC | Age: 75
End: 2025-03-11

## 2025-03-21 RX ORDER — PANTOPRAZOLE SODIUM 40 MG/1
TABLET, DELAYED RELEASE ORAL
Qty: 90 TABLET | Refills: 0 | Status: SHIPPED | OUTPATIENT
Start: 2025-03-21

## 2025-04-22 RX ORDER — ALBUTEROL SULFATE 90 UG/1
2 INHALANT RESPIRATORY (INHALATION) EVERY 6 HOURS PRN
Qty: 9 G | Refills: 0 | Status: SHIPPED | OUTPATIENT
Start: 2025-04-22

## 2025-04-28 ENCOUNTER — RESULTS FOLLOW-UP (OUTPATIENT)
Dept: CARDIOLOGY CLINIC | Age: 75
End: 2025-04-28

## 2025-04-28 DIAGNOSIS — Z95.0 PACEMAKER: Primary | ICD-10-CM

## 2025-04-28 DIAGNOSIS — R00.1 BRADYCARDIA: ICD-10-CM

## 2025-05-27 RX ORDER — ATORVASTATIN CALCIUM 40 MG/1
40 TABLET, FILM COATED ORAL NIGHTLY
Qty: 90 TABLET | Refills: 1 | Status: SHIPPED | OUTPATIENT
Start: 2025-05-27

## 2025-06-10 ENCOUNTER — OFFICE VISIT (OUTPATIENT)
Dept: PRIMARY CARE CLINIC | Age: 75
End: 2025-06-10
Payer: MEDICARE

## 2025-06-10 ENCOUNTER — TELEPHONE (OUTPATIENT)
Dept: OTHER | Age: 75
End: 2025-06-10

## 2025-06-10 VITALS
DIASTOLIC BLOOD PRESSURE: 72 MMHG | HEART RATE: 100 BPM | SYSTOLIC BLOOD PRESSURE: 108 MMHG | BODY MASS INDEX: 21.82 KG/M2 | TEMPERATURE: 98 F | WEIGHT: 144 LBS | OXYGEN SATURATION: 98 % | HEIGHT: 68 IN | RESPIRATION RATE: 16 BRPM

## 2025-06-10 DIAGNOSIS — Z12.11 SCREENING FOR COLON CANCER: ICD-10-CM

## 2025-06-10 DIAGNOSIS — Z87.891 PERSONAL HISTORY OF TOBACCO USE: ICD-10-CM

## 2025-06-10 DIAGNOSIS — J44.9 CHRONIC OBSTRUCTIVE PULMONARY DISEASE, UNSPECIFIED COPD TYPE (HCC): ICD-10-CM

## 2025-06-10 DIAGNOSIS — E03.9 ACQUIRED HYPOTHYROIDISM: Primary | ICD-10-CM

## 2025-06-10 DIAGNOSIS — E03.9 HYPOTHYROIDISM, UNSPECIFIED TYPE: ICD-10-CM

## 2025-06-10 PROBLEM — R56.9 SEIZURES (HCC): Status: RESOLVED | Noted: 2019-11-04 | Resolved: 2025-06-10

## 2025-06-10 LAB
ANION GAP SERPL CALCULATED.3IONS-SCNC: 9 MMOL/L (ref 8–16)
BUN SERPL-MCNC: 13 MG/DL (ref 8–23)
CALCIUM SERPL-MCNC: 9.1 MG/DL (ref 8.8–10.2)
CHLORIDE SERPL-SCNC: 110 MMOL/L (ref 98–107)
CO2 SERPL-SCNC: 25 MMOL/L (ref 22–29)
CREAT SERPL-MCNC: 0.7 MG/DL (ref 0.5–0.9)
GLUCOSE SERPL-MCNC: 133 MG/DL (ref 70–99)
POTASSIUM SERPL-SCNC: 3.9 MMOL/L (ref 3.5–5.1)
SODIUM SERPL-SCNC: 144 MMOL/L (ref 136–145)
T4 FREE SERPL-MCNC: 1.33 NG/DL (ref 0.93–1.7)
TSH SERPL DL<=0.005 MIU/L-ACNC: 3.26 UIU/ML (ref 0.27–4.2)

## 2025-06-10 PROCEDURE — G8420 CALC BMI NORM PARAMETERS: HCPCS | Performed by: FAMILY MEDICINE

## 2025-06-10 PROCEDURE — G0296 VISIT TO DETERM LDCT ELIG: HCPCS | Performed by: FAMILY MEDICINE

## 2025-06-10 PROCEDURE — 4004F PT TOBACCO SCREEN RCVD TLK: CPT | Performed by: FAMILY MEDICINE

## 2025-06-10 PROCEDURE — 99214 OFFICE O/P EST MOD 30 MIN: CPT | Performed by: FAMILY MEDICINE

## 2025-06-10 PROCEDURE — 3017F COLORECTAL CA SCREEN DOC REV: CPT | Performed by: FAMILY MEDICINE

## 2025-06-10 PROCEDURE — 3023F SPIROM DOC REV: CPT | Performed by: FAMILY MEDICINE

## 2025-06-10 PROCEDURE — 1159F MED LIST DOCD IN RCRD: CPT | Performed by: FAMILY MEDICINE

## 2025-06-10 PROCEDURE — G8427 DOCREV CUR MEDS BY ELIG CLIN: HCPCS | Performed by: FAMILY MEDICINE

## 2025-06-10 PROCEDURE — 1090F PRES/ABSN URINE INCON ASSESS: CPT | Performed by: FAMILY MEDICINE

## 2025-06-10 PROCEDURE — G8399 PT W/DXA RESULTS DOCUMENT: HCPCS | Performed by: FAMILY MEDICINE

## 2025-06-10 PROCEDURE — 1123F ACP DISCUSS/DSCN MKR DOCD: CPT | Performed by: FAMILY MEDICINE

## 2025-06-10 RX ORDER — TRAZODONE HYDROCHLORIDE 100 MG/1
200 TABLET ORAL NIGHTLY
Qty: 60 TABLET | Refills: 5 | Status: SHIPPED | OUTPATIENT
Start: 2025-06-10 | End: 2025-12-07

## 2025-06-10 ASSESSMENT — ENCOUNTER SYMPTOMS
DIARRHEA: 0
ABDOMINAL PAIN: 0
WHEEZING: 0
COLOR CHANGE: 0
COUGH: 0
BACK PAIN: 0
EYE DISCHARGE: 0
NAUSEA: 0
VOMITING: 0

## 2025-06-10 NOTE — PATIENT INSTRUCTIONS
follow-up, he or she will help you understand what to do next.  After a lung cancer screening, you can go back to your usual activities right away.  A lung cancer screening test can't tell if you have lung cancer. If your results are positive, your doctor can't tell whether an abnormal finding is a harmless nodule, cancer, or something else without doing more tests.  What can you do to help prevent lung cancer?  Some lung cancers can't be prevented. But if you smoke, quitting smoking is the best step you can take to prevent lung cancer. If you want to quit, your doctor can recommend medicines or other ways to help.  Follow-up care is a key part of your treatment and safety. Be sure to make and go to all appointments, and call your doctor if you are having problems. It's also a good idea to know your test results and keep a list of the medicines you take.  Where can you learn more?  Go to https://www.[employer].net/patientEd and enter Q940 to learn more about \"Learning About Lung Cancer Screening.\"  Current as of: October 25, 2024  Content Version: 14.5  © 4508-8720 [employer] ([employer]).   Care instructions adapted under license by [employer]. If you have questions about a medical condition or this instruction, always ask your healthcare professional. [employer], disclaims any warranty or liability for your use of this information.

## 2025-06-10 NOTE — TELEPHONE ENCOUNTER
Dr. Isaac ordered smoking cessation referral for patient due to nicotine dependence.  Offered smoking cessation class beginning in June.  Pt states she doesn't need the class because she quit smoking 2 months ago.

## 2025-06-10 NOTE — PROGRESS NOTES
Discussed with the patient the current USPSTF guidelines released March 9, 2021 for screening for lung cancer.    For adults aged 50 to 80 years who have a 20 pack-year smoking history and currently smoke or have quit within the past 15 years the grade B recommendation is to:  Screen for lung cancer with low-dose computed tomography (LDCT) every year.  Stop screening once a person has not smoked for 15 years or has a health problem that limits life expectancy or the ability to have lung surgery.    The patient  reports that she has been smoking cigarettes. She started smoking about 54 years ago. She has a 27.3 pack-year smoking history. She has never used smokeless tobacco.. Discussed with patient the risks and benefits of screening, including over-diagnosis, false positive rate, and total radiation exposure.  The patient currently exhibits no signs or symptoms suggestive of lung cancer.  Discussed with patient the importance of compliance with yearly annual lung cancer screenings and willingness to undergo diagnosis and treatment if screening scan is positive.  In addition, the patient was counseled regarding the importance of remaining smoke free and/or total smoking cessation.    Also reviewed the following if the patient has Medicare that as of February 10, 2022, Medicare only covers LDCT screening in patients aged 50-77 with at least a 20 pack-year smoking history who currently smoke or have quit in the last 15 years

## 2025-06-11 ENCOUNTER — RESULTS FOLLOW-UP (OUTPATIENT)
Dept: PRIMARY CARE CLINIC | Age: 75
End: 2025-06-11

## 2025-06-11 DIAGNOSIS — R73.09 ELEVATED GLUCOSE: Primary | ICD-10-CM

## 2025-06-11 LAB — HBA1C MFR BLD: 5.7 % (ref 4–5.6)

## 2025-06-26 ENCOUNTER — HOSPITAL ENCOUNTER (OUTPATIENT)
Dept: CT IMAGING | Age: 75
Discharge: HOME | End: 2025-06-26
Payer: MEDICARE

## 2025-06-26 DIAGNOSIS — Z87.891 PERSONAL HISTORY OF TOBACCO USE: ICD-10-CM

## 2025-06-26 PROCEDURE — 71271 CT THORAX LUNG CANCER SCR C-: CPT

## 2025-06-26 RX ORDER — PANTOPRAZOLE SODIUM 40 MG/1
TABLET, DELAYED RELEASE ORAL
Qty: 90 TABLET | Refills: 1 | Status: SHIPPED | OUTPATIENT
Start: 2025-06-26

## 2025-07-01 DIAGNOSIS — J18.9 PNEUMONIA OF UPPER LOBE DUE TO INFECTIOUS ORGANISM, UNSPECIFIED LATERALITY: ICD-10-CM

## 2025-07-01 DIAGNOSIS — R91.8 ABNORMAL CT LUNG SCREENING: Primary | ICD-10-CM

## 2025-07-01 RX ORDER — AZITHROMYCIN 250 MG/1
TABLET, FILM COATED ORAL
Qty: 6 TABLET | Refills: 0 | Status: SHIPPED | OUTPATIENT
Start: 2025-07-01 | End: 2025-07-11

## 2025-07-15 ENCOUNTER — ANESTHESIA EVENT (OUTPATIENT)
Dept: ENDOSCOPY | Age: 75
End: 2025-07-15
Payer: MEDICARE

## 2025-07-15 ENCOUNTER — TELEPHONE (OUTPATIENT)
Dept: GASTROENTEROLOGY | Age: 75
End: 2025-07-15

## 2025-07-15 NOTE — TELEPHONE ENCOUNTER
called patient to confirm  procedure scheduled for  7/18/25@629 with Dr. West at [facility] outpatient

## 2025-07-16 ENCOUNTER — OFFICE VISIT (OUTPATIENT)
Dept: PULMONOLOGY | Age: 75
End: 2025-07-16
Payer: MEDICARE

## 2025-07-16 VITALS
HEIGHT: 69 IN | OXYGEN SATURATION: 95 % | HEART RATE: 89 BPM | TEMPERATURE: 99 F | WEIGHT: 147 LBS | BODY MASS INDEX: 21.77 KG/M2 | DIASTOLIC BLOOD PRESSURE: 62 MMHG | SYSTOLIC BLOOD PRESSURE: 92 MMHG | RESPIRATION RATE: 20 BRPM

## 2025-07-16 DIAGNOSIS — Z87.09 HISTORY OF PNEUMOTHORAX: ICD-10-CM

## 2025-07-16 DIAGNOSIS — R06.2 WHEEZING: ICD-10-CM

## 2025-07-16 DIAGNOSIS — Z87.891 HISTORY OF SMOKING: ICD-10-CM

## 2025-07-16 DIAGNOSIS — G45.9 TIA (TRANSIENT ISCHEMIC ATTACK): ICD-10-CM

## 2025-07-16 DIAGNOSIS — J98.4 APICAL LUNG SCARRING: Primary | ICD-10-CM

## 2025-07-16 PROCEDURE — 4004F PT TOBACCO SCREEN RCVD TLK: CPT | Performed by: INTERNAL MEDICINE

## 2025-07-16 PROCEDURE — 1123F ACP DISCUSS/DSCN MKR DOCD: CPT | Performed by: INTERNAL MEDICINE

## 2025-07-16 PROCEDURE — 3017F COLORECTAL CA SCREEN DOC REV: CPT | Performed by: INTERNAL MEDICINE

## 2025-07-16 PROCEDURE — G8399 PT W/DXA RESULTS DOCUMENT: HCPCS | Performed by: INTERNAL MEDICINE

## 2025-07-16 PROCEDURE — G8420 CALC BMI NORM PARAMETERS: HCPCS | Performed by: INTERNAL MEDICINE

## 2025-07-16 PROCEDURE — 1090F PRES/ABSN URINE INCON ASSESS: CPT | Performed by: INTERNAL MEDICINE

## 2025-07-16 PROCEDURE — 1159F MED LIST DOCD IN RCRD: CPT | Performed by: INTERNAL MEDICINE

## 2025-07-16 PROCEDURE — 99204 OFFICE O/P NEW MOD 45 MIN: CPT | Performed by: INTERNAL MEDICINE

## 2025-07-16 PROCEDURE — G8427 DOCREV CUR MEDS BY ELIG CLIN: HCPCS | Performed by: INTERNAL MEDICINE

## 2025-07-16 ASSESSMENT — ENCOUNTER SYMPTOMS
ABDOMINAL PAIN: 0
BACK PAIN: 0
ANAL BLEEDING: 0
COUGH: 0
SHORTNESS OF BREATH: 1
APNEA: 0
CHEST TIGHTNESS: 0
ABDOMINAL DISTENTION: 0
RHINORRHEA: 0
WHEEZING: 1

## 2025-07-16 NOTE — PROGRESS NOTES
the above.          Continue the following medications as reported by the patient:    Prior to Visit Medications    Medication Sig Taking? Authorizing Provider   pantoprazole (PROTONIX) 40 MG tablet TAKE 1 TABLET BY MOUTH ONCE DAILY IN THE MORNING BEFORE BREAKFAST Yes Lisa Isaac MD   traZODone (DESYREL) 100 MG tablet Take 2 tablets by mouth nightly Yes Lisa Isaac MD   atorvastatin (LIPITOR) 40 MG tablet Take 1 tablet by mouth nightly Yes Lisa Isaac MD   albuterol sulfate HFA (PROVENTIL;VENTOLIN;PROAIR) 108 (90 Base) MCG/ACT inhaler INHALE 2 PUFFS BY MOUTH EVERY 6 HOURS AS NEEDED FOR WHEEZING Yes Lisa Isaac MD   cariprazine hcl (VRAYLAR) 1.5 MG capsule Take 1 capsule by mouth daily Yes Lisa Isaac MD   sertraline (ZOLOFT) 25 MG tablet Take 1 tablet by mouth daily Yes Lisa Isaac MD   budesonide-formoterol (SYMBICORT) 160-4.5 MCG/ACT AERO Inhale 2 puffs into the lungs 2 times daily Yes Lisa Isaac MD   levothyroxine (SYNTHROID) 112 MCG tablet Take 1 tablet by mouth daily Yes Lisa Isaac MD   famotidine (PEPCID) 20 MG tablet Take 1 tablet by mouth every 12 hours Yes Kristofer Rice MD   midodrine (PROAMATINE) 2.5 MG tablet Take 1 tablet by mouth 3 times daily (with meals) Yes Allie Currie, APRN - CNP   aspirin 81 MG EC tablet Take 1 tablet by mouth daily Yes Kristofer Rice MD   Nutritional Supplements (ENSURE ORIGINAL) LIQD Take 1 each by mouth in the morning and at bedtime  Patient not taking: Reported on 7/16/2025  Lisa Isaac MD   ferrous sulfate (IRON 325) 325 (65 Fe) MG tablet Take 1 tablet by mouth daily (with breakfast)  Patient not taking: Reported on 7/16/2025  ProviderKristofer MD        Review of Systems   Constitutional:  Negative for activity change, appetite change, chills, diaphoresis and fatigue.   HENT:  Negative for congestion, dental problem,

## 2025-07-18 ENCOUNTER — ANESTHESIA (OUTPATIENT)
Dept: ENDOSCOPY | Age: 75
End: 2025-07-18
Payer: MEDICARE

## 2025-07-18 ENCOUNTER — HOSPITAL ENCOUNTER (OUTPATIENT)
Age: 75
Setting detail: OUTPATIENT SURGERY
Discharge: HOME | End: 2025-07-18
Attending: INTERNAL MEDICINE | Admitting: INTERNAL MEDICINE
Payer: MEDICARE

## 2025-07-18 ENCOUNTER — ANCILLARY PROCEDURE (OUTPATIENT)
Dept: ENDOSCOPY | Age: 75
End: 2025-07-18
Attending: INTERNAL MEDICINE
Payer: MEDICARE

## 2025-07-18 VITALS
OXYGEN SATURATION: 96 % | HEIGHT: 68 IN | RESPIRATION RATE: 18 BRPM | TEMPERATURE: 96.9 F | HEART RATE: 67 BPM | DIASTOLIC BLOOD PRESSURE: 69 MMHG | WEIGHT: 147 LBS | BODY MASS INDEX: 22.28 KG/M2 | SYSTOLIC BLOOD PRESSURE: 119 MMHG

## 2025-07-18 PROBLEM — Z12.11 SCREENING FOR COLON CANCER: Status: ACTIVE | Noted: 2025-07-18

## 2025-07-18 PROCEDURE — 3609027000 HC COLONOSCOPY: Performed by: INTERNAL MEDICINE

## 2025-07-18 PROCEDURE — G0121 COLON CA SCRN NOT HI RSK IND: HCPCS | Performed by: INTERNAL MEDICINE

## 2025-07-18 PROCEDURE — 3700000000 HC ANESTHESIA ATTENDED CARE: Performed by: INTERNAL MEDICINE

## 2025-07-18 PROCEDURE — 3700000001 HC ADD 15 MINUTES (ANESTHESIA): Performed by: INTERNAL MEDICINE

## 2025-07-18 PROCEDURE — 2709999900 HC NON-CHARGEABLE SUPPLY: Performed by: INTERNAL MEDICINE

## 2025-07-18 PROCEDURE — 7100000011 HC PHASE II RECOVERY - ADDTL 15 MIN: Performed by: INTERNAL MEDICINE

## 2025-07-18 PROCEDURE — 7100000010 HC PHASE II RECOVERY - FIRST 15 MIN: Performed by: INTERNAL MEDICINE

## 2025-07-18 PROCEDURE — 2580000003 HC RX 258: Performed by: INTERNAL MEDICINE

## 2025-07-18 PROCEDURE — 6360000002 HC RX W HCPCS: Performed by: NURSE ANESTHETIST, CERTIFIED REGISTERED

## 2025-07-18 RX ORDER — SODIUM CHLORIDE, SODIUM LACTATE, POTASSIUM CHLORIDE, CALCIUM CHLORIDE 600; 310; 30; 20 MG/100ML; MG/100ML; MG/100ML; MG/100ML
INJECTION, SOLUTION INTRAVENOUS CONTINUOUS
Status: DISCONTINUED | OUTPATIENT
Start: 2025-07-18 | End: 2025-07-18 | Stop reason: HOSPADM

## 2025-07-18 RX ORDER — PROPOFOL 10 MG/ML
INJECTION, EMULSION INTRAVENOUS
Status: DISCONTINUED | OUTPATIENT
Start: 2025-07-18 | End: 2025-07-18 | Stop reason: SDUPTHER

## 2025-07-18 RX ORDER — LIDOCAINE HYDROCHLORIDE 10 MG/ML
INJECTION, SOLUTION INFILTRATION; PERINEURAL
Status: DISCONTINUED | OUTPATIENT
Start: 2025-07-18 | End: 2025-07-18 | Stop reason: SDUPTHER

## 2025-07-18 RX ADMIN — PROPOFOL 110 MG: 10 INJECTION, EMULSION INTRAVENOUS at 09:51

## 2025-07-18 RX ADMIN — LIDOCAINE HYDROCHLORIDE 40 MG: 10 INJECTION, SOLUTION INFILTRATION; PERINEURAL at 09:51

## 2025-07-18 RX ADMIN — SODIUM CHLORIDE, SODIUM LACTATE, POTASSIUM CHLORIDE, AND CALCIUM CHLORIDE: .6; .31; .03; .02 INJECTION, SOLUTION INTRAVENOUS at 08:32

## 2025-07-18 ASSESSMENT — PAIN - FUNCTIONAL ASSESSMENT
PAIN_FUNCTIONAL_ASSESSMENT: NONE - DENIES PAIN
PAIN_FUNCTIONAL_ASSESSMENT: 0-10

## 2025-07-18 NOTE — OP NOTE
Patient: Debbie Asher : 1950  Med Rec#: [id number] Acc#: [id number]   Primary Care Provider Lisa Isaac MD    Date of Procedure:  2025    Endoscopist: Chip West MD    Referring Provider: Lisa Isaac MD,     Operation Performed: Colonoscopy     Indications: screening    Anesthesia:  Sedation was administered by anesthesia who monitored the patient during the procedure.    I met with Debbie Asher prior to procedure. We discussed the procedure itself, and I have discussed the risks of endoscopy (including-- but not limited to-- pain, discomfort, bleeding potentially requiring second endoscopic procedure and/or blood transfusion, organ perforation requiring operative repair, damage to organs near the colon, infection, aspiration, cardiopulmonary/allergic reaction), benefits, indications to endoscopy. Additionally, we discussed options other than colonoscopy. The patient expressed understanding. All questions answered. The patient decided to proceed with the procedure.  Signed informed consent was placed on the chart.    Blood Loss: minimal    Withdrawal time: > 6 minutes  Bowel Prep: adequate     Complications: no immediate complications    DESCRIPTION OF PROCEDURE:     A time out was performed. After written informed consent was obtained, the patient was placed in the left lateral position.     The perianal area was inspected, and a digital rectal exam was performed. A rectal exam was performed: normal tone, no palpable lesions. At this point, a forward viewing Olympus colonoscope was inserted into the anus and carefully advanced to the cecum.  The cecum was identified by the ileocecal valve and the appendiceal orifice. The colonoscope was then slowly withdrawn with careful inspection of the mucosa in a linear and circumferential fashion. The scope was retroflexed in the rectum. Suction was utilized during the procedure to remove as much air as possible from the  bowel. The colonoscope was removed from the patient, and the procedure was terminated.  Findings are listed below.        Findings:     The mucosa appeared normal throughout the entire examined colon     There was evidence of diverticular disease throughout the sigmoid colon.     Retroflexion in the rectum was normal and revealed no further abnormalities         Recommendations:  1. Repeat colonoscopy - repeat exam in 10 yrs       Findings and recommendations were discussed w/ the patient. A copy of the images was provided.    Chip West MD  7/18/2025  10:06 AM

## 2025-07-18 NOTE — DISCHARGE INSTRUCTIONS
Recommendations:  1. Repeat colonoscopy - repeat exam in 10 yrs     POST-OP ORDERS: COLONOSCOPY:    1. Rest today.  2. DO NOT eat or drink until wide awake; eat your usual diet today in moderate amount only.  3. DO NOT drive today.  4. Call physician if you have severe pain, vomiting, fever, rectal bleeding or black bowel movements.  5.  If a biopsy was taken or a polyp removed, you should expect to hear results in about 21 days.  If you have heard nothing from your physician by then, call the office for results.  6.  Discharge home when patient awake, vitals signs stable and tolerating liquids.  7. Call with questions or concerns [phone].

## 2025-07-18 NOTE — ANESTHESIA PRE PROCEDURE
Department of Anesthesiology  Preprocedure Note       Name:  Debbie Asher   Age:  74 y.o.  :  1950                                          MRN:  [id number]         Date:  2025      Surgeon: Surgeon(s):  Chip West MD    Procedure: Procedure(s):  COLORECTAL CANCER SCREENING, NOT HIGH RISK    Medications prior to admission:   Prior to Admission medications    Medication Sig Start Date End Date Taking? Authorizing Provider   pantoprazole (PROTONIX) 40 MG tablet TAKE 1 TABLET BY MOUTH ONCE DAILY IN THE MORNING BEFORE BREAKFAST 25  Yes Lisa Isaac MD   traZODone (DESYREL) 100 MG tablet Take 2 tablets by mouth nightly 6/10/25 12/7/25 Yes Lisa Isaac MD   atorvastatin (LIPITOR) 40 MG tablet Take 1 tablet by mouth nightly 25  Yes Lisa Isaac MD   cariprazine hcl (VRAYLAR) 1.5 MG capsule Take 1 capsule by mouth daily 25  Yes Lisa Isaac MD   budesonide-formoterol (SYMBICORT) 160-4.5 MCG/ACT AERO Inhale 2 puffs into the lungs 2 times daily 25  Yes Lisa Isaac MD   levothyroxine (SYNTHROID) 112 MCG tablet Take 1 tablet by mouth daily 24  Yes Lisa Isaac MD   aspirin 81 MG EC tablet Take 1 tablet by mouth daily   Yes ProviderKristofer MD   albuterol sulfate HFA (PROVENTIL;VENTOLIN;PROAIR) 108 (90 Base) MCG/ACT inhaler INHALE 2 PUFFS BY MOUTH EVERY 6 HOURS AS NEEDED FOR WHEEZING 25   Lisa Isaac MD   famotidine (PEPCID) 20 MG tablet Take 1 tablet by mouth every 12 hours 24   ProviderKristofer MD   midodrine (PROAMATINE) 2.5 MG tablet Take 1 tablet by mouth 3 times daily (with meals) 24   Allie Currie, APRN - CNP   Nutritional Supplements (ENSURE ORIGINAL) LIQD Take 1 each by mouth in the morning and at bedtime  Patient not taking: Reported on 23   Lisa Isaac MD       Current medications:    Current Facility-Administered

## 2025-07-18 NOTE — H&P
Patient Name: Debbie Asher  : 1950  MRN: [id number]  DATE: 25    Allergies:   Allergies   Allergen Reactions    Medrol [Methylprednisolone]     Norco [Hydrocodone-Acetaminophen] Palpitations    Penicillins Rash        ENDOSCOPY  History and Physical    Procedure:    [] Diagnostic Colonoscopy       [x] Screening Colonoscopy  [] EGD      [] ERCP      [] EUS       [] Other    [x] Previous office notes/History and Physical reviewed from the patients chart. Please see EMR for further details of HPI. I have examined the patient's status immediately prior to the procedure and:      Indications/HPI:       [x] Screening              [] History of Polyps      []Fhx of colon CA/polyps []+Cologard/DNA/Stool Testing      Anesthesia:   [x] MAC [] Moderate Sedation   [] General   [] None     ROS: 12 pt review of systems was negative unless stated above    Medications:   Prior to Admission medications    Medication Sig Start Date End Date Taking? Authorizing Provider   pantoprazole (PROTONIX) 40 MG tablet TAKE 1 TABLET BY MOUTH ONCE DAILY IN THE MORNING BEFORE BREAKFAST 25  Yes Lisa Isaac MD   traZODone (DESYREL) 100 MG tablet Take 2 tablets by mouth nightly 6/10/25 12/7/25 Yes Lisa Isaac MD   atorvastatin (LIPITOR) 40 MG tablet Take 1 tablet by mouth nightly 25  Yes Lisa Isaac MD   cariprazine hcl (VRAYLAR) 1.5 MG capsule Take 1 capsule by mouth daily 25  Yes Lisa Isaac MD   budesonide-formoterol (SYMBICORT) 160-4.5 MCG/ACT AERO Inhale 2 puffs into the lungs 2 times daily 25  Yes Lisa Isaac MD   levothyroxine (SYNTHROID) 112 MCG tablet Take 1 tablet by mouth daily 24  Yes Lisa Isaac MD   aspirin 81 MG EC tablet Take 1 tablet by mouth daily   Yes ProviderKristofer MD   albuterol sulfate HFA (PROVENTIL;VENTOLIN;PROAIR) 108 (90 Base) MCG/ACT inhaler INHALE 2 PUFFS BY MOUTH EVERY 6 HOURS AS

## 2025-07-18 NOTE — ANESTHESIA POSTPROCEDURE EVALUATION
Department of Anesthesiology  Postprocedure Note    Patient: Debbie Asher  MRN: [id number]  YOB: 1950  Date of evaluation: 7/18/2025    Procedure Summary       Date: 07/18/25 Room / Location: [place] [address] / [facility]    Anesthesia Start: 0944 Anesthesia Stop:     Procedure: COLORECTAL CANCER SCREENING, NOT HIGH RISK Diagnosis:       Screen for colon cancer      (Screen for colon cancer [Z12.11])    Surgeons: Chip West MD Responsible Provider: Zoe Sparks APRN - CRNA    Anesthesia Type: general, TIVA ASA Status: 3            Anesthesia Type: No value filed.    Jennifer Phase I: Jennifer Score: 10    Jennifer Phase II:      Anesthesia Post Evaluation    Patient location during evaluation: bedside  Patient participation: complete - patient participated  Level of consciousness: sleepy but conscious  Pain score: 0  Airway patency: patent  Nausea & Vomiting: no nausea and no vomiting  Cardiovascular status: hemodynamically stable and blood pressure returned to baseline  Respiratory status: acceptable and nasal cannula  Hydration status: stable  Pain management: adequate    No notable events documented.

## 2025-07-29 DIAGNOSIS — Z95.0 PACEMAKER: Primary | ICD-10-CM

## 2025-07-29 DIAGNOSIS — R00.1 SYMPTOMATIC BRADYCARDIA: ICD-10-CM

## 2025-08-17 PROBLEM — Z12.11 SCREENING FOR COLON CANCER: Status: RESOLVED | Noted: 2025-07-18 | Resolved: 2025-08-17

## (undated) DEVICE — Z INACTIVE USE 2660664 SOLUTION IRRIG 3000ML 0.9% SOD CHL USP UROMATIC PLAS CONT

## (undated) DEVICE — DRAPE,ORTHOMAX ,HIP,W/POUCHES: Brand: MEDLINE

## (undated) DEVICE — TUBE ET 7MM NSL ORAL BASIC CUF INTMED MURPHY EYE RADPQ MRK

## (undated) DEVICE — 3M™ IOBAN™ 2 ANTIMICROBIAL INCISE DRAPE 6651EZ: Brand: IOBAN™ 2

## (undated) DEVICE — TOTAL TRAY, 16FR 10ML SIL FOLEY, URN: Brand: MEDLINE

## (undated) DEVICE — CURAVIEW LED LARYNSCP BLDE

## (undated) DEVICE — ENDO KIT,LOURDES HOSPITAL: Brand: MEDLINE INDUSTRIES, INC.

## (undated) DEVICE — SOLUTION IV IRRIG POUR BRL 0.9% SODIUM CHL 2F7124

## (undated) DEVICE — X-RAY CASSETTE COVER: Brand: CONVERTORS

## (undated) DEVICE — SURGICAL PROCEDURE PACK HIP TOT DBD CDS LOURDES HOSP LTX

## (undated) DEVICE — SUTURE VCRL SZ 1 L27IN ABSRB UD L36MM CP-1 1/2 CIR REV CUT J268H

## (undated) DEVICE — DRESSING BORDERED ADH GZ UNIV GEN USE 8INX4IN AND 6INX2IN

## (undated) DEVICE — SUTURE VCRL SZ 0 L27IN ABSRB UD L36MM CT-1 1/2 CIR J260H

## (undated) DEVICE — DUAL CUT SAGITTAL BLADE

## (undated) DEVICE — GAUZE,SPONGE,8"X4",12PLY,XRAY,STRL,LF: Brand: MEDLINE

## (undated) DEVICE — SUTURE ETHBND SZ 1 L30IN NONABSORBABLE GRN OS-6 L36MM 1/2 X538H

## (undated) DEVICE — SUTURE VCRL SZ 2-0 L36IN ABSRB UD L36MM CT-1 1/2 CIR J945H

## (undated) DEVICE — Device

## (undated) DEVICE — FORCEPS BX 240CM 2.4MM L NDL RAD JAW 4 M00513334